# Patient Record
Sex: FEMALE | Race: BLACK OR AFRICAN AMERICAN | NOT HISPANIC OR LATINO | Employment: UNEMPLOYED | URBAN - METROPOLITAN AREA
[De-identification: names, ages, dates, MRNs, and addresses within clinical notes are randomized per-mention and may not be internally consistent; named-entity substitution may affect disease eponyms.]

---

## 2017-01-03 ENCOUNTER — TELEPHONE (OUTPATIENT)
Dept: INTERNAL MEDICINE | Facility: CLINIC | Age: 42
End: 2017-01-03

## 2017-01-03 DIAGNOSIS — M79.7 FIBROMYALGIA: Chronic | ICD-10-CM

## 2017-01-03 RX ORDER — HYDROCODONE BITARTRATE AND ACETAMINOPHEN 7.5; 325 MG/1; MG/1
1 TABLET ORAL EVERY 12 HOURS PRN
Qty: 60 TABLET | Refills: 0 | Status: SHIPPED | OUTPATIENT
Start: 2017-01-03 | End: 2017-02-06 | Stop reason: SDUPTHER

## 2017-01-03 NOTE — TELEPHONE ENCOUNTER
Sent refill on pain medication.  However needs appointment before next refill.  Cannot wait to appointment in April. Reschedule for end of this month or beginning of next month before runs out of med again

## 2017-01-03 NOTE — TELEPHONE ENCOUNTER
----- Message from Dina Hewitt sent at 1/3/2017  1:32 PM CST -----  Contact: pt  Pt is requesting an rx refill for pain medication. Pt uses ..  [facility] - [address] [address]  Phone: [phone] Fax: [phone]    Pt can be reached at [phone]

## 2017-01-19 ENCOUNTER — PATIENT OUTREACH (OUTPATIENT)
Dept: ADMINISTRATIVE | Facility: HOSPITAL | Age: 42
End: 2017-01-19

## 2017-02-06 ENCOUNTER — LAB VISIT (OUTPATIENT)
Dept: LAB | Facility: HOSPITAL | Age: 42
End: 2017-02-06
Attending: FAMILY MEDICINE
Payer: COMMERCIAL

## 2017-02-06 ENCOUNTER — OFFICE VISIT (OUTPATIENT)
Dept: INTERNAL MEDICINE | Facility: CLINIC | Age: 42
End: 2017-02-06
Payer: COMMERCIAL

## 2017-02-06 VITALS
BODY MASS INDEX: 35.37 KG/M2 | SYSTOLIC BLOOD PRESSURE: 136 MMHG | TEMPERATURE: 98 F | WEIGHT: 219.13 LBS | DIASTOLIC BLOOD PRESSURE: 88 MMHG

## 2017-02-06 DIAGNOSIS — E78.5 HYPERLIPIDEMIA ASSOCIATED WITH TYPE 2 DIABETES MELLITUS: Chronic | ICD-10-CM

## 2017-02-06 DIAGNOSIS — F41.9 ANXIETY: Chronic | ICD-10-CM

## 2017-02-06 DIAGNOSIS — E05.90 HYPERTHYROIDISM: ICD-10-CM

## 2017-02-06 DIAGNOSIS — Z23 NEED FOR TDAP VACCINATION: ICD-10-CM

## 2017-02-06 DIAGNOSIS — Z23 NEED FOR PNEUMOCOCCAL VACCINATION: ICD-10-CM

## 2017-02-06 DIAGNOSIS — M79.7 FIBROMYALGIA: Chronic | ICD-10-CM

## 2017-02-06 DIAGNOSIS — E11.69 HYPERLIPIDEMIA ASSOCIATED WITH TYPE 2 DIABETES MELLITUS: Chronic | ICD-10-CM

## 2017-02-06 DIAGNOSIS — E11.9 TYPE 2 DIABETES MELLITUS WITHOUT COMPLICATION, WITHOUT LONG-TERM CURRENT USE OF INSULIN: Primary | Chronic | ICD-10-CM

## 2017-02-06 DIAGNOSIS — E11.59 HYPERTENSION ASSOCIATED WITH DIABETES: Chronic | ICD-10-CM

## 2017-02-06 DIAGNOSIS — I15.2 HYPERTENSION ASSOCIATED WITH DIABETES: Chronic | ICD-10-CM

## 2017-02-06 DIAGNOSIS — J45.20 ASTHMA, INTERMITTENT, UNCOMPLICATED: ICD-10-CM

## 2017-02-06 DIAGNOSIS — E11.9 TYPE 2 DIABETES MELLITUS WITHOUT COMPLICATION, WITHOUT LONG-TERM CURRENT USE OF INSULIN: Chronic | ICD-10-CM

## 2017-02-06 PROCEDURE — 3060F POS MICROALBUMINURIA REV: CPT | Mod: S$GLB,,, | Performed by: FAMILY MEDICINE

## 2017-02-06 PROCEDURE — 2022F DILAT RTA XM EVC RTNOPTHY: CPT | Mod: S$GLB,,, | Performed by: FAMILY MEDICINE

## 2017-02-06 PROCEDURE — 90732 PPSV23 VACC 2 YRS+ SUBQ/IM: CPT | Mod: S$GLB,,, | Performed by: FAMILY MEDICINE

## 2017-02-06 PROCEDURE — 36415 COLL VENOUS BLD VENIPUNCTURE: CPT | Mod: PO

## 2017-02-06 PROCEDURE — 84443 ASSAY THYROID STIM HORMONE: CPT

## 2017-02-06 PROCEDURE — 90471 IMMUNIZATION ADMIN: CPT | Mod: S$GLB,,, | Performed by: FAMILY MEDICINE

## 2017-02-06 PROCEDURE — 90715 TDAP VACCINE 7 YRS/> IM: CPT | Mod: S$GLB,,, | Performed by: FAMILY MEDICINE

## 2017-02-06 PROCEDURE — 90472 IMMUNIZATION ADMIN EACH ADD: CPT | Mod: S$GLB,,, | Performed by: FAMILY MEDICINE

## 2017-02-06 PROCEDURE — 99999 PR PBB SHADOW E&M-EST. PATIENT-LVL III: CPT | Mod: PBBFAC,,, | Performed by: FAMILY MEDICINE

## 2017-02-06 PROCEDURE — 3075F SYST BP GE 130 - 139MM HG: CPT | Mod: S$GLB,,, | Performed by: FAMILY MEDICINE

## 2017-02-06 PROCEDURE — 84439 ASSAY OF FREE THYROXINE: CPT

## 2017-02-06 PROCEDURE — 83036 HEMOGLOBIN GLYCOSYLATED A1C: CPT

## 2017-02-06 PROCEDURE — 99215 OFFICE O/P EST HI 40 MIN: CPT | Mod: S$GLB,,, | Performed by: FAMILY MEDICINE

## 2017-02-06 PROCEDURE — 3079F DIAST BP 80-89 MM HG: CPT | Mod: S$GLB,,, | Performed by: FAMILY MEDICINE

## 2017-02-06 PROCEDURE — 3044F HG A1C LEVEL LT 7.0%: CPT | Mod: S$GLB,,, | Performed by: FAMILY MEDICINE

## 2017-02-06 RX ORDER — HYDROCODONE BITARTRATE AND ACETAMINOPHEN 7.5; 325 MG/1; MG/1
1 TABLET ORAL EVERY 12 HOURS PRN
Qty: 60 TABLET | Refills: 0 | Status: SHIPPED | OUTPATIENT
Start: 2017-02-06 | End: 2017-03-03 | Stop reason: SDUPTHER

## 2017-02-06 RX ORDER — PANTOPRAZOLE SODIUM 20 MG/1
1 TABLET, DELAYED RELEASE ORAL DAILY
Refills: 0 | COMMUNITY
Start: 2017-01-19 | End: 2017-07-24

## 2017-02-06 RX ORDER — PROPRANOLOL HYDROCHLORIDE 40 MG/1
40 TABLET ORAL 2 TIMES DAILY
COMMUNITY
Start: 2016-11-10 | End: 2017-11-22 | Stop reason: SDUPTHER

## 2017-02-06 RX ORDER — CLINDAMYCIN HYDROCHLORIDE 300 MG/1
1 CAPSULE ORAL 3 TIMES DAILY
Refills: 0 | COMMUNITY
Start: 2017-01-11 | End: 2017-07-24

## 2017-02-06 RX ORDER — METHIMAZOLE 10 MG/1
1 TABLET ORAL DAILY
Refills: 3 | COMMUNITY
Start: 2016-11-10 | End: 2017-07-28 | Stop reason: SDUPTHER

## 2017-02-06 NOTE — LETTER
February 6, 2017                 [facility] Internal Medicine  Internal Medicine  [address] [place] [address]  Phone: [phone]  Fax: [phone]   February 6, 2017     Patient: Shelly Kam   YOB: 1975   Date of Visit: 2/6/2017       To Whom it May Concern:    Shelly Kam was seen in my clinic on 2/6/2017. She may return to work on 2/7/2017.    If you have any questions or concerns, please don't hesitate to call.    Sincerely,         Heraclio Farrell MD/Rakel Godoy LPN

## 2017-02-06 NOTE — PROGRESS NOTES
Subjective:   Patient ID: Shelly Kam is a 42 y.o. female.  Chief Complaint:  Medication Refill    HPI Comments: Patient presents for follow-up on diabetes.  A1c 5.8 in October.  Microalbumin negative.  Controlled on metformin.  Needs eye exam.  Hypertension stable.  Negative cardiac review of symptoms.  Hyperlipidemia controlled with LDL 77 in October.  Fibromyalgia and anxiety stable on present regimen of Celexa daily with hydrocodone twice a day.  In October, new diagnosis hyperthyroidism.  Saw Dr. Jonathon CLAYTON at [facility].  Started on propanolol for symptoms and Tapazole.  No follow-up visit to repeat labs since.  No other new issues or concerns today.  Also needs pneumonia and tetanus updated.  Needs GYN referral, but declines at this time.     Review of Systems   Constitutional: Positive for fatigue. Negative for chills, diaphoresis and fever.   HENT: Positive for sore throat, trouble swallowing and voice change. Negative for congestion, dental problem, ear discharge, ear pain, facial swelling, mouth sores, nosebleeds, postnasal drip, rhinorrhea and sinus pressure.    Eyes: Negative for visual disturbance.   Respiratory: Negative for cough, chest tightness, shortness of breath and wheezing.    Cardiovascular: Negative for chest pain, palpitations and leg swelling.   Gastrointestinal: Negative for abdominal distention, abdominal pain, constipation, diarrhea, nausea and vomiting.   Endocrine: Negative for polydipsia, polyphagia and polyuria.   Genitourinary: Negative for difficulty urinating, dysuria, flank pain, frequency, hematuria, pelvic pain and urgency.   Musculoskeletal: Positive for myalgias. Negative for back pain and neck pain.   Skin: Negative for rash.   Neurological: Negative for dizziness, tremors, syncope, weakness, light-headedness, numbness and headaches.   Psychiatric/Behavioral: Negative for agitation, behavioral problems, confusion, decreased concentration, dysphoric mood,  hallucinations, self-injury, sleep disturbance and suicidal ideas. The patient is not nervous/anxious and is not hyperactive.      Objective:     Visit Vitals    /88    Temp 98.2 °F (36.8 °C) (Tympanic)    Wt 99.4 kg (219 lb 2.2 oz)    LMP 02/02/2017    BMI 35.37 kg/m2     Physical Exam   Constitutional: She is oriented to person, place, and time. Vital signs are normal. She appears well-developed and well-nourished. She is cooperative. No distress.   Eyes: Conjunctivae are normal. Right conjunctiva is not injected. Left conjunctiva is not injected. No scleral icterus.   Neck: No JVD present. Carotid bruit is not present. No thyroid mass and no thyromegaly present.   Healed but tender tracheostomy scar midline neck   Cardiovascular: Normal rate, regular rhythm and normal heart sounds.  Exam reveals no gallop and no friction rub.    No murmur heard.  Pulmonary/Chest: Effort normal and breath sounds normal. She has no wheezes. She has no rhonchi. She has no rales.   Abdominal: Soft. She exhibits no distension. There is no hepatosplenomegaly. There is no tenderness. There is no rebound, no guarding and no CVA tenderness.   Musculoskeletal: She exhibits no edema.   Neurological: She is alert and oriented to person, place, and time. She displays a negative Romberg sign. Coordination and gait normal.   Skin: Skin is warm and dry. No rash noted.   Psychiatric: She has a normal mood and affect. Her behavior is normal. Judgment and thought content normal. Her mood appears not anxious. Her affect is not angry, not blunt, not labile and not inappropriate. Her speech is not rapid and/or pressured, not delayed, not tangential and not slurred. She is not agitated, not aggressive, not hyperactive, not slowed, not withdrawn, not actively hallucinating and not combative. Thought content is not paranoid and not delusional. Cognition and memory are not impaired. She does not exhibit a depressed mood. She expresses no  homicidal and no suicidal ideation. She is communicative. She exhibits normal recent memory and normal remote memory. She is attentive.   Nursing note and vitals reviewed.    Assessment:     1. Type 2 diabetes mellitus without complication, without long-term current use of insulin    2. Hypertension associated with diabetes    3. Hyperlipidemia associated with type 2 diabetes mellitus    4. Fibromyalgia    5. Anxiety    6. Asthma, intermittent, uncomplicated    7. Hyperthyroidism    8. Need for pneumococcal vaccination    9. Need for Tdap vaccination      Plan:   Type 2 diabetes mellitus without complication, without long-term current use of insulin  -     Hemoglobin A1c; Future; Expected date: 2/6/17  -     Ambulatory referral to Ophthalmology  Adjust metformin as needed.  Micro albumin up-to-date and negative.    Hypertension associated with diabetes  BP at goal.  Controlled.  Continue present medications.    Hyperlipidemia associated with type 2 diabetes mellitus  LDL less than 100.  At goal.  Continue present dose of pravastatin.    Fibromyalgia  -     hydrocodone-acetaminophen 7.5-325mg (NORCO) 7.5-325 mg per tablet; Take 1 tablet by mouth every 12 (twelve) hours as needed for Pain.  Dispense: 60 tablet; Refill: 0  Pain controlled present regimen.  Continue with 60 pills per month.  Okay to refill ×3.  Reassess in 3 months.    Anxiety  Stable on Celexa.    Hyperthyroidism  -     T4, free; Future; Expected date: 2/6/17  -     TSH; Future; Expected date: 2/6/17  Questionable levels.  On Tapazole and propranolol per Dr. Jonathon CLAYTON.  No follow-up visit to date.  Labs as above.  Additional referral/evaluation as needed.    RHM  -     Pneumococcal Polysaccharide Vaccine (23 Valent) (SQ/IM)  -     Tdap Vaccine (Adult)  Declines GYN referral    RTC 3 months

## 2017-02-06 NOTE — MR AVS SNAPSHOT
[facility] Internal Medicine  [address] [place]  [facility] [address]  Phone: [phone]  Fax: [phone]                  Shelly Kam   2017 9:20 AM   Office Visit    Description:  Female : 1975   Provider:  Heraclio Farrell MD   Department:  [facility] - Internal Medicine           Reason for Visit     Medication Refill           Diagnoses this Visit        Comments    Type 2 diabetes mellitus without complication, without long-term current use of insulin    -  Primary     Hypertension associated with diabetes         Hyperlipidemia associated with type 2 diabetes mellitus         Fibromyalgia         Anxiety         Asthma, intermittent, uncomplicated         Hyperthyroidism         Need for pneumococcal vaccination         Need for Tdap vaccination                To Do List           Future Appointments        Provider Department Dept Phone    2017 11:30 AM LAB, SAME DAY [facility] [phone]    2017 9:00 AM Heraclio Farrell MD [facility] Internal [facility] [phone]      Goals (5 Years of Data)     None      Follow-Up and Disposition     Return in about 3 months (around 2017).       These Medications        Disp Refills Start End    hydrocodone-acetaminophen 7.5-325mg (NORCO) 7.5-325 mg per tablet 60 tablet 0 2017     Take 1 tablet by mouth every 12 (twelve) hours as needed for Pain. - Oral    Pharmacy: [place] - [address] #: [phone]         [facility] On Call     [facility] On Call Nurse Care Line -  Assistance  Registered nurses in the [facility] On Call Center provide clinical advisement, health education, appointment booking, and other advisory services.  Call for this free service at [phone].             Medications           Message regarding Medications     Verify the changes and/or additions to your medication regime listed below are the same as discussed with your  clinician today.  If any of these changes or additions are incorrect, please notify your healthcare provider.        STOP taking these medications     omeprazole (PRILOSEC) 40 MG capsule Take 1 capsule (40 mg total) by mouth before dinner.           Verify that the below list of medications is an accurate representation of the medications you are currently taking.  If none reported, the list may be blank. If incorrect, please contact your healthcare provider. Carry this list with you in case of emergency.           Current Medications     albuterol 90 mcg/actuation inhaler Inhale 2 puffs into the lungs every 6 (six) hours as needed for Wheezing.    amlodipine (NORVASC) 10 MG tablet Take 1 tablet (10 mg total) by mouth once daily.    citalopram (CELEXA) 20 MG tablet Take 1 tablet (20 mg total) by mouth once daily.    clindamycin (CLEOCIN) 300 MG capsule Take 1 capsule by mouth 3 (three) times daily.    hydrocodone-acetaminophen 7.5-325mg (NORCO) 7.5-325 mg per tablet Take 1 tablet by mouth every 12 (twelve) hours as needed for Pain.    metformin (GLUMETZA) 1000 MG (MOD) 24 hr tablet Take 1 tablet (1,000 mg total) by mouth daily with breakfast.    methimazole (TAPAZOLE) 10 MG Tab Take 1 tablet by mouth once daily.    pravastatin (PRAVACHOL) 10 MG tablet Take 1 tablet (10 mg total) by mouth once daily.    propranolol (INDERAL) 40 MG tablet Take 40 mg by mouth 3 (three) times daily.    albuterol (ACCUNEB) 0.63 mg/3 mL Nebu Take 3 mLs (0.63 mg total) by nebulization every 6 (six) hours as needed.    pantoprazole (PROTONIX) 20 MG tablet Take 1 tablet by mouth once daily.           Clinical Reference Information           Your Vitals Were     BP Temp Weight Last Period BMI    136/88 98.2 °F (36.8 °C) (Tympanic) 99.4 kg (219 lb 2.2 oz) 02/02/2017 35.37 kg/m2      Blood Pressure          Most Recent Value    BP  136/88      Allergies as of 2/6/2017     Pcn [Penicillins]    Morphine      Immunizations Administered on Date of  Encounter - 2/6/2017     Name Date Dose VIS Date Route    Pneumococcal Polysaccharide - 23 Valent  Incomplete 0.5 mL 4/24/2015 Intramuscular    TDAP  Incomplete 0.5 mL 2/24/2015 Intramuscular      Orders Placed During Today's Visit      Normal Orders This Visit    Ambulatory referral to Ophthalmology     Pneumococcal Polysaccharide Vaccine (23 Valent) (SQ/IM)     Tdap Vaccine (Adult)     Future Labs/Procedures Expected by Expires    Hemoglobin A1c  2/6/2017 5/7/2017    T4, free  2/6/2017 5/7/2017    TSH  2/6/2017 4/7/2018      Language Assistance Services     ATTENTION: Language assistance services are available, free of charge. Please call [phone].      ATENCIÓN: Si habla shmuel, tiene a lehman disposición servicios gratuitos de asistencia lingüística. Llame al [phone].     CHÚ Ý: N?u b?n nói Ti?ng Vi?t, có các d?ch v? h? tr? ngôn ng? mi?n phí dành cho b?n. G?i s? [phone].         [facility] - Internal Medicine complies with applicable Federal civil rights laws and does not discriminate on the basis of race, color, national origin, age, disability, or sex.

## 2017-02-07 ENCOUNTER — TELEPHONE (OUTPATIENT)
Dept: INTERNAL MEDICINE | Facility: CLINIC | Age: 42
End: 2017-02-07

## 2017-02-07 LAB
ESTIMATED AVG GLUCOSE: 126 MG/DL
HBA1C MFR BLD HPLC: 6 %
T4 FREE SERPL-MCNC: 1.24 NG/DL
TSH SERPL DL<=0.005 MIU/L-ACNC: <0.01 UIU/ML

## 2017-02-07 NOTE — TELEPHONE ENCOUNTER
----- Message from Heraclio Farrell MD sent at 2/7/2017  2:09 PM CST -----  a1C good Prediabetes control. No med changes needed    Circulating thyroid levels normal, but TSH still suppressed.  Follow up with Endo but probably only needs to continue Tapazole, no RA iodine needed

## 2017-02-15 ENCOUNTER — TELEPHONE (OUTPATIENT)
Dept: INTERNAL MEDICINE | Facility: CLINIC | Age: 42
End: 2017-02-15

## 2017-02-15 NOTE — LETTER
February 15, 2017    Shelly Kam  [address]  [place] [address]             [facility] Internal Medicine  [address]  [place] [address]  Phone: [phone]  Fax: [phone] Dear Shelly Kam      Please contact office in regards to your test results.    Sincerely,        Heraclio Farrell MD/Rakel Godoy LPN

## 2017-03-03 ENCOUNTER — TELEPHONE (OUTPATIENT)
Dept: INTERNAL MEDICINE | Facility: CLINIC | Age: 42
End: 2017-03-03

## 2017-03-03 DIAGNOSIS — M79.7 FIBROMYALGIA: Chronic | ICD-10-CM

## 2017-03-03 RX ORDER — HYDROCODONE BITARTRATE AND ACETAMINOPHEN 7.5; 325 MG/1; MG/1
1 TABLET ORAL EVERY 12 HOURS PRN
Qty: 60 TABLET | Refills: 0 | Status: SHIPPED | OUTPATIENT
Start: 2017-03-03 | End: 2017-03-31 | Stop reason: SDUPTHER

## 2017-03-03 NOTE — TELEPHONE ENCOUNTER
Sent refill of pain medication.  Please let her know we except some but not all of at the insurance plans.  If its at medicaid product, she will need to follow-up/see primary care physician on her card.  I can refill her medications for the next 3 months while she gets this sorted out.

## 2017-03-03 NOTE — TELEPHONE ENCOUNTER
S/w pt. Pt stated that she needs refill for her pain medication sent into [facility] on [address]. Pt also stated that she no longer has insurance and hasn't spoke with the Endocrinologist. Pt will be getting [employer] insurance soon and will call back to get a new referral for Endocrinology. Told pt that I would discuss with Dr. Farrell and return her call. Pt verbalized understanding

## 2017-03-31 ENCOUNTER — TELEPHONE (OUTPATIENT)
Dept: INTERNAL MEDICINE | Facility: CLINIC | Age: 42
End: 2017-03-31

## 2017-03-31 DIAGNOSIS — M79.7 FIBROMYALGIA: Chronic | ICD-10-CM

## 2017-03-31 RX ORDER — HYDROCODONE BITARTRATE AND ACETAMINOPHEN 7.5; 325 MG/1; MG/1
1 TABLET ORAL EVERY 12 HOURS PRN
Qty: 60 TABLET | Refills: 0 | Status: SHIPPED | OUTPATIENT
Start: 2017-03-31 | End: 2017-05-01 | Stop reason: SDUPTHER

## 2017-03-31 NOTE — TELEPHONE ENCOUNTER
----- Message from Manju Jensen sent at 3/31/2017 12:30 PM CDT -----  Contact: Patient  Patient is requesting a refill on Hydrocodone-[facility]. Please call patient back if needed at [phone]. Thank you

## 2017-04-24 ENCOUNTER — PATIENT OUTREACH (OUTPATIENT)
Dept: ADMINISTRATIVE | Facility: HOSPITAL | Age: 42
End: 2017-04-24

## 2017-05-01 DIAGNOSIS — M79.7 FIBROMYALGIA: Chronic | ICD-10-CM

## 2017-05-01 RX ORDER — HYDROCODONE BITARTRATE AND ACETAMINOPHEN 7.5; 325 MG/1; MG/1
1 TABLET ORAL EVERY 12 HOURS PRN
Qty: 60 TABLET | Refills: 0 | Status: SHIPPED | OUTPATIENT
Start: 2017-05-01 | End: 2017-06-02 | Stop reason: SDUPTHER

## 2017-05-01 NOTE — TELEPHONE ENCOUNTER
----- Message from Izabella Valentin sent at 5/1/2017 12:24 PM CDT -----  Pt at [phone]//states she is needing a refill of her pain med//Hydrocodone//uses//[facility]/[address]//please call//yanci/adwoa

## 2017-06-02 ENCOUNTER — TELEPHONE (OUTPATIENT)
Dept: INTERNAL MEDICINE | Facility: CLINIC | Age: 42
End: 2017-06-02

## 2017-06-02 DIAGNOSIS — M79.7 FIBROMYALGIA: Chronic | ICD-10-CM

## 2017-06-02 RX ORDER — HYDROCODONE BITARTRATE AND ACETAMINOPHEN 7.5; 325 MG/1; MG/1
1 TABLET ORAL EVERY 12 HOURS PRN
Qty: 30 TABLET | Refills: 0 | Status: SHIPPED | OUTPATIENT
Start: 2017-06-02 | End: 2017-06-14 | Stop reason: SDUPTHER

## 2017-06-02 NOTE — TELEPHONE ENCOUNTER
----- Message from Lupis Saldaña sent at 6/2/2017 12:39 PM CDT -----  Contact: Patient  Patient called to request a refill on:    1. Hydrocodone    [id number] - [place] - [address] AT [address] [facility]  [address] [address]  Phone: [phone] Fax: [phone]    She can be contacted at [phone].    Thanks,  Lupis

## 2017-06-03 ENCOUNTER — NURSE TRIAGE (OUTPATIENT)
Dept: ADMINISTRATIVE | Facility: CLINIC | Age: 42
End: 2017-06-03

## 2017-06-05 ENCOUNTER — TELEPHONE (OUTPATIENT)
Dept: INTERNAL MEDICINE | Facility: CLINIC | Age: 42
End: 2017-06-05

## 2017-06-05 NOTE — TELEPHONE ENCOUNTER
S/w pt. Pt stated that she picked up rx for her Norco but it was only for 30 pills, but should have been for 60 pills. Pt already filled rx but would like to know what she needs to do once she runs out since she takes it twice daily. Told pt that I would discuss with Dr. Farrell and return her call. Pt verbalized understanding.

## 2017-06-05 NOTE — TELEPHONE ENCOUNTER
----- Message from Gwendolyn Valentin sent at 6/5/2017 10:13 AM CDT -----  Contact: pt  Please call pt @ [phone] regarding hydrocodone script she picked up today from Dr. Mendoza pt states the quantity is incorrect, pt states she takes meds twice a day.

## 2017-06-06 NOTE — TELEPHONE ENCOUNTER
MD Rakel Regalado LPN   Caller: Unspecified (Yesterday, 11:07 AM)             Her last visit was in February. The next refill will be the last I can give her without a follow up visit.

## 2017-06-06 NOTE — TELEPHONE ENCOUNTER
MD Rakel Regalado LPN   Caller: Unspecified (Yesterday, 11:07 AM)             Prescribed on June 2nd. Should last 15 days until June 17th   Call on or after June 14 th for refill.   Will prescribe 60 pills then, and get back on her 1 month regimin

## 2017-06-14 DIAGNOSIS — M79.7 FIBROMYALGIA: Chronic | ICD-10-CM

## 2017-06-14 NOTE — TELEPHONE ENCOUNTER
S/w pt. Pt stated that she is completely out of [place] and needs a refill. Pt was given 30 pills on 6/2/17 but she normally gets 60 tablets. Per telephone note on 6/5/17 pt was instructed to call clinic back and refill would be sent in for 60 tablets to get her back on a 1 month regimen. Told pt that I would discuss with on call doctor and return her call. Pt verbalized understanding and would like rx sent directly to pharmacy.

## 2017-06-14 NOTE — TELEPHONE ENCOUNTER
----- Message from Desmond Arroyo sent at 6/14/2017 12:35 PM CDT -----  Contact: Pt  Pt request refill on Hydrocodone,...    [id number] - [place] - [address] AT [facility]  [address] [address]  Phone: [phone] Fax: [phone]     please contact pt at [phone]

## 2017-06-15 ENCOUNTER — TELEPHONE (OUTPATIENT)
Dept: INTERNAL MEDICINE | Facility: CLINIC | Age: 42
End: 2017-06-15

## 2017-06-15 RX ORDER — HYDROCODONE BITARTRATE AND ACETAMINOPHEN 7.5; 325 MG/1; MG/1
1 TABLET ORAL EVERY 12 HOURS PRN
Qty: 60 TABLET | Refills: 0 | Status: SHIPPED | OUTPATIENT
Start: 2017-06-15 | End: 2017-07-18 | Stop reason: SDUPTHER

## 2017-06-15 NOTE — TELEPHONE ENCOUNTER
----- Message from Adeline Wilson sent at 6/15/2017 11:46 AM CDT -----  Contact: pt   Pt did call yesterday about medication and is calling back again,,, please call pt back at [phone]

## 2017-06-21 ENCOUNTER — PATIENT OUTREACH (OUTPATIENT)
Dept: ADMINISTRATIVE | Facility: HOSPITAL | Age: 42
End: 2017-06-21

## 2017-06-29 ENCOUNTER — PATIENT OUTREACH (OUTPATIENT)
Dept: ADMINISTRATIVE | Facility: HOSPITAL | Age: 42
End: 2017-06-29

## 2017-07-17 ENCOUNTER — TELEPHONE (OUTPATIENT)
Dept: INTERNAL MEDICINE | Facility: CLINIC | Age: 42
End: 2017-07-17

## 2017-07-17 NOTE — TELEPHONE ENCOUNTER
Per last refill/telephone call on 6/5/17 pt was to follow up for any additional refills. Pt had appt scheduled for 7/5/17 but appt was canceled. Called pt, left message informing that she needs to be seen.

## 2017-07-17 NOTE — TELEPHONE ENCOUNTER
----- Message from Amaury Roth sent at 7/17/2017  4:27 PM CDT -----  Contact: Pt   Pt needs two medication refilled see below:    ..1. What is the name of the medication you are requesting? Hydrocodone  2. What is the dose? 7.5 mgs   3. How do you take the medication? Orally, topically, etc? Orally   4. How often do you take this medication? Twice a day   5. Do you need a 30 day or 90 day supply? 30 days   6. How many refills are you requesting? 1  7. What is your preferred pharmacy and location of the pharmacy?   ..  [facility] [place], [place] [address] [facility] & [address] [address]  Phone: [phone] Fax: [phone]      8. Who can we contact with further questions? ..[phone] (home)       ..1. What is the name of the medication you are requesting? amlodipine  2. What is the dose? 10 mgs   3. How do you take the medication? Orally, topically, etc? Orally   4. How often do you take this medication? Once a day   5. Do you need a 30 day or 90 day supply? 30  6. How many refills are you requesting? 1  7. What is your preferred pharmacy and location of the pharmacy?   ..  [facility] [address] [place], [place] [address] [address] & [address] [address]  Phone: [phone] Fax: [phone]      8. Who can we contact with further questions?.[phone]

## 2017-07-18 ENCOUNTER — TELEPHONE (OUTPATIENT)
Dept: INTERNAL MEDICINE | Facility: CLINIC | Age: 42
End: 2017-07-18

## 2017-07-18 DIAGNOSIS — M79.7 FIBROMYALGIA: Chronic | ICD-10-CM

## 2017-07-18 RX ORDER — HYDROCODONE BITARTRATE AND ACETAMINOPHEN 7.5; 325 MG/1; MG/1
1 TABLET ORAL EVERY 12 HOURS PRN
Qty: 14 TABLET | Refills: 0 | Status: SHIPPED | OUTPATIENT
Start: 2017-07-18 | End: 2017-07-24 | Stop reason: SDUPTHER

## 2017-07-18 RX ORDER — AMLODIPINE BESYLATE 10 MG/1
10 TABLET ORAL DAILY
Qty: 90 TABLET | Refills: 0 | Status: SHIPPED | OUTPATIENT
Start: 2017-07-18 | End: 2017-10-25 | Stop reason: SDUPTHER

## 2017-07-18 NOTE — TELEPHONE ENCOUNTER
----- Message from Eliazar Clovis sent at 7/18/2017 10:01 AM CDT -----  Contact: pt  1. What is the name of the medication you are requesting? Hydrocodone & Amalodopine  2. What is the dose? 7.5 mg & 10 mg  3. How do you take the medication? Orally, topically, etc? Orally  4. How often do you take this medication? Twice daily 7 once daily  5. Do you need a 30 day or 90 day supply? 30  6. How many refills are you requesting? 1  7. What is your preferred pharmacy and location of the pharmacy? [facility] on [facility] [address] [address] [facility]  8. Who can we contact with further questions? [phone] (aqlu)

## 2017-07-18 NOTE — TELEPHONE ENCOUNTER
S/w pt. Pt stated that she was originally scheduled for a follow up on 7/5/17  But it had to be canceled because Dr. Farrell was out of clinic. Pt stated that she is out of her medication and needs refills. Pt does have follow up appt scheduled for 7/24/17 @ 11:20am. Told pt that I would discuss with on call provider and return her call. Pt verbalized understanding.

## 2017-07-24 ENCOUNTER — OFFICE VISIT (OUTPATIENT)
Dept: INTERNAL MEDICINE | Facility: CLINIC | Age: 42
End: 2017-07-24
Payer: COMMERCIAL

## 2017-07-24 ENCOUNTER — LAB VISIT (OUTPATIENT)
Dept: LAB | Facility: HOSPITAL | Age: 42
End: 2017-07-24
Attending: INTERNAL MEDICINE
Payer: COMMERCIAL

## 2017-07-24 ENCOUNTER — OFFICE VISIT (OUTPATIENT)
Dept: OPHTHALMOLOGY | Facility: CLINIC | Age: 42
End: 2017-07-24
Payer: COMMERCIAL

## 2017-07-24 ENCOUNTER — OFFICE VISIT (OUTPATIENT)
Dept: ENDOCRINOLOGY | Facility: CLINIC | Age: 42
End: 2017-07-24
Payer: COMMERCIAL

## 2017-07-24 VITALS
TEMPERATURE: 98 F | WEIGHT: 224 LBS | HEIGHT: 65 IN | SYSTOLIC BLOOD PRESSURE: 132 MMHG | DIASTOLIC BLOOD PRESSURE: 74 MMHG | BODY MASS INDEX: 37.32 KG/M2 | HEART RATE: 80 BPM

## 2017-07-24 VITALS
DIASTOLIC BLOOD PRESSURE: 70 MMHG | HEART RATE: 76 BPM | SYSTOLIC BLOOD PRESSURE: 110 MMHG | OXYGEN SATURATION: 99 % | WEIGHT: 224.44 LBS | BODY MASS INDEX: 37.39 KG/M2 | HEIGHT: 65 IN | TEMPERATURE: 98 F

## 2017-07-24 DIAGNOSIS — E11.9 TYPE 2 DIABETES MELLITUS WITHOUT COMPLICATION, WITHOUT LONG-TERM CURRENT USE OF INSULIN: ICD-10-CM

## 2017-07-24 DIAGNOSIS — M79.7 FIBROMYALGIA: Primary | ICD-10-CM

## 2017-07-24 DIAGNOSIS — E05.90 HYPERTHYROIDISM: Primary | ICD-10-CM

## 2017-07-24 DIAGNOSIS — E05.90 HYPERTHYROIDISM: ICD-10-CM

## 2017-07-24 DIAGNOSIS — H52.13 MYOPIA WITH ASTIGMATISM AND PRESBYOPIA, BILATERAL: ICD-10-CM

## 2017-07-24 DIAGNOSIS — K52.9 GASTROENTERITIS, ACUTE: ICD-10-CM

## 2017-07-24 DIAGNOSIS — E11.69 HYPERLIPIDEMIA ASSOCIATED WITH TYPE 2 DIABETES MELLITUS: ICD-10-CM

## 2017-07-24 DIAGNOSIS — E78.5 HYPERLIPIDEMIA ASSOCIATED WITH TYPE 2 DIABETES MELLITUS: ICD-10-CM

## 2017-07-24 DIAGNOSIS — H52.203 MYOPIA WITH ASTIGMATISM AND PRESBYOPIA, BILATERAL: ICD-10-CM

## 2017-07-24 DIAGNOSIS — H52.31 ANISOMETROPIA: ICD-10-CM

## 2017-07-24 DIAGNOSIS — H52.4 MYOPIA WITH ASTIGMATISM AND PRESBYOPIA, BILATERAL: ICD-10-CM

## 2017-07-24 DIAGNOSIS — I15.2 HYPERTENSION ASSOCIATED WITH DIABETES: ICD-10-CM

## 2017-07-24 DIAGNOSIS — E11.59 HYPERTENSION ASSOCIATED WITH DIABETES: ICD-10-CM

## 2017-07-24 DIAGNOSIS — E11.9 TYPE 2 DIABETES MELLITUS WITHOUT RETINOPATHY: Primary | ICD-10-CM

## 2017-07-24 LAB
ALBUMIN SERPL BCP-MCNC: 3.4 G/DL
ALP SERPL-CCNC: 91 U/L
ALT SERPL W/O P-5'-P-CCNC: 11 U/L
ANION GAP SERPL CALC-SCNC: 8 MMOL/L
AST SERPL-CCNC: 13 U/L
BASOPHILS # BLD AUTO: 0.04 K/UL
BASOPHILS NFR BLD: 0.6 %
BILIRUB SERPL-MCNC: 0.1 MG/DL
BUN SERPL-MCNC: 10 MG/DL
CALCIUM SERPL-MCNC: 9 MG/DL
CHLORIDE SERPL-SCNC: 105 MMOL/L
CO2 SERPL-SCNC: 26 MMOL/L
CREAT SERPL-MCNC: 0.7 MG/DL
DIFFERENTIAL METHOD: ABNORMAL
EOSINOPHIL # BLD AUTO: 0.5 K/UL
EOSINOPHIL NFR BLD: 7 %
ERYTHROCYTE [DISTWIDTH] IN BLOOD BY AUTOMATED COUNT: 14.9 %
EST. GFR  (AFRICAN AMERICAN): >60 ML/MIN/1.73 M^2
EST. GFR  (NON AFRICAN AMERICAN): >60 ML/MIN/1.73 M^2
GLUCOSE SERPL-MCNC: 79 MG/DL
HCT VFR BLD AUTO: 38.1 %
HGB BLD-MCNC: 12.1 G/DL
LYMPHOCYTES # BLD AUTO: 3.2 K/UL
LYMPHOCYTES NFR BLD: 45.2 %
MCH RBC QN AUTO: 24.4 PG
MCHC RBC AUTO-ENTMCNC: 31.8 G/DL
MCV RBC AUTO: 77 FL
MONOCYTES # BLD AUTO: 0.4 K/UL
MONOCYTES NFR BLD: 6.3 %
NEUTROPHILS # BLD AUTO: 2.9 K/UL
NEUTROPHILS NFR BLD: 40.9 %
PLATELET # BLD AUTO: 244 K/UL
PMV BLD AUTO: 12.1 FL
POTASSIUM SERPL-SCNC: 3.9 MMOL/L
PROT SERPL-MCNC: 7.2 G/DL
RBC # BLD AUTO: 4.95 M/UL
SODIUM SERPL-SCNC: 139 MMOL/L
T3FREE SERPL-MCNC: 5.1 PG/ML
T4 FREE SERPL-MCNC: 1.42 NG/DL
TSH SERPL DL<=0.005 MIU/L-ACNC: <0.01 UIU/ML
WBC # BLD AUTO: 7.03 K/UL

## 2017-07-24 PROCEDURE — 92004 COMPRE OPH EXAM NEW PT 1/>: CPT | Mod: S$GLB,,, | Performed by: OPTOMETRIST

## 2017-07-24 PROCEDURE — 99999 PR PBB SHADOW E&M-EST. PATIENT-LVL IV: CPT | Mod: PBBFAC,,, | Performed by: FAMILY MEDICINE

## 2017-07-24 PROCEDURE — 99999 PR PBB SHADOW E&M-EST. PATIENT-LVL III: CPT | Mod: PBBFAC,,, | Performed by: INTERNAL MEDICINE

## 2017-07-24 PROCEDURE — 99999 PR PBB SHADOW E&M-EST. PATIENT-LVL I: CPT | Mod: PBBFAC,,, | Performed by: OPTOMETRIST

## 2017-07-24 PROCEDURE — 36415 COLL VENOUS BLD VENIPUNCTURE: CPT | Mod: PO

## 2017-07-24 PROCEDURE — 84439 ASSAY OF FREE THYROXINE: CPT

## 2017-07-24 PROCEDURE — 85025 COMPLETE CBC W/AUTO DIFF WBC: CPT

## 2017-07-24 PROCEDURE — 84443 ASSAY THYROID STIM HORMONE: CPT

## 2017-07-24 PROCEDURE — 84481 FREE ASSAY (FT-3): CPT

## 2017-07-24 PROCEDURE — 99204 OFFICE O/P NEW MOD 45 MIN: CPT | Mod: S$GLB,,, | Performed by: INTERNAL MEDICINE

## 2017-07-24 PROCEDURE — 99214 OFFICE O/P EST MOD 30 MIN: CPT | Mod: S$GLB,,, | Performed by: FAMILY MEDICINE

## 2017-07-24 PROCEDURE — 92015 DETERMINE REFRACTIVE STATE: CPT | Mod: S$GLB,,, | Performed by: OPTOMETRIST

## 2017-07-24 PROCEDURE — 80053 COMPREHEN METABOLIC PANEL: CPT

## 2017-07-24 PROCEDURE — 3044F HG A1C LEVEL LT 7.0%: CPT | Mod: S$GLB,,, | Performed by: FAMILY MEDICINE

## 2017-07-24 RX ORDER — PRAVASTATIN SODIUM 10 MG/1
10 TABLET ORAL DAILY
Qty: 90 TABLET | Refills: 3 | Status: SHIPPED | OUTPATIENT
Start: 2017-07-24 | End: 2017-11-27 | Stop reason: SDUPTHER

## 2017-07-24 RX ORDER — HYDROCODONE BITARTRATE AND ACETAMINOPHEN 7.5; 325 MG/1; MG/1
1 TABLET ORAL EVERY 12 HOURS PRN
Qty: 60 TABLET | Refills: 0 | Status: SHIPPED | OUTPATIENT
Start: 2017-07-24 | End: 2017-08-14 | Stop reason: SDUPTHER

## 2017-07-24 RX ORDER — METFORMIN HYDROCHLORIDE 1000 MG/1
1000 TABLET, FILM COATED, EXTENDED RELEASE ORAL
Qty: 90 TABLET | Refills: 3 | Status: SHIPPED | OUTPATIENT
Start: 2017-07-24 | End: 2017-08-31

## 2017-07-24 NOTE — LETTER
July 24, 2017      [facility] Internal Medicine  [address] [place] [address]  Phone: [phone]  Fax: [phone]       Patient: Shelly Kam   YOB: 1975  Date of Visit: 07/24/2017    To Whom It May Concern:    Shelly Shukla was at [facility] on 07/24/2017. She may return to work on 07/26/2017 with no restrictions. If you have any questions or concerns, or if I can be of further assistance, please do not hesitate to contact me.    Sincerely,    Heraclio Farrell MD

## 2017-07-24 NOTE — PROGRESS NOTES
HPI     Diabetic Eye Exam    Additional comments: Yearly           Comments   NP to [facility]. Last eye exam was 1 year ago.  HPI    Any vision changes since last exam: No  Eye pain: No  Other ocular symptoms: Itching and irritation    Do you wear currently wear glasses or contacts? Glasses    Interested in contacts today? No    Do you plan on getting new glasses today? Yes  Diabetic eye exam  Diagnosed with diabetes in 2015  Recent vision fluctuations No  Blood sugar: Unsure           Last edited by Chelsea Hewitt, PCT on 7/24/2017  2:34 PM. (History)              Assessment /Plan     For exam results, see Encounter Report.    Type 2 diabetes mellitus without retinopathy  No diabetic retinopathy in either eye today. Reviewed importance of yearly dilated eye exams. Continue close care with PCP regarding diabetes.    Myopia with astigmatism and presbyopia, bilateral  Anisometropia  Eyeglass Final Rx     Eyeglass Final Rx       Sphere Cylinder Axis Dist VA Add    Right -6.00 +4.00 115 20/20-2 +1.25    Left -5.00 +1.25 072 20/20 +1.25    Expiration Date:  7/25/2018                  RTC 1 yr for dilated eye exam or PRN if any problems.   Discussed above and answered questions.  PRN for cls fitting

## 2017-07-24 NOTE — LETTER
July 27, 2017      Heraclio Farrell MD  [address]  [place] [address]           [facility] - Endocrinology  [address]  [address]  [place] [address]  Phone: [phone]  Fax: [phone]          Patient: Shelly Kam   MR Number: [id number]   YOB: 1975   Date of Visit: 7/24/2017       Dear Dr. Heraclio Farrell:    Thank you for referring Shelly Kam to me for evaluation. Attached you will find relevant portions of my assessment and plan of care.    If you have questions, please do not hesitate to call me. I look forward to following Shelly Kam along with you.    Sincerely,    Gurdeep Estes  CC:  No Recipients    If you would like to receive this communication electronically, please contact [email or web address] or [phone] to request more information on Your [employer] Link access.    For providers and/or their staff who would like to refer a patient to [facility], please contact us through our one-stop-shop provider referral line, [facility], at [phone].    If you feel you have received this communication in error or would no longer like to receive these types of communications, please e-mail [email or web address]

## 2017-07-24 NOTE — LETTER
July 24, 2017      Heraclio Farrell MD  [address] [facility] [address]  [place] [address]           [facility]  [address] [place] [address]  Phone: [phone]  Fax: [phone]          Patient: Shelly Kam   MR Number: [id number]   YOB: 1975   Date of Visit: 7/24/2017       Dear Dr. Heraclio Farrell:    Thank you for referring Shelly Kam to me for evaluation. Attached you will find relevant portions of my assessment and plan of care.    If you have questions, please do not hesitate to call me. I look forward to following Shelly Kam along with you.    Sincerely,    Vern Koo, OD    Enclosure  CC:  No Recipients    If you would like to receive this communication electronically, please contact [email or web address] or [phone] to request more information on [employer] access.    For providers and/or their staff who would like to refer a patient to [facility], please contact us through our one-stop-shop provider referral line, [facility] , at [phone].    If you feel you have received this communication in error or would no longer like to receive these types of communications, please e-mail [email or web address]

## 2017-07-24 NOTE — PROGRESS NOTES
Subjective:   Patient ID: Shelly Kam is a 42 y.o. female.  Chief Complaint:  Diarrhea; Nausea; Abdominal Pain; and Medication Refill      Patient presents for follow-up/reestablish care after obtaining insurance.  Diabetes/prediabetes.  A1c from Feb 2017 6.0%.  Controlled.  On metformin 1000 milligrams daily.  Hypertension well controlled on amlodipine 10 mg daily.  Hyperlipidemia.  Previously controlled pravastatin 10 mg daily.  Patient not presently taking.  Fibromyalgia/mood related disorder.  Celexa 20 mg, hydrocodone 7.5 mg twice a day still effective.  Symptoms controlled.  Happy with medication.  Wants to continue.  Abnormal thyroid testing with [facility]. Told hyperthyroidism.  Placed on Tapazole and propanolol.  Reports compliance with medication.  No follow up to date. TSH <0.10 and Free T4 1.24 Feb 2017  Only new complaint today is GI upset from presumed file gastritis.  Exposure to multiple sick patients at work.  Gradually improving.  No additional complaints concerns.      GI Problem   The primary symptoms include fatigue, abdominal pain, nausea, diarrhea and myalgias (Stable present medical regimen.). Primary symptoms do not include fever, weight loss, vomiting, melena, hematemesis, jaundice, hematochezia, dysuria, arthralgias or rash. The illness began 3 to 5 days ago. The onset was gradual. The problem has been gradually improving.   The nausea is associated with eating. The nausea is exacerbated by food.   The diarrhea is watery and mucous. The diarrhea occurs 2 to 4 times per day.   The myalgias are not associated with weakness.     The illness is also significant for anorexia. The illness does not include chills, dysphagia, odynophagia, bloating, constipation, tenesmus, back pain or itching.     Review of Systems   Constitutional: Positive for fatigue. Negative for chills, diaphoresis, fever and weight loss.   Eyes: Negative for visual disturbance.   Respiratory: Negative for cough, chest  tightness, shortness of breath and wheezing.    Cardiovascular: Negative for chest pain, palpitations and leg swelling.   Gastrointestinal: Positive for abdominal pain, anorexia, diarrhea and nausea. Negative for abdominal distention, bloating, constipation, dysphagia, hematemesis, hematochezia, jaundice, melena and vomiting.   Endocrine: Negative for polydipsia, polyphagia and polyuria.   Genitourinary: Negative for difficulty urinating, dysuria, flank pain, frequency, hematuria and urgency.   Musculoskeletal: Positive for myalgias (Stable present medical regimen.). Negative for arthralgias, back pain and gait problem.   Skin: Negative for itching and rash.   Neurological: Negative for dizziness, syncope, weakness, light-headedness, numbness and headaches.   Psychiatric/Behavioral: Negative.         Stable on present medical regimen.       Current Outpatient Prescriptions:     albuterol (ACCUNEB) 0.63 mg/3 mL Nebu, Take 3 mLs (0.63 mg total) by nebulization every 6 (six) hours as needed., Disp: 30 vial, Rfl: 11    albuterol 90 mcg/actuation inhaler, Inhale 2 puffs into the lungs every 6 (six) hours as needed for Wheezing., Disp: 1 Inhaler, Rfl: 0    amlodipine (NORVASC) 10 MG tablet, Take 1 tablet (10 mg total) by mouth once daily., Disp: 90 tablet, Rfl: 0    citalopram (CELEXA) 20 MG tablet, Take 1 tablet (20 mg total) by mouth once daily., Disp: 90 tablet, Rfl: 3    hydrocodone-acetaminophen 7.5-325mg (NORCO) 7.5-325 mg per tablet, Take 1 tablet by mouth every 12 (twelve) hours as needed for Pain., Disp: 60 tablet, Rfl: 0    metformin (GLUMETZA) 1000 MG (MOD) 24 hr tablet, Take 1 tablet (1,000 mg total) by mouth daily with breakfast., Disp: 90 tablet, Rfl: 3    methimazole (TAPAZOLE) 10 MG Tab, Take 1 tablet by mouth once daily., Disp: , Rfl: 3    pravastatin (PRAVACHOL) 10 MG tablet, Take 1 tablet (10 mg total) by mouth once daily., Disp: 90 tablet, Rfl: 3    propranolol (INDERAL) 40 MG tablet, Take 40  "mg by mouth once daily., Disp: , Rfl:     Objective:   /70 (BP Location: Right arm, Patient Position: Sitting, BP Method: Manual)   Pulse 76   Temp 98.2 °F (36.8 °C)   Ht 5' 5" (1.651 m)   Wt 101.8 kg (224 lb 6.9 oz)   LMP 07/10/2017   SpO2 99%   BMI 37.35 kg/m²     Physical Exam   Constitutional: She is oriented to person, place, and time. Vital signs are normal. She appears well-developed and well-nourished. She is cooperative.  Non-toxic appearance. She does not have a sickly appearance. She does not appear ill. No distress.   HENT:   Mouth/Throat: Uvula is midline, oropharynx is clear and moist and mucous membranes are normal.   Eyes: Conjunctivae are normal. Right conjunctiva is not injected. Left conjunctiva is not injected. No scleral icterus.   Neck: Normal range of motion. Neck supple. No JVD present. Carotid bruit is not present. No thyroid mass and no thyromegaly present.   Healed but tender tracheostomy scar midline neck   Cardiovascular: Normal rate, regular rhythm and normal heart sounds.  Exam reveals no gallop and no friction rub.    No murmur heard.  Pulmonary/Chest: Effort normal and breath sounds normal. She has no wheezes. She has no rhonchi. She has no rales.   Abdominal: Soft. Normal appearance and bowel sounds are normal. She exhibits no distension, no fluid wave, no ascites and no mass. There is no hepatosplenomegaly. There is tenderness (Mid epigastric). There is no rigidity, no rebound, no guarding and no CVA tenderness. No hernia.   Musculoskeletal: She exhibits no edema.   Lymphadenopathy:     She has no cervical adenopathy.        Right: No inguinal adenopathy present.        Left: No inguinal adenopathy present.   Neurological: She is alert and oriented to person, place, and time. She displays a negative Romberg sign. Coordination and gait normal.   Skin: Skin is warm, dry and intact. No rash noted.   Psychiatric: She has a normal mood and affect. Her behavior is normal. " Judgment and thought content normal. Her mood appears not anxious. Her affect is not angry, not blunt, not labile and not inappropriate. Her speech is not rapid and/or pressured, not delayed, not tangential and not slurred. She is not agitated, not aggressive, not hyperactive, not slowed, not withdrawn, not actively hallucinating and not combative. Thought content is not paranoid and not delusional. Cognition and memory are normal. Cognition and memory are not impaired. She does not exhibit a depressed mood. She expresses no homicidal and no suicidal ideation. She is communicative. She exhibits normal recent memory and normal remote memory. She is attentive.   Nursing note and vitals reviewed.    Assessment:     1. Fibromyalgia    2. Type 2 diabetes mellitus without complication, without long-term current use of insulin    3. Hypertension associated with diabetes    4. Hyperlipidemia associated with type 2 diabetes mellitus    5. Hyperthyroidism    6. Gastroenteritis, acute      Plan:   Fibromyalgia  -     hydrocodone-acetaminophen 7.5-325mg (NORCO) 7.5-325 mg per tablet; Take 1 tablet by mouth every 12 (twelve) hours as needed for Pain.  Dispense: 60 tablet; Refill: 0  Symptoms controlled present regimen.  Continue Celexa 20 mg daily and Norco 7.5 twice a day.    Type 2 diabetes mellitus without complication, without long-term current use of insulin  -     metformin (GLUMETZA) 1000 MG (MOD) 24 hr tablet; Take 1 tablet (1,000 mg total) by mouth daily with breakfast.  Dispense: 90 tablet; Refill: 3  -     Ambulatory referral to Ophthalmology  Stable.  Continue metformin.  Referral to eye doctor.    Hypertension associated with diabetes  Controlled.  Continue amlodipine 10 mg daily.    Hyperlipidemia associated with type 2 diabetes mellitus  -     pravastatin (PRAVACHOL) 10 MG tablet; Take 1 tablet (10 mg total) by mouth once daily.  Dispense: 90 tablet; Refill: 3  Previously controlled with LDL at goal less than 100.   Restart pravastatin 10 mg daily.  Take aspirin 81 mg daily.    Hyperthyroidism  -     Ambulatory referral to Endocrinology  Questionable control/etiology.  Reports first to see endocrinology here.  Referral made.  Continue Tapazole and propranolol for now.    Gastroenteritis, acute  Patient education on Gastroenteritis  BRAT diet. Increase fluid intake  Immodium 2 initially, 1 each loose stool after that. Do not take more than 8 Imodium in 24 hour period.   Should improve significantly in 48 to 72 hours, may take 7-10 days to return to normal  If worse despite medication, not better in 2 weeks, or if any blood noted in stool RTC    RTC 3-4 months

## 2017-07-24 NOTE — PATIENT INSTRUCTIONS
Hyperthyroidism    You have hyperthyroidism. This means you have a thyroid gland that makes too much thyroid hormone. This hormone is vital to body growth and metabolism. If you make too much thyroid hormone, many body processes speed up and may not work right. This can cause symptoms throughout the body.  There are a number of causes of hyperthyroidism. The most common cause is Graves disease. This occurs when the bodys immune system causes the thyroid to grow and make more thyroid hormone than needed.  Symptoms of hyperthyroidism include:  · Nervousness, anxiety, irritability  · Shaking (tremors) that affects the hands and fingers  · Weight loss despite having a normal or increased appetite  · Low tolerance to heat  · Sweating more than normal  · Fast or irregular heartbeat  · Lighter or irregular periods (women only)  · More frequent bowel movements  · Enlarged thyroid gland (goiter)  · Bulging eyes  · Problems sleeping  · Muscle weakness  · Fatigue  · Swelling of the hands, ankles, or feet (older adults only)  Treatment for hyperthyroidism may include taking medicines. For instance, antithyroid medicines may be prescribed. These help lower the amount of thyroid hormone made by the thyroid gland. Beta-blockers may be prescribed as well. Tips for taking medicines are given below.  Radioiodine ablation or surgery may also be advised. Your healthcare provider will tell you more about these options if needed.  Home care  Tips for taking medicines  · Take any medicines youre prescribed as directed.  · Take your medicine at the same times each day.  · Use a pillbox labeled with the days of the week. This will help you remember to take your medicine each day.  · Tell your provider if you have any side effects from your medicines that bother you.  · Never stop taking medicines on your own. If you do, your symptoms will return.  General care  · Always talk with your provider before trying other medicines or  treatments for your thyroid problem.  · If you see other healthcare providers, be sure to let them know about your thyroid problem.  Follow-up care  See your healthcare provider for checkups as advised. You may need regular tests to check the level of thyroid hormone in your blood.  When to seek medical advice  Call your healthcare provider right away if any of these occur:  · New symptoms occur  · Symptoms return, continue, or worsen even after treatment  · Extreme fatigue  · Puffy hands, face, or feet  · Confusion  Call 911  Call 911 right away if any of these occur:  · Fainting  · Chest pain  · Shortness of breath or trouble breathing  Date Last Reviewed: 8/24/2015 © 2000-2016 [employer]. [address] [place] [address], [place] [address]. All rights reserved. This information is not intended as a substitute for professional medical care. Always follow your healthcare professional's instructions.        Discharge Instructions for Graves' Disease  You have been diagnosed with Graves' disease, which is the result of an overactive thyroid gland (hyperthyroidism). Thyroid hormone is important to your body's growth and metabolism. But if you have too much thyroid hormone, your body's processes may speed up or overreact, causing a variety of symptoms. Three options are available to treat Graves' disease: medications, radiation, or surgery. Here's what you need to do at home following treatment.  Medication and medical care  Take your medication exactly as directed.   · Never stop treatment on your own. If you do, your symptoms will return.  · Take your medication at the same time every day.  · Keep your pills in a container that is labeled with the days of the week. This will help you remember whether youve taken your medication each day.  · Try to take your medication with the same food or drink each day. This will help you control the amount of thyroid hormone in your system.  · Keep a card in your wallet  that lists:  ¨ Your name and contact information  ¨ Your doctors name and contact information  ¨ The name of your disease  ¨ The brand names and doses of your medications  Doctor visits  · Make and keep appointments to see your doctor and get laboratory work. You will need to be monitored for the rest of your life.  · During your routine visits, tell your doctor about any signs of hyperthyroidism (too much thyroid hormone), such as:  ¨ Restlessness  ¨ Rapid weight loss  ¨ Sweating  ¨ Feeling unusually  hot when others feel comfortable  · During your routine visits, tell your doctor about any signs of hypothyroidism (too little thyroid hormone, which can be a side effect of treatment), such as:  ¨ Fatigue or sluggishness  ¨ Puffy hands, face, or feet  ¨ Hoarseness  ¨ Muscle pain  ¨ Slow pulse (less than 60 beats per minute)  ¨ Feeling unusually cold when others feel comfortable  Other home care  · Sleep with your head elevated to reduce eyelid swelling, if you have this problem.  · If your eyes are irritated, ask your doctor about using ointments or artificial tears.  · If you have eye symptoms, wear glasses with side guards to protect your eyes from dust and drying wind.  To learn more  The resources below can help you learn more:  · American Thyroid Association [phone] www.thyroid.org  · [facility] [phone] www. hormone.org  Follow-up care  Make a follow-up appointment as directed by our staff.  When to call your doctor  Call your doctor right away if you have any of the following:  · Anxiety, tremors, or sleeplessness that gets worse  · Sore throat while taking medications to control hyperthyroidism  · Fever above 100.5°F (38°C)  · Feeling sweaty and hot when others around you are comfortable  · Shortness of breath  · Trouble focusing your eyes or double vision  · Bulging eyes  · Weight loss for no obvious reason  · Rapid pulse (higher than 100 beats per minute)  · Enlarged thyroid gland  (bulmaro) gets larger  · Diarrhea   Date Last Reviewed: 8/15/2014  © 1460-0142 [employer]. [address] [place] [address], [place] [address]. All rights reserved. This information is not intended as a substitute for professional medical care. Always follow your healthcare professional's instructions.

## 2017-07-28 ENCOUNTER — PATIENT MESSAGE (OUTPATIENT)
Dept: ENDOCRINOLOGY | Facility: CLINIC | Age: 42
End: 2017-07-28

## 2017-07-28 RX ORDER — METHIMAZOLE 10 MG/1
10 TABLET ORAL 2 TIMES DAILY
Qty: 60 TABLET | Refills: 3 | Status: SHIPPED | OUTPATIENT
Start: 2017-07-28 | End: 2018-01-08 | Stop reason: SDUPTHER

## 2017-08-01 NOTE — PROGRESS NOTES
""This note will be shared with the patient"Subjective:       Patient ID: Shelly Kam is a 42 y.o. female.  Patient is new to me  Chief Complaint: Hyperparathyroidism    HPI    Consultation was requested by Dr. Heraclio Farrell       Diagnosed: around October 2016, positive TSI ANTIBODY; HAD SEEN Dr. Lugo once and was started on methimazole    Previous radiology tests:  Thyroid ultrasound : No   NM uptake and scan: Yes - showed increased uptake at 6 in 24 hours    Previous thyroid surgery: No      Thyroid symptoms:denies fatigue, weight changes, heat/cold intolerance, bowel/skin changes or CVS symptoms      Thyroid medications: methimazole 10 mg once a day      Takes medication appropriately: Yes    Review of Systems   Constitutional: Negative for appetite change, fatigue, fever and unexpected weight change.   HENT: Negative for sore throat and trouble swallowing.    Eyes: Negative for visual disturbance.   Respiratory: Negative for shortness of breath and wheezing.    Cardiovascular: Negative for chest pain, palpitations and leg swelling.   Gastrointestinal: Negative for diarrhea, nausea and vomiting.   Endocrine: Negative for cold intolerance, heat intolerance, polydipsia, polyphagia and polyuria.   Genitourinary: Negative for difficulty urinating, dysuria and menstrual problem.   Musculoskeletal: Negative for arthralgias and joint swelling.   Skin: Negative for rash.   Neurological: Negative for dizziness, weakness, numbness and headaches.   Psychiatric/Behavioral: Negative for confusion, dysphoric mood and sleep disturbance.       Objective:      Physical Exam   Constitutional: She is oriented to person, place, and time. She appears well-developed and well-nourished. No distress.   HENT:   Head: Normocephalic and atraumatic.   Right Ear: External ear normal.   Left Ear: External ear normal.   Nose: Nose normal.   Mouth/Throat: Oropharynx is clear and moist. No oropharyngeal exudate.   Eyes: Conjunctivae " and EOM are normal. Pupils are equal, round, and reactive to light. No scleral icterus.   Neck: No JVD present. No tracheal deviation present. No thyromegaly present.   Cardiovascular: Normal rate, regular rhythm, normal heart sounds and intact distal pulses.  Exam reveals no gallop and no friction rub.    No murmur heard.  Pulmonary/Chest: Effort normal and breath sounds normal. No respiratory distress. She has no wheezes. She has no rales.   Abdominal: Soft. Bowel sounds are normal. She exhibits no distension and no mass. There is no tenderness. There is no rebound and no guarding. No hernia.   Musculoskeletal: She exhibits no edema or deformity.   Lymphadenopathy:     She has no cervical adenopathy.   Neurological: She is alert and oriented to person, place, and time. She has normal reflexes. No cranial nerve deficit.   Skin: Skin is warm. No rash noted. No erythema.   Psychiatric: She has a normal mood and affect. Her behavior is normal.   Vitals reviewed.        Lab Review:   Lab Results   Component Value Date    TSH <0.010 (L) 07/24/2017    TSH <0.010 (L) 02/06/2017    TSH <0.010 (L) 10/11/2016    TSH <0.010 (L) 10/06/2016     Lab Results   Component Value Date    FREET4 1.42 07/24/2017    FREET4 1.24 02/06/2017    FREET4 1.79 (H) 10/11/2016    FREET4 1.84 (H) 10/06/2016     No components found for: FREET3      Assessment:     1. Hyperthyroidism  TSH    T4, free    T3, free    Comprehensive metabolic panel    CBC auto differential      Check labs andjust adjust dose of methimazole as n  Plan:   Hyperthyroidism  -     TSH; Future; Expected date: 07/24/2017  -     T4, free; Future; Expected date: 07/24/2017  -     T3, free; Future; Expected date: 07/24/2017  -     Comprehensive metabolic panel; Future; Expected date: 07/24/2017  -     CBC auto differential; Future; Expected date: 07/24/2017        Return in about 6 weeks (around 9/4/2017).     Thank you to Dr. Heraclio Farrell for this consultation

## 2017-08-08 ENCOUNTER — TELEPHONE (OUTPATIENT)
Dept: ENDOCRINOLOGY | Facility: CLINIC | Age: 42
End: 2017-08-08

## 2017-08-14 DIAGNOSIS — M79.7 FIBROMYALGIA: ICD-10-CM

## 2017-08-14 RX ORDER — HYDROCODONE BITARTRATE AND ACETAMINOPHEN 7.5; 325 MG/1; MG/1
1 TABLET ORAL EVERY 12 HOURS PRN
Qty: 60 TABLET | Refills: 0 | Status: SHIPPED | OUTPATIENT
Start: 2017-08-14 | End: 2017-09-18 | Stop reason: SDUPTHER

## 2017-08-14 NOTE — TELEPHONE ENCOUNTER
----- Message from Izabella Hanson sent at 8/14/2017 10:13 AM CDT -----  1. What is the name of the medication you are requesting? Hydrocodone  2. What is the dose? 7.5mg  3. How do you take the medication? Orally, topically, etc? orally  4. How often do you take this medication? Takes twice daily  5. Do you need a 30 day or 90 day supply? 30 day  6. How many refills are you requesting? 1  7. What is your preferred pharmacy and location of the pharmacy? [facility]  8. Who can we contact with further questions?  [phone](please call when sent in)

## 2017-08-18 DIAGNOSIS — E11.9 TYPE 2 DIABETES MELLITUS WITHOUT COMPLICATION: ICD-10-CM

## 2017-08-24 ENCOUNTER — TELEPHONE (OUTPATIENT)
Dept: INTERNAL MEDICINE | Facility: CLINIC | Age: 42
End: 2017-08-24

## 2017-08-24 NOTE — TELEPHONE ENCOUNTER
----- Message from Savannah Laboy sent at 8/24/2017  2:53 PM CDT -----  Contact: patient  Calling concerning needing to get an antibiotic and diabetic medication. States she have a very severe sinus infection. Please call patient ASAP Today @ [phone]. Thanks, [facility] [id number] - [place] - [address] [facility]  [address] [place] [address]  Phone: [phone] Fax: [phone]

## 2017-08-24 NOTE — TELEPHONE ENCOUNTER
S/w pt. Pt stated that she has sinus pressure, nasal drainage, sore throat, cough and headache, no fever or wheezing. Pt would like antibiotic to be sent to pharmacy. Told pt that I would discuss with Dr. Farrell and return her call. Pt verbalized understanding.    Pt also stated that pharmacy still hasn't filled her Glumetza and she would like for us to contact pharmacy to see what's going on.

## 2017-08-25 NOTE — TELEPHONE ENCOUNTER
MD Rakel Regalado LPN   Caller: Unspecified (Yesterday,  2:59 PM)             Cannot send an antibiotic prescription without visit to confirm actual bacterial cause of sinus issues.     The insurance may no longer cover her Glumetza.  She may need to change to plain metformin 500 mg 2 pills twice a day or 1000 mg 1 pill twice a day.      S/w pharmacy yesterday and rx for Glumetza will be filled.

## 2017-08-31 ENCOUNTER — TELEPHONE (OUTPATIENT)
Dept: INTERNAL MEDICINE | Facility: CLINIC | Age: 42
End: 2017-08-31

## 2017-08-31 RX ORDER — METFORMIN HYDROCHLORIDE 1000 MG/1
1000 TABLET ORAL 2 TIMES DAILY WITH MEALS
COMMUNITY
End: 2017-11-22 | Stop reason: SDUPTHER

## 2017-08-31 NOTE — TELEPHONE ENCOUNTER
Per telephone call on 8/24/17, Dr. Farrell stated that:  The insurance may no longer cover her Glumetza.  She may need to change to plain metformin 500 mg 2 pills twice a day or 1000 mg 1 pill twice a day.     Rx for Metformin 1000mg 1 pill twice daily dispense #180 with no refills [facility] ([place], [place] spoke with Elsy. Notified pt. Pt verbalized understanding.

## 2017-08-31 NOTE — TELEPHONE ENCOUNTER
----- Message from Macarena Chu sent at 8/31/2017  7:20 AM CDT -----  Contact: Pt  Pt states that the rx for Glumetza is too expensive. Pls call pt back at [phone].

## 2017-09-18 DIAGNOSIS — M79.7 FIBROMYALGIA: ICD-10-CM

## 2017-09-18 RX ORDER — HYDROCODONE BITARTRATE AND ACETAMINOPHEN 7.5; 325 MG/1; MG/1
1 TABLET ORAL EVERY 12 HOURS PRN
Qty: 60 TABLET | Refills: 0 | Status: SHIPPED | OUTPATIENT
Start: 2017-09-18 | End: 2017-10-17 | Stop reason: SDUPTHER

## 2017-09-18 NOTE — TELEPHONE ENCOUNTER
----- Message from Ana Paula Smith sent at 9/18/2017  9:22 AM CDT -----  Contact: self [phone]  1. What is the name of the medication you are requesting? hydrocodone  2. What is the dose? 7.5mg  3. How do you take the medication? Orally, topically, etc? orally  4. How often do you take this medication? 2x daily  5. Do you need a 30 day or 90 day supply? 30 day  6. How many refills are you requesting? 1  7. What is your preferred pharmacy and location of the pharmacy?     [facility] [id number] - [facility] [address] [facility] AT [facility]  [address] [address]  Phone: [phone] Fax: [phone]    8. Who can we contact with further questions? Pt [phone]

## 2017-10-02 ENCOUNTER — HOSPITAL ENCOUNTER (EMERGENCY)
Facility: HOSPITAL | Age: 42
Discharge: HOME | End: 2017-10-03
Attending: EMERGENCY MEDICINE
Payer: COMMERCIAL

## 2017-10-02 DIAGNOSIS — R05.9 COUGH: ICD-10-CM

## 2017-10-02 DIAGNOSIS — J45.20 ASTHMA, INTERMITTENT, UNCOMPLICATED: ICD-10-CM

## 2017-10-02 DIAGNOSIS — F17.200 SMOKER: ICD-10-CM

## 2017-10-02 DIAGNOSIS — J20.9 BRONCHITIS, ACUTE, WITH BRONCHOSPASM: Primary | ICD-10-CM

## 2017-10-02 LAB
ALBUMIN SERPL BCP-MCNC: 3.1 G/DL
ALP SERPL-CCNC: 88 U/L
ALT SERPL W/O P-5'-P-CCNC: 11 U/L
ANION GAP SERPL CALC-SCNC: 9 MMOL/L
AST SERPL-CCNC: 13 U/L
BASOPHILS # BLD AUTO: 0.05 K/UL
BASOPHILS NFR BLD: 0.6 %
BILIRUB SERPL-MCNC: 0.1 MG/DL
BNP SERPL-MCNC: 24 PG/ML
BUN SERPL-MCNC: 9 MG/DL
CALCIUM SERPL-MCNC: 8.8 MG/DL
CHLORIDE SERPL-SCNC: 106 MMOL/L
CO2 SERPL-SCNC: 25 MMOL/L
CREAT SERPL-MCNC: 0.8 MG/DL
DIFFERENTIAL METHOD: ABNORMAL
EOSINOPHIL # BLD AUTO: 0.5 K/UL
EOSINOPHIL NFR BLD: 5.4 %
ERYTHROCYTE [DISTWIDTH] IN BLOOD BY AUTOMATED COUNT: 15.5 %
EST. GFR  (AFRICAN AMERICAN): >60 ML/MIN/1.73 M^2
EST. GFR  (NON AFRICAN AMERICAN): >60 ML/MIN/1.73 M^2
GLUCOSE SERPL-MCNC: 107 MG/DL
HCT VFR BLD AUTO: 34.8 %
HGB BLD-MCNC: 11.1 G/DL
LYMPHOCYTES # BLD AUTO: 4.3 K/UL
LYMPHOCYTES NFR BLD: 50.3 %
MCH RBC QN AUTO: 25.3 PG
MCHC RBC AUTO-ENTMCNC: 31.9 G/DL
MCV RBC AUTO: 79 FL
MONOCYTES # BLD AUTO: 0.6 K/UL
MONOCYTES NFR BLD: 7.6 %
NEUTROPHILS # BLD AUTO: 3.1 K/UL
NEUTROPHILS NFR BLD: 36.1 %
PLATELET # BLD AUTO: 241 K/UL
PMV BLD AUTO: 10.4 FL
POTASSIUM SERPL-SCNC: 3.8 MMOL/L
PROT SERPL-MCNC: 6.4 G/DL
RBC # BLD AUTO: 4.39 M/UL
SODIUM SERPL-SCNC: 140 MMOL/L
WBC # BLD AUTO: 8.47 K/UL

## 2017-10-02 PROCEDURE — 36415 COLL VENOUS BLD VENIPUNCTURE: CPT

## 2017-10-02 PROCEDURE — 83880 ASSAY OF NATRIURETIC PEPTIDE: CPT

## 2017-10-02 PROCEDURE — 25000242 PHARM REV CODE 250 ALT 637 W/ HCPCS: Performed by: EMERGENCY MEDICINE

## 2017-10-02 PROCEDURE — 85025 COMPLETE CBC W/AUTO DIFF WBC: CPT

## 2017-10-02 PROCEDURE — 80053 COMPREHEN METABOLIC PANEL: CPT

## 2017-10-02 PROCEDURE — 94640 AIRWAY INHALATION TREATMENT: CPT

## 2017-10-02 PROCEDURE — 99284 EMERGENCY DEPT VISIT MOD MDM: CPT

## 2017-10-02 RX ORDER — IPRATROPIUM BROMIDE AND ALBUTEROL SULFATE 2.5; .5 MG/3ML; MG/3ML
3 SOLUTION RESPIRATORY (INHALATION)
Status: COMPLETED | OUTPATIENT
Start: 2017-10-02 | End: 2017-10-02

## 2017-10-02 RX ADMIN — IPRATROPIUM BROMIDE AND ALBUTEROL SULFATE 3 ML: .5; 3 SOLUTION RESPIRATORY (INHALATION) at 11:10

## 2017-10-03 VITALS
WEIGHT: 220 LBS | BODY MASS INDEX: 35.36 KG/M2 | DIASTOLIC BLOOD PRESSURE: 94 MMHG | OXYGEN SATURATION: 98 % | HEIGHT: 66 IN | HEART RATE: 76 BPM | SYSTOLIC BLOOD PRESSURE: 137 MMHG | TEMPERATURE: 98 F | RESPIRATION RATE: 18 BRPM

## 2017-10-03 RX ORDER — PREDNISONE 20 MG/1
40 TABLET ORAL DAILY
Qty: 10 TABLET | Refills: 0 | Status: SHIPPED | OUTPATIENT
Start: 2017-10-03 | End: 2017-10-08

## 2017-10-03 RX ORDER — ALBUTEROL SULFATE 90 UG/1
2 AEROSOL, METERED RESPIRATORY (INHALATION) EVERY 4 HOURS PRN
Qty: 1 INHALER | Refills: 3 | Status: SHIPPED | OUTPATIENT
Start: 2017-10-03 | End: 2019-06-05 | Stop reason: SDUPTHER

## 2017-10-03 NOTE — ED PROVIDER NOTES
SCRIBE #1 NOTE: I, Terrell Roger, am scribing for, and in the presence of, Ryan Harris MD. I have scribed the entire note.      History      Chief Complaint   Patient presents with    Cough     seen at after hours last week and given antibiotics for URI; pt still having wheezing, chest congestion, productive cough and chest soreness       Review of patient's allergies indicates:   Allergen Reactions    Pcn [penicillins]     Morphine Other (See Comments)     Unknown        HPI   HPI    10/2/2017, 10:16 PM   History obtained from the patient      History of Present Illness: Shelly Kam is a 42 y.o. female patient who presents to the Emergency Department for productive cough which onset gradually 1 week ago. Symptoms are intermittent and moderate in severity. Pt was seen at urgent care last week and was given doxycycline. Pt reports no relief from the abx. Pt states she had a fever last week before she was seen at urgent care but it has since resolved. No mitigating or exacerbating factors reported. Associated sxs include chest congestion and generalized body aches. Patient denies any chills, n/v/d, sore throat, rhinorrhea, CP, SOB, HA, lightheadedness, dizziness, and all other sxs at this time. No further complaints or concerns at this time.         Arrival mode: Personal vehicle     PCP: Heraclio Farrell MD       Past Medical History:  Past Medical History:   Diagnosis Date    Asthma     Diabetes mellitus     Fibromyalgia     Hypertension     Panic attack     Prozac in past       Past Surgical History:  Past Surgical History:   Procedure Laterality Date    BRONCHOSCOPY      TONSILLECTOMY      TRACHEOSTOMY TUBE PLACEMENT           Family History:  Family History   Problem Relation Age of Onset    Cancer Paternal Aunt        Social History:  Social History     Social History Main Topics    Smoking status: Former Smoker     Packs/day: 0.50     Quit date: 3/1/2011    Smokeless tobacco: Never Used     Alcohol use Yes      Comment: occasionally    Drug use: No    Sexual activity: Yes     Partners: Male       ROS   Review of Systems   Constitutional: Negative for chills and fever.   HENT: Negative for rhinorrhea, sore throat and trouble swallowing.    Respiratory: Positive for cough. Negative for shortness of breath.         + chest congestion   Cardiovascular: Negative for chest pain.   Gastrointestinal: Negative for diarrhea, nausea and vomiting.   Genitourinary: Negative for dysuria.   Musculoskeletal: Negative for back pain.   Skin: Negative for rash.   Neurological: Negative for dizziness, weakness, light-headedness and headaches.   Hematological: Does not bruise/bleed easily.       Physical Exam      Initial Vitals [10/02/17 2201]   BP Pulse Resp Temp SpO2   (!) 150/95 76 (!) 22 98.1 °F (36.7 °C) 98 %      MAP       113.33          Physical Exam  Nursing Notes and Vital Signs Reviewed.  Constitutional: Patient is in no acute distress. Well-developed and well-nourished.  Head: Atraumatic. Normocephalic.  Eyes: PERRL. EOM intact. Conjunctivae are not pale. No scleral icterus.  ENT: Mucous membranes are moist. Oropharynx is clear and symmetric.    Neck: Supple. Full ROM. No lymphadenopathy.  Cardiovascular: Regular rate. Regular rhythm. No murmurs, rubs, or gallops. Distal pulses are 2+ and symmetric.  Pulmonary/Chest: No respiratory distress. Bibasilar wheezing in the lungs. No rales or rhonchi.  Abdominal: Soft and non-distended.  There is no tenderness.  No rebound, guarding, or rigidity.   Musculoskeletal: Moves all extremities. No obvious deformities. No edema. No calf tenderness.  Skin: Warm and dry.  Neurological:  Alert, awake, and appropriate.  Normal speech.  No acute focal neurological deficits are appreciated.  Psychiatric: Normal affect. Good eye contact. Appropriate in content.    ED Course    Procedures  ED Vital Signs:  Vitals:    10/02/17 2201 10/02/17 2336 10/02/17 2344 10/03/17 0019   BP:  "(!) 150/95   (!) 137/94   Pulse: 76 75 77 76   Resp: (!) 22 16 18 18   Temp: 98.1 °F (36.7 °C)      TempSrc: Oral      SpO2: 98% 98% 98% 98%   Weight: 99.8 kg (220 lb)      Height: 5' 6" (1.676 m)          Abnormal Lab Results:  Labs Reviewed   CBC W/ AUTO DIFFERENTIAL - Abnormal; Notable for the following:        Result Value    Hemoglobin 11.1 (*)     Hematocrit 34.8 (*)     MCV 79 (*)     MCH 25.3 (*)     MCHC 31.9 (*)     RDW 15.5 (*)     Gran% 36.1 (*)     Lymph% 50.3 (*)     All other components within normal limits   COMPREHENSIVE METABOLIC PANEL - Abnormal; Notable for the following:     Albumin 3.1 (*)     All other components within normal limits   B-TYPE NATRIURETIC PEPTIDE        All Lab Results:  Results for orders placed or performed during the hospital encounter of 10/02/17   CBC auto differential   Result Value Ref Range    WBC 8.47 3.90 - 12.70 K/uL    RBC 4.39 4.00 - 5.40 M/uL    Hemoglobin 11.1 (L) 12.0 - 16.0 g/dL    Hematocrit 34.8 (L) 37.0 - 48.5 %    MCV 79 (L) 82 - 98 fL    MCH 25.3 (L) 27.0 - 31.0 pg    MCHC 31.9 (L) 32.0 - 36.0 g/dL    RDW 15.5 (H) 11.5 - 14.5 %    Platelets 241 150 - 350 K/uL    MPV 10.4 9.2 - 12.9 fL    Gran # 3.1 1.8 - 7.7 K/uL    Lymph # 4.3 1.0 - 4.8 K/uL    Mono # 0.6 0.3 - 1.0 K/uL    Eos # 0.5 0.0 - 0.5 K/uL    Baso # 0.05 0.00 - 0.20 K/uL    Gran% 36.1 (L) 38.0 - 73.0 %    Lymph% 50.3 (H) 18.0 - 48.0 %    Mono% 7.6 4.0 - 15.0 %    Eosinophil% 5.4 0.0 - 8.0 %    Basophil% 0.6 0.0 - 1.9 %    Differential Method Automated    Comprehensive metabolic panel   Result Value Ref Range    Sodium 140 136 - 145 mmol/L    Potassium 3.8 3.5 - 5.1 mmol/L    Chloride 106 95 - 110 mmol/L    CO2 25 23 - 29 mmol/L    Glucose 107 70 - 110 mg/dL    BUN, Bld 9 6 - 20 mg/dL    Creatinine 0.8 0.5 - 1.4 mg/dL    Calcium 8.8 8.7 - 10.5 mg/dL    Total Protein 6.4 6.0 - 8.4 g/dL    Albumin 3.1 (L) 3.5 - 5.2 g/dL    Total Bilirubin 0.1 0.1 - 1.0 mg/dL    Alkaline Phosphatase 88 55 - 135 U/L    " AST 13 10 - 40 U/L    ALT 11 10 - 44 U/L    Anion Gap 9 8 - 16 mmol/L    eGFR if African American >60 >60 mL/min/1.73 m^2    eGFR if non African American >60 >60 mL/min/1.73 m^2   Brain natriuretic peptide   Result Value Ref Range    BNP 24 0 - 99 pg/mL         Imaging Results:  Imaging Results          X-Ray Chest PA And Lateral (Final result)  Result time 10/02/17 23:10:25    Final result by Clarence Good MD (10/02/17 23:10:25)                 Impression:         No acute findings.  No significant change since the previous exam.      Electronically signed by: CLARENCE GOOD MD  Date:     10/02/17  Time:    23:10              Narrative:    Exam: XR CHEST PA AND LATERAL,    Date:  10/02/17 23:07:13    History: Cough    Comparison:  05/07/2016    Findings:     The lungs clear.  Heart size within normal limits.  Tracheostomy not visualized. No suspicious bony findings.                                      The Emergency Provider reviewed the vital signs and test results, which are outlined above.    ED Discussion     10:20 PM: Patient was counseled regarding smoking for 3-10 minutes. Assistance with smoking cessation was offered, including:  [x]  Printed Information on Smoking Cessation    12:03 AM: Reassessed pt at this time.  Pt is awake, alert, and in no distress. Discussed with pt all pertinent ED information and results. Discussed pt dx and plan of tx. Gave pt all f/u and return to the ED instructions. All questions and concerns were addressed at this time. Pt expresses understanding of information and instructions, and is comfortable with plan to discharge. Pt is stable for discharge.    I discussed with patient and/or family/caretaker that evaluation in the ED does not suggest any emergent or life threatening medical conditions requiring immediate intervention beyond what was provided in the ED, and I believe patient is safe for discharge.  Regardless, an unremarkable evaluation in the ED does not  preclude the development or presence of a serious of life threatening condition. As such, patient was instructed to return immediately for any worsening or change in current symptoms.                ED Medication(s):  Medications   albuterol-ipratropium 2.5mg-0.5mg/3mL nebulizer solution 3 mL (3 mLs Nebulization Given 10/2/17 2344)       Discharge Medication List as of 10/3/2017 12:06 AM      START taking these medications    Details   predniSONE (DELTASONE) 20 MG tablet Take 2 tablets (40 mg total) by mouth once daily., Starting Tue 10/3/2017, Until Sun 10/8/2017, Print             Follow-up Information     Heraclio Farrell MD In 2 days.    Specialty:  Family Medicine  Contact information:  [address]  [place] [address] [phone]             [facility] - .    Specialty:  Emergency Medicine  Why:  As needed, If symptoms worsen  Contact information:  [address]  [facility] [address] [phone]                   Medical Decision Making    Medical Decision Making:   Clinical Tests:   Lab Tests: Ordered and Reviewed  Radiological Study: Ordered and Reviewed     Additional MDM:   Smoking Cessation: The patient is a smoker. The patient was counseled on smoking cessation for: 3 minutes. The patient was counseled on tobacco related  health complications. Appropriate patient literature was given to the patient concerning tobacco cessation.        Scribe Attestation:   Scribe #1: I performed the above scribed service and the documentation accurately describes the services I performed. I attest to the accuracy of the note.    Attending:   Physician Attestation Statement for Scribe #1: I, Ryan Harris MD, personally performed the services described in this documentation, as scribed by Terrell Roger, in my presence, and it is both accurate and complete.          Clinical Impression       ICD-10-CM ICD-9-CM   1. Bronchitis, acute, with bronchospasm J20.9 466.0   2. Cough R05  786.2   3. Smoker F17.200 305.1   4. Asthma, intermittent, uncomplicated J45.20 493.90       Disposition:   Disposition: Discharged  Condition: Stable         Si GERRY Harris MD  10/03/17 0540

## 2017-10-17 DIAGNOSIS — M79.7 FIBROMYALGIA: ICD-10-CM

## 2017-10-17 RX ORDER — HYDROCODONE BITARTRATE AND ACETAMINOPHEN 7.5; 325 MG/1; MG/1
1 TABLET ORAL EVERY 12 HOURS PRN
Qty: 60 TABLET | Refills: 0 | Status: SHIPPED | OUTPATIENT
Start: 2017-10-17 | End: 2017-11-22 | Stop reason: SDUPTHER

## 2017-10-17 NOTE — TELEPHONE ENCOUNTER
----- Message from Fatou Jules sent at 10/17/2017 11:12 AM CDT -----  Contact: self   1. What is the name of the medication you are requesting? Pain medication  2. What is the dose? 7.25  3. How do you take the medication? Orally, topically, etc? orally  4. How often do you take this medication? Twice a day  5. Do you need a 30 day or 90 day supply? 30 day  6. How many refills are you requesting? n/a  7. What is your preferred pharmacy and location of the pharmacy?   [id number] - [place] - [address] AT [address] [facility] [address] [facility]  [address] [address]  Phone: [phone] Fax: [phone]  8. Who can we contact with further questions? Self

## 2017-10-25 RX ORDER — AMLODIPINE BESYLATE 10 MG/1
10 TABLET ORAL DAILY
Qty: 90 TABLET | Refills: 0 | Status: SHIPPED | OUTPATIENT
Start: 2017-10-25 | End: 2017-11-22 | Stop reason: SDUPTHER

## 2017-10-27 ENCOUNTER — TELEPHONE (OUTPATIENT)
Dept: INTERNAL MEDICINE | Facility: CLINIC | Age: 42
End: 2017-10-27

## 2017-10-27 DIAGNOSIS — E11.9 TYPE 2 DIABETES MELLITUS WITHOUT COMPLICATION: ICD-10-CM

## 2017-10-27 NOTE — TELEPHONE ENCOUNTER
Spoke to pt. Informed pt that I dont see any recent labs from [place] . But I went over pt february labs and most recent scan. Pt verbalized understanding

## 2017-10-27 NOTE — TELEPHONE ENCOUNTER
----- Message from Fatou Vicente sent at 10/27/2017 10:06 AM CDT -----  Contact: self   Patient returning call. Please call back at [phone].        Thanks,  Fatou Vicente

## 2017-11-17 DIAGNOSIS — M79.7 FIBROMYALGIA: ICD-10-CM

## 2017-11-17 NOTE — TELEPHONE ENCOUNTER
----- Message from Manju Puris sent at 11/17/2017  1:34 PM CST -----  Contact: Patient  1. What is the name of the medication you are requesting? Hydrocodone  2. What is the dose? 7.5mg  3. How do you take the medication? Orally, topically, etc? orally  4. How often do you take this medication? Twice daily  5. Do you need a 30 day or 90 day supply? 30  6. How many refills are you requesting?   7. What is your preferred pharmacy and location of the pharmacy? [facility]  8. Who can we contact with further questions? Patient at [phone] please call when sent in

## 2017-11-20 ENCOUNTER — TELEPHONE (OUTPATIENT)
Dept: INTERNAL MEDICINE | Facility: CLINIC | Age: 42
End: 2017-11-20

## 2017-11-20 RX ORDER — HYDROCODONE BITARTRATE AND ACETAMINOPHEN 7.5; 325 MG/1; MG/1
1 TABLET ORAL EVERY 12 HOURS PRN
Qty: 60 TABLET | Refills: 0 | OUTPATIENT
Start: 2017-11-20

## 2017-11-20 NOTE — TELEPHONE ENCOUNTER
----- Message from Christopher Dickens sent at 11/20/2017  3:47 PM CST -----  Contact: Pt  Please give pt a call at ..[phone] (home) regarding a refill on her HYDROCODONE 7.5 MG            ..  [facility] [id number] - [place] - [address] AT [facility] [address] [place] [id number] [address]  Phone: [phone] Fax: [phone]

## 2017-11-20 NOTE — TELEPHONE ENCOUNTER
S/w pt and informed that refill was denied and she has to be seen before any refills can be given. Pt verbalized understanding and has appt scheduled for 11/22/17.

## 2017-11-22 ENCOUNTER — LAB VISIT (OUTPATIENT)
Dept: LAB | Facility: HOSPITAL | Age: 42
End: 2017-11-22
Attending: FAMILY MEDICINE
Payer: COMMERCIAL

## 2017-11-22 ENCOUNTER — OFFICE VISIT (OUTPATIENT)
Dept: INTERNAL MEDICINE | Facility: CLINIC | Age: 42
End: 2017-11-22
Payer: COMMERCIAL

## 2017-11-22 VITALS
DIASTOLIC BLOOD PRESSURE: 76 MMHG | HEIGHT: 66 IN | BODY MASS INDEX: 36.39 KG/M2 | TEMPERATURE: 99 F | WEIGHT: 226.44 LBS | SYSTOLIC BLOOD PRESSURE: 124 MMHG

## 2017-11-22 DIAGNOSIS — E11.59 HYPERTENSION ASSOCIATED WITH DIABETES: Chronic | ICD-10-CM

## 2017-11-22 DIAGNOSIS — E11.9 TYPE 2 DIABETES MELLITUS WITHOUT COMPLICATION, WITHOUT LONG-TERM CURRENT USE OF INSULIN: Chronic | ICD-10-CM

## 2017-11-22 DIAGNOSIS — J45.20 MILD INTERMITTENT ASTHMA WITHOUT COMPLICATION: Chronic | ICD-10-CM

## 2017-11-22 DIAGNOSIS — E05.90 HYPERTHYROIDISM: Chronic | ICD-10-CM

## 2017-11-22 DIAGNOSIS — M79.7 FIBROMYALGIA: Primary | Chronic | ICD-10-CM

## 2017-11-22 DIAGNOSIS — Z23 NEED FOR INFLUENZA VACCINATION: ICD-10-CM

## 2017-11-22 DIAGNOSIS — E11.69 HYPERLIPIDEMIA ASSOCIATED WITH TYPE 2 DIABETES MELLITUS: Chronic | ICD-10-CM

## 2017-11-22 DIAGNOSIS — D64.9 ANEMIA, UNSPECIFIED TYPE: ICD-10-CM

## 2017-11-22 DIAGNOSIS — E78.5 HYPERLIPIDEMIA ASSOCIATED WITH TYPE 2 DIABETES MELLITUS: Chronic | ICD-10-CM

## 2017-11-22 DIAGNOSIS — F41.9 ANXIETY: Chronic | ICD-10-CM

## 2017-11-22 DIAGNOSIS — I15.2 HYPERTENSION ASSOCIATED WITH DIABETES: Chronic | ICD-10-CM

## 2017-11-22 LAB
CHOLEST SERPL-MCNC: 201 MG/DL
CHOLEST/HDLC SERPL: 4.6 {RATIO}
ESTIMATED AVG GLUCOSE: 108 MG/DL
HBA1C MFR BLD HPLC: 5.4 %
HDLC SERPL-MCNC: 44 MG/DL
HDLC SERPL: 21.9 %
LDLC SERPL CALC-MCNC: 119 MG/DL
NONHDLC SERPL-MCNC: 157 MG/DL
T3FREE SERPL-MCNC: 2.6 PG/ML
T4 FREE SERPL-MCNC: 0.82 NG/DL
TRIGL SERPL-MCNC: 190 MG/DL
TSH SERPL DL<=0.005 MIU/L-ACNC: 0.01 UIU/ML

## 2017-11-22 PROCEDURE — 84443 ASSAY THYROID STIM HORMONE: CPT

## 2017-11-22 PROCEDURE — 90471 IMMUNIZATION ADMIN: CPT | Mod: S$GLB,,, | Performed by: FAMILY MEDICINE

## 2017-11-22 PROCEDURE — 90686 IIV4 VACC NO PRSV 0.5 ML IM: CPT | Mod: S$GLB,,, | Performed by: FAMILY MEDICINE

## 2017-11-22 PROCEDURE — 99215 OFFICE O/P EST HI 40 MIN: CPT | Mod: 25,S$GLB,, | Performed by: FAMILY MEDICINE

## 2017-11-22 PROCEDURE — 36415 COLL VENOUS BLD VENIPUNCTURE: CPT | Mod: PO

## 2017-11-22 PROCEDURE — 80061 LIPID PANEL: CPT

## 2017-11-22 PROCEDURE — 99999 PR PBB SHADOW E&M-EST. PATIENT-LVL III: CPT | Mod: PBBFAC,,, | Performed by: FAMILY MEDICINE

## 2017-11-22 PROCEDURE — 83036 HEMOGLOBIN GLYCOSYLATED A1C: CPT

## 2017-11-22 PROCEDURE — 84481 FREE ASSAY (FT-3): CPT

## 2017-11-22 PROCEDURE — 84439 ASSAY OF FREE THYROXINE: CPT

## 2017-11-22 RX ORDER — CLINDAMYCIN HYDROCHLORIDE 300 MG/1
300 CAPSULE ORAL 2 TIMES DAILY
COMMUNITY
End: 2017-11-22

## 2017-11-22 RX ORDER — PROPRANOLOL HYDROCHLORIDE 40 MG/1
40 TABLET ORAL 2 TIMES DAILY
Qty: 180 TABLET | Refills: 3 | Status: SHIPPED | OUTPATIENT
Start: 2017-11-22 | End: 2018-01-08 | Stop reason: SDUPTHER

## 2017-11-22 RX ORDER — CITALOPRAM 20 MG/1
20 TABLET, FILM COATED ORAL DAILY
Qty: 90 TABLET | Refills: 3 | Status: SHIPPED | OUTPATIENT
Start: 2017-11-22 | End: 2019-06-05 | Stop reason: SDUPTHER

## 2017-11-22 RX ORDER — HYDROCODONE BITARTRATE AND ACETAMINOPHEN 7.5; 325 MG/1; MG/1
1 TABLET ORAL EVERY 12 HOURS PRN
Qty: 60 TABLET | Refills: 0 | Status: SHIPPED | OUTPATIENT
Start: 2017-11-22 | End: 2017-12-20 | Stop reason: SDUPTHER

## 2017-11-22 RX ORDER — METFORMIN HYDROCHLORIDE 1000 MG/1
1000 TABLET ORAL 2 TIMES DAILY WITH MEALS
Qty: 180 TABLET | Refills: 3 | Status: SHIPPED | OUTPATIENT
Start: 2017-11-22 | End: 2019-06-05 | Stop reason: SDUPTHER

## 2017-11-22 RX ORDER — AMLODIPINE BESYLATE 10 MG/1
10 TABLET ORAL DAILY
Qty: 90 TABLET | Refills: 3 | Status: SHIPPED | OUTPATIENT
Start: 2017-11-22 | End: 2018-03-02

## 2017-11-22 NOTE — PROGRESS NOTES
Subjective:   Patient ID: Shelly Kam is a 42 y.o. female.  Chief Complaint:  Medication Refill and Diabetes      Follow-up multiple chronic medical conditions.  Diabetes.  Last A1c 6% well-controlled.  Metformin 1000 mg twice a day.  Needs repeat A1c and urine microalbumin.  Hypertension.  Controlled on amlodipine 10 mg daily.  Hyperlipidemia.  On pravastatin 10 mg daily.  Due repeat lipid panel.  CMP stable October 2017.  Hyperthyroidism.  On Tapazole and propanolol.  Has not followed up with endocrinology.  Due repeat labs.  Fibromyalgia, anxiety, chronic pain.  Stable on Celexa 20 mg daily and Lortab 500 mg twice a day as needed.  Average use 60 per month.  Reviewed .  No additional refills from outside providers.  CBC October 2017 with mild anemia.  Not taking any multivitamin.  Foot exam done today.  Needs flu vaccine.  No additional complaints concerns today.        Current Outpatient Prescriptions:     albuterol (ACCUNEB) 0.63 mg/3 mL Nebu, Take 3 mLs (0.63 mg total) by nebulization every 6 (six) hours as needed., Disp: 30 vial, Rfl: 11    amLODIPine (NORVASC) 10 MG tablet, Take 1 tablet (10 mg total) by mouth once daily., Disp: 90 tablet, Rfl: 3    citalopram (CELEXA) 20 MG tablet, Take 1 tablet (20 mg total) by mouth once daily., Disp: 90 tablet, Rfl: 3    hydrocodone-acetaminophen 7.5-325mg (NORCO) 7.5-325 mg per tablet, Take 1 tablet by mouth every 12 (twelve) hours as needed for Pain., Disp: 60 tablet, Rfl: 0    metFORMIN (GLUCOPHAGE) 1000 MG tablet, Take 1 tablet (1,000 mg total) by mouth 2 (two) times daily with meals., Disp: 180 tablet, Rfl: 3    methimazole (TAPAZOLE) 10 MG Tab, Take 1 tablet (10 mg total) by mouth 2 (two) times daily., Disp: 60 tablet, Rfl: 3    pravastatin (PRAVACHOL) 10 MG tablet, Take 1 tablet (10 mg total) by mouth once daily., Disp: 90 tablet, Rfl: 3    propranolol (INDERAL) 40 MG tablet, Take 1 tablet (40 mg total) by mouth 2 (two) times daily., Disp: 180  "tablet, Rfl: 3    albuterol 90 mcg/actuation inhaler, Inhale 2 puffs into the lungs every 4 (four) hours as needed for Wheezing or Shortness of Breath., Disp: 1 Inhaler, Rfl: 3     Review of Systems   Constitutional: Negative for activity change, appetite change, chills, diaphoresis, fatigue and fever.   Eyes: Negative for visual disturbance.   Respiratory: Negative for cough, chest tightness, shortness of breath and wheezing.    Cardiovascular: Negative for chest pain, palpitations and leg swelling.   Gastrointestinal: Negative for abdominal distention, abdominal pain, constipation, diarrhea, nausea and vomiting.   Endocrine: Negative for cold intolerance, heat intolerance, polydipsia, polyphagia and polyuria.   Genitourinary: Negative for difficulty urinating, dysuria, flank pain, frequency, hematuria, menstrual problem, pelvic pain and urgency.   Musculoskeletal: Positive for myalgias. Negative for back pain and neck pain.   Skin: Negative for rash.   Neurological: Negative for dizziness, tremors, syncope, weakness, light-headedness, numbness and headaches.   Psychiatric/Behavioral: Negative for agitation, behavioral problems, confusion, decreased concentration, dysphoric mood, hallucinations, self-injury, sleep disturbance and suicidal ideas. The patient is not nervous/anxious and is not hyperactive.      Objective:   /76 (BP Location: Right arm, Patient Position: Sitting, BP Method: Large (Manual))   Temp 98.6 °F (37 °C) (Tympanic)   Ht 5' 6" (1.676 m)   Wt 102.7 kg (226 lb 6.6 oz)   LMP 11/11/2017 (Approximate)   BMI 36.54 kg/m²     Physical Exam   Constitutional: She is oriented to person, place, and time. Vital signs are normal. She appears well-developed and well-nourished. She is cooperative.  Non-toxic appearance. She does not have a sickly appearance. She does not appear ill. No distress.   HENT:   Nose: Nose normal. Right sinus exhibits no maxillary sinus tenderness and no frontal sinus " tenderness. Left sinus exhibits no maxillary sinus tenderness and no frontal sinus tenderness.   Mouth/Throat: Uvula is midline, oropharynx is clear and moist and mucous membranes are normal.   Eyes: Conjunctivae are normal. Right eye exhibits no discharge. Left eye exhibits no discharge. Right conjunctiva is not injected. Left conjunctiva is not injected. No scleral icterus.   Neck: Trachea normal, normal range of motion and phonation normal. Neck supple. No JVD present. Carotid bruit is not present. No thyroid mass and no thyromegaly present.   Healed but tender tracheostomy scar midline neck   Cardiovascular: Normal rate, regular rhythm and normal heart sounds.  Exam reveals no gallop and no friction rub.    No murmur heard.  Pulses:       Dorsalis pedis pulses are 2+ on the right side, and 2+ on the left side.        Posterior tibial pulses are 2+ on the right side, and 2+ on the left side.   Pulmonary/Chest: Effort normal and breath sounds normal. No accessory muscle usage or stridor. No respiratory distress. She has no decreased breath sounds. She has no wheezes. She has no rhonchi. She has no rales.   Abdominal: Soft. Normal appearance and bowel sounds are normal. She exhibits no distension, no fluid wave, no ascites and no mass. There is no hepatosplenomegaly. There is no tenderness. There is no rigidity, no rebound, no guarding and no CVA tenderness. No hernia.   Musculoskeletal: She exhibits no edema.        Right foot: There is normal range of motion and no deformity.        Left foot: There is normal range of motion and no deformity.   Feet:   Right Foot:   Protective Sensation: 10 sites tested. 10 sites sensed.   Skin Integrity: Negative for ulcer, blister, skin breakdown, erythema, warmth, callus or dry skin.   Left Foot:   Protective Sensation: 10 sites tested. 10 sites sensed.   Skin Integrity: Negative for ulcer, blister, skin breakdown, erythema, warmth, callus or dry skin.   Lymphadenopathy:      She has no cervical adenopathy.        Right: No inguinal adenopathy present.        Left: No inguinal adenopathy present.   Neurological: She is alert and oriented to person, place, and time. She displays a negative Romberg sign. Coordination and gait normal.   Skin: Skin is warm, dry and intact. No rash noted.   Psychiatric: She has a normal mood and affect. Her behavior is normal. Judgment and thought content normal. Her mood appears not anxious. Her affect is not angry, not blunt, not labile and not inappropriate. Her speech is not rapid and/or pressured, not delayed, not tangential and not slurred. She is not agitated, not aggressive, not hyperactive, not slowed, not withdrawn, not actively hallucinating and not combative. Thought content is not paranoid and not delusional. Cognition and memory are normal. Cognition and memory are not impaired. She does not exhibit a depressed mood. She expresses no homicidal and no suicidal ideation. She is communicative. She exhibits normal recent memory and normal remote memory. She is attentive.   Nursing note and vitals reviewed.    Assessment:     1. Fibromyalgia    2. Type 2 diabetes mellitus without complication, without long-term current use of insulin    3. Hyperlipidemia associated with type 2 diabetes mellitus    4. Hypertension associated with diabetes    5. Hyperthyroidism    6. Mild intermittent asthma without complication    7. Anxiety    8. Need for influenza vaccination    9. Anemia, unspecified type      Plan:   Fibromyalgia  -     hydrocodo pain control.  Stable narcotic use.  Continue to refill 60 pills per month.  ne-acetaminophen 7.5-325mg (NORCO) 7.5-325 mg per tablet; Take 1 tablet by mouth every 12 (twelve) hours as needed for Pain.  Dispense: 60 tablet; Refill: 0    Type 2 diabetes mellitus without complication, without long-term current use of insulin  -     metFORMIN (GLUCOPHAGE) 1000 MG tablet; Take 1 tablet (1,000 mg total) by mouth 2 (two) times  daily with meals.  Dispense: 180 tablet; Refill: 3  -     Microalbumin/creatinine urine ratio; Future; Expected date: 11/22/2017  -     Hemoglobin A1c; Future; Expected date: 11/22/2017  Check A1c.  Additional medication if greater than 7%.  Start ACE inhibitor if Mycobutin positive.    Hyperlipidemia associated with type 2 diabetes mellitus  -     Lipid panel; Future; Expected date: 11/22/2017  Adjust pravastatin 10 mg Achilles indicated.  Goal LDL less than 100 for diabetic.    Hypertension associated with diabetes  -     amLODIPine (NORVASC) 10 MG tablet; Take 1 tablet (10 mg total) by mouth once daily.  Dispense: 90 tablet; Refill: 3  -     propranolol (INDERAL) 40 MG tablet; Take 1 tablet (40 mg total) by mouth 2 (two) times daily.  Dispense: 180 tablet; Refill: 3  Controlled.  BP at goal.  Continue present medications.      Hyperthyroidism  -     TSH; Future; Expected date: 11/22/2017  -     T4, free; Future; Expected date: 11/22/2017  -     T3, FREE; Future; Expected date: 12/06/2017  Check labs.  Forward to endocrinology.  Additional treatment/management per them.  Presently on Inderal and Tapazole.    Mild intermittent asthma without complication  Stable.  No frequent inhaler use.  Does need refill of albuterol inhaler.  Pharmacy to send request for cheapest alternative.    Anxiety  -     citalopram (CELEXA) 20 MG tablet; Take 1 tablet (20 mg total) by mouth once daily.  Dispense: 90 tablet; Refill: 3  Stable.  No exacerbations.  Continue Celexa.    Anemia, unspecified type  Daily multivitamin with iron.  Recheck next lab draw.    RHM  -     Influenza - Quadrivalent (3 years & older) (PF)    Return to clinic 4 months sooner as needed.

## 2017-11-27 DIAGNOSIS — E11.69 HYPERLIPIDEMIA ASSOCIATED WITH TYPE 2 DIABETES MELLITUS: ICD-10-CM

## 2017-11-27 DIAGNOSIS — E78.5 HYPERLIPIDEMIA ASSOCIATED WITH TYPE 2 DIABETES MELLITUS: ICD-10-CM

## 2017-11-27 RX ORDER — PRAVASTATIN SODIUM 10 MG/1
20 TABLET ORAL DAILY
Qty: 90 TABLET | Refills: 3
Start: 2017-11-27 | End: 2018-07-13 | Stop reason: SDUPTHER

## 2017-12-07 ENCOUNTER — TELEPHONE (OUTPATIENT)
Dept: INTERNAL MEDICINE | Facility: CLINIC | Age: 42
End: 2017-12-07

## 2017-12-07 NOTE — TELEPHONE ENCOUNTER
----- Message from Heraclio Farrell MD sent at 11/27/2017  8:14 AM CST -----  A1c is well controlled diabetes.  Continue metformin thousand milligrams twice a day.  TSH low, free T4 and T3 levels low normal.  Follow-up with endocrinology as scheduled.  .  Not at goal for diabetic.  Increase pravastatin 20 mg daily.  Recheck A1c and lipids 3 months.

## 2017-12-12 ENCOUNTER — TELEPHONE (OUTPATIENT)
Dept: INTERNAL MEDICINE | Facility: CLINIC | Age: 42
End: 2017-12-12

## 2017-12-12 NOTE — TELEPHONE ENCOUNTER
----- Message from Enrique Rollins sent at 12/12/2017  3:32 PM CST -----  Contact: self [phone] or [phone]  Would like to consult with nurse regarding test results.   Please call back at [phone] or [phone]

## 2017-12-20 DIAGNOSIS — M79.7 FIBROMYALGIA: Chronic | ICD-10-CM

## 2017-12-20 RX ORDER — HYDROCODONE BITARTRATE AND ACETAMINOPHEN 7.5; 325 MG/1; MG/1
1 TABLET ORAL EVERY 12 HOURS PRN
Qty: 60 TABLET | Refills: 0 | Status: SHIPPED | OUTPATIENT
Start: 2017-12-20 | End: 2018-01-04 | Stop reason: SDUPTHER

## 2017-12-20 NOTE — TELEPHONE ENCOUNTER
----- Message from Mery Napier sent at 12/20/2017 11:14 AM CST -----  1. What is the name of the medication you are requesting? hydrocodone  2. What is the dose? 7.5  3. How do you take the medication? Orally, topically, etc? orally  4. How often do you take this medication? Twice a day  5. Do you need a 30 day or 90 day supply? Left up to provider  6. How many refills are you requesting? Left up to provider  7. What is your preferred pharmacy and location of the pharmacy? [facility] [phone]  8. Who can we contact with further questions? Patient [phone]                                       johnson

## 2018-01-03 ENCOUNTER — TELEPHONE (OUTPATIENT)
Dept: INTERNAL MEDICINE | Facility: CLINIC | Age: 43
End: 2018-01-03

## 2018-01-03 NOTE — TELEPHONE ENCOUNTER
----- Message from Lupis Saldaña sent at 1/3/2018  4:12 PM CST -----  Contact: Patient  Patient called to speak with the nurse; she wants to be worked in tomorrow morning because she Pulled muscle in her back on the right that is constantly giving her muscle spasms. She doesn't want to see anyone else but is in a lot of pain. She can be contacted at [phone] [place].    Thanks,  Lupis

## 2018-01-04 ENCOUNTER — OFFICE VISIT (OUTPATIENT)
Dept: INTERNAL MEDICINE | Facility: CLINIC | Age: 43
End: 2018-01-04
Payer: COMMERCIAL

## 2018-01-04 VITALS
BODY MASS INDEX: 37.56 KG/M2 | DIASTOLIC BLOOD PRESSURE: 90 MMHG | SYSTOLIC BLOOD PRESSURE: 148 MMHG | TEMPERATURE: 99 F | HEART RATE: 81 BPM | OXYGEN SATURATION: 99 % | HEIGHT: 66 IN | WEIGHT: 233.69 LBS

## 2018-01-04 DIAGNOSIS — E05.90 HYPERTHYROIDISM: Chronic | ICD-10-CM

## 2018-01-04 DIAGNOSIS — E11.59 HYPERTENSION ASSOCIATED WITH DIABETES: Chronic | ICD-10-CM

## 2018-01-04 DIAGNOSIS — S23.41XA SPRAIN OF COSTOCHONDRAL JOINT, INITIAL ENCOUNTER: Primary | ICD-10-CM

## 2018-01-04 DIAGNOSIS — I15.2 HYPERTENSION ASSOCIATED WITH DIABETES: Chronic | ICD-10-CM

## 2018-01-04 DIAGNOSIS — M54.6 ACUTE RIGHT-SIDED THORACIC BACK PAIN: ICD-10-CM

## 2018-01-04 PROBLEM — Z23 NEED FOR INFLUENZA VACCINATION: Status: RESOLVED | Noted: 2017-11-22 | Resolved: 2018-01-04

## 2018-01-04 PROCEDURE — 99999 PR PBB SHADOW E&M-EST. PATIENT-LVL III: CPT | Mod: PBBFAC,,, | Performed by: FAMILY MEDICINE

## 2018-01-04 PROCEDURE — 99214 OFFICE O/P EST MOD 30 MIN: CPT | Mod: S$GLB,,, | Performed by: FAMILY MEDICINE

## 2018-01-04 RX ORDER — HYDROCODONE BITARTRATE AND ACETAMINOPHEN 7.5; 325 MG/1; MG/1
1 TABLET ORAL EVERY 6 HOURS PRN
Qty: 30 TABLET | Refills: 0 | Status: SHIPPED | OUTPATIENT
Start: 2018-01-04 | End: 2018-06-14 | Stop reason: CLARIF

## 2018-01-04 RX ORDER — BACLOFEN 10 MG/1
10 TABLET ORAL 3 TIMES DAILY
Qty: 30 TABLET | Refills: 0 | Status: SHIPPED | OUTPATIENT
Start: 2018-01-04 | End: 2018-03-01 | Stop reason: SDUPTHER

## 2018-01-04 RX ORDER — METHYLPREDNISOLONE 4 MG/1
TABLET ORAL
Qty: 1 PACKAGE | Refills: 0 | Status: SHIPPED | OUTPATIENT
Start: 2018-01-04 | End: 2018-06-14

## 2018-01-04 NOTE — PROGRESS NOTES
Subjective:   Patient ID: Shelly Kam is a 42 y.o. female.  Chief Complaint:  muscle strain (back)      Patient presents for evaluation of acute right-sided upper thoracic back pain.  Reports lifting something at home, felt pull/pop/snap.  Pain since.  Norco for fibromyalgia helps pain a little.  Flexeril to sedating.  Tolerated baclofen past.  Reports unable to work presently.  Denies previous pain problem in this area.  Shortness of breath due to hurts to take a deep breath  Needs follow-up appointment with endocrinology Hyperthyroidism.      Back Pain   This is a new problem. The current episode started in the past 7 days. The problem occurs constantly. The problem is unchanged. The pain is present in the thoracic spine. The quality of the pain is described as shooting, stabbing and burning. The pain does not radiate. The pain is at a severity of 10/10. The pain is severe. The pain is the same all the time. The symptoms are aggravated by coughing, twisting, lying down and position. Pertinent negatives include no abdominal pain, bladder incontinence, bowel incontinence, chest pain, dysuria, fever, headaches, leg pain, numbness, paresis, paresthesias, pelvic pain, perianal numbness, tingling, weakness or weight loss. Risk factors include lack of exercise, obesity, poor posture and sedentary lifestyle. She has tried bed rest (Narcotic Pain Meds) for the symptoms. The treatment provided mild relief.   Review of Systems   Constitutional: Negative for chills, fatigue, fever and weight loss.   HENT: Negative.    Respiratory: Positive for shortness of breath. Negative for cough, choking, chest tightness and wheezing.    Cardiovascular: Negative for chest pain, palpitations and leg swelling.   Gastrointestinal: Negative for abdominal pain, bowel incontinence, constipation, diarrhea, nausea and vomiting.   Genitourinary: Negative for bladder incontinence, decreased urine volume, difficulty urinating, dysuria, flank  "pain, frequency, hematuria, pelvic pain and urgency.   Musculoskeletal: Positive for back pain (Right upper thoracic) and myalgias. Negative for arthralgias, gait problem, joint swelling, neck pain and neck stiffness.   Skin: Negative for color change and rash.   Neurological: Negative for dizziness, tingling, tremors, syncope, weakness, light-headedness, numbness, headaches and paresthesias.   Psychiatric/Behavioral: Positive for sleep disturbance (secondary to pain). The patient is not nervous/anxious.      Objective:   BP (!) 148/90 (BP Location: Right arm, Patient Position: Sitting, BP Method: Large (Manual))   Pulse 81   Temp 98.9 °F (37.2 °C) (Tympanic)   Ht 5' 6" (1.676 m)   Wt 106 kg (233 lb 11 oz)   LMP 12/31/2017 (Approximate)   SpO2 99%   BMI 37.72 kg/m²     Physical Exam   Constitutional: She is oriented to person, place, and time. Vital signs are normal. She appears well-developed and well-nourished. No distress.   HENT:   Head: Normocephalic and atraumatic.   Neck: Normal range of motion and full passive range of motion without pain. Neck supple. No thyromegaly present.   Cardiovascular: Normal rate, regular rhythm, S1 normal, S2 normal and normal heart sounds.  Exam reveals no gallop and no friction rub.    No murmur heard.  Pulmonary/Chest: Effort normal and breath sounds normal. No respiratory distress. She has no wheezes. She has no rhonchi. She has no rales.   Abdominal: Soft. She exhibits no distension. There is no tenderness. There is no CVA tenderness.   Musculoskeletal: She exhibits no edema.        Thoracic back: She exhibits decreased range of motion, tenderness, pain and spasm. She exhibits no bony tenderness, no swelling, no edema, no deformity and no laceration.        Lumbar back: Normal. She exhibits normal range of motion, no tenderness, no bony tenderness, no swelling, no edema, no deformity, no laceration, no pain and no spasm.   Neurological: She is oriented to person, place, " and time. She has normal strength and normal reflexes. She displays a negative Romberg sign. Coordination and gait normal.   Skin: Skin is warm and dry. No rash noted.   Psychiatric: She has a normal mood and affect. Her speech is normal and behavior is normal. Judgment and thought content normal. Cognition and memory are normal.   Nursing note and vitals reviewed.    Assessment:     1. Sprain of costochondral joint, initial encounter    2. Acute right-sided thoracic back pain    3. Hyperthyroidism    4. Hypertension associated with diabetes      Plan:   Sprain of costochondral joint, initial encounter  Acute right-sided thoracic back pain  -     methylPREDNISolone (MEDROL DOSEPACK) 4 mg tablet; Take as directed on dosepack  Dispense: 1 Package; Refill: 0  -     baclofen (LIORESAL) 10 MG tablet; Take 1 tablet (10 mg total) by mouth 3 (three) times daily.  Dispense: 30 tablet; Refill: 0  -     hydrocodone-acetaminophen 7.5-325mg (NORCO) 7.5-325 mg per tablet; Take 1 tablet by mouth every 6 (six) hours as needed for Pain.  Dispense: 30 tablet; Refill: 0  Anti-inflammatories, muscle relaxant, increased frequency of Norco.  Alternate ice or heat.  Out of work letter provided until rechecked.    Hyperthyroidism  Schedule follow-up appointment with endocrinology.    Hypertension associated with diabetes  Previously controlled.  Elevated today.  Question related pain. Recheck at follow-up visit.    Return to clinic on Monday.

## 2018-01-04 NOTE — LETTER
January 4, 2018      [facility]  [address] [facility] [address] [place] [address]  Phone: [phone]  Fax: [phone]       Patient: Shelly Kam   YOB: 1975  Date of Visit: 01/04/2018    To Whom It May Concern:    Yashira Kam  was at [facility] on 01/04/2018. She is not able to work at this time. She will be revaluated on 01/08/2018. If you have any questions or concerns, or if I can be of further assistance, please do not hesitate to contact me.    Sincerely,    Heraclio Farrell MD

## 2018-01-08 ENCOUNTER — OFFICE VISIT (OUTPATIENT)
Dept: ENDOCRINOLOGY | Facility: CLINIC | Age: 43
End: 2018-01-08
Payer: COMMERCIAL

## 2018-01-08 ENCOUNTER — OFFICE VISIT (OUTPATIENT)
Dept: INTERNAL MEDICINE | Facility: CLINIC | Age: 43
End: 2018-01-08
Payer: COMMERCIAL

## 2018-01-08 VITALS
WEIGHT: 231.94 LBS | TEMPERATURE: 99 F | BODY MASS INDEX: 37.43 KG/M2 | SYSTOLIC BLOOD PRESSURE: 130 MMHG | HEART RATE: 93 BPM | OXYGEN SATURATION: 98 % | DIASTOLIC BLOOD PRESSURE: 72 MMHG

## 2018-01-08 VITALS
BODY MASS INDEX: 37.28 KG/M2 | SYSTOLIC BLOOD PRESSURE: 116 MMHG | WEIGHT: 231.94 LBS | HEART RATE: 95 BPM | HEIGHT: 66 IN | TEMPERATURE: 99 F | DIASTOLIC BLOOD PRESSURE: 76 MMHG

## 2018-01-08 DIAGNOSIS — S23.41XD: Primary | ICD-10-CM

## 2018-01-08 DIAGNOSIS — M54.6 ACUTE RIGHT-SIDED THORACIC BACK PAIN: ICD-10-CM

## 2018-01-08 DIAGNOSIS — E11.59 HYPERTENSION ASSOCIATED WITH DIABETES: Chronic | ICD-10-CM

## 2018-01-08 DIAGNOSIS — I15.2 HYPERTENSION ASSOCIATED WITH DIABETES: Chronic | ICD-10-CM

## 2018-01-08 DIAGNOSIS — Z87.891 SMOKING HX: ICD-10-CM

## 2018-01-08 DIAGNOSIS — E05.90 HYPERTHYROIDISM: Primary | ICD-10-CM

## 2018-01-08 DIAGNOSIS — R49.0 HOARSENESS: ICD-10-CM

## 2018-01-08 DIAGNOSIS — R13.10 DYSPHAGIA, UNSPECIFIED TYPE: ICD-10-CM

## 2018-01-08 PROCEDURE — 99999 PR PBB SHADOW E&M-EST. PATIENT-LVL III: CPT | Mod: PBBFAC,,, | Performed by: INTERNAL MEDICINE

## 2018-01-08 PROCEDURE — 3008F BODY MASS INDEX DOCD: CPT | Mod: S$GLB,,, | Performed by: FAMILY MEDICINE

## 2018-01-08 PROCEDURE — 99999 PR PBB SHADOW E&M-EST. PATIENT-LVL III: CPT | Mod: PBBFAC,,, | Performed by: FAMILY MEDICINE

## 2018-01-08 PROCEDURE — 99214 OFFICE O/P EST MOD 30 MIN: CPT | Mod: S$GLB,,, | Performed by: FAMILY MEDICINE

## 2018-01-08 PROCEDURE — 99214 OFFICE O/P EST MOD 30 MIN: CPT | Mod: S$GLB,,, | Performed by: INTERNAL MEDICINE

## 2018-01-08 RX ORDER — METHIMAZOLE 10 MG/1
20 TABLET ORAL DAILY
Qty: 60 TABLET | Refills: 3 | Status: SHIPPED | OUTPATIENT
Start: 2018-01-08 | End: 2019-06-05

## 2018-01-08 RX ORDER — PROPRANOLOL HYDROCHLORIDE 40 MG/1
40 TABLET ORAL 2 TIMES DAILY PRN
Qty: 60 TABLET | Refills: 3 | Status: SHIPPED | OUTPATIENT
Start: 2018-01-08 | End: 2019-04-09 | Stop reason: SDUPTHER

## 2018-01-08 NOTE — PROGRESS NOTES
Subjective:   Patient ID: Shelly Kam is a 43 y.o. female.  Chief Complaint:  Follow-up      Follow up on Sprain of costochondral joint and Acute right-sided thoracic back pain. Treated with:  -     methylPREDNISolone (MEDROL DOSEPACK) 4 mg tablet; Take as directed on dosepack  Dispense: 1 Package; Refill: 0  -     baclofen (LIORESAL) 10 MG tablet; Take 1 tablet (10 mg total) by mouth 3 (three) times daily.  Dispense: 30 tablet; Refill: 0  -     hydrocodone-acetaminophen 7.5-325mg (NORCO) 7.5-325 mg per tablet; Take 1 tablet by mouth every 6 (six) hours as needed for Pain.  Dispense: 30 tablet; Refill: 0  Rest and out of work until today  She reports pain improved but still present.     Hypertension associated with diabetes Previously controlled.  Elevated last visit. Question related pain. Her for recheck.  BP presently controlled.      Review of Systems   Constitutional: Negative for chills, fatigue and fever.   HENT: Negative.    Respiratory: Negative for cough, choking, chest tightness, shortness of breath and wheezing.    Cardiovascular: Negative for chest pain, palpitations and leg swelling.   Gastrointestinal: Negative for abdominal pain, constipation, diarrhea, nausea and vomiting.   Genitourinary: Negative for decreased urine volume, difficulty urinating, dysuria, flank pain, frequency, hematuria, pelvic pain and urgency.   Musculoskeletal: Positive for back pain (Right upper thoracic. Improved). Negative for arthralgias, gait problem, joint swelling, myalgias, neck pain and neck stiffness.   Skin: Negative for color change and rash.   Neurological: Negative for dizziness, tremors, syncope, weakness, light-headedness, numbness and headaches.   Psychiatric/Behavioral: Negative for sleep disturbance. The patient is not nervous/anxious.      Objective:   /72 (BP Location: Right arm, Patient Position: Sitting, BP Method: Large (Manual))   Pulse 93   Temp 98.6 °F (37 °C) (Tympanic)   Wt 105.2 kg  (231 lb 14.8 oz)   LMP 12/31/2017 (Approximate)   SpO2 98%   BMI 37.43 kg/m²     Physical Exam   Constitutional: She is oriented to person, place, and time. Vital signs are normal. She appears well-developed and well-nourished. No distress.   HENT:   Head: Normocephalic and atraumatic.   Neck: Normal range of motion and full passive range of motion without pain. Neck supple. No thyromegaly present.   Cardiovascular: Normal rate, regular rhythm, S1 normal, S2 normal and normal heart sounds.  Exam reveals no gallop and no friction rub.    No murmur heard.  Pulmonary/Chest: Effort normal and breath sounds normal. No respiratory distress. She has no wheezes. She has no rhonchi. She has no rales.   Abdominal: Soft. She exhibits no distension. There is no tenderness. There is no CVA tenderness.   Musculoskeletal: She exhibits no edema.        Thoracic back: She exhibits decreased range of motion, tenderness, pain and spasm. She exhibits no bony tenderness, no swelling, no edema, no deformity and no laceration.        Lumbar back: Normal. She exhibits normal range of motion, no tenderness, no bony tenderness, no swelling, no edema, no deformity, no laceration, no pain and no spasm.   Neurological: She is oriented to person, place, and time. She has normal strength and normal reflexes. She displays a negative Romberg sign. Coordination and gait normal.   Skin: Skin is warm and dry. No rash noted.   Psychiatric: She has a normal mood and affect. Her speech is normal and behavior is normal. Judgment and thought content normal. Cognition and memory are normal.   Nursing note and vitals reviewed.    Assessment:     1. Sprain of costochondral joint, subsequent encounter    2. Acute right-sided thoracic back pain    3. Hypertension associated with diabetes      Plan:   Sprain of costochondral joint, subsequent encounter  Acute right-sided thoracic back pain  -     Ambulatory referral to Physiatry  Improved.  Still some  discomfort.  Referral to physiatry.  Possible trigger point or other injection.    Hypertension associated with diabetes  Controlled.  BP at goal.  Continue present medications.    Return to clinic 4 months or sooner as needed.

## 2018-01-08 NOTE — PROGRESS NOTES
Patient ID: Shelly Kam is a 43 y.o. female.  Patient is here for follow up      Last seen July 2017 and Mr. follow-up appointment in September      Chief Complaint: Hyperthyroidism      HPI  Consultation was requested by Dr. Heraclio Farrell       Diagnosed: around October 2016, positive TSI ANTIBODY; HAD SEEN Dr. Lugo once and was started on methimazole    Past hx of tracheotomy, had severe pharnygotonsillitis complicated by respiratory insufficiency    She reports worsening hoarseness and trouble swallowing pills especially of the methimazole and always having a sensation of feeling something at the back of her throat, sometimes can't completely swallow her food and has to spit it out, has not seen ENT here since June 2016 but was also seen Dr. 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 infections and has a cough often    Today has a cough   She is a smoker      Previous radiology tests:  Thyroid ultrasound : No   NM uptake and scan: Yes - showed increased uptake at 6 in 24 hours    Previous thyroid surgery: No      Thyroid symptoms:denies fatigue, weight changes, heat/cold intolerance, bowel/skin changes or CVS symptoms      Thyroid medications: methimazole 10 mg once a day-in July I had wanted to increase her to 10 mg twice a day which she admits she is not good at taking her pills more than once a day so most the time she is just taking it once a day and same thing with the Inderal 40 mg she takes it most of the time once a day    Has run out of both of these medications 2 weeks ago      Takes medication appropriately: See above      I have reviewed the past medical, family and social history    Review of Systems   Constitutional: Negative for appetite change, fatigue, fever and unexpected weight change.   HENT: Positive for trouble swallowing and voice change. Negative for sore throat.    Eyes: Negative for visual disturbance.   Respiratory: Positive  for cough. Negative for shortness of breath and wheezing.    Cardiovascular: Negative for chest pain, palpitations and leg swelling.   Gastrointestinal: Negative for diarrhea, nausea and vomiting.   Endocrine: Negative for cold intolerance, heat intolerance, polydipsia, polyphagia and polyuria.   Genitourinary: Negative for difficulty urinating, dysuria and menstrual problem.   Musculoskeletal: Negative for arthralgias and joint swelling.   Skin: Negative for rash.   Neurological: Negative for dizziness, weakness, numbness and headaches.   Psychiatric/Behavioral: Negative for confusion, dysphoric mood and sleep disturbance.       Objective:      Physical Exam   Constitutional: She appears well-developed and well-nourished. No distress.   HENT:   Head: Normocephalic and atraumatic.   Eyes: Conjunctivae are normal.   Neck: No JVD present. No tracheal deviation present. No thyromegaly present.   Scar from previous tracheotomy   Cardiovascular: Normal rate, regular rhythm, normal heart sounds and intact distal pulses.  Exam reveals no gallop and no friction rub.    No murmur heard.  Pulmonary/Chest: Effort normal. No stridor. No respiratory distress. She has wheezes. She has no rales. She exhibits no tenderness.   Slight wheeze in the left lower lung   Musculoskeletal: She exhibits no edema or deformity.   Lymphadenopathy:     She has no cervical adenopathy.   Neurological: She is alert.   Skin: She is not diaphoretic.   Vitals reviewed.        Lab Review:   Lab Visit on 11/22/2017   Component Date Value    Microalbum.,U,Random 11/22/2017 11.0     Creatinine, Random Ur 11/22/2017 282.0     Microalb Creat Ratio 11/22/2017 3.9    Lab Visit on 11/22/2017   Component Date Value    Cholesterol 11/22/2017 201*    Triglycerides 11/22/2017 190*    HDL 11/22/2017 44     LDL Cholesterol 11/22/2017 119.0     HDL/Chol Ratio 11/22/2017 21.9     Total Cholesterol/HDL Ra* 11/22/2017 4.6     Non-HDL Cholesterol 11/22/2017 157      TSH 11/22/2017 0.010*    Free T4 11/22/2017 0.82     T3, Free 11/22/2017 2.6     Hemoglobin A1C 11/22/2017 5.4     Estimated Avg Glucose 11/22/2017 108    Admission on 10/02/2017, Discharged on 10/03/2017   Component Date Value    WBC 10/02/2017 8.47     RBC 10/02/2017 4.39     Hemoglobin 10/02/2017 11.1*    Hematocrit 10/02/2017 34.8*    MCV 10/02/2017 79*    MCH 10/02/2017 25.3*    MCHC 10/02/2017 31.9*    RDW 10/02/2017 15.5*    Platelets 10/02/2017 241     MPV 10/02/2017 10.4     Gran # 10/02/2017 3.1     Lymph # 10/02/2017 4.3     Mono # 10/02/2017 0.6     Eos # 10/02/2017 0.5     Baso # 10/02/2017 0.05     Gran% 10/02/2017 36.1*    Lymph% 10/02/2017 50.3*    Mono% 10/02/2017 7.6     Eosinophil% 10/02/2017 5.4     Basophil% 10/02/2017 0.6     Differential Method 10/02/2017 Automated     Sodium 10/02/2017 140     Potassium 10/02/2017 3.8     Chloride 10/02/2017 106     CO2 10/02/2017 25     Glucose 10/02/2017 107     BUN, Bld 10/02/2017 9     Creatinine 10/02/2017 0.8     Calcium 10/02/2017 8.8     Total Protein 10/02/2017 6.4     Albumin 10/02/2017 3.1*    Total Bilirubin 10/02/2017 0.1     Alkaline Phosphatase 10/02/2017 88     AST 10/02/2017 13     ALT 10/02/2017 11     Anion Gap 10/02/2017 9     eGFR if African American 10/02/2017 >60     eGFR if non African Amer* 10/02/2017 >60     BNP 10/02/2017 24    Lab Visit on 07/24/2017   Component Date Value    TSH 07/24/2017 <0.010*    Free T4 07/24/2017 1.42     T3, Free 07/24/2017 5.1*    Sodium 07/24/2017 139     Potassium 07/24/2017 3.9     Chloride 07/24/2017 105     CO2 07/24/2017 26     Glucose 07/24/2017 79     BUN, Bld 07/24/2017 10     Creatinine 07/24/2017 0.7     Calcium 07/24/2017 9.0     Total Protein 07/24/2017 7.2     Albumin 07/24/2017 3.4*    Total Bilirubin 07/24/2017 0.1     Alkaline Phosphatase 07/24/2017 91     AST 07/24/2017 13     ALT 07/24/2017 11     Anion Gap 07/24/2017 8      eGFR if African American 07/24/2017 >60.0     eGFR if non  Amer* 07/24/2017 >60.0     WBC 07/24/2017 7.03     RBC 07/24/2017 4.95     Hemoglobin 07/24/2017 12.1     Hematocrit 07/24/2017 38.1     MCV 07/24/2017 77*    MCH 07/24/2017 24.4*    MCHC 07/24/2017 31.8*    RDW 07/24/2017 14.9*    Platelets 07/24/2017 244     MPV 07/24/2017 12.1     Gran # 07/24/2017 2.9     Lymph # 07/24/2017 3.2     Mono # 07/24/2017 0.4     Eos # 07/24/2017 0.5     Baso # 07/24/2017 0.04     Gran% 07/24/2017 40.9     Lymph% 07/24/2017 45.2     Mono% 07/24/2017 6.3     Eosinophil% 07/24/2017 7.0     Basophil% 07/24/2017 0.6     Differential Method 07/24/2017 Automated        Assessment:     1. Hyperthyroidism     2. Hypertension associated with diabetes  propranolol (INDERAL) 40 MG tablet   3. Dysphagia, unspecified type      Unsure if may be related to pharyngeal problems from her previous tracheotomy, plus she is having chronic hoarseness and is a smoker, will schedule with ENT    4. Hoarseness     5. Smoking hx      Discussed smoking cessation      Plan:   Hyperthyroidism    Hypertension associated with diabetes  -     propranolol (INDERAL) 40 MG tablet; Take 1 tablet (40 mg total) by mouth 2 (two) times daily as needed.  Dispense: 60 tablet; Refill: 3    Dysphagia, unspecified type  Comments:  Unsure if may be related to pharyngeal problems from her previous tracheotomy, plus she is having chronic hoarseness and is a smoker, will schedule with ENT     Hoarseness    Smoking hx  Comments:  Discussed smoking cessation    Other orders  -     Cancel: TSH; Future; Expected date: 01/08/2018  -     Cancel: T4, free; Future; Expected date: 01/08/2018  -     Cancel: T3, free; Future; Expected date: 01/08/2018  -     Cancel: Thyroid stimulating immunoglobulin; Future; Expected date: 01/08/2018  -     methIMAzole (TAPAZOLE) 10 MG Tab; Take 2 tablets (20 mg total) by mouth once daily.  Dispense: 60 tablet; Refill:  3          Return in about 6 weeks (around 2/19/2018).    Labs prior to appointment? no     Disclaimer:  This note may have been partially prepared using voice recognition software and  it may have not been extensively proofed, as such there could be errors within the text such as sound alike errors.

## 2018-01-08 NOTE — LETTER
January 8, 2018      [facility]  [address]  [place] [address]  Phone: [phone]  Fax: [phone]       Patient: Shelly Kam   YOB: 1975  Date of Visit: 01/08/2018    To Whom It May Concern:    Yashira Kam  was at [facility] on 01/08/2018. She may return to workl on 1/11/2018 with no restrictions. If you have any questions or concerns, or if I can be of further assistance, please do not hesitate to contact me.    Sincerely,    Heraclio Farrell MD

## 2018-01-11 ENCOUNTER — TELEPHONE (OUTPATIENT)
Dept: INTERNAL MEDICINE | Facility: CLINIC | Age: 43
End: 2018-01-11

## 2018-01-11 NOTE — TELEPHONE ENCOUNTER
----- Message from Maribel Vann sent at 1/11/2018  9:06 AM CST -----  Contact: pt  The pt request a call concerning her work release form, pt can be reached at [phone]///[email or web address]

## 2018-03-01 DIAGNOSIS — S23.41XA SPRAIN OF COSTOCHONDRAL JOINT, INITIAL ENCOUNTER: ICD-10-CM

## 2018-03-01 DIAGNOSIS — M54.6 ACUTE RIGHT-SIDED THORACIC BACK PAIN: ICD-10-CM

## 2018-03-02 RX ORDER — BACLOFEN 10 MG/1
10 TABLET ORAL 3 TIMES DAILY PRN
Qty: 90 TABLET | Refills: 0 | Status: SHIPPED | OUTPATIENT
Start: 2018-03-02 | End: 2019-06-05

## 2018-03-02 RX ORDER — AMLODIPINE BESYLATE 10 MG/1
10 TABLET ORAL DAILY
Qty: 90 TABLET | Refills: 3 | Status: SHIPPED | OUTPATIENT
Start: 2018-03-02 | End: 2018-06-14 | Stop reason: CLARIF

## 2018-06-14 ENCOUNTER — HOSPITAL ENCOUNTER (EMERGENCY)
Facility: HOSPITAL | Age: 43
Discharge: HOME | End: 2018-06-14
Payer: COMMERCIAL

## 2018-06-14 VITALS
BODY MASS INDEX: 38.73 KG/M2 | WEIGHT: 241 LBS | DIASTOLIC BLOOD PRESSURE: 87 MMHG | RESPIRATION RATE: 18 BRPM | TEMPERATURE: 99 F | HEART RATE: 76 BPM | SYSTOLIC BLOOD PRESSURE: 159 MMHG | HEIGHT: 66 IN | OXYGEN SATURATION: 97 %

## 2018-06-14 DIAGNOSIS — R09.82 POST-NASAL DRIP: ICD-10-CM

## 2018-06-14 DIAGNOSIS — I10 ELEVATED BLOOD PRESSURE READING WITH DIAGNOSIS OF HYPERTENSION: ICD-10-CM

## 2018-06-14 DIAGNOSIS — J04.0 LARYNGITIS: ICD-10-CM

## 2018-06-14 DIAGNOSIS — R59.0 ANTERIOR CERVICAL ADENOPATHY: Primary | ICD-10-CM

## 2018-06-14 DIAGNOSIS — F17.200 CURRENT SMOKER: ICD-10-CM

## 2018-06-14 PROCEDURE — 99283 EMERGENCY DEPT VISIT LOW MDM: CPT

## 2018-06-14 RX ORDER — AZITHROMYCIN 250 MG/1
TABLET, FILM COATED ORAL
Qty: 6 TABLET | Refills: 0 | Status: SHIPPED | OUTPATIENT
Start: 2018-06-14 | End: 2019-06-05

## 2018-06-14 RX ORDER — AMLODIPINE BESYLATE 10 MG/1
10 TABLET ORAL DAILY
COMMUNITY
End: 2019-04-09 | Stop reason: SDUPTHER

## 2018-06-14 NOTE — ED PROVIDER NOTES
History      Chief Complaint   Patient presents with    Sore Throat      pt states it hurts when she swallows    Otalgia     bilateral pain reported    Headache       Review of patient's allergies indicates:   Allergen Reactions    Pcn [penicillins]     Morphine Other (See Comments)     Unknown        HPI   HPI    6/14/2018, 12:31 PM   History obtained from the patient      History of Present Illness: Shelly Kam is a 43 y.o. female patient who presents to the Emergency Department for otalgia x one day.  Associated symptoms include sore throat, headache, cough, post nasal drip.  Denies fever, nasal congestion, rhinorrhea, chest pain, SOB, dizziness. No treatments tried.  Patient complains of swollen neck glands; she states that she has had swollen glands since trach was removed but that glands have been hurting since yesterday.        Arrival mode: Personal vehicle      PCP: Heraclio Farrell MD       Past Medical History:  Past Medical History:   Diagnosis Date    Asthma     Diabetes mellitus     Fibromyalgia     Hypertension     Panic attack     Prozac in past       Past Surgical History:  Past Surgical History:   Procedure Laterality Date    BRONCHOSCOPY      TONSILLECTOMY      TRACHEOSTOMY TUBE PLACEMENT           Family History:  Family History   Problem Relation Age of Onset    Cancer Paternal Aunt        Social History:  Social History     Social History Main Topics    Smoking status: Current Every Day Smoker     Packs/day: 0.25     Last attempt to quit: 3/1/2011    Smokeless tobacco: Never Used    Alcohol use Yes      Comment: occasionally    Drug use: No    Sexual activity: Yes     Partners: Male       ROS   Review of Systems   Constitutional: Negative for chills and fever.   HENT: Positive for ear pain, postnasal drip and sore throat. Negative for congestion and rhinorrhea.    Eyes: Negative for discharge and redness.   Respiratory: Positive for cough. Negative for wheezing.   "  Cardiovascular: Negative for chest pain and palpitations.   Gastrointestinal: Negative for diarrhea and vomiting.   Genitourinary: Negative for dysuria and frequency.   Musculoskeletal: Negative for back pain and neck pain.   Skin: Negative for rash and wound.   Neurological: Positive for headaches. Negative for dizziness.       Physical Exam      Initial Vitals [06/14/18 1149]   BP Pulse Resp Temp SpO2   (!) 159/87 76 18 98.7 °F (37.1 °C) 97 %      MAP       --          Physical Exam  Nursing Notes and Vital Signs Reviewed.  Constitutional: Patient is in no acute distress. Awake and alert. Well-developed and well-nourished.  Head: Atraumatic. Normocephalic.  Eyes: PERRL. EOM intact. Conjunctivae are not pale. No scleral icterus.  ENT: Mucous membranes are moist. Oropharynx is clear and symmetric.  Cervical adenopathy, anterior.     Neck: Supple. Full ROM. No lymphadenopathy.  Cardiovascular: Regular rate. Regular rhythm. No murmurs, rubs, or gallops.   Pulmonary/Chest: No respiratory distress. Clear to auscultation bilaterally. No wheezing, rales, or rhonchi.  Abdominal: Soft and non-distended.  There is no tenderness.  No rebound, guarding, or rigidity.   Musculoskeletal: Moves all extremities. No obvious deformities. No edema. No calf tenderness.  Skin: Warm and dry.  Neurological:  Alert, awake, and appropriate.  Normal speech.  No acute focal neurological deficits are appreciated.  Psychiatric: Normal affect. Good eye contact. Appropriate in content.    ED Course    Procedures  ED Vital Signs:  Vitals:    06/14/18 1149   BP: (!) 159/87   Pulse: 76   Resp: 18   Temp: 98.7 °F (37.1 °C)   TempSrc: Oral   SpO2: 97%   Weight: 109.3 kg (241 lb)   Height: 5' 6" (1.676 m)       Abnormal Lab Results:  Labs Reviewed - No data to display         Imaging Results:  Imaging Results    None                 The Emergency Provider reviewed the vital signs and test results, which are outlined above.    ED Discussion     12:47  " PM: . Discussed with pt all pertinent ED information and results. Discussed pt dx and plan of tx. Gave pt all f/u and return to the ED instructions. All questions and concerns were addressed at this time. Pt expresses understanding of information and instructions, and is comfortable with plan to discharge. Pt is stable for discharge.    I discussed with patient and/or family/caretaker that evaluation in the ED does not suggest any emergent or life threatening medical conditions requiring immediate intervention beyond what was provided in the ED, and I believe patient is safe for discharge.  Regardless, an unremarkable evaluation in the ED does not preclude the development or presence of a serious of life threatening condition. As such, patient was instructed to return immediately for any worsening or change in current symptoms.    Pre-hypertension/Hypertension: The pt has been informed that they may have pre-hypertension or hypertension based on a blood pressure reading in the ED. I recommend that the pt call the PCP listed on their discharge instructions or a physician of their choice this week to arrange f/u for further evaluation of possible pre-hypertension or hypertension.       ED Medication(s):  Medications - No data to display    Discharge Medication List as of 6/14/2018 12:47 PM      START taking these medications    Details   azithromycin (Z-KALI) 250 MG tablet As per packet instructions, Print             Follow-up Information     [facility] In 3 days.    Contact information:  [address] [facility] [address] [phone]                     Medical Decision Making        Additional MDM:   Hypertension: The patient has hypertension (no treatment required at this time). The patient's condition was felt to be stable.   Smoking Cessation: The patient was counseled on tobacco related  health complications.            Clinical Impression       ICD-10-CM ICD-9-CM   1. Anterior cervical  adenopathy R59.0 785.6   2. Laryngitis J04.0 464.00   3. Post-nasal drip R09.82 784.91   4. Current smoker F17.200 305.1   5. Elevated blood pressure reading with diagnosis of hypertension I10 401.9       Disposition:   Disposition: Discharged  Condition: Stable           Vanita Schreiber PA-C  06/15/18 [id number]

## 2018-06-15 DIAGNOSIS — E11.9 TYPE 2 DIABETES MELLITUS WITHOUT COMPLICATION: ICD-10-CM

## 2018-07-12 ENCOUNTER — NURSE TRIAGE (OUTPATIENT)
Dept: ADMINISTRATIVE | Facility: CLINIC | Age: 43
End: 2018-07-12

## 2018-07-12 DIAGNOSIS — E11.69 HYPERLIPIDEMIA ASSOCIATED WITH TYPE 2 DIABETES MELLITUS: Chronic | ICD-10-CM

## 2018-07-12 DIAGNOSIS — E78.5 HYPERLIPIDEMIA ASSOCIATED WITH TYPE 2 DIABETES MELLITUS: Chronic | ICD-10-CM

## 2018-07-12 DIAGNOSIS — E11.9 TYPE 2 DIABETES MELLITUS WITHOUT COMPLICATION, WITHOUT LONG-TERM CURRENT USE OF INSULIN: Primary | Chronic | ICD-10-CM
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
## 2018-07-13 RX ORDER — PRAVASTATIN SODIUM 10 MG/1
20 TABLET ORAL DAILY
Qty: 90 TABLET | Refills: 3
Start: 2018-07-13 | End: 2019-01-21

## 2018-07-13 RX ORDER — INSULIN PUMP SYRINGE, 3 ML
EACH MISCELLANEOUS
Qty: 1 EACH | Refills: 0 | Status: SHIPPED | OUTPATIENT
Start: 2018-07-13 | End: 2019-06-05

## 2018-07-13 RX ORDER — LANCETS
EACH MISCELLANEOUS
Qty: 100 EACH | Refills: 3 | Status: SHIPPED | OUTPATIENT
Start: 2018-07-13 | End: 2019-06-05

## 2018-07-13 NOTE — TELEPHONE ENCOUNTER
Refilled pravastatin.    No additional medication refills without visit.    Sent prescription for insurance preferred diabetes meter.

## 2018-08-13 ENCOUNTER — TELEPHONE (OUTPATIENT)
Dept: INTERNAL MEDICINE | Facility: CLINIC | Age: 43
End: 2018-08-13

## 2018-08-13 NOTE — TELEPHONE ENCOUNTER
----- Message from Heraclio Farrell MD sent at 8/13/2018  4:14 PM CDT -----  Contact: Patient  She needs to identify what pain management is covered by her insurance.    Let us know the insurance preferred provider, I will fill out appropriate referral to that physician.  ----- Message -----  From: Lisa Underwood LPN  Sent: 8/13/2018   3:43 PM  To: Heraclio Farrell MD        ----- Message -----  From: Lupis Saldaña  Sent: 8/13/2018  12:48 PM  To: Bud MCPHERSON Staff    Patient called and stated she needs a referral to a Pain Management doctor.    She can be contacted at [phone].    Thanks,  Lupis

## 2018-08-13 NOTE — TELEPHONE ENCOUNTER
----- Message from Heraclio Farrell MD sent at 8/13/2018  4:11 PM CDT -----  Contact: Patient  She needs to contact her insurance provider.    See what pain management clinics if any are on her list.    I will refer to any provider she identifies is covered by her insurance.  ----- Message -----  From: Lisa Underwood LPN  Sent: 8/13/2018   3:43 PM  To: Heraclio Farrell MD        ----- Message -----  From: Lupis Saldaña  Sent: 8/13/2018  12:48 PM  To: Bud MCPHERSON Staff    Patient called and stated she needs a referral to a Pain Management doctor.    She can be contacted at [phone].    Thanks,  Lupis

## 2018-11-23 DIAGNOSIS — E11.9 TYPE 2 DIABETES MELLITUS WITHOUT COMPLICATION: ICD-10-CM

## 2018-12-05 ENCOUNTER — PATIENT OUTREACH (OUTPATIENT)
Dept: ADMINISTRATIVE | Facility: HOSPITAL | Age: 43
End: 2018-12-05

## 2018-12-05 ENCOUNTER — PATIENT MESSAGE (OUTPATIENT)
Dept: ADMINISTRATIVE | Facility: HOSPITAL | Age: 43
End: 2018-12-05

## 2019-01-20 DIAGNOSIS — E11.59 HYPERTENSION ASSOCIATED WITH DIABETES: Chronic | ICD-10-CM

## 2019-01-20 DIAGNOSIS — I15.2 HYPERTENSION ASSOCIATED WITH DIABETES: Chronic | ICD-10-CM

## 2019-01-20 DIAGNOSIS — E78.5 HYPERLIPIDEMIA ASSOCIATED WITH TYPE 2 DIABETES MELLITUS: ICD-10-CM

## 2019-01-20 DIAGNOSIS — E11.69 HYPERLIPIDEMIA ASSOCIATED WITH TYPE 2 DIABETES MELLITUS: ICD-10-CM

## 2019-01-21 RX ORDER — PRAVASTATIN SODIUM 10 MG/1
10 TABLET ORAL DAILY
Qty: 30 TABLET | Refills: 0 | Status: SHIPPED | OUTPATIENT
Start: 2019-01-21 | End: 2019-06-05

## 2019-01-21 RX ORDER — PROPRANOLOL HYDROCHLORIDE 40 MG/1
TABLET ORAL
Qty: 60 TABLET | Refills: 0 | OUTPATIENT
Start: 2019-01-21

## 2019-01-21 RX ORDER — METHIMAZOLE 10 MG/1
TABLET ORAL
Qty: 60 TABLET | Refills: 0 | OUTPATIENT
Start: 2019-01-21

## 2019-01-21 NOTE — TELEPHONE ENCOUNTER
Patient advised Refill script sent to pharmacy and to make follow up appointment.  Patient voiced understanding.

## 2019-02-20 ENCOUNTER — PATIENT OUTREACH (OUTPATIENT)
Dept: ADMINISTRATIVE | Facility: HOSPITAL | Age: 44
End: 2019-02-20

## 2019-02-20 NOTE — PROGRESS NOTES
Contacted patient to schedule annual pcp appointment. Patient has an appointment scheduled on 03/11/19

## 2019-02-25 ENCOUNTER — PATIENT OUTREACH (OUTPATIENT)
Dept: ADMINISTRATIVE | Facility: HOSPITAL | Age: 44
End: 2019-02-25

## 2019-03-11 DIAGNOSIS — E78.5 HYPERLIPIDEMIA ASSOCIATED WITH TYPE 2 DIABETES MELLITUS: ICD-10-CM

## 2019-03-11 DIAGNOSIS — E11.69 HYPERLIPIDEMIA ASSOCIATED WITH TYPE 2 DIABETES MELLITUS: ICD-10-CM

## 2019-03-11 RX ORDER — PRAVASTATIN SODIUM 10 MG/1
TABLET ORAL
Qty: 30 TABLET | Refills: 0 | OUTPATIENT
Start: 2019-03-11

## 2019-04-08 ENCOUNTER — TELEPHONE (OUTPATIENT)
Dept: INTERNAL MEDICINE | Facility: CLINIC | Age: 44
End: 2019-04-08

## 2019-04-08 NOTE — TELEPHONE ENCOUNTER
----- Message from Cathie Wilson sent at 4/8/2019  1:55 PM CDT -----  Contact: [phone]  Would like to consult with the nurse, patient states that she has not seen the dr in a while because of her insurance, patient would like to know if she can get something call in for her blood pressure, patient would like to speak with the nurse concerning this medication, please call back at [phone], thank sj  .Type:  RX Refill Request    Who Called: ms leary  Refill or New Rx:refill  RX Name and Strength:blood pressure  How is the patient currently taking it? (ex. 1XDay):once a day  Is this a 30 day or 90 day RX:90  Preferred Pharmacy with phone number:.  [id number] - [place], [place] - [address] AT [facility] [address] [facility]  [address] [address]  Phone: [phone] Fax: [phone]      Local or Mail Order: local  Ordering Provider:dr granados  Would the patient rather a call back or a response via [facility]? Call back  Best Call Back Number:[phone]  Additional Information:

## 2019-04-08 NOTE — TELEPHONE ENCOUNTER
Spoke with patient who advised she is currently uninsured. She has been out of her BPmedications for the last 2-3 weeks (amlodipine 10 mg QD, Inderal 40 mg BID). Patient went to [facility] ER for difficulty swallowing, had up GI scope. [facility] set patient up to receive medicaid coverage, was told she would receive decision via mailed letter. She has not received anything in a week  And a half. Patient was told [facility] clinic doctors cannot prescribe her BP medications has to come from pcp. I advised patient Dr. Farrell is out of the office today and would most likely reply tomorrow when hes back. As soon as that happens our office will contact her. Please advise.  Pharmacy verified

## 2019-04-09 DIAGNOSIS — I15.2 HYPERTENSION ASSOCIATED WITH DIABETES: Chronic | ICD-10-CM

## 2019-04-09 DIAGNOSIS — E11.59 HYPERTENSION ASSOCIATED WITH DIABETES: Chronic | ICD-10-CM

## 2019-04-09 RX ORDER — PROPRANOLOL HYDROCHLORIDE 40 MG/1
40 TABLET ORAL 2 TIMES DAILY
Qty: 60 TABLET | Refills: 0 | Status: SHIPPED | OUTPATIENT
Start: 2019-04-09 | End: 2019-06-05

## 2019-04-09 RX ORDER — AMLODIPINE BESYLATE 10 MG/1
10 TABLET ORAL DAILY
Qty: 30 TABLET | Refills: 0 | Status: SHIPPED | OUTPATIENT
Start: 2019-04-09 | End: 2019-06-05 | Stop reason: SDUPTHER

## 2019-04-09 NOTE — TELEPHONE ENCOUNTER
I refilled her blood pressure medications for 30 days.    She will need to establish with [facility]CP or a Medicaid provider as her PCP.

## 2019-05-07 ENCOUNTER — PATIENT OUTREACH (OUTPATIENT)
Dept: ADMINISTRATIVE | Facility: HOSPITAL | Age: 44
End: 2019-05-07

## 2019-05-29 ENCOUNTER — PATIENT OUTREACH (OUTPATIENT)
Dept: ADMINISTRATIVE | Facility: HOSPITAL | Age: 44
End: 2019-05-29

## 2019-05-29 NOTE — PROGRESS NOTES
Spoke with pt re scheduling appt with Dr. Farrell for HTN/DM. Appt scheduled 06/05/19. Instructed pt on appt. Pt verbalized understanding.

## 2019-06-05 ENCOUNTER — OFFICE VISIT (OUTPATIENT)
Dept: INTERNAL MEDICINE | Facility: CLINIC | Age: 44
End: 2019-06-05
Payer: MEDICAID

## 2019-06-05 ENCOUNTER — LAB VISIT (OUTPATIENT)
Dept: LAB | Facility: HOSPITAL | Age: 44
End: 2019-06-05
Attending: FAMILY MEDICINE
Payer: MEDICAID

## 2019-06-05 VITALS
HEART RATE: 94 BPM | TEMPERATURE: 99 F | HEIGHT: 66 IN | SYSTOLIC BLOOD PRESSURE: 158 MMHG | DIASTOLIC BLOOD PRESSURE: 74 MMHG | BODY MASS INDEX: 36.24 KG/M2 | WEIGHT: 225.5 LBS | OXYGEN SATURATION: 98 %

## 2019-06-05 DIAGNOSIS — E05.90 HYPERTHYROIDISM: Chronic | ICD-10-CM

## 2019-06-05 DIAGNOSIS — I15.2 HYPERTENSION ASSOCIATED WITH DIABETES: Chronic | ICD-10-CM

## 2019-06-05 DIAGNOSIS — E11.69 HYPERLIPIDEMIA ASSOCIATED WITH TYPE 2 DIABETES MELLITUS: Chronic | ICD-10-CM

## 2019-06-05 DIAGNOSIS — D64.9 ANEMIA, UNSPECIFIED TYPE: ICD-10-CM

## 2019-06-05 DIAGNOSIS — F41.9 ANXIETY: Chronic | ICD-10-CM

## 2019-06-05 DIAGNOSIS — E11.59 HYPERTENSION ASSOCIATED WITH DIABETES: Chronic | ICD-10-CM

## 2019-06-05 DIAGNOSIS — E11.9 TYPE 2 DIABETES MELLITUS WITHOUT COMPLICATION, WITHOUT LONG-TERM CURRENT USE OF INSULIN: Primary | Chronic | ICD-10-CM

## 2019-06-05 DIAGNOSIS — M79.7 FIBROMYALGIA: Chronic | ICD-10-CM

## 2019-06-05 DIAGNOSIS — E78.5 HYPERLIPIDEMIA ASSOCIATED WITH TYPE 2 DIABETES MELLITUS: Chronic | ICD-10-CM

## 2019-06-05 DIAGNOSIS — J45.20 MILD INTERMITTENT ASTHMA WITHOUT COMPLICATION: Chronic | ICD-10-CM

## 2019-06-05 DIAGNOSIS — E11.9 TYPE 2 DIABETES MELLITUS WITHOUT COMPLICATION: ICD-10-CM

## 2019-06-05 DIAGNOSIS — E11.9 TYPE 2 DIABETES MELLITUS WITHOUT COMPLICATION, WITHOUT LONG-TERM CURRENT USE OF INSULIN: Chronic | ICD-10-CM

## 2019-06-05 LAB
ALBUMIN SERPL BCP-MCNC: 3.2 G/DL (ref 3.5–5.2)
ALP SERPL-CCNC: 63 U/L (ref 55–135)
ALT SERPL W/O P-5'-P-CCNC: 7 U/L (ref 10–44)
ANION GAP SERPL CALC-SCNC: 8 MMOL/L (ref 8–16)
AST SERPL-CCNC: 12 U/L (ref 10–40)
BILIRUB SERPL-MCNC: 0.1 MG/DL (ref 0.1–1)
BUN SERPL-MCNC: 10 MG/DL (ref 6–20)
CALCIUM SERPL-MCNC: 8.8 MG/DL (ref 8.7–10.5)
CHLORIDE SERPL-SCNC: 109 MMOL/L (ref 95–110)
CHOLEST SERPL-MCNC: 130 MG/DL (ref 120–199)
CHOLEST SERPL-MCNC: 130 MG/DL (ref 120–199)
CHOLEST/HDLC SERPL: 3.7 {RATIO} (ref 2–5)
CHOLEST/HDLC SERPL: 3.7 {RATIO} (ref 2–5)
CO2 SERPL-SCNC: 23 MMOL/L (ref 23–29)
CREAT SERPL-MCNC: 0.7 MG/DL (ref 0.5–1.4)
ERYTHROCYTE [DISTWIDTH] IN BLOOD BY AUTOMATED COUNT: 17.7 % (ref 11.5–14.5)
EST. GFR  (AFRICAN AMERICAN): >60 ML/MIN/1.73 M^2
EST. GFR  (NON AFRICAN AMERICAN): >60 ML/MIN/1.73 M^2
GLUCOSE SERPL-MCNC: 115 MG/DL (ref 70–110)
HCT VFR BLD AUTO: 32.3 % (ref 37–48.5)
HDLC SERPL-MCNC: 35 MG/DL (ref 40–75)
HDLC SERPL-MCNC: 35 MG/DL (ref 40–75)
HDLC SERPL: 26.9 % (ref 20–50)
HDLC SERPL: 26.9 % (ref 20–50)
HGB BLD-MCNC: 9.7 G/DL (ref 12–16)
LDLC SERPL CALC-MCNC: 71 MG/DL (ref 63–159)
LDLC SERPL CALC-MCNC: 71 MG/DL (ref 63–159)
MCH RBC QN AUTO: 22 PG (ref 27–31)
MCHC RBC AUTO-ENTMCNC: 30 G/DL (ref 32–36)
MCV RBC AUTO: 73 FL (ref 82–98)
NONHDLC SERPL-MCNC: 95 MG/DL
NONHDLC SERPL-MCNC: 95 MG/DL
PLATELET # BLD AUTO: 183 K/UL (ref 150–350)
PMV BLD AUTO: ABNORMAL FL (ref 9.2–12.9)
POTASSIUM SERPL-SCNC: 3.6 MMOL/L (ref 3.5–5.1)
PROT SERPL-MCNC: 6.3 G/DL (ref 6–8.4)
RBC # BLD AUTO: 4.41 M/UL (ref 4–5.4)
SODIUM SERPL-SCNC: 140 MMOL/L (ref 136–145)
T4 FREE SERPL-MCNC: 1.82 NG/DL (ref 0.71–1.51)
TRIGL SERPL-MCNC: 120 MG/DL (ref 30–150)
TRIGL SERPL-MCNC: 120 MG/DL (ref 30–150)
TSH SERPL DL<=0.005 MIU/L-ACNC: <0.01 UIU/ML (ref 0.4–4)
WBC # BLD AUTO: 5.93 K/UL (ref 3.9–12.7)

## 2019-06-05 PROCEDURE — 85027 COMPLETE CBC AUTOMATED: CPT

## 2019-06-05 PROCEDURE — 84443 ASSAY THYROID STIM HORMONE: CPT

## 2019-06-05 PROCEDURE — 99999 PR PBB SHADOW E&M-EST. PATIENT-LVL III: ICD-10-PCS | Mod: PBBFAC,,, | Performed by: FAMILY MEDICINE

## 2019-06-05 PROCEDURE — 99215 PR OFFICE/OUTPT VISIT, EST, LEVL V, 40-54 MIN: ICD-10-PCS | Mod: S$PBB,,, | Performed by: FAMILY MEDICINE

## 2019-06-05 PROCEDURE — 83036 HEMOGLOBIN GLYCOSYLATED A1C: CPT

## 2019-06-05 PROCEDURE — 36415 COLL VENOUS BLD VENIPUNCTURE: CPT

## 2019-06-05 PROCEDURE — 84439 ASSAY OF FREE THYROXINE: CPT

## 2019-06-05 PROCEDURE — 80053 COMPREHEN METABOLIC PANEL: CPT

## 2019-06-05 PROCEDURE — 99215 OFFICE O/P EST HI 40 MIN: CPT | Mod: S$PBB,,, | Performed by: FAMILY MEDICINE

## 2019-06-05 PROCEDURE — 99213 OFFICE O/P EST LOW 20 MIN: CPT | Mod: PBBFAC | Performed by: FAMILY MEDICINE

## 2019-06-05 PROCEDURE — 99999 PR PBB SHADOW E&M-EST. PATIENT-LVL III: CPT | Mod: PBBFAC,,, | Performed by: FAMILY MEDICINE

## 2019-06-05 PROCEDURE — 80061 LIPID PANEL: CPT

## 2019-06-05 RX ORDER — ALBUTEROL SULFATE 90 UG/1
2 AEROSOL, METERED RESPIRATORY (INHALATION) EVERY 4 HOURS PRN
Qty: 1 INHALER | Refills: 3 | Status: SHIPPED | OUTPATIENT
Start: 2019-06-05 | End: 2020-04-07 | Stop reason: SDUPTHER

## 2019-06-05 RX ORDER — CITALOPRAM 20 MG/1
20 TABLET, FILM COATED ORAL DAILY
Qty: 90 TABLET | Refills: 1 | Status: SHIPPED | OUTPATIENT
Start: 2019-06-05 | End: 2020-04-06

## 2019-06-05 RX ORDER — SIMVASTATIN 10 MG/1
10 TABLET, FILM COATED ORAL NIGHTLY
Qty: 90 TABLET | Refills: 1 | Status: SHIPPED | OUTPATIENT
Start: 2019-06-05 | End: 2020-04-07 | Stop reason: SDUPTHER

## 2019-06-05 RX ORDER — AMLODIPINE BESYLATE 10 MG/1
10 TABLET ORAL DAILY
Qty: 90 TABLET | Refills: 1 | Status: SHIPPED | OUTPATIENT
Start: 2019-06-05 | End: 2020-04-07 | Stop reason: SDUPTHER

## 2019-06-05 RX ORDER — METFORMIN HYDROCHLORIDE 1000 MG/1
1000 TABLET ORAL 2 TIMES DAILY WITH MEALS
Qty: 180 TABLET | Refills: 1 | Status: SHIPPED | OUTPATIENT
Start: 2019-06-05 | End: 2020-04-07 | Stop reason: SDUPTHER

## 2019-06-05 NOTE — PROGRESS NOTES
"Subjective:   Patient ID: Shelly Kam is a 44 y.o. female.  Chief Complaint:  Hypertension; Diabetes; and Wheezing      Patient presents for overdue follow-up on chronic medical conditions.  Lost insurance.  Out of all medications.    Diabetes mellitus.  Previously well-controlled metformin 1000 mg twice a day.    No side effects. Reports symptoms hyperglycemia off medication.  Due for A1c, micro albumin, foot exam, and eye exam.    Hypertension.  Previously amlodipine 10 mg daily.  No side effects.  Worked well.  Blood pressure elevated today off medication.    Asthma.  Intermittent.  Albuterol p.r.n..  Fibromyalgia and anxiety.  Celexa 20 mg daily previously effective.    Hyperthyroidism.  Tapazole 20 mg daily and Inderal 40 mg twice a day.  No recent TSH free T4 on file.  Hyperlipidemia.  Pravastatin 10 mg daily.  No side effects.  Routine health maintenance shows need for gyn exam and Pap smear.    She is a smoker and not interested in cessation.  Overall just feels "terrible" off all medicines.      Current Outpatient Medications:     albuterol (PROVENTIL/VENTOLIN HFA) 90 mcg/actuation inhaler, Inhale 2 puffs into the lungs every 4 (four) hours as needed for Wheezing or Shortness of Breath., Disp: 1 Inhaler, Rfl: 3    amLODIPine (NORVASC) 10 MG tablet, Take 1 tablet (10 mg total) by mouth once daily., Disp: 90 tablet, Rfl: 1    citalopram (CELEXA) 20 MG tablet, Take 1 tablet (20 mg total) by mouth once daily., Disp: 90 tablet, Rfl: 1    metFORMIN (GLUCOPHAGE) 1000 MG tablet, Take 1 tablet (1,000 mg total) by mouth 2 (two) times daily with meals., Disp: 180 tablet, Rfl: 1    simvastatin (ZOCOR) 10 MG tablet, Take 1 tablet (10 mg total) by mouth every evening., Disp: 90 tablet, Rfl: 1    Review of Systems   Constitutional: Negative for chills, diaphoresis, fatigue and fever.   HENT: Positive for congestion, postnasal drip, rhinorrhea and sinus pressure. Negative for dental problem, ear discharge, ear " "pain, sore throat and trouble swallowing.    Eyes: Positive for visual disturbance.   Respiratory: Positive for wheezing. Negative for cough, chest tightness and shortness of breath.    Cardiovascular: Positive for leg swelling. Negative for chest pain and palpitations.   Gastrointestinal: Negative for abdominal distention, abdominal pain, blood in stool, constipation, diarrhea, nausea and vomiting.   Endocrine: Positive for polydipsia, polyphagia and polyuria.   Genitourinary: Negative for difficulty urinating, dysuria, flank pain, frequency, hematuria, pelvic pain and urgency.   Musculoskeletal: Positive for myalgias.   Skin: Negative for rash.   Neurological: Positive for headaches. Negative for dizziness, syncope, weakness, light-headedness and numbness.   Hematological: Negative for adenopathy.   Psychiatric/Behavioral: Positive for sleep disturbance. Negative for decreased concentration and dysphoric mood. The patient is nervous/anxious.      Objective:   BP (!) 158/74 (BP Location: Left arm, Patient Position: Sitting, BP Method: Small (Manual))   Pulse 94   Temp 98.8 °F (37.1 °C) (Tympanic)   Ht 5' 6" (1.676 m)   Wt 102.3 kg (225 lb 8.5 oz)   LMP 05/22/2019   SpO2 98%   BMI 36.40 kg/m²     Physical Exam   Constitutional: She is oriented to person, place, and time. She appears well-developed and well-nourished. She is cooperative.  Non-toxic appearance. She does not have a sickly appearance. She does not appear ill. No distress.   Blood pressure elevated.  Severe obesity.   HENT:   Nose: Nose normal. Right sinus exhibits no maxillary sinus tenderness and no frontal sinus tenderness. Left sinus exhibits no maxillary sinus tenderness and no frontal sinus tenderness.   Mouth/Throat: Uvula is midline, oropharynx is clear and moist and mucous membranes are normal.   Eyes: Conjunctivae are normal. Right eye exhibits no discharge. Left eye exhibits no discharge. Right conjunctiva is not injected. Left " conjunctiva is not injected. No scleral icterus.   Neck: Trachea normal, normal range of motion and phonation normal. Neck supple. No JVD present. Carotid bruit is not present. No thyroid mass and no thyromegaly present.   Healed but tender tracheostomy scar midline neck   Cardiovascular: Normal rate, regular rhythm and normal heart sounds. Exam reveals no gallop and no friction rub.   No murmur heard.  Pulses:       Dorsalis pedis pulses are 2+ on the right side, and 2+ on the left side.        Posterior tibial pulses are 2+ on the right side, and 2+ on the left side.   Pulmonary/Chest: Effort normal. No accessory muscle usage or stridor. No respiratory distress. She has no decreased breath sounds. She has wheezes. She has no rhonchi. She has no rales.   Abdominal: Soft. Normal appearance and bowel sounds are normal. She exhibits no distension, no fluid wave, no ascites and no mass. There is no hepatosplenomegaly. There is no tenderness. There is no rigidity, no rebound, no guarding and no CVA tenderness. No hernia.   Musculoskeletal: She exhibits edema.        Right foot: There is normal range of motion and no deformity.        Left foot: There is normal range of motion and no deformity.   Feet:   Right Foot:   Protective Sensation: 10 sites tested. 10 sites sensed.   Skin Integrity: Negative for ulcer, blister, skin breakdown, erythema, warmth, callus or dry skin.   Left Foot:   Protective Sensation: 10 sites tested. 10 sites sensed.   Skin Integrity: Negative for ulcer, blister, skin breakdown, erythema, warmth, callus or dry skin.   Lymphadenopathy:     She has no cervical adenopathy.        Right: No inguinal adenopathy present.        Left: No inguinal adenopathy present.   Neurological: She is alert and oriented to person, place, and time. She displays a negative Romberg sign. Coordination and gait normal.   Skin: Skin is warm, dry and intact. No rash noted.   Psychiatric: Judgment and thought content  normal. Her mood appears not anxious. Her affect is not angry, not blunt, not labile and not inappropriate. Her speech is delayed. Her speech is not rapid and/or pressured, not tangential and not slurred. She is slowed and withdrawn. She is not agitated, not aggressive, not hyperactive, not actively hallucinating and not combative. Thought content is not paranoid and not delusional. Cognition and memory are normal. Cognition and memory are not impaired. She exhibits a depressed mood. She expresses no homicidal and no suicidal ideation. She is communicative. She exhibits normal recent memory and normal remote memory. She is attentive.   Nursing note and vitals reviewed.    Assessment:       ICD-10-CM ICD-9-CM   1. Type 2 diabetes mellitus without complication, without long-term current use of insulin E11.9 250.00   2. Hypertension associated with diabetes E11.59 250.80    I10 401.9   3. Hyperlipidemia associated with type 2 diabetes mellitus E11.69 250.80    E78.5 272.4   4. Mild intermittent asthma without complication J45.20 493.90   5. Hyperthyroidism E05.90 242.90   6. Anemia, unspecified type D64.9 285.9   7. Fibromyalgia M79.7 729.1   8. Anxiety F41.9 300.00     Plan:   Type 2 diabetes mellitus without complication, without long-term current use of insulin  -     metFORMIN (GLUCOPHAGE) 1000 MG tablet; Take 1 tablet (1,000 mg total) by mouth 2 (two) times daily with meals.  Dispense: 180 tablet; Refill: 1  -     Hemoglobin A1c; Future; Expected date: 06/05/2019  Check A1c  Restart metformin 1000 mg twice a day.    Declines eye exam and micro albumin due to cost.      Hypertension associated with diabetes  -     amLODIPine (NORVASC) 10 MG tablet; Take 1 tablet (10 mg total) by mouth once daily.  Dispense: 90 tablet; Refill: 1  -     Comprehensive metabolic panel; Future; Expected date: 06/05/2019  Uncontrolled.  BP not at goal.    Restart amlodipine 10 mg daily.      Hyperlipidemia associated with type 2 diabetes  mellitus  -     simvastatin (ZOCOR) 10 MG tablet; Take 1 tablet (10 mg total) by mouth every evening.  Dispense: 90 tablet; Refill: 1  -     Lipid panel; Future; Expected date: 06/05/2019  Check lipid panel.  Discontinue pravastatin.    Changed simvastatin 10 mg daily     Mild intermittent asthma without complication  -     albuterol (PROVENTIL/VENTOLIN HFA) 90 mcg/actuation inhaler; Inhale 2 puffs into the lungs every 4 (four) hours as needed for Wheezing or Shortness of Breath.  Dispense: 1 Inhaler; Refill: 3  Refill albuterol inhaler as needed for wheezing.      Hyperthyroidism  -     TSH; Future; Expected date: 06/05/2019  Discontinue Tapazole and Inderal.    Check TSH.  Additional recommendations based on results.      Anemia, unspecified type  -     CBC Without Differential; Future; Expected date: 06/05/2019  Evaluation and treatment as indicated.      Fibromyalgia  Anxiety  -     citalopram (CELEXA) 20 MG tablet; Take 1 tablet (20 mg total) by mouth once daily.  Dispense: 90 tablet; Refill: 1  Restart Celexa 20 mg daily.    Reviewed all medications except for albuterol on [employer] dollar 90 day supply list. Patient agrees to restart meds above    She does decline gyn referral due to cost.    She is not interested in smoking cessation  She is advised to follow up in 6 months or sooner as needed.

## 2019-06-06 LAB
ESTIMATED AVG GLUCOSE: 117 MG/DL (ref 68–131)
HBA1C MFR BLD HPLC: 5.7 % (ref 4–5.6)

## 2019-06-08 DIAGNOSIS — E05.90 HYPERTHYROIDISM: Primary | Chronic | ICD-10-CM

## 2019-06-29 ENCOUNTER — HOSPITAL ENCOUNTER (EMERGENCY)
Facility: HOSPITAL | Age: 44
Discharge: HOME | End: 2019-06-29
Attending: EMERGENCY MEDICINE
Payer: MEDICAID

## 2019-06-29 VITALS
OXYGEN SATURATION: 96 % | TEMPERATURE: 99 F | SYSTOLIC BLOOD PRESSURE: 164 MMHG | BODY MASS INDEX: 34.33 KG/M2 | HEART RATE: 100 BPM | WEIGHT: 213.63 LBS | HEIGHT: 66 IN | DIASTOLIC BLOOD PRESSURE: 74 MMHG | RESPIRATION RATE: 20 BRPM

## 2019-06-29 DIAGNOSIS — H92.02 OTALGIA OF LEFT EAR: Primary | ICD-10-CM

## 2019-06-29 PROCEDURE — 96372 THER/PROPH/DIAG INJ SC/IM: CPT

## 2019-06-29 PROCEDURE — 63600175 PHARM REV CODE 636 W HCPCS: Performed by: EMERGENCY MEDICINE

## 2019-06-29 PROCEDURE — 99284 EMERGENCY DEPT VISIT MOD MDM: CPT | Mod: 25

## 2019-06-29 RX ORDER — NEOMYCIN SULFATE, POLYMYXIN B SULFATE AND HYDROCORTISONE 10; 3.5; 1 MG/ML; MG/ML; [USP'U]/ML
4 SUSPENSION/ DROPS AURICULAR (OTIC) 3 TIMES DAILY
Qty: 10 ML | Refills: 0 | Status: SHIPPED | OUTPATIENT
Start: 2019-06-29 | End: 2019-07-09

## 2019-06-29 RX ORDER — TRAMADOL HYDROCHLORIDE 50 MG/1
50 TABLET ORAL EVERY 6 HOURS PRN
Qty: 12 TABLET | Refills: 0 | Status: SHIPPED | OUTPATIENT
Start: 2019-06-29 | End: 2019-09-06

## 2019-06-29 RX ORDER — KETOROLAC TROMETHAMINE 30 MG/ML
30 INJECTION, SOLUTION INTRAMUSCULAR; INTRAVENOUS
Status: COMPLETED | OUTPATIENT
Start: 2019-06-29 | End: 2019-06-29

## 2019-06-29 RX ORDER — NAPROXEN 375 MG/1
375 TABLET ORAL 2 TIMES DAILY WITH MEALS
Qty: 14 TABLET | Refills: 0 | Status: SHIPPED | OUTPATIENT
Start: 2019-06-29 | End: 2019-09-06

## 2019-06-29 RX ADMIN — KETOROLAC TROMETHAMINE 30 MG: 30 INJECTION, SOLUTION INTRAMUSCULAR; INTRAVENOUS at 10:06

## 2019-06-30 NOTE — ED TRIAGE NOTES
"Arrives to ER reports left ear pain for last few days. Pt. States "I took some abx I had, the pain went away for a little bit but now it's back." Rates pain 10/10, no fever, no vomiting or diarrhea some nausea.   "

## 2019-06-30 NOTE — ED PROVIDER NOTES
SCRIBE #1 NOTE: I, Katherine Nelson, am scribing for, and in the presence of, Ryan Harris MD. I have scribed the entire note.      History      Chief Complaint   Patient presents with    Otalgia     pt reports severe L sided ear pain       Review of patient's allergies indicates:   Allergen Reactions    Pcn [penicillins]     Morphine Other (See Comments)     Unknown        HPI   HPI    6/29/2019, 10:26 PM   History obtained from the patient      History of Present Illness: Shelly Kam is a 44 y.o. female patient who presents to the Emergency Department for otalgia which onset gradually 9 days ago. Symptoms are constant and moderate in severity. Pt reports pain to her L ear, described as sharp and radiating to her L jaw. Patient was treated with antibiotics a week ago, with no relief. No mitigating factors reported. Pt's ear pain worsens with ROM to her jaw. Associated sxs include rhinorrhea. Patient denies any fever, chills, nausea, emesis, diarrhea, ear discharge, hearing loss, sore throat, and all other sxs at this time. Prior Tx includes tylenol, last dose noted at 11:00 AM. No further complaints or concerns at this time.         Arrival mode: Personal vehicle    PCP: Heraclio Farrell MD       Past Medical History:  Past Medical History:   Diagnosis Date    Asthma     Diabetes mellitus     Fibromyalgia     Hypertension     Panic attack     Prozac in past       Past Surgical History:  Past Surgical History:   Procedure Laterality Date    BRONCHOSCOPY      INTRACAPSULAR TONSILLECTOMY Right 3/15/2016    [facility]    LARYNGOSCOPY BRONCHOSCOPY-DIRECT WITH BIOPSY N/A 3/15/2016    [facility]    MICROLARYNGOSOPY W/ LASER OF STENOSIS N/A 5/3/2016    [facility]    STEROID INJECTION N/A 5/3/2016    [facility]    TONSILLECTOMY       TONSILLECTOMY-ADENOIDECTOMY (T AND A) N/A 5/3/2016    [facility]    TRACHEOSTOMY N/A 3/15/2016    [facility]    TRACHEOSTOMY TUBE PLACEMENT           Family History:  Family History   Problem Relation Age of Onset    Cancer Paternal Aunt        Social History:  Social History     Tobacco Use    Smoking status: Current Every Day Smoker     Packs/day: 0.25     Last attempt to quit: 3/1/2011     Years since quittin.3    Smokeless tobacco: Never Used   Substance and Sexual Activity    Alcohol use: Yes     Comment: occasionally    Drug use: No    Sexual activity: Yes     Partners: Male       ROS   Review of Systems   Constitutional: Negative for chills and fever.   HENT: Positive for ear pain (Left) and rhinorrhea. Negative for ear discharge, hearing loss and sore throat.    Respiratory: Negative for shortness of breath.    Cardiovascular: Negative for chest pain.   Gastrointestinal: Negative for diarrhea, nausea and vomiting.   Genitourinary: Negative for dysuria.   Musculoskeletal: Negative for back pain.   Skin: Negative for rash.   Neurological: Negative for weakness.   Hematological: Does not bruise/bleed easily.   All other systems reviewed and are negative.    Physical Exam      Initial Vitals [19 2137]   BP Pulse Resp Temp SpO2   (!) 153/76 103 20 99.1 °F (37.3 °C) 95 %      MAP       --          Physical Exam  Nursing Notes and Vital Signs Reviewed.  Constitutional: Patient is in no acute distress. Well-developed and well-nourished.  Head: Atraumatic. Normocephalic.  Eyes: PERRL. EOM intact. Conjunctivae are not pale. No scleral icterus.  ENT: Mucous membranes are moist. Oropharynx is clear and symmetric. There is tenderness to the L tragus. Earwax noted.    Neck: Supple. Full ROM. No lymphadenopathy.  Cardiovascular: Regular rate. Regular rhythm. No murmurs, rubs, or gallops. Distal pulses are 2+ and  "symmetric.  Pulmonary/Chest: No respiratory distress. Clear to auscultation bilaterally. No wheezing or rales.  Abdominal: Soft and non-distended.  There is no tenderness.  No rebound, guarding, or rigidity. Good bowel sounds.  Genitourinary: No CVA tenderness  Musculoskeletal: Moves all extremities. No obvious deformities. No edema. No calf tenderness.  Skin: Warm and dry.  Neurological:  Alert, awake, and appropriate.  Normal speech.  No acute focal neurological deficits are appreciated.  Psychiatric: Normal affect. Good eye contact. Appropriate in content.    ED Course    Procedures  ED Vital Signs:  Vitals:    06/29/19 2137 06/29/19 2251   BP: (!) 153/76 (!) 164/74   Pulse: 103 100   Resp: 20 20   Temp: 99.1 °F (37.3 °C)    TempSrc: Oral    SpO2: 95% 96%   Weight: 96.9 kg (213 lb 10 oz)    Height: 5' 6" (1.676 m)        Abnormal Lab Results:  Labs Reviewed - No data to display     All Lab Results:  None    Imaging Results:  Imaging Results    None                 The Emergency Provider reviewed the vital signs and test results, which are outlined above.    ED Discussion     10:48 PM: Reassessed pt at this time.  Pt states her condition has improved at this time. Discussed with pt all pertinent ED information and results. Discussed pt dx and plan of tx. Gave pt all f/u and return to the ED instructions. All questions and concerns were addressed at this time. Pt expresses understanding of information and instructions, and is comfortable with plan to discharge. Pt is stable for discharge.    I discussed with patient and/or family/caretaker that evaluation in the ED does not suggest any emergent or life threatening medical conditions requiring immediate intervention beyond what was provided in the ED, and I believe patient is safe for discharge.  Regardless, an unremarkable evaluation in the ED does not preclude the development or presence of a serious of life threatening condition. As such, patient was instructed " to return immediately for any worsening or change in current symptoms.      ED Medication(s):  Medications   ketorolac injection 30 mg (30 mg Intramuscular Given 6/29/19 0783)     Current Discharge Medication List      START taking these medications    Details   naproxen (NAPROSYN) 375 MG tablet Take 1 tablet (375 mg total) by mouth 2 (two) times daily with meals.  Qty: 14 tablet, Refills: 0      neomycin-polymyxin-hydrocortisone (CORTISPORIN) 3.5-10,000-1 mg/mL-unit/mL-% otic suspension Place 4 drops into the left ear 3 (three) times daily. for 10 days  Qty: 10 mL, Refills: 0      traMADol (ULTRAM) 50 mg tablet Take 1 tablet (50 mg total) by mouth every 6 (six) hours as needed for Pain.  Qty: 12 tablet, Refills: 0               Follow-up Information     Heraclio Farrell MD In 2 days.    Specialty:  Family Medicine  Contact information:  [address]  [place] [address] [phone]             [facility]    Specialty:  Emergency Medicine  Why:  As needed, If symptoms worsen  Contact information:  [address] [facility] [address] [phone]                   Medical Decision Making              Scribe Attestation:   Scribe #1: I performed the above scribed service and the documentation accurately describes the services I performed. I attest to the accuracy of the note.    Attending:   Physician Attestation Statement for Scribe #1: I, Ryan Harris MD, personally performed the services described in this documentation, as scribed by Katherine Damon, in my presence, and it is both accurate and complete.          Clinical Impression       ICD-10-CM ICD-9-CM   1. Otalgia of left ear H92.02 388.70       Disposition:   Disposition: Discharged  Condition: Stable         Ryan Harris MD  06/30/19 0270

## 2019-07-01 ENCOUNTER — TELEPHONE (OUTPATIENT)
Dept: INTERNAL MEDICINE | Facility: CLINIC | Age: 44
End: 2019-07-01

## 2019-07-01 NOTE — TELEPHONE ENCOUNTER
----- Message from Lona Vasquez sent at 7/1/2019  1:38 PM CDT -----  Contact: PT  Type:  Same Day Appointment Request    Caller is requesting a same day appointment.  Caller declined first available appointment listed below.    Name of Caller: Pt  When is the first available appointment? none  Symptoms: two medications causing a allergic reaction  Best Call Back Number: [phone]  Additional Information: n/a

## 2019-07-17 ENCOUNTER — NURSE TRIAGE (OUTPATIENT)
Dept: ADMINISTRATIVE | Facility: CLINIC | Age: 44
End: 2019-07-17

## 2019-07-17 NOTE — TELEPHONE ENCOUNTER
Feeling nauseated, and small amount of spitting up, thinks it might be her medication, began about 2 weeks ago.  Not sure which medication it might be, but there are several new medications that have been added.  Would like to see the physician.    Reason for Disposition   Caller has NON-URGENT medication question about med that PCP prescribed and triager unable to answer question    Protocols used: MEDICATION QUESTION CALL-A-AH

## 2019-07-17 NOTE — TELEPHONE ENCOUNTER
Should not be any of the medicines that I gave her.    Could be related to the tramadol given for her here.    Okay to schedule her an appointment.

## 2019-08-15 ENCOUNTER — TELEPHONE (OUTPATIENT)
Dept: INTERNAL MEDICINE | Facility: CLINIC | Age: 44
End: 2019-08-15

## 2019-08-15 DIAGNOSIS — Z12.39 SCREENING FOR BREAST CANCER: Primary | ICD-10-CM

## 2019-08-15 NOTE — TELEPHONE ENCOUNTER
----- Message from Savannah Laboy sent at 8/15/2019  2:06 PM CDT -----  Contact: PATIENT  Type:  Needs Medical Advice    Who Called: PATIENT  Symptoms (please be specific): JOINT PAIN   How long has patient had these symptoms:  WEEKS  Pharmacy name and phone #:    [id number] - [place] - [address]  [address] [address]  Phone: [phone] Fax: [phone]    Would the patient rather a call back or a response via [facility]? CALL  Best Call Back Number: [phone]  Additional Information: PLEASE CALL PATIENT ASAP TODAY URGENT!!

## 2019-08-26 ENCOUNTER — HOSPITAL ENCOUNTER (OUTPATIENT)
Dept: RADIOLOGY | Facility: HOSPITAL | Age: 44
Discharge: HOME | End: 2019-08-26
Attending: FAMILY MEDICINE
Payer: MEDICAID

## 2019-08-26 VITALS — BODY MASS INDEX: 34.23 KG/M2 | WEIGHT: 213 LBS | HEIGHT: 66 IN

## 2019-08-26 DIAGNOSIS — Z12.39 SCREENING FOR BREAST CANCER: ICD-10-CM

## 2019-08-26 PROCEDURE — 77067 SCR MAMMO BI INCL CAD: CPT | Mod: TC

## 2019-08-26 PROCEDURE — 77063 BREAST TOMOSYNTHESIS BI: CPT | Mod: 26,,, | Performed by: RADIOLOGY

## 2019-08-26 PROCEDURE — 77063 MAMMO DIGITAL SCREENING BILAT WITH TOMOSYNTHESIS_CAD: ICD-10-PCS | Mod: 26,,, | Performed by: RADIOLOGY

## 2019-08-26 PROCEDURE — 77067 MAMMO DIGITAL SCREENING BILAT WITH TOMOSYNTHESIS_CAD: ICD-10-PCS | Mod: 26,,, | Performed by: RADIOLOGY

## 2019-08-26 PROCEDURE — 77067 SCR MAMMO BI INCL CAD: CPT | Mod: 26,,, | Performed by: RADIOLOGY

## 2019-09-05 ENCOUNTER — TELEPHONE (OUTPATIENT)
Dept: RADIOLOGY | Facility: HOSPITAL | Age: 44
End: 2019-09-05

## 2019-09-06 ENCOUNTER — OFFICE VISIT (OUTPATIENT)
Dept: INTERNAL MEDICINE | Facility: CLINIC | Age: 44
End: 2019-09-06
Payer: MEDICAID

## 2019-09-06 ENCOUNTER — HOSPITAL ENCOUNTER (OUTPATIENT)
Dept: RADIOLOGY | Facility: HOSPITAL | Age: 44
Discharge: HOME | End: 2019-09-06
Attending: FAMILY MEDICINE
Payer: MEDICAID

## 2019-09-06 VITALS
BODY MASS INDEX: 34.98 KG/M2 | SYSTOLIC BLOOD PRESSURE: 142 MMHG | HEART RATE: 91 BPM | TEMPERATURE: 99 F | OXYGEN SATURATION: 98 % | WEIGHT: 216.69 LBS | DIASTOLIC BLOOD PRESSURE: 72 MMHG

## 2019-09-06 DIAGNOSIS — M79.7 FIBROMYALGIA: ICD-10-CM

## 2019-09-06 DIAGNOSIS — R92.8 ABNORMAL MAMMOGRAM: ICD-10-CM

## 2019-09-06 DIAGNOSIS — L20.9 ATOPIC DERMATITIS, UNSPECIFIED TYPE: Primary | ICD-10-CM

## 2019-09-06 PROCEDURE — 77061 BREAST TOMOSYNTHESIS UNI: CPT | Mod: 26,LT,, | Performed by: RADIOLOGY

## 2019-09-06 PROCEDURE — 99214 OFFICE O/P EST MOD 30 MIN: CPT | Mod: PBBFAC | Performed by: PHYSICIAN ASSISTANT

## 2019-09-06 PROCEDURE — 99214 OFFICE O/P EST MOD 30 MIN: CPT | Mod: S$PBB,,, | Performed by: PHYSICIAN ASSISTANT

## 2019-09-06 PROCEDURE — 77065 MAMMO DIGITAL DIAGNOSTIC LEFT WITH TOMOSYNTHESIS_CAD: ICD-10-PCS | Mod: 26,LT,, | Performed by: RADIOLOGY

## 2019-09-06 PROCEDURE — 77061 BREAST TOMOSYNTHESIS UNI: CPT | Mod: TC,LT

## 2019-09-06 PROCEDURE — 77065 DX MAMMO INCL CAD UNI: CPT | Mod: TC,LT

## 2019-09-06 PROCEDURE — 99214 PR OFFICE/OUTPT VISIT, EST, LEVL IV, 30-39 MIN: ICD-10-PCS | Mod: S$PBB,,, | Performed by: PHYSICIAN ASSISTANT

## 2019-09-06 PROCEDURE — 99999 PR PBB SHADOW E&M-EST. PATIENT-LVL IV: CPT | Mod: PBBFAC,,, | Performed by: PHYSICIAN ASSISTANT

## 2019-09-06 PROCEDURE — 77061 MAMMO DIGITAL DIAGNOSTIC LEFT WITH TOMOSYNTHESIS_CAD: ICD-10-PCS | Mod: 26,LT,, | Performed by: RADIOLOGY

## 2019-09-06 PROCEDURE — 77065 DX MAMMO INCL CAD UNI: CPT | Mod: 26,LT,, | Performed by: RADIOLOGY

## 2019-09-06 PROCEDURE — 99999 PR PBB SHADOW E&M-EST. PATIENT-LVL IV: ICD-10-PCS | Mod: PBBFAC,,, | Performed by: PHYSICIAN ASSISTANT

## 2019-09-06 RX ORDER — GABAPENTIN 300 MG/1
300 CAPSULE ORAL NIGHTLY
Qty: 30 CAPSULE | Refills: 0 | Status: SHIPPED | OUTPATIENT
Start: 2019-09-06 | End: 2020-04-07

## 2019-09-06 RX ORDER — TRIAMCINOLONE ACETONIDE 1 MG/G
CREAM TOPICAL 2 TIMES DAILY
Qty: 80 G | Refills: 0 | Status: SHIPPED | OUTPATIENT
Start: 2019-09-06 | End: 2020-04-07

## 2019-09-06 NOTE — PATIENT INSTRUCTIONS
Fibromyalgia  Fibromyalgia is a chronic condition. It causes pain and tenderness in connective tissues. This causes muscle pain. Often, there are also many tender areas throughout the body. Symptoms may also include stiffness and feelings of numbness and tingling. Symptoms may be worse upon waking up. They may increase with poor sleep, heavy activity, cold or damp weather, anxiety, or stress.  People with fibromyalgia often feel tired. They may have trouble sleeping. Other symptoms include morning stiffness, headaches, and painful menstrual periods. Some people have problems with thinking clearly and changes in memory.  The cause of fibromyalgia is not known. Symptoms are similar to that of other diseases. These include rheumatoid arthritis, low thyroid, chronic fatigue syndrome, and Lyme disease. In some cases, these diseases may occur together.  Fibromyalgia is often treated with medicines. You and your healthcare provider can discuss the medicine that may work best for you. You may have to try more than one medicine or combination of medicines before you find what works for you.  Home care  · If your healthcare provider has prescribed or recommended medicines, take them as directed.  · Rest as needed. Try to get enough sleep. If you have trouble sleeping, discuss this with your healthcare provider.   · Be active. Regular exercise can help manage symptoms. Some options include walking, swimming, and biking. Strengthening exercises may also be helpful. Talk to your healthcare provider about the best ways to be active.  · Follow a healthy diet. Limit caffeine and alcohol. If you smoke, ask your healthcare provider for help to stop.  · Notice how your body reacts to stress. Learn to listen to your body signals. This will help you take action before the stress becomes severe.  · Learn relaxation techniques. Also consider joining a stress reduction program or class.  · Talk to your healthcare provider about trying  complementary treatments. These include acupuncture, hypnosis, and biofeedback. Yoga and cassie chi may be helpful.  · Ask your healthcare provider about cognitive behavioral therapy (CBT). This type of counseling can help people with fibromyalgia cope better with their illness.  Follow-up care  Follow up with your healthcare provider or as advised by our staff. In many cases, fibromyalgia is best treated with a team approach.  This may involve your primary care provider, a rheumatologist, a physical therapist, and a mental health professional.   For more information: National [employer] of Arthritis and Musculoskeletal and Skin Diseases (NIAMS)  www.niams.nih.gov 076-558-2028  When to seek medical advice  Contact your healthcare provider right away if any of these occur:  · Symptoms getting worse or new symptoms developing  · You feel hopeless, helpless, or lose interest in day-to-day life  Date Last Reviewed: 3/1/2017  © 9686-9733 [employer]. [address] [place] [address]. All rights reserved. This information is not intended as a substitute for professional medical care. Always follow your healthcare professional's instructions.        Atopic Dermatitis (Adult)  Atopic dermatitis is a dry, itchy, red rash. Its also called eczema. The rash is chronic, or ongoing. It can come and go over time. The disease is often passed down in families. It causes a problem with the skin barrier that makes the skin more sensitive to the environment and other factors. The increased skin sensitivity causes an itch, which causes scratching. Scratching can worsen the itching or also break the skin. This can put the skin at risk of infection.  The condition is most common in people with asthma, hay fever, hives, or dry or sensitive skin. The rash may be caused by extreme heat or heavy sweating. Skin irritants can cause the rash to flare up. These can include wool or silk clothing, grease, oils, some medicines,  and harsh soaps and detergents. Emotional stress can also be a trigger.  Treatment is done to relieve the itching and inflammation of the skin.  Home care  Follow these tips to care for your condition:  · Keep the areas of rash clean by bathing at least every other day. Use lukewarm water to bathe. Dont use hot water, which can dry out the skin.  · Dont use soaps with strong detergents. Use mild soaps made for sensitive skin.  · Apply a cream or ointment to damp skin right after bathing.  · Avoid things that irritate your skin. Wear absorbent, soft fabrics next to the skin rather than rough or scratchy materials.  · Use mild laundry soap free of scents and perfumes. Make sure to rinse all the soap out of your clothes.  · Treat any skin infection as directed.  · Use oral diphenhydramine to help reduce itching. This is an antihistamine you can buy at drug and grocery stores. It can make you sleepy, so use lower doses during the daytime. Or you can use loratadine. This is an antihistamine that will not make you sleepy. Do not use diphenhydramine if you have glaucoma or have trouble urinating due to an enlarged prostate.  Follow-up care  See your healthcare provider, or as advised. If your symptoms dont get better or if they get worse in the next 7 days, make an appointment with your healthcare provider.  When to seek medical advice  Call your healthcare provider right away  if any of these occur:  · Increasing area of redness or pain in the skin  · Yellow crusts or wet drainage from the rash  · Fever of 100.4°F (38°C) or higher, or as directed by your healthcare provider  Date Last Reviewed: 9/1/2016  © 3040-2554 [employer]. [address] [place] [address], [place] [address]. All rights reserved. This information is not intended as a substitute for professional medical care. Always follow your healthcare professional's instructions.        Managing Fibromyalgia    Fibromyalgia is a chronic illness that causes  pain in specific points on the body, stiffness, and fatigue. But it doesnt have to keep you from doing what you enjoy. You can feel better. You and your healthcare provider can work together to develop a plan.  Take medications as directed  You may be given medications to help reduce pain and improve sleep.  Several medications are approved to treat fibromyalgia. Two were originally designed to treat depression. They are duloxetine, and milnacipran. A third, called pregaballin, was developed to treat nerve pain. Other medications include pain relievers such as acetaminophen or stronger narcotics. These may be prescribed short term.  NSAIDs (non-steroidal anti-inflammatory drugs) include aspirin, ibuprofen and naproxen are used to relieve pain.  Get exercise  Gentle exercise can help lessen your pain. Try these tips:  · Choose activities that are gentle on your joints, such as walking, biking, and swimming or other water exercises.  · Dont push yourself too hard. Build up your strength and endurance slowly, over time.  · Stick to it. For the most relief, exercise should become part of your daily life.  Get a good nights rest  To help you get more sleep, try the tips below:  · Sleep only in a bed, not on a couch or chair.  · Dont watch TV, read, or work in bed.  · Go to bed and get up at the same time each day.  · Try to avoid naps.  · Avoid alcohol, caffeine, and tobacco for at least 3 hours before going to bed.  · Avoid fluids in the evening to avoid having to get up to urinate.  Other things you can do  No one knows what causes fibromyalgia. But stress, poor eating habits, and extra weight can make it worse. These tips may help you feel better:  · Eat a balanced diet with plenty of fruits and vegetables, whole grains, lean protein, and low-fat or nonfat dairy products.  · Maintain a healthy weight.  · Learn ways to reduce or manage the stress in your life.  · Ask your healthcare provider for resources to help  you make changes. Or check out the resources below.  Resources  · Arthritis Foundation  www.arthritis.org  · National Fibromyalgia Association  www.fmaware.org  · American Fibromyalgia Syndrome Association  www.afsafund.org  Date Last Reviewed: 2/14/2016 © 2000-2017 [employer]. [address] [place] [address]. All rights reserved. This information is not intended as a substitute for professional medical care. Always follow your healthcare professional's instructions.        Understanding Fibromyalgia     People with fibromyalgia tend to have at least 11 of the 18 tender points shown above.     Fibromyalgia is a condition that causes pain at specific points on the body, stiffness, and fatigue.  What are the symptoms of fibromyalgia?  In most cases, you will have tender points on your body. These points are very sore, especially when touched. Finding several of these points helps your healthcare provider diagnose fibromyalgia.  Along with the tender points, you may have some or all of the following symptoms:  · Constant tiredness (fatigue), even after a full nights sleep (non-restorative sleep)  · A burning or throbbing pain in many parts of the body (this pain may vary during the day)  · Stiffness or aching all over your body  · Numbness or tingling in your arms and legs  · Trouble sleeping  · Bowel problems (bloating, diarrhea, constipation)  · Headaches  · Depression  How is fibromyalgia diagnosed?  There is no lab test to diagnose fibromyalgia. Instead, your healthcare provider will take your health history and examine your joints and muscles. Certain criteria are used when diagnosing fibromyalgia. Symptoms need to be present for at least 3 months. Tests may be done to rule out other conditions with similar symptoms. Then, together you can design a plan to help you manage your symptoms.   How is fibromyalgia treated?  Several medications are approved to treat fibromyalgia. Two were  originally made to treat depression. They are duloxetine, and milnacipran. A third, called pregaballin, was developed to treat nerve pain. Certain medicines used to treat depression have been helpful with fibromyalgia. Other medications include pain relievers such as acetaminophen or stronger narcotics. These may be prescribed short term.  NSAIDs (nonsteroidal anti-inflammatory drugs) including aspirin, ibuprofen, and naproxen are used to relieve pain.  Getting enough sleep, regular exercise, and eating a healthy diet can help manage symptoms. Cognitive behavioral therapy (a form of psychotherapy) can be helpful for fibromyalgia. Some people find alternative treatments such as massage, chiropractic treatments, biofeedback, or acupuncture also help with symptoms.  Date Last Reviewed: 2/14/2016  © 0418-5546 [employer]. [address] [place] [address], [place] [address]. All rights reserved. This information is not intended as a substitute for professional medical care. Always follow your healthcare professional's instructions.

## 2019-09-06 NOTE — PROGRESS NOTES
Subjective:      Patient ID: Shelly Kam is a 44 y.o. female.    Chief Complaint: Arm Pain (BUE) and Leg Pain (BLE)    Rash   This is a new problem. The current episode started 1 to 4 weeks ago. The problem has been gradually worsening since onset. Location: right ankle. The rash is characterized by itchiness. She was exposed to nothing. Pertinent negatives include no anorexia, congestion, cough, diarrhea, eye pain, facial edema, fatigue, fever, joint pain, nail changes, rhinorrhea, shortness of breath, sore throat or vomiting. Treatments tried: OTC anti-itch cream. The treatment provided no relief. There is no history of allergies, asthma, eczema or varicella.   Also reports numbness and tingling in bilateral arms and legs. Sharp pains that come and go. Scheduled to see pain management in December.     Review of Systems   Constitutional: Negative for activity change, appetite change, chills, diaphoresis, fatigue, fever and unexpected weight change.   HENT: Negative.  Negative for congestion, hearing loss, postnasal drip, rhinorrhea, sore throat, trouble swallowing and voice change.    Eyes: Negative.  Negative for pain and visual disturbance.   Respiratory: Negative.  Negative for cough, choking, chest tightness and shortness of breath.    Cardiovascular: Negative for chest pain, palpitations and leg swelling.   Gastrointestinal: Negative for abdominal distention, abdominal pain, anorexia, blood in stool, constipation, diarrhea, nausea and vomiting.   Endocrine: Negative for cold intolerance, heat intolerance, polydipsia and polyuria.   Genitourinary: Negative.  Negative for difficulty urinating and frequency.   Musculoskeletal: Positive for arthralgias and myalgias. Negative for back pain, gait problem, joint pain, joint swelling, neck pain and neck stiffness.   Skin: Positive for rash. Negative for color change, nail changes, pallor and wound.   Neurological: Positive for numbness. Negative for dizziness,  tremors, seizures, syncope, facial asymmetry, speech difficulty, weakness, light-headedness and headaches.   Hematological: Negative for adenopathy.   Psychiatric/Behavioral: Negative for behavioral problems, confusion, self-injury, sleep disturbance and suicidal ideas. The patient is not nervous/anxious.      Objective:   BP (!) 142/72 (BP Location: Left arm, Patient Position: Sitting, BP Method: Large (Manual))   Pulse 91   Temp 98.7 °F (37.1 °C) (Tympanic)   Wt 98.3 kg (216 lb 11.4 oz)   LMP 08/26/2019 (Within Weeks)   SpO2 98%   BMI 34.98 kg/m²     Physical Exam   Constitutional: She is oriented to person, place, and time. She appears well-developed and well-nourished.   HENT:   Head: Normocephalic and atraumatic.   Right Ear: External ear normal.   Left Ear: External ear normal.   Nose: Nose normal.   Mouth/Throat: Oropharynx is clear and moist.   Eyes: Pupils are equal, round, and reactive to light. Conjunctivae and EOM are normal.   Neck: Normal range of motion. Neck supple.   Cardiovascular: Normal rate, regular rhythm and normal heart sounds. Exam reveals no gallop and no friction rub.   No murmur heard.  Pulmonary/Chest: Effort normal and breath sounds normal. No respiratory distress. She has no wheezes. She has no rales. She exhibits no tenderness.   Abdominal: Soft. She exhibits no distension. There is no tenderness.   Musculoskeletal: Normal range of motion.   Lymphadenopathy:     She has no cervical adenopathy.   Neurological: She is alert and oriented to person, place, and time.   Skin: Skin is warm and dry. Rash noted. Rash is urticarial. Rash is not vesicular.        Psychiatric: She has a normal mood and affect. Her behavior is normal. Judgment and thought content normal.   Vitals reviewed.      Assessment:     1. Atopic dermatitis, unspecified type    2. Fibromyalgia      Plan:   Atopic dermatitis, unspecified type  -     triamcinolone acetonide 0.1% (KENALOG) 0.1 % cream; Apply topically 2  (two) times daily. for 7 days  Dispense: 80 g; Refill: 0    Fibromyalgia  -     Cancel: Ambulatory Referral to Pain Clinic  -     gabapentin (NEURONTIN) 300 MG capsule; Take 1 capsule (300 mg total) by mouth every evening.  Dispense: 30 capsule; Refill: 0    Educational handout on over-the-counter medications and at-home conservative care, pertinent to the patients diagnosis today, was handed to the patient and discussed in detail.    Follow up if symptoms worsen or fail to improve.

## 2019-09-08 ENCOUNTER — NURSE TRIAGE (OUTPATIENT)
Dept: ADMINISTRATIVE | Facility: CLINIC | Age: 44
End: 2019-09-08

## 2019-09-08 NOTE — TELEPHONE ENCOUNTER
Reason for Disposition   [1] Caller requesting NON-URGENT health information AND [2] PCP's office is the best resource    Protocols used: INFORMATION ONLY CALL-A-    Pt called to request another pain pill,stated the Gabapentin relieved her joint pain, but has her too sleepy. Advised to discuss with PCP.

## 2019-09-10 ENCOUNTER — TELEPHONE (OUTPATIENT)
Dept: INTERNAL MEDICINE | Facility: CLINIC | Age: 44
End: 2019-09-10

## 2019-09-10 ENCOUNTER — NURSE TRIAGE (OUTPATIENT)
Dept: ADMINISTRATIVE | Facility: CLINIC | Age: 44
End: 2019-09-10

## 2019-09-10 NOTE — TELEPHONE ENCOUNTER
----- Message from Bibiana Blevins MA sent at 9/10/2019 12:25 PM CDT -----  Contact: Pt  See nurse triage call, Pt stated Gabapentin has her too sleepy and difficult to function at work. Please follow up with pt.

## 2019-09-10 NOTE — TELEPHONE ENCOUNTER
"  Pt has questions regarding her medicine. Please contact to advise  Reason for Disposition   Caller has NON-URGENT medication question about med that PCP prescribed and triager unable to answer question    Additional Information   Negative: Drug overdose and nurse unable to answer question   Negative: Caller requesting information not related to medicine   Negative: Caller requesting a prescription for Strep throat and has a positive culture result   Negative: Rash while taking a medication or within 3 days of stopping it   Negative: Immunization reaction suspected   Negative: [1] Asthma AND [2] having symptoms of asthma (cough, wheezing, etc)   Negative: MORE THAN A DOUBLE DOSE of a prescription or over-the-counter (OTC) drug   Negative: [1] DOUBLE DOSE (an extra dose or lesser amount) of over-the-counter (OTC) drug AND [2] any symptoms (e.g., dizziness, nausea, pain, sleepiness)   Negative: [1] DOUBLE DOSE (an extra dose or lesser amount) of prescription drug AND [2] any symptoms (e.g., dizziness, nausea, pain, sleepiness)   Negative: Took another person's prescription drug   Negative: [1] DOUBLE DOSE (an extra dose or lesser amount) of prescription drug AND [2] NO symptoms (Exception: a double dose of antibiotics)   Negative: Diabetes drug error or overdose (e.g., insulin or extra dose)   Negative: [1] Request for URGENT new prescription or refill of "essential" medication (i.e., likelihood of harm to patient if not taken) AND [2] triager unable to fill per unit policy   Negative: [1] Prescription not at pharmacy AND [2] was prescribed today by PCP   Negative: Pharmacy calling with prescription questions and triager unable to answer question   Negative: Caller has urgent medication question about med that PCP prescribed and triager unable to answer question    Protocols used: MEDICATION QUESTION CALL-A-AH      "

## 2019-09-10 NOTE — TELEPHONE ENCOUNTER
S/w pt c/o stating she took 1st dose Gabapentin in evening 09/06/19 6:15pm and made her groggy during work the next day till after 1pm, pt confirms it did relieve pain. Advised pt would send message to ZACH Lee, and let pt know when we receive provider's response. Pt voiced understanding.

## 2019-09-12 NOTE — TELEPHONE ENCOUNTER
S/w pt asking if she'd be willing to try lower dose 100mg of Gabapentin, pt denies wanting to take Gabapentin at all and requests different rx. Advised pt would send request to ZACH Lee, and let pt know when we receive her response, pt voiced understanding.

## 2019-09-13 ENCOUNTER — TELEPHONE (OUTPATIENT)
Dept: INTERNAL MEDICINE | Facility: CLINIC | Age: 44
End: 2019-09-13

## 2019-09-13 DIAGNOSIS — M79.7 FIBROMYALGIA: Primary | ICD-10-CM

## 2019-09-13 NOTE — TELEPHONE ENCOUNTER
(See [facility] call) Reviewed ZACH Lee's instructions w/ pt (see below), pt states she wants to see Pain Mgt not PCP, advised pt since unable to sched Pain Mgt referral would send their office message to call pt for appt. Pt voiced understating.

## 2019-10-17 ENCOUNTER — TELEPHONE (OUTPATIENT)
Dept: INTERNAL MEDICINE | Facility: CLINIC | Age: 44
End: 2019-10-17

## 2019-10-17 NOTE — TELEPHONE ENCOUNTER
----- Message from Gwendolyn Valentin sent at 10/17/2019  7:12 AM CDT -----  Contact: pt  Type:  Needs Medical Advice    Who Called: Patient  Symptoms (please be specific): headache,sinus infection,body ache   How long has patient had these symptoms:  yesterday  Pharmacy name and phone #:  [facility]  Would the patient rather a call back or a response via [facility]? Call back  Best Call Back Number: [phone]  Additional Information: pt need a script called into pharmacy

## 2019-11-07 ENCOUNTER — TELEPHONE (OUTPATIENT)
Dept: INTERNAL MEDICINE | Facility: CLINIC | Age: 44
End: 2019-11-07

## 2019-11-07 NOTE — TELEPHONE ENCOUNTER
----- Message from Pili Mendoza sent at 11/7/2019 12:04 PM CST -----  Contact: Pt   Pt is calling  .Type:  Needs Medical Advice    Who Called: Pt   Symptoms (please be specific):  Pt states that she is going on a cruise and is requesting a motion sickness patches   How long has patient had these symptoms:    Pharmacy name and phone #:  .  [id number]  [place] [address]  [address] [place] [address]  Phone: [phone] Fax: [phone]  Would the patient rather a call back or a response via [facility]?Call Back   Best Call Back Number:  [phone]             .Thank You  Pili Mendoza

## 2019-11-08 ENCOUNTER — NURSE TRIAGE (OUTPATIENT)
Dept: ADMINISTRATIVE | Facility: CLINIC | Age: 44
End: 2019-11-08

## 2019-11-08 NOTE — TELEPHONE ENCOUNTER
Pt states she is going on a cruise and wants a prescription for motion sickness medication, advised her I would send a message to her provider, caller agreed

## 2019-11-11 ENCOUNTER — PATIENT MESSAGE (OUTPATIENT)
Dept: INTERNAL MEDICINE | Facility: CLINIC | Age: 44
End: 2019-11-11

## 2019-11-11 ENCOUNTER — TELEPHONE (OUTPATIENT)
Dept: INTERNAL MEDICINE | Facility: CLINIC | Age: 44
End: 2019-11-11

## 2019-11-11 DIAGNOSIS — Z87.898 HISTORY OF MOTION SICKNESS: Primary | ICD-10-CM

## 2019-11-11 RX ORDER — SCOLOPAMINE TRANSDERMAL SYSTEM 1 MG/1
1 PATCH, EXTENDED RELEASE TRANSDERMAL
Qty: 4 PATCH | Refills: 0 | Status: SHIPPED | OUTPATIENT
Start: 2019-11-11 | End: 2020-04-07

## 2019-11-11 NOTE — TELEPHONE ENCOUNTER
I will send a prescription for the scopolamine patches.    Start day before she gets on boat, change every 3 days.    One box has four patches so she should be good for 12 days.    If her cruise is longer than that, let me know.

## 2019-11-11 NOTE — TELEPHONE ENCOUNTER
----- Message from Florencia Delgado MA sent at 11/8/2019  4:07 PM CST -----  Contact: pt      ----- Message -----  From: Maribel Vann  Sent: 11/8/2019   3:42 PM CST  To: Rosa ARECHIGA Staff (Int Med)    1. What is the name of the medication you are requesting? Motion sickness patch  2. What is the dose? n/a  3. How do you take the medication? Orally, topically, etc? n/a  4. How often do you take this medication? n/a  5. Do you need a 30 day or 90 day supply? n/a  6. How many refills are you requesting? n/a  7. What is your preferred pharmacy and location of the pharmacy? [facility] [address]  8. Who can we contact with further questions? The pt request a return call at [phone] or [phone]

## 2019-11-11 NOTE — TELEPHONE ENCOUNTER
Pt is requesting something for motion sickness for a cruise. Please advise.////[facility]

## 2019-11-12 ENCOUNTER — TELEPHONE (OUTPATIENT)
Dept: INTERNAL MEDICINE | Facility: CLINIC | Age: 44
End: 2019-11-12

## 2019-12-06 DIAGNOSIS — E11.9 TYPE 2 DIABETES MELLITUS WITHOUT COMPLICATION: ICD-10-CM

## 2019-12-13 DIAGNOSIS — E11.9 TYPE 2 DIABETES MELLITUS WITHOUT COMPLICATION: ICD-10-CM

## 2020-01-27 DIAGNOSIS — I15.2 HYPERTENSION ASSOCIATED WITH DIABETES: Chronic | ICD-10-CM

## 2020-01-27 DIAGNOSIS — E11.59 HYPERTENSION ASSOCIATED WITH DIABETES: Chronic | ICD-10-CM

## 2020-01-27 DIAGNOSIS — E11.69 HYPERLIPIDEMIA ASSOCIATED WITH TYPE 2 DIABETES MELLITUS: Chronic | ICD-10-CM

## 2020-01-27 DIAGNOSIS — E78.5 HYPERLIPIDEMIA ASSOCIATED WITH TYPE 2 DIABETES MELLITUS: Chronic | ICD-10-CM

## 2020-01-27 DIAGNOSIS — E11.9 TYPE 2 DIABETES MELLITUS WITHOUT COMPLICATION, WITHOUT LONG-TERM CURRENT USE OF INSULIN: Chronic | ICD-10-CM

## 2020-01-27 NOTE — TELEPHONE ENCOUNTER
----- Message from Caty Blevins sent at 1/27/2020  7:54 AM CST -----  Contact: pt  Type:  RX Refill Request    Who Called: pt  Refill or New Rx:refill  RX Name and Strength:metformin, amlodipine and symbastatin  How is the patient currently taking it? (ex. 1XDay):2xday & 1XDay  Is this a 30 day or 90 day RX:30  Preferred Pharmacy with phone number:.  [facility] [place] - [address]  [address] [place] [address]  Phone: [phone] Fax: [phone]  Local or Mail Order:local  Ordering Provider:Dr Farrell  Would the patient rather a call back or a response via [facility]? Call back  Best Call Back Number:[phone]  Additional Information: #    Thank you

## 2020-01-31 ENCOUNTER — IMMUNIZATION (OUTPATIENT)
Dept: INTERNAL MEDICINE | Facility: CLINIC | Age: 45
End: 2020-01-31
Payer: MEDICAID

## 2020-01-31 PROCEDURE — 90686 IIV4 VACC NO PRSV 0.5 ML IM: CPT | Mod: PBBFAC

## 2020-02-02 RX ORDER — AMLODIPINE BESYLATE 10 MG/1
10 TABLET ORAL DAILY
Qty: 90 TABLET | Refills: 1 | OUTPATIENT
Start: 2020-02-02 | End: 2021-02-01

## 2020-02-02 RX ORDER — METFORMIN HYDROCHLORIDE 1000 MG/1
1000 TABLET ORAL 2 TIMES DAILY WITH MEALS
Qty: 180 TABLET | Refills: 1 | OUTPATIENT
Start: 2020-02-02 | End: 2021-02-01

## 2020-02-02 RX ORDER — SIMVASTATIN 10 MG/1
10 TABLET, FILM COATED ORAL NIGHTLY
Qty: 90 TABLET | Refills: 1 | OUTPATIENT
Start: 2020-02-02 | End: 2021-02-01

## 2020-02-03 NOTE — TELEPHONE ENCOUNTER
Refill denied.   Please call the patient and schedule an appointment for any refills  OK to medically override for visit

## 2020-02-05 ENCOUNTER — TELEPHONE (OUTPATIENT)
Dept: INTERNAL MEDICINE | Facility: CLINIC | Age: 45
End: 2020-02-05

## 2020-02-18 ENCOUNTER — TELEPHONE (OUTPATIENT)
Dept: INTERNAL MEDICINE | Facility: CLINIC | Age: 45
End: 2020-02-18

## 2020-02-18 NOTE — TELEPHONE ENCOUNTER
----- Message from Maribel Vann sent at 2/18/2020 11:45 AM CST -----  Contact: pt  Type:  Same Day Appointment Request    Caller is requesting a same day appointment.  Caller declined first available appointment listed below.    Name of Caller: the pt   When is the first available appointment? 04/07/2020  Symptoms: 6mth f/u  Best Call Back Number: [phone] or [phone]  Additional Information: n/a

## 2020-02-20 ENCOUNTER — TELEPHONE (OUTPATIENT)
Dept: INTERNAL MEDICINE | Facility: CLINIC | Age: 45
End: 2020-02-20

## 2020-02-20 NOTE — TELEPHONE ENCOUNTER
----- Message from Savana Samano sent at 2/19/2020 12:45 PM CST -----  Contact: Self  Type:  Patient Returning Call    Who Called:Shelly  Who Left Message for Patient:  Does the patient know what this is regarding?:yes  Would the patient rather a call back or a response via [facility]? call  Best Call Back Number:[phone]  Additional Information:

## 2020-02-23 ENCOUNTER — HOSPITAL ENCOUNTER (EMERGENCY)
Facility: HOSPITAL | Age: 45
Discharge: HOME | End: 2020-02-23
Attending: INTERNAL MEDICINE
Payer: MEDICAID

## 2020-02-23 VITALS
OXYGEN SATURATION: 98 % | SYSTOLIC BLOOD PRESSURE: 162 MMHG | HEART RATE: 88 BPM | RESPIRATION RATE: 21 BRPM | DIASTOLIC BLOOD PRESSURE: 84 MMHG | WEIGHT: 198.88 LBS | BODY MASS INDEX: 32.1 KG/M2 | TEMPERATURE: 99 F

## 2020-02-23 DIAGNOSIS — R07.9 CHEST PAIN: ICD-10-CM

## 2020-02-23 DIAGNOSIS — M94.0 COSTOCHONDRITIS: ICD-10-CM

## 2020-02-23 LAB — HIV 1+2 AB+HIV1 P24 AG SERPL QL IA: NEGATIVE

## 2020-02-23 PROCEDURE — 99284 EMERGENCY DEPT VISIT MOD MDM: CPT | Mod: 25

## 2020-02-23 PROCEDURE — 93005 ELECTROCARDIOGRAM TRACING: CPT

## 2020-02-23 PROCEDURE — 63600175 PHARM REV CODE 636 W HCPCS: Performed by: INTERNAL MEDICINE

## 2020-02-23 PROCEDURE — 93010 EKG 12-LEAD: ICD-10-PCS | Mod: ,,, | Performed by: INTERNAL MEDICINE

## 2020-02-23 PROCEDURE — 93010 ELECTROCARDIOGRAM REPORT: CPT | Mod: ,,, | Performed by: INTERNAL MEDICINE

## 2020-02-23 PROCEDURE — 25000003 PHARM REV CODE 250: Performed by: INTERNAL MEDICINE

## 2020-02-23 PROCEDURE — 86703 HIV-1/HIV-2 1 RESULT ANTBDY: CPT

## 2020-02-23 PROCEDURE — 96372 THER/PROPH/DIAG INJ SC/IM: CPT

## 2020-02-23 RX ORDER — METHYLPREDNISOLONE 4 MG/1
TABLET ORAL
Qty: 1 PACKAGE | Refills: 0 | Status: SHIPPED | OUTPATIENT
Start: 2020-02-23 | End: 2020-03-15

## 2020-02-23 RX ORDER — OXYCODONE AND ACETAMINOPHEN 10; 325 MG/1; MG/1
1 TABLET ORAL
Status: DISCONTINUED | OUTPATIENT
Start: 2020-02-23 | End: 2020-02-23

## 2020-02-23 RX ORDER — MELOXICAM 15 MG/1
15 TABLET ORAL DAILY
Qty: 30 TABLET | Refills: 0 | Status: SHIPPED | OUTPATIENT
Start: 2020-02-23 | End: 2020-04-07

## 2020-02-23 RX ORDER — COLCHICINE 0.6 MG/1
0.6 TABLET, FILM COATED ORAL
Status: COMPLETED | OUTPATIENT
Start: 2020-02-23 | End: 2020-02-23

## 2020-02-23 RX ORDER — HYDROMORPHONE HYDROCHLORIDE 2 MG/ML
1 INJECTION, SOLUTION INTRAMUSCULAR; INTRAVENOUS; SUBCUTANEOUS
Status: COMPLETED | OUTPATIENT
Start: 2020-02-23 | End: 2020-02-23

## 2020-02-23 RX ORDER — PREDNISONE 20 MG/1
60 TABLET ORAL
Status: COMPLETED | OUTPATIENT
Start: 2020-02-23 | End: 2020-02-23

## 2020-02-23 RX ORDER — METOPROLOL TARTRATE 50 MG/1
50 TABLET ORAL
Status: COMPLETED | OUTPATIENT
Start: 2020-02-23 | End: 2020-02-23

## 2020-02-23 RX ORDER — KETOROLAC TROMETHAMINE 30 MG/ML
30 INJECTION, SOLUTION INTRAMUSCULAR; INTRAVENOUS
Status: COMPLETED | OUTPATIENT
Start: 2020-02-23 | End: 2020-02-23

## 2020-02-23 RX ADMIN — METOPROLOL TARTRATE 50 MG: 50 TABLET ORAL at 11:02

## 2020-02-23 RX ADMIN — HYDROMORPHONE HYDROCHLORIDE 1 MG: 2 INJECTION INTRAMUSCULAR; INTRAVENOUS; SUBCUTANEOUS at 10:02

## 2020-02-23 RX ADMIN — KETOROLAC TROMETHAMINE 30 MG: 30 INJECTION, SOLUTION INTRAMUSCULAR at 09:02

## 2020-02-23 RX ADMIN — PREDNISONE 60 MG: 20 TABLET ORAL at 09:02

## 2020-02-23 RX ADMIN — COLCHICINE 0.6 MG: 0.6 TABLET, FILM COATED ORAL at 10:02

## 2020-02-23 NOTE — ED PROVIDER NOTES
SCRIBE #1 NOTE: I, Mazin Bravo, am scribing for, and in the presence of, Skyler Negron MD. I have scribed the entire note.       History     Chief Complaint   Patient presents with    Chest Pain     x 4 days, worse with inspirations     Review of patient's allergies indicates:   Allergen Reactions    Pcn [penicillins] Anaphylaxis    Morphine Other (See Comments)     Unknown         History of Present Illness     HPI    2020, 9:30 AM  History obtained from the patient      History of Present Illness: Shelly Kam is a 45 y.o. female patient with a PMHx of asthma, DM, HTN, who presents to the Emergency Department for evaluation of mid-sternal CP which onset gradually 4 days PTA. Symptoms are constant and moderate in severity. No mitigating or exacerbating factors reported. Associated sxs include cough. Patient denies any fever, chills, sore throat, n/v/d, SOB, HA, syncope, weakness, and all other sxs at this time. Pt reports that she was seen at this ED in 2018 by Dr. Farrell for similar sxs. No further complaints or concerns at this time.       Arrival mode: Personal vehicle     PCP: Heraclio Farrell MD        Past Medical History:  Past Medical History:   Diagnosis Date    Asthma     Diabetes mellitus     Fibromyalgia     Hypertension     Panic attack     Prozac in past       Past Surgical History:  Past Surgical History:   Procedure Laterality Date    BRONCHOSCOPY      HERNIA REPAIR      TONSILLECTOMY      TRACHEOSTOMY      TRACHEOSTOMY TUBE PLACEMENT           Family History:  Family History   Problem Relation Age of Onset    Cancer Paternal Aunt        Social History:  Social History     Tobacco Use    Smoking status: Current Every Day Smoker     Packs/day: 0.25     Last attempt to quit: 3/1/2011     Years since quittin.9    Smokeless tobacco: Never Used   Substance and Sexual Activity    Alcohol use: Yes     Frequency: Never     Drinks per session: 1 or 2     Binge  frequency: Never     Comment: occasionally    Drug use: No    Sexual activity: Yes     Partners: Male        Review of Systems     Review of Systems   Constitutional: Negative for activity change, appetite change, chills, diaphoresis and fever.   HENT: Negative for congestion, drooling, ear pain, mouth sores, rhinorrhea, sinus pain, sore throat and trouble swallowing.    Eyes: Negative for pain and discharge.   Respiratory: Positive for cough. Negative for chest tightness, shortness of breath, wheezing and stridor.    Cardiovascular: Positive for chest pain (mid-sternal). Negative for palpitations and leg swelling.   Gastrointestinal: Negative for abdominal distention, abdominal pain, blood in stool, constipation, diarrhea, nausea and vomiting.   Genitourinary: Negative for difficulty urinating, dysuria, flank pain, frequency, hematuria and urgency.   Musculoskeletal: Negative for arthralgias, back pain and myalgias.   Skin: Negative for pallor, rash and wound.   Neurological: Negative for dizziness, seizures, syncope, weakness, light-headedness, numbness and headaches.   All other systems reviewed and are negative.       Physical Exam     Initial Vitals [02/23/20 0847]   BP Pulse Resp Temp SpO2   (!) 152/73 90 18 98.5 °F (36.9 °C) 97 %      MAP       --          Physical Exam  Nursing Notes and Vital Signs Reviewed.  Constitutional: Patient is in no acute distress. Well-developed and well-nourished.  Head: Atraumatic. Normocephalic.  Eyes: PERRL. EOM intact. Conjunctivae are not pale. No scleral icterus.  ENT: Mucous membranes are moist. Oropharynx is clear and symmetric.    Neck: Supple. Full ROM. No lymphadenopathy.  Cardiovascular: Regular rate. Regular rhythm. No murmurs, rubs, or gallops. Distal pulses are 2+ and symmetric.  Pulmonary/Chest: Tenderness to costochondral area, consistent with severe constrochondiritis. No respiratory distress. Clear to auscultation bilaterally. No wheezing or  rales.  Abdominal: Soft and non-distended.  There is no tenderness.  No rebound, guarding, or rigidity. Good bowel sounds.  Genitourinary: No CVA tenderness  Musculoskeletal: Moves all extremities. No obvious deformities. No edema. No calf tenderness.  Skin: Warm and dry.  Neurological:  Alert, awake, and appropriate.  Normal speech.  No acute focal neurological deficits are appreciated.  Psychiatric: Normal affect. Good eye contact. Appropriate in content.     ED Course   Procedures  ED Vital Signs:  Vitals:    02/23/20 0847 02/23/20 0903 02/23/20 0906 02/23/20 1001   BP: (!) 152/73 (!) 148/75  (!) 151/74   Pulse: 90  93 93   Resp: 18      Temp: 98.5 °F (36.9 °C)      TempSrc: Oral      SpO2: 97%  98% 100%   Weight: 90.2 kg (198 lb 13.7 oz)       02/23/20 1032 02/23/20 1101 02/23/20 1111   BP: (!) 170/88 (!) 162/84 (!) 162/84   Pulse:  92 88   Resp:  (!) 24 (!) 21   Temp:      TempSrc:      SpO2: 100% 99% 98%   Weight:          Abnormal Lab Results:  Labs Reviewed   HIV 1 / 2 ANTIBODY        All Lab Results:  Results for orders placed or performed during the hospital encounter of 02/23/20   HIV 1/2 Ag/Ab (4th Gen)   Result Value Ref Range    HIV 1/2 Ag/Ab Negative Negative         Imaging Results:  Imaging Results          X-Ray Chest 1 View (Final result)  Result time 02/23/20 09:14:39    Final result by Terrell Tompkins MD (02/23/20 09:14:39)                 Impression:      Cardiomegaly without evidence of overt pulmonary edema.      Electronically signed by: Terrell Tompkins MD  Date:    02/23/2020  Time:    09:14             Narrative:    EXAMINATION:  XR CHEST 1 VIEW    CLINICAL HISTORY:  Chondrocostal junction syndrome (tietze)    TECHNIQUE:  Single frontal view of the chest was performed.    COMPARISON:  10/02/2017    FINDINGS:  Lungs are clear.  Cardiomegaly.  No overt pulmonary edema.                                 The EKG was ordered, reviewed, and independently interpreted by the ED provider.  Interpretation  time: 849  Rate: 85 BPM  Rhythm: normal sinus rhythm  Interpretation: L atrial enlargement. No STEMI.           The Emergency Provider reviewed the vital signs and test results, which are outlined above.     ED Discussion       10:50 AM: Reassessed pt at this time.  Pt states her condition has improved at this time. Discussed with pt all pertinent ED information and results. Discussed pt dx and plan of tx. Gave pt all f/u and return to the ED instructions. All questions and concerns were addressed at this time. Pt expresses understanding of information and instructions, and is comfortable with plan to discharge. Pt is stable for discharge.    I discussed with patient and/or family/caretaker that evaluation in the ED does not suggest any emergent or life threatening medical conditions requiring immediate intervention beyond what was provided in the ED, and I believe patient is safe for discharge.  Regardless, an unremarkable evaluation in the ED does not preclude the development or presence of a serious of life threatening condition. As such, patient was instructed to return immediately for any worsening or change in current symptoms.         Medical Decision Making:   Clinical Tests:   Lab Tests: Ordered and Reviewed  Radiological Study: Ordered and Reviewed  Medical Tests: Ordered and Reviewed     Additional MDM:   Smoking Cessation: The patient was counseled on tobacco related  health complications. Appropriate patient literature was given to the patient concerning tobacco cessation. The patient was counseled on how exercise will aide in tobacco cessation. The patient was counseled on the adverse effects of second hand smoke. The patient was counseled on hazards of smoking while driving.        ED Medication(s):  Medications   ketorolac injection 30 mg (30 mg Intramuscular Given 2/23/20 0914)   predniSONE tablet 60 mg (60 mg Oral Given 2/23/20 0914)   colchicine capsule/tablet 0.6 mg (0.6 mg Oral Given 2/23/20  1017)   hydromorphone (PF) injection 1 mg (1 mg Intramuscular Given 2/23/20 1019)   metoprolol tartrate (LOPRESSOR) tablet 50 mg (50 mg Oral Given 2/23/20 1111)       Discharge Medication List as of 2/23/2020 10:48 AM      START taking these medications    Details   meloxicam (MOBIC) 15 MG tablet Take 1 tablet (15 mg total) by mouth once daily., Starting Sun 2/23/2020, Normal      methylPREDNISolone (MEDROL DOSEPACK) 4 mg tablet As per box, Normal             Follow-up Information     Go to  [facility]    Specialty:  Emergency Medicine  Why:  If symptoms worsen  Contact information:  [address] [place] [address] [phone]           [facility]    Specialty:  Emergency Medicine  Contact information:  [address] [place] [address] [phone]           Schedule an appointment as soon as possible for a visit  with Heraclio Farrell MD.    Specialty:  Family Medicine  Why:  costochondritis follow-up  Contact information:  [id number] [place] [id number] [phone]                       Scribe Attestation:   Scribe #1: I performed the above scribed service and the documentation accurately describes the services I performed. I attest to the accuracy of the note.     Attending:   Physician Attestation Statement for Scribe #1: I, Skyler Negron MD, personally performed the services described in this documentation, as scribed by Mazin Bravo, in my presence, and it is both accurate and complete.           Clinical Impression       ICD-10-CM ICD-9-CM   1. Chest pain R07.9 786.50   2. Costochondritis M94.0 733.6       Disposition:   Disposition: Discharged  Condition: Stable         Skyler Negron MD  02/23/20 1223

## 2020-02-23 NOTE — ED NOTES
Patient lying in bed, pain medication given for stated 5/10, no acute distress noted, awake, alert, and oriented x 3, calm, respirations even and unlabored, and skin is warm and dry. Patient updated of status and plan of care. Side rails up x 2, call light in reach, bed low and locked. Family at bedside. Will continue to monitor.

## 2020-02-23 NOTE — ED TRIAGE NOTES
46 y/o F presents to ED c/o midsternal chest pain x4 days, worse with inspiration, radiating to back.

## 2020-02-24 ENCOUNTER — TELEPHONE (OUTPATIENT)
Dept: INTERNAL MEDICINE | Facility: CLINIC | Age: 45
End: 2020-02-24

## 2020-02-24 NOTE — TELEPHONE ENCOUNTER
----- Message from Skyler Negron MD sent at 2/23/2020 11:28 AM CST -----  Patient was seen in ER     Please accommodate for follow up appointment after d/c from ER    Thanks     Everton Negron MD

## 2020-02-26 ENCOUNTER — OFFICE VISIT (OUTPATIENT)
Dept: INTERNAL MEDICINE | Facility: CLINIC | Age: 45
End: 2020-02-26
Payer: MEDICAID

## 2020-02-26 VITALS
BODY MASS INDEX: 31.42 KG/M2 | DIASTOLIC BLOOD PRESSURE: 80 MMHG | HEART RATE: 85 BPM | SYSTOLIC BLOOD PRESSURE: 140 MMHG | WEIGHT: 194.69 LBS | OXYGEN SATURATION: 97 % | TEMPERATURE: 98 F

## 2020-02-26 DIAGNOSIS — M94.0 COSTOCHONDRITIS: Primary | ICD-10-CM

## 2020-02-26 PROCEDURE — 99213 OFFICE O/P EST LOW 20 MIN: CPT | Mod: PBBFAC | Performed by: INTERNAL MEDICINE

## 2020-02-26 PROCEDURE — 99213 OFFICE O/P EST LOW 20 MIN: CPT | Mod: S$PBB,,, | Performed by: INTERNAL MEDICINE

## 2020-02-26 PROCEDURE — 99999 PR PBB SHADOW E&M-EST. PATIENT-LVL III: ICD-10-PCS | Mod: PBBFAC,,, | Performed by: INTERNAL MEDICINE

## 2020-02-26 PROCEDURE — 99999 PR PBB SHADOW E&M-EST. PATIENT-LVL III: CPT | Mod: PBBFAC,,, | Performed by: INTERNAL MEDICINE

## 2020-02-26 PROCEDURE — 99213 PR OFFICE/OUTPT VISIT, EST, LEVL III, 20-29 MIN: ICD-10-PCS | Mod: S$PBB,,, | Performed by: INTERNAL MEDICINE

## 2020-02-26 RX ORDER — CEPHALEXIN 500 MG/1
CAPSULE ORAL
COMMUNITY
Start: 2020-01-23 | End: 2020-04-07

## 2020-03-02 NOTE — PROGRESS NOTES
Subjective:      Patient ID: Shelly Kam is a 45 y.o. female.    Chief Complaint: Hospital Follow Up    HPI   44 yo with   Patient Active Problem List   Diagnosis    Anxiety    Dysphagia, pharyngoesophageal    Hypertension associated with diabetes    Type 2 diabetes mellitus without complication, without long-term current use of insulin    Fibromyalgia    Hyperlipidemia associated with type 2 diabetes mellitus    Mild intermittent asthma without complication    Hyperthyroidism    Type 2 diabetes mellitus without retinopathy    Anemia     Past Medical History:   Diagnosis Date    Asthma     Diabetes mellitus     Fibromyalgia     Hypertension     Panic attack     Prozac in past     Here today for hospital f/u after recent er visit for costochondritis. Started medrol and mobic yesterday. Mild improvement in symptoms. Continues with left sided chest pain worse with palpation and certain movements. No exertional pain.     Review of Systems   Constitutional: Negative for chills, diaphoresis, fatigue and fever.   HENT: Negative for sinus pressure.    Eyes: Negative for visual disturbance.   Respiratory: Negative for cough, shortness of breath and wheezing.    Cardiovascular: Negative for chest pain, palpitations and leg swelling.   Gastrointestinal: Negative for abdominal pain and vomiting.   Skin: Negative for rash and wound.   Neurological: Negative for dizziness, syncope and light-headedness.     Objective:   BP (!) 140/80 (BP Location: Right arm, Patient Position: Sitting, BP Method: Medium (Manual))   Pulse 85   Temp 98 °F (36.7 °C) (Oral)   Wt 88.3 kg (194 lb 10.7 oz)   LMP 02/22/2020   SpO2 97%   BMI 31.42 kg/m²     Physical Exam   Constitutional: She appears well-developed and well-nourished. No distress.   HENT:   Head: Normocephalic and atraumatic.   Eyes: Conjunctivae and EOM are normal.   Cardiovascular: Normal rate and regular rhythm.   Pulmonary/Chest: Effort normal.        Musculoskeletal: She exhibits no edema.   Neurological: She is alert.   Skin: Skin is warm and dry.   Psychiatric: She has a normal mood and affect. Her behavior is normal.       Assessment:     1. Costochondritis      Plan:   Costochondritis  Comments:  complete meds as prescribed by ER.         Lab Frequency Next Occurrence   Microalbumin/creatinine urine ratio Once 12/06/2019   Hemoglobin A1c Once 12/13/2019       Problem List Items Addressed This Visit     None      Visit Diagnoses     Costochondritis    -  Primary    complete meds as prescribed by ER.           Follow up if symptoms worsen or fail to improve.

## 2020-04-06 ENCOUNTER — TELEPHONE (OUTPATIENT)
Dept: INTERNAL MEDICINE | Facility: CLINIC | Age: 45
End: 2020-04-06

## 2020-04-06 DIAGNOSIS — F41.9 ANXIETY: Chronic | ICD-10-CM

## 2020-04-06 DIAGNOSIS — M79.7 FIBROMYALGIA: Chronic | ICD-10-CM

## 2020-04-06 RX ORDER — CITALOPRAM 20 MG/1
20 TABLET, FILM COATED ORAL DAILY
Qty: 90 TABLET | Refills: 0 | Status: SHIPPED | OUTPATIENT
Start: 2020-04-06 | End: 2020-04-07 | Stop reason: SDUPTHER

## 2020-04-06 NOTE — TELEPHONE ENCOUNTER
Approved but for a three month supply only   Patient needs a follow up appointment.  Please call and schedule.  Okay for telemedicine visit

## 2020-04-06 NOTE — TELEPHONE ENCOUNTER
----- Message from Destiny Gibbons sent at 4/6/2020  1:21 PM CDT -----  Contact: pt  .Type:  Patient Returning Call    Who Called: pt  Who Left Message for Patient:   Does the patient know what this is regarding?: appt verification   Would the patient rather a call back or a response via [facility]?  Call back   Best Call Back Number: [phone] (home)   Additional Information:

## 2020-04-06 NOTE — TELEPHONE ENCOUNTER
----- Message from Caty Blevins sent at 4/6/2020 12:18 PM CDT -----  Contact: pt  Type:  Patient Returning Call    Who Called:pt  Who Left Message for Patient:nurse  Does the patient know what this is regarding?:she has an appt tomorrow  Would the patient rather a call back or a response via [facility]? Call back  Best Call Back Number:[phone]  Additional Information: .    Thank you

## 2020-04-07 ENCOUNTER — TELEPHONE (OUTPATIENT)
Dept: INTERNAL MEDICINE | Facility: CLINIC | Age: 45
End: 2020-04-07

## 2020-04-07 ENCOUNTER — OFFICE VISIT (OUTPATIENT)
Dept: INTERNAL MEDICINE | Facility: CLINIC | Age: 45
End: 2020-04-07
Payer: MEDICAID

## 2020-04-07 DIAGNOSIS — R09.82 POST-NASAL DRIP: Primary | ICD-10-CM

## 2020-04-07 DIAGNOSIS — J45.20 MILD INTERMITTENT ASTHMA WITHOUT COMPLICATION: Chronic | ICD-10-CM

## 2020-04-07 DIAGNOSIS — E05.90 HYPERTHYROIDISM: Chronic | ICD-10-CM

## 2020-04-07 DIAGNOSIS — I15.2 HYPERTENSION ASSOCIATED WITH DIABETES: Chronic | ICD-10-CM

## 2020-04-07 DIAGNOSIS — M79.7 FIBROMYALGIA: Chronic | ICD-10-CM

## 2020-04-07 DIAGNOSIS — E78.5 HYPERLIPIDEMIA ASSOCIATED WITH TYPE 2 DIABETES MELLITUS: Chronic | ICD-10-CM

## 2020-04-07 DIAGNOSIS — E11.69 HYPERLIPIDEMIA ASSOCIATED WITH TYPE 2 DIABETES MELLITUS: Chronic | ICD-10-CM

## 2020-04-07 DIAGNOSIS — D64.9 ANEMIA, UNSPECIFIED TYPE: ICD-10-CM

## 2020-04-07 DIAGNOSIS — F41.9 ANXIETY: Chronic | ICD-10-CM

## 2020-04-07 DIAGNOSIS — E11.59 HYPERTENSION ASSOCIATED WITH DIABETES: Chronic | ICD-10-CM

## 2020-04-07 DIAGNOSIS — E11.9 TYPE 2 DIABETES MELLITUS WITHOUT COMPLICATION, WITHOUT LONG-TERM CURRENT USE OF INSULIN: Chronic | ICD-10-CM

## 2020-04-07 PROCEDURE — 99214 OFFICE O/P EST MOD 30 MIN: CPT | Mod: 95,,, | Performed by: FAMILY MEDICINE

## 2020-04-07 PROCEDURE — 99214 PR OFFICE/OUTPT VISIT, EST, LEVL IV, 30-39 MIN: ICD-10-PCS | Mod: 95,,, | Performed by: FAMILY MEDICINE

## 2020-04-07 RX ORDER — AMLODIPINE BESYLATE 10 MG/1
10 TABLET ORAL DAILY
Qty: 90 TABLET | Refills: 1 | Status: SHIPPED | OUTPATIENT
Start: 2020-04-07 | End: 2020-11-17 | Stop reason: SDUPTHER

## 2020-04-07 RX ORDER — ALBUTEROL SULFATE 90 UG/1
2 AEROSOL, METERED RESPIRATORY (INHALATION) EVERY 4 HOURS PRN
Qty: 18 G | Refills: 0 | Status: SHIPPED | OUTPATIENT
Start: 2020-04-07 | End: 2020-07-06 | Stop reason: SDUPTHER

## 2020-04-07 RX ORDER — SIMVASTATIN 10 MG/1
10 TABLET, FILM COATED ORAL NIGHTLY
Qty: 90 TABLET | Refills: 1 | Status: SHIPPED | OUTPATIENT
Start: 2020-04-07 | End: 2020-11-17 | Stop reason: SDUPTHER

## 2020-04-07 RX ORDER — ASPIRIN 81 MG/1
81 TABLET ORAL DAILY
Qty: 90 TABLET | Refills: 3 | Status: ON HOLD | OUTPATIENT
Start: 2020-04-07 | End: 2021-03-26 | Stop reason: SDUPTHER

## 2020-04-07 RX ORDER — METFORMIN HYDROCHLORIDE 1000 MG/1
1000 TABLET ORAL 2 TIMES DAILY WITH MEALS
Qty: 180 TABLET | Refills: 1 | Status: SHIPPED | OUTPATIENT
Start: 2020-04-07 | End: 2020-11-17 | Stop reason: SDUPTHER

## 2020-04-07 RX ORDER — CITALOPRAM 20 MG/1
20 TABLET, FILM COATED ORAL DAILY
Qty: 90 TABLET | Refills: 1 | Status: SHIPPED | OUTPATIENT
Start: 2020-04-07 | End: 2020-11-17 | Stop reason: SDUPTHER

## 2020-04-07 RX ORDER — AZELASTINE 1 MG/ML
2 SPRAY, METERED NASAL 2 TIMES DAILY PRN
Qty: 30 ML | Refills: 5 | Status: SHIPPED | OUTPATIENT
Start: 2020-04-07 | End: 2021-03-18 | Stop reason: CLARIF

## 2020-04-07 RX ORDER — FLUTICASONE PROPIONATE 50 MCG
2 SPRAY, SUSPENSION (ML) NASAL DAILY
Qty: 16 G | Refills: 5 | Status: SHIPPED | OUTPATIENT
Start: 2020-04-07 | End: 2020-04-17

## 2020-04-07 NOTE — TELEPHONE ENCOUNTER
----- Message from Oscar Eaton sent at 4/7/2020  9:53 AM CDT -----  Contact: pt   Pt unable to connect to virtual visit due to incompatible device. pls return call.       [phone]

## 2020-04-07 NOTE — PROGRESS NOTES
Subjective:   Patient ID: Shelly Kam is a 45 y.o. female.  Chief Complaint:  No chief complaint on file.    The patient location is: Home  The chief complaint leading to consultation is:   Medication refill  Visit type: Virtual visit with synchronous audio and video  Total time spent with patient: 30 minutes  Each patient to whom he or she provides medical services by telemedicine is:  (1) informed of the relationship between the physician and patient and the respective role of any other health care provider with respect to management of the patient; and (2) notified that he or she may decline to receive medical services by telemedicine and may withdraw from such care at any time.    Patient presents for overdue follow-up on multiple chronic medical conditions.    Last visit with me June 2019.  Present reports out of all medications for months.    Medical history significant for:   Diabetes mellitus.  A1c June 2019 5.7%.  Compliant with metformin 1000 mg  Twice a day at the time.  Deferred eye exam and micro albumin due to cost.  Denies symptoms hypo or hyperglycemia.  Overdue for repeat A1c.  However off medication.    Hypertension.  Previously uncontrolled.  Restarted amlodipine 10 mg daily.  June 2018 CMP was stable.  Off medications.  Reports most recent blood pressure 172/90.    Hyperlipidemia.  Last lipid panel with LDL 71.  At goal for treatment.  Simvastatin 10 mg daily.  Presently off medications.  No side effects previously.    Mild intermittent asthma.  Needs albuterol inhaler refill.  Stable.    Hyperthyroidism.  June 2019 TSH low, free T4 high.  No medications.  Not follow-up with endocrinology.    Anemia.  CBC June 2019 with stable hemoglobin/ hematocrit 9.7/32.3.    Fibromyalgia and anxiety.  Step symptoms stable and controlled previously.  On Celexa 20 mg daily.  No side effects.    Today complains of increased cough/wheezing from asthma and no inhaler.  Also with sinus congestion most  significantly postnasal drip and irritated throat.  Three weeks duration.  No fever.  No other constitutional symptoms.  Out of Flonase and Astelin previously prescribed.    In addition to eye exam, foot exam, repeat A1c, and micro albumin for diabetes patient also overdue for gyn/pelvic exam.      Current Outpatient Medications:     albuterol (PROVENTIL/VENTOLIN HFA) 90 mcg/actuation inhaler, Inhale 2 puffs into the lungs every 4 (four) hours as needed for Wheezing or Shortness of Breath., Disp: 18 g, Rfl: 0    amLODIPine (NORVASC) 10 MG tablet, Take 1 tablet (10 mg total) by mouth once daily., Disp: 90 tablet, Rfl: 1    aspirin (ECOTRIN) 81 MG EC tablet, Take 1 tablet (81 mg total) by mouth once daily., Disp: 90 tablet, Rfl: 3    azelastine (ASTELIN) 137 mcg (0.1 %) nasal spray, 2 sprays (274 mcg total) by Nasal route 2 (two) times daily as needed for Rhinitis., Disp: 30 mL, Rfl: 5    citalopram (CELEXA) 20 MG tablet, Take 1 tablet (20 mg total) by mouth once daily., Disp: 90 tablet, Rfl: 1    fluticasone propionate (FLONASE) 50 mcg/actuation nasal spray, 2 sprays (100 mcg total) by Each Nostril route once daily., Disp: 16 g, Rfl: 5    metFORMIN (GLUCOPHAGE) 1000 MG tablet, Take 1 tablet (1,000 mg total) by mouth 2 (two) times daily with meals., Disp: 180 tablet, Rfl: 1    simvastatin (ZOCOR) 10 MG tablet, Take 1 tablet (10 mg total) by mouth every evening., Disp: 90 tablet, Rfl: 1    Review of Systems   Constitutional: Negative for appetite change, chills, diaphoresis, fatigue and fever.   HENT: Positive for congestion, postnasal drip, rhinorrhea and sinus pressure. Negative for dental problem, ear discharge, ear pain, sore throat and trouble swallowing.    Eyes: Negative for visual disturbance.   Respiratory: Positive for wheezing. Negative for cough, chest tightness and shortness of breath.    Cardiovascular: Negative for chest pain, palpitations and leg swelling.   Gastrointestinal: Negative for  abdominal distention, abdominal pain, blood in stool, constipation, diarrhea, nausea and vomiting.   Endocrine: Negative for polydipsia, polyphagia and polyuria.   Genitourinary: Negative for difficulty urinating, dysuria, flank pain, frequency, hematuria, pelvic pain and urgency.   Musculoskeletal: Negative for myalgias.   Skin: Negative for rash.   Neurological: Negative for dizziness, syncope, weakness, light-headedness, numbness and headaches.   Hematological: Negative for adenopathy.   Psychiatric/Behavioral: Negative for decreased concentration, dysphoric mood and sleep disturbance. The patient is not nervous/anxious.      Objective:   There were no vitals taken for this visit.    Physical Exam   Constitutional: She is oriented to person, place, and time. She appears well-developed and well-nourished.  Non-toxic appearance. She does not have a sickly appearance. She does not appear ill. No distress.   HENT:   Nose: Mucosal edema and rhinorrhea present. Right sinus exhibits no maxillary sinus tenderness and no frontal sinus tenderness. Left sinus exhibits no maxillary sinus tenderness and no frontal sinus tenderness.   Eyes: Conjunctivae are normal. Right eye exhibits no discharge. Left eye exhibits no discharge. Right conjunctiva is not injected. Left conjunctiva is not injected. No scleral icterus.   Neck: No JVD present.   Healed tracheostomy scar midline neck    Pulmonary/Chest: Effort normal. No accessory muscle usage. No tachypnea. No respiratory distress.   Musculoskeletal: She exhibits no edema.   Neurological: She is alert and oriented to person, place, and time.   Skin: No rash noted.   Psychiatric: She has a normal mood and affect. Judgment normal.     Assessment:       ICD-10-CM ICD-9-CM   1. Post-nasal drip R09.82 784.91   2. Type 2 diabetes mellitus without complication, without long-term current use of insulin E11.9 250.00   3. Hyperthyroidism E05.90 242.90   4. Hypertension associated with  diabetes E11.59 250.80    I10 401.9   5. Hyperlipidemia associated with type 2 diabetes mellitus E11.69 250.80    E78.5 272.4   6. Mild intermittent asthma without complication J45.20 493.90   7. Fibromyalgia M79.7 729.1   8. Anxiety F41.9 300.00   9. Anemia, unspecified type D64.9 285.9     Plan:   Post-nasal drip  -     fluticasone propionate (FLONASE) 50 mcg/actuation nasal spray; 2 sprays (100 mcg total) by Each Nostril route once daily.  Dispense: 16 g; Refill: 5  -     azelastine (ASTELIN) 137 mcg (0.1 %) nasal spray; 2 sprays (274 mcg total) by Nasal route 2 (two) times daily as needed for Rhinitis.  Dispense: 30 mL; Refill: 5    Type 2 diabetes mellitus without complication, without long-term current use of insulin  -     metFORMIN (GLUCOPHAGE) 1000 MG tablet; Take 1 tablet (1,000 mg total) by mouth 2 (two) times daily with meals.  Dispense: 180 tablet; Refill: 1  Restart metformin 1000 mg twice a day  A1c and micro albumin in 3 months   Foot exam at follow-up visit   Eye exam when able to schedule in light of COVID-19 pandemic    Hyperthyroidism   repeat TSH, free T4, and endocrinology referral when able to schedule in light of COVID-19 pandemic    Hypertension associated with diabetes  -     amLODIPine (NORVASC) 10 MG tablet; Take 1 tablet (10 mg total) by mouth once daily.  Dispense: 90 tablet; Refill: 1  Uncontrolled.  BP elevated.  Not at goal.    Restart amlodipine 10 mg daily.    Recheck blood pressure at follow-up visit.      Hyperlipidemia associated with type 2 diabetes mellitus  -     simvastatin (ZOCOR) 10 MG tablet; Take 1 tablet (10 mg total) by mouth every evening.  Dispense: 90 tablet; Refill: 1  -     aspirin (ECOTRIN) 81 MG EC tablet; Take 1 tablet (81 mg total) by mouth once daily.  Dispense: 90 tablet; Refill: 3  Previously controlled/ stable.  LDL at goal.  Asymptomatic.    Restart aspirin 81 mg daily   Restart simvastatin 10 mg daily   Repeat lipid panel 3 months     Mild intermittent  asthma without complication  -     albuterol (PROVENTIL/VENTOLIN HFA) 90 mcg/actuation inhaler; Inhale 2 puffs into the lungs every 4 (four) hours as needed for Wheezing or Shortness of Breath.  Dispense: 18 g; Refill: 0  Refill abuse or all as needed   Based on present use, controller medication not indicated.      Fibromyalgia  Anxiety  -     citalopram (CELEXA) 20 MG tablet; Take 1 tablet (20 mg total) by mouth once daily.  Dispense: 90 tablet; Refill: 1  Stable, no side effects, mood and symptoms well controlled on present medication .  Continue Celexa 20 mg daily.    Anemia, unspecified type  Stable.  Asymptomatic.    Continue daily multivitamin with iron.    Return to clinic 3 months or sooner as needed.

## 2020-04-16 ENCOUNTER — TELEPHONE (OUTPATIENT)
Dept: INTERNAL MEDICINE | Facility: CLINIC | Age: 45
End: 2020-04-16

## 2020-04-16 NOTE — TELEPHONE ENCOUNTER
----- Message from Ashley Herrera sent at 4/16/2020  4:44 PM CDT -----  Contact: pt  Would like to consult with nurse regarding nasal pump not working, states she is in a lot of pain and sinus acting up real bad. Please give a call back at [phone].        [facility] [place] - [id number]  [address] [id number]  Phone: [phone] Fax: [phone]

## 2020-04-17 RX ORDER — TRIAMCINOLONE ACETONIDE 55 UG/1
2 SPRAY, METERED NASAL DAILY
Qty: 16.9 ML | Refills: 0 | Status: SHIPPED | OUTPATIENT
Start: 2020-04-17 | End: 2020-09-16

## 2020-04-17 NOTE — TELEPHONE ENCOUNTER
Stop the Flonase.  Will try and send prescription for Nasacort AQ.  Continue the Astelin.  If she is having any fever or sinus tenderness, let me know and I will send a prescription for an antibiotic.    If no fever or sinus tenderness, in addition to medication changes just needs more time.

## 2020-06-08 ENCOUNTER — TELEPHONE (OUTPATIENT)
Dept: INTERNAL MEDICINE | Facility: CLINIC | Age: 45
End: 2020-06-08

## 2020-06-08 NOTE — TELEPHONE ENCOUNTER
Spoke with patient in regards to her phone call. She stated that she is having a hard time trying to breathe with the masks on. She states she has a variety of masks cloth, N-95, and surgical mask and they are still hard for her to breathe with them on. She knows that she has underlying conditions that she has to wear her masks. She just would advised on what to do with that. Also she has scheduled an appointment for 07/06/2020 at 0800 am she would like to do her labs before hand and she is requesting an handicapped sticker, please advised.//KM

## 2020-06-11 DIAGNOSIS — E78.5 HYPERLIPIDEMIA ASSOCIATED WITH TYPE 2 DIABETES MELLITUS: ICD-10-CM

## 2020-06-11 DIAGNOSIS — E05.90 HYPERTHYROIDISM: ICD-10-CM

## 2020-06-11 DIAGNOSIS — D64.9 ANEMIA, UNSPECIFIED TYPE: ICD-10-CM

## 2020-06-11 DIAGNOSIS — E11.9 TYPE 2 DIABETES MELLITUS WITHOUT COMPLICATION, WITHOUT LONG-TERM CURRENT USE OF INSULIN: Primary | ICD-10-CM

## 2020-06-11 DIAGNOSIS — E11.69 HYPERLIPIDEMIA ASSOCIATED WITH TYPE 2 DIABETES MELLITUS: ICD-10-CM

## 2020-06-11 NOTE — TELEPHONE ENCOUNTER
Labs ordered.  Schedule before visit.    Can provide handicap license form at visit.    Cloth mask should be easiest to tolerate and for what she needs just as effective as and 95 or surgical mask.

## 2020-06-11 NOTE — TELEPHONE ENCOUNTER
Spoke with patient in regards to Dr. Farrell's recommedation. Scheduled labs for 07/06/2020 at 0745 and kept appointment scheduled for 07/06/2020 at 0800. Informed patient that she can purchased air filters at [facility] to help put in her cloth masks to make breathing a little bit better, verbalized understanding.//KM

## 2020-07-06 ENCOUNTER — OFFICE VISIT (OUTPATIENT)
Dept: INTERNAL MEDICINE | Facility: CLINIC | Age: 45
End: 2020-07-06
Payer: MEDICAID

## 2020-07-06 ENCOUNTER — LAB VISIT (OUTPATIENT)
Dept: LAB | Facility: HOSPITAL | Age: 45
End: 2020-07-06
Attending: FAMILY MEDICINE
Payer: MEDICAID

## 2020-07-06 VITALS
TEMPERATURE: 98 F | DIASTOLIC BLOOD PRESSURE: 74 MMHG | OXYGEN SATURATION: 96 % | SYSTOLIC BLOOD PRESSURE: 174 MMHG | HEIGHT: 66 IN | HEART RATE: 98 BPM | WEIGHT: 196.63 LBS | BODY MASS INDEX: 31.6 KG/M2

## 2020-07-06 DIAGNOSIS — E11.9 TYPE 2 DIABETES MELLITUS WITHOUT COMPLICATION, WITHOUT LONG-TERM CURRENT USE OF INSULIN: Primary | ICD-10-CM

## 2020-07-06 DIAGNOSIS — F41.9 ANXIETY: ICD-10-CM

## 2020-07-06 DIAGNOSIS — J45.20 MILD INTERMITTENT ASTHMA WITHOUT COMPLICATION: ICD-10-CM

## 2020-07-06 DIAGNOSIS — M79.7 FIBROMYALGIA: ICD-10-CM

## 2020-07-06 DIAGNOSIS — E11.69 HYPERLIPIDEMIA ASSOCIATED WITH TYPE 2 DIABETES MELLITUS: ICD-10-CM

## 2020-07-06 DIAGNOSIS — E78.5 HYPERLIPIDEMIA ASSOCIATED WITH TYPE 2 DIABETES MELLITUS: ICD-10-CM

## 2020-07-06 DIAGNOSIS — E11.9 TYPE 2 DIABETES MELLITUS WITHOUT COMPLICATION, WITHOUT LONG-TERM CURRENT USE OF INSULIN: ICD-10-CM

## 2020-07-06 DIAGNOSIS — R06.09 DOE (DYSPNEA ON EXERTION): ICD-10-CM

## 2020-07-06 DIAGNOSIS — E11.59 HYPERTENSION ASSOCIATED WITH DIABETES: ICD-10-CM

## 2020-07-06 DIAGNOSIS — I15.2 HYPERTENSION ASSOCIATED WITH DIABETES: ICD-10-CM

## 2020-07-06 DIAGNOSIS — D64.9 ANEMIA, UNSPECIFIED TYPE: ICD-10-CM

## 2020-07-06 DIAGNOSIS — R09.82 POST-NASAL DRIP: ICD-10-CM

## 2020-07-06 DIAGNOSIS — Z01.419 ENCOUNTER FOR GYNECOLOGICAL EXAMINATION: ICD-10-CM

## 2020-07-06 DIAGNOSIS — H60.502 ACUTE OTITIS EXTERNA OF LEFT EAR, UNSPECIFIED TYPE: ICD-10-CM

## 2020-07-06 DIAGNOSIS — E05.90 HYPERTHYROIDISM: ICD-10-CM

## 2020-07-06 LAB
ALBUMIN SERPL BCP-MCNC: 3.2 G/DL (ref 3.5–5.2)
ALP SERPL-CCNC: 97 U/L (ref 55–135)
ALT SERPL W/O P-5'-P-CCNC: 21 U/L (ref 10–44)
ANION GAP SERPL CALC-SCNC: 7 MMOL/L (ref 8–16)
AST SERPL-CCNC: 23 U/L (ref 10–40)
BILIRUB SERPL-MCNC: 0.3 MG/DL (ref 0.1–1)
BUN SERPL-MCNC: 8 MG/DL (ref 6–20)
CALCIUM SERPL-MCNC: 9.2 MG/DL (ref 8.7–10.5)
CHLORIDE SERPL-SCNC: 107 MMOL/L (ref 95–110)
CHOLEST SERPL-MCNC: 109 MG/DL (ref 120–199)
CHOLEST/HDLC SERPL: 2.8 {RATIO} (ref 2–5)
CO2 SERPL-SCNC: 25 MMOL/L (ref 23–29)
CREAT SERPL-MCNC: 0.6 MG/DL (ref 0.5–1.4)
ERYTHROCYTE [DISTWIDTH] IN BLOOD BY AUTOMATED COUNT: 14.7 % (ref 11.5–14.5)
EST. GFR  (AFRICAN AMERICAN): >60 ML/MIN/1.73 M^2
EST. GFR  (NON AFRICAN AMERICAN): >60 ML/MIN/1.73 M^2
ESTIMATED AVG GLUCOSE: 105 MG/DL (ref 68–131)
GLUCOSE SERPL-MCNC: 104 MG/DL (ref 70–110)
HBA1C MFR BLD HPLC: 5.3 % (ref 4–5.6)
HCT VFR BLD AUTO: 36.7 % (ref 37–48.5)
HDLC SERPL-MCNC: 39 MG/DL (ref 40–75)
HDLC SERPL: 35.8 % (ref 20–50)
HGB BLD-MCNC: 10.8 G/DL (ref 12–16)
LDLC SERPL CALC-MCNC: 62 MG/DL (ref 63–159)
MCH RBC QN AUTO: 24.7 PG (ref 27–31)
MCHC RBC AUTO-ENTMCNC: 29.4 G/DL (ref 32–36)
MCV RBC AUTO: 84 FL (ref 82–98)
NONHDLC SERPL-MCNC: 70 MG/DL
PLATELET # BLD AUTO: 161 K/UL (ref 150–350)
PMV BLD AUTO: ABNORMAL FL (ref 9.2–12.9)
POTASSIUM SERPL-SCNC: 3.8 MMOL/L (ref 3.5–5.1)
PROT SERPL-MCNC: 6.6 G/DL (ref 6–8.4)
RBC # BLD AUTO: 4.37 M/UL (ref 4–5.4)
SODIUM SERPL-SCNC: 139 MMOL/L (ref 136–145)
T4 FREE SERPL-MCNC: 2.7 NG/DL (ref 0.71–1.51)
TRIGL SERPL-MCNC: 40 MG/DL (ref 30–150)
TSH SERPL DL<=0.005 MIU/L-ACNC: <0.01 UIU/ML (ref 0.4–4)
WBC # BLD AUTO: 4.64 K/UL (ref 3.9–12.7)

## 2020-07-06 PROCEDURE — 84439 ASSAY OF FREE THYROXINE: CPT

## 2020-07-06 PROCEDURE — 83036 HEMOGLOBIN GLYCOSYLATED A1C: CPT

## 2020-07-06 PROCEDURE — 99999 PR PBB SHADOW E&M-EST. PATIENT-LVL V: CPT | Mod: PBBFAC,,, | Performed by: FAMILY MEDICINE

## 2020-07-06 PROCEDURE — 99215 OFFICE O/P EST HI 40 MIN: CPT | Mod: PBBFAC | Performed by: FAMILY MEDICINE

## 2020-07-06 PROCEDURE — 84443 ASSAY THYROID STIM HORMONE: CPT

## 2020-07-06 PROCEDURE — 80061 LIPID PANEL: CPT

## 2020-07-06 PROCEDURE — 99215 PR OFFICE/OUTPT VISIT, EST, LEVL V, 40-54 MIN: ICD-10-PCS | Mod: S$PBB,,, | Performed by: FAMILY MEDICINE

## 2020-07-06 PROCEDURE — 99999 PR PBB SHADOW E&M-EST. PATIENT-LVL V: ICD-10-PCS | Mod: PBBFAC,,, | Performed by: FAMILY MEDICINE

## 2020-07-06 PROCEDURE — 85027 COMPLETE CBC AUTOMATED: CPT

## 2020-07-06 PROCEDURE — 99215 OFFICE O/P EST HI 40 MIN: CPT | Mod: S$PBB,,, | Performed by: FAMILY MEDICINE

## 2020-07-06 PROCEDURE — 36415 COLL VENOUS BLD VENIPUNCTURE: CPT

## 2020-07-06 PROCEDURE — 80053 COMPREHEN METABOLIC PANEL: CPT

## 2020-07-06 RX ORDER — ALBUTEROL SULFATE 90 UG/1
2 AEROSOL, METERED RESPIRATORY (INHALATION) EVERY 4 HOURS PRN
Qty: 18 G | Refills: 0 | Status: SHIPPED | OUTPATIENT
Start: 2020-07-06 | End: 2020-09-16 | Stop reason: SDUPTHER

## 2020-07-06 RX ORDER — NEOMYCIN SULFATE, POLYMYXIN B SULFATE AND HYDROCORTISONE 10; 3.5; 1 MG/ML; MG/ML; [USP'U]/ML
3 SUSPENSION/ DROPS AURICULAR (OTIC) 4 TIMES DAILY
Qty: 10 ML | Refills: 0 | Status: SHIPPED | OUTPATIENT
Start: 2020-07-06 | End: 2020-07-16

## 2020-07-06 RX ORDER — PROPRANOLOL HYDROCHLORIDE 40 MG/1
40 TABLET ORAL 2 TIMES DAILY
Qty: 180 TABLET | Refills: 3 | Status: SHIPPED | OUTPATIENT
Start: 2020-07-06 | End: 2020-09-22

## 2020-07-06 NOTE — PROGRESS NOTES
Subjective:   Patient ID: Shelly Kam is a 45 y.o. female.  Chief Complaint:  Follow-up      Presents follow-up chronic medical conditions.    Last visit April 2020 via telemedicine do COVID.    Was off all medications at that time.    Restarted reports compliance to medications.    Main concern is that blood pressure remains elevated.  Also with increased stressors citalopram not working as well as in past for her mood.    Finally with some left ear pain and drainage.      Medical history for:   -Hypertension.  Will be in 10 mg daily.  Blood pressure remains elevated.  Had also been on propanolol 40 mg twice a day past when being treated for hyperthyroidism.  Presently not taking any beta-blocker.    - Diabetes mellitus.  Metformin 1000 mg twice a day.  Denies symptoms hypo hyperglycemia.  Needs repeat A1c, micro albumin, and foot exam.  Reports eye exam done by Dr. Olson and told hypertensive retinopathy, but no diabetic retinopathy.    -Hyperlipidemia.  Aspirin 81 mg daily.  Simvastatin 10 mg daily.  Needs repeat lipid panel.    - mild intermittent asthma.  Last epidural refilled April.  Is having increased symptoms related to mask where, although may be more anxiety related.  Had pulmonary function testing done July 2019 through [facility], records not available.  -Postnasal drip improved with Nasacort and Astelin.    - hyperthyroidism.  No present symptoms.  No repeat TSH free T4 on file.  -Anemia.  Previously stable hemoglobin/ hematocrit.  Needs repeat CBC.    -Anxiety.  Not as well controlled as in past.  Compliance Celexa 20 mg daily.  Would like to see Psychology.    Health maintenance needs include gyn exam.      Otalgia   There is pain in the left ear. This is a new problem. The current episode started in the past 7 days. The problem occurs constantly. The problem has been unchanged. There has been no fever. The pain is at a severity of 3/10. The pain is mild. Associated symptoms include ear discharge.  Pertinent negatives include no abdominal pain, coughing, diarrhea, headaches, hearing loss, neck pain, rash, rhinorrhea, sore throat or vomiting. She has tried nothing for the symptoms. There is no history of a chronic ear infection, hearing loss or a tympanostomy tube.       Current Outpatient Medications:     albuterol (PROVENTIL/VENTOLIN HFA) 90 mcg/actuation inhaler, Inhale 2 puffs into the lungs every 4 (four) hours as needed for Wheezing or Shortness of Breath., Disp: 18 g, Rfl: 0    amLODIPine (NORVASC) 10 MG tablet, Take 1 tablet (10 mg total) by mouth once daily., Disp: 90 tablet, Rfl: 1    aspirin (ECOTRIN) 81 MG EC tablet, Take 1 tablet (81 mg total) by mouth once daily., Disp: 90 tablet, Rfl: 3    azelastine (ASTELIN) 137 mcg (0.1 %) nasal spray, 2 sprays (274 mcg total) by Nasal route 2 (two) times daily as needed for Rhinitis., Disp: 30 mL, Rfl: 5    citalopram (CELEXA) 20 MG tablet, Take 1 tablet (20 mg total) by mouth once daily., Disp: 90 tablet, Rfl: 1    metFORMIN (GLUCOPHAGE) 1000 MG tablet, Take 1 tablet (1,000 mg total) by mouth 2 (two) times daily with meals., Disp: 180 tablet, Rfl: 1    simvastatin (ZOCOR) 10 MG tablet, Take 1 tablet (10 mg total) by mouth every evening., Disp: 90 tablet, Rfl: 1    triamcinolone (NASACORT) 55 mcg nasal inhaler, 2 sprays by Nasal route once daily., Disp: 16.9 mL, Rfl: 0    neomycin-polymyxin-hydrocortisone (CORTISPORIN) 3.5-10,000-1 mg/mL-unit/mL-% otic suspension, Place 3 drops into the left ear 4 (four) times daily. for 10 days, Disp: 10 mL, Rfl: 0    propranoloL (INDERAL) 40 MG tablet, Take 1 tablet (40 mg total) by mouth 2 (two) times daily., Disp: 180 tablet, Rfl: 3    Review of Systems   Constitutional: Negative for activity change, appetite change, chills, diaphoresis, fatigue and fever.   HENT: Positive for ear discharge and ear pain. Negative for congestion, dental problem, hearing loss, nosebleeds, postnasal drip, rhinorrhea, sinus  "pressure, sneezing, sore throat, tinnitus, trouble swallowing and voice change.    Eyes: Negative for pain, discharge, redness, itching and visual disturbance.   Respiratory: Negative for cough, chest tightness, shortness of breath and wheezing.    Cardiovascular: Negative for chest pain, palpitations and leg swelling.   Gastrointestinal: Negative for abdominal distention, abdominal pain, constipation, diarrhea, nausea and vomiting.   Endocrine: Negative for polydipsia, polyphagia and polyuria.   Genitourinary: Negative for difficulty urinating, dysuria, flank pain, frequency, hematuria, pelvic pain and urgency.   Musculoskeletal: Positive for myalgias. Negative for back pain and neck pain.   Skin: Negative for rash.   Neurological: Negative for dizziness, tremors, syncope, facial asymmetry, speech difficulty, weakness, light-headedness, numbness and headaches.   Hematological: Negative for adenopathy.   Psychiatric/Behavioral: Positive for sleep disturbance. Negative for agitation, behavioral problems, confusion, decreased concentration, dysphoric mood, hallucinations, self-injury and suicidal ideas. The patient is nervous/anxious. The patient is not hyperactive.      Objective:   BP (!) 174/74 (BP Location: Left arm, Patient Position: Sitting, BP Method: Medium (Manual))   Pulse 98   Temp 97.7 °F (36.5 °C) (Tympanic)   Ht 5' 6" (1.676 m)   Wt 89.2 kg (196 lb 10.4 oz)   LMP 06/22/2020   SpO2 96%   BMI 31.74 kg/m²     Physical Exam  Vitals signs and nursing note reviewed.   Constitutional:       General: She is not in acute distress.     Appearance: Normal appearance. She is well-developed. She is obese. She is not ill-appearing or toxic-appearing.      Comments: Blood pressure elevated   HENT:      Right Ear: Hearing, tympanic membrane, ear canal and external ear normal.      Left Ear: Hearing, tympanic membrane and external ear normal. Drainage and swelling present.  No middle ear effusion. No foreign body. " Tympanic membrane is not injected, scarred, perforated, erythematous, retracted or bulging. Tympanic membrane has normal mobility.   Eyes:      General: No scleral icterus.     Conjunctiva/sclera: Conjunctivae normal.      Right eye: Right conjunctiva is not injected.      Left eye: Left conjunctiva is not injected.   Neck:      Thyroid: No thyroid mass or thyromegaly.      Vascular: No carotid bruit or JVD.   Cardiovascular:      Rate and Rhythm: Normal rate and regular rhythm.      Pulses:           Radial pulses are 2+ on the right side and 2+ on the left side.        Dorsalis pedis pulses are 2+ on the right side and 2+ on the left side.        Posterior tibial pulses are 2+ on the right side and 2+ on the left side.      Heart sounds: Normal heart sounds. No murmur. No friction rub. No gallop.    Pulmonary:      Effort: Pulmonary effort is normal. No tachypnea, bradypnea or respiratory distress.      Breath sounds: Normal breath sounds. No wheezing, rhonchi or rales.   Abdominal:      General: There is no distension.      Palpations: Abdomen is soft. There is no hepatomegaly.      Tenderness: There is no abdominal tenderness. There is no guarding or rebound.   Musculoskeletal:      Right lower leg: No edema.      Left lower leg: No edema.      Right foot: Normal range of motion. No deformity or bunion.      Left foot: Normal range of motion. No deformity or bunion.   Feet:      Right foot:      Protective Sensation: 5 sites tested. 5 sites sensed.      Skin integrity: No ulcer, blister, skin breakdown, erythema, warmth, callus, dry skin or fissure.      Toenail Condition: Right toenails are normal.      Left foot:      Protective Sensation: 5 sites tested. 5 sites sensed.      Skin integrity: No ulcer, blister, skin breakdown, erythema, warmth, callus, dry skin or fissure.      Toenail Condition: Left toenails are normal.   Skin:     General: Skin is warm and dry.      Capillary Refill: Capillary refill takes  less than 2 seconds.      Findings: No abrasion, bruising, ecchymosis or rash.   Neurological:      Mental Status: She is alert.      Coordination: Coordination is intact. Coordination normal.      Gait: Gait is intact. Gait normal.   Psychiatric:         Attention and Perception: Attention normal. She is attentive.         Mood and Affect: Affect normal. Mood is anxious. Mood is not depressed or elated. Affect is not labile, blunt, flat, angry, tearful or inappropriate.         Speech: She is communicative. Speech is rapid and pressured and tangential. Speech is not delayed or slurred.         Behavior: Behavior normal. Behavior is not agitated, slowed, aggressive, withdrawn, hyperactive or combative. Behavior is cooperative.         Thought Content: Thought content normal. Thought content is not paranoid or delusional. Thought content does not include homicidal or suicidal ideation.         Cognition and Memory: Cognition normal.         Judgment: Judgment normal.       Assessment:       ICD-10-CM ICD-9-CM   1. Type 2 diabetes mellitus without complication, without long-term current use of insulin  E11.9 250.00   2. Hyperlipidemia associated with type 2 diabetes mellitus  E11.69 250.80    E78.5 272.4   3. Hypertension associated with diabetes  E11.59 250.80    I10 401.9   4. Hyperthyroidism  E05.90 242.90   5. Anemia, unspecified type  D64.9 285.9   6. Mild intermittent asthma without complication  J45.20 493.90   7. Post-nasal drip  R09.82 784.91   8. GILL (dyspnea on exertion)  R06.09 786.09   9. Fibromyalgia  M79.7 729.1   10. Anxiety  F41.9 300.00   11. Encounter for gynecological examination  Z01.419 V72.31   12. Acute otitis externa of left ear, unspecified type  H60.502 380.10     Plan:   Type 2 diabetes mellitus without complication, without long-term current use of insulin  A1c and micro albumin done today.    Foot exam up-to-date.  Continue metformin a 1000 mg twice a day   Additional agent as  indicated  Requested eye exam from Dr Olson.    Hyperlipidemia associated with type 2 diabetes mellitus  Continue aspirin 81 mg daily  Adjust simvastatin 10 mg daily as needed.    Goal LDL less than 100 diabetic.      Hypertension associated with diabetes  -     propranoloL (INDERAL) 40 MG tablet; Take 1 tablet (40 mg total) by mouth 2 (two) times daily.  Dispense: 180 tablet; Refill: 3  Uncontrolled.  BP not at goal.    Continue amlodipine 10 mg daily.    Restart propanolol 40 mg twice a day.      Hyperthyroidism  Recheck free T4 and TSH.    If persistent hyperthyroidism, refer back to Endocrinology.    If hypothyroid, start supplement.      Anemia, unspecified type  Continue daily multivitamin with iron.    Additional iron if indicated.      Mild intermittent asthma without complication  -     albuterol (PROVENTIL/VENTOLIN HFA) 90 mcg/actuation inhaler; Inhale 2 puffs into the lungs every 4 (four) hours as needed for Wheezing or Shortness of Breath.  Dispense: 18 g; Refill: 0  Try and obtain previous PFTs July 2019 at [facility].    Post-nasal drip  Stable.  Symptoms controlled.    Continue Astelin and Nasacort.    GILL (dyspnea on exertion)    Most like anxiety related due to wearing mask.    Clinically overall at baseline.    If persist despite adequate anxiety treatment, will need additional cardiac evaluation.      Fibromyalgia  Anxiety  -     Ambulatory referral/consult to Psychology; Future; Expected date: 07/13/2020  Continue citalopram 20 mg daily.    Referral to psychology.      Encounter for gynecological examination  -     Ambulatory referral/consult to Gynecology; Future; Expected date: 07/13/2020    Acute otitis externa of left ear, unspecified type  -     neomycin-polymyxin-hydrocortisone (CORTISPORIN) 3.5-10,000-1 mg/mL-unit/mL-% otic suspension; Place 3 drops into the left ear 4 (four) times daily. for 10 days  Dispense: 10 mL; Refill: 0  Patient education/handout on otitis externa  Use drops as  prescribed  Apply warm compress after drops  Do not place anything else into ear canal  Should improve significantly in next 24-48 hours.    If any worsening, no improvement, or symptoms persist more than 7-10 days please return to clinic.    Return to clinic 1 month

## 2020-07-08 ENCOUNTER — TELEPHONE (OUTPATIENT)
Dept: INTERNAL MEDICINE | Facility: CLINIC | Age: 45
End: 2020-07-08

## 2020-07-08 DIAGNOSIS — E05.90 HYPERTHYROIDISM: Primary | ICD-10-CM

## 2020-07-08 NOTE — TELEPHONE ENCOUNTER
Patient advised [facility] no longer taking new medicaid patients for services.  Patient [facility] that [facility]  Please order.

## 2020-07-08 NOTE — TELEPHONE ENCOUNTER
----- Message from Missy Harmon MA sent at 7/7/2020  6:10 PM CDT -----  The Psychiatry Dept is not accepting new patients for therapy who have Medicaid Ins.       These On license of [facility] clinics all take Medicaid for Psychiatric services:    [facility]  [phone]   Health system  [phone]  [facility] [phone]  [facility]  [phone]  [facility]  [phone]  [facility] [phone]    The order and referral have been cancelled.

## 2020-07-13 ENCOUNTER — PATIENT OUTREACH (OUTPATIENT)
Dept: ADMINISTRATIVE | Facility: HOSPITAL | Age: 45
End: 2020-07-13

## 2020-07-13 NOTE — PROGRESS NOTES
CARLITOS uploaded and faxed to  [facility] [phone] for eye exam results.

## 2020-09-03 ENCOUNTER — TELEPHONE (OUTPATIENT)
Dept: INTERNAL MEDICINE | Facility: CLINIC | Age: 45
End: 2020-09-03

## 2020-09-03 DIAGNOSIS — Z12.31 ENCOUNTER FOR SCREENING MAMMOGRAM FOR MALIGNANT NEOPLASM OF BREAST: Primary | ICD-10-CM

## 2020-09-03 NOTE — TELEPHONE ENCOUNTER
----- Message from Enrique Rollins sent at 9/3/2020  8:57 AM CDT -----  .Type:  Needs Medical Advice    Who Called: Shelly Kam  Symptoms (please be specific): knee/back pain/joint paint  How long has patient had these symptoms: 1 wk   Pharmacy name and phone #:  .  [id number] [place] - [address]  [address] [address] [place] [address]  Phone: [phone] Fax: [phone]      Would the patient rather a call back or a response via [facility]? Call back  Best Call Back Number: [phone]  Additional Information: pt would like to have something called to pharmacy        [facility]  Primary care  Doctor  For follow up  appointment  And PAP

## 2020-09-03 NOTE — TELEPHONE ENCOUNTER
Patient is having legs and arms.  Feeling sore and tight.  Having symptoms for 1 week.  Requesting something to be called in to [facility] [address].

## 2020-09-03 NOTE — TELEPHONE ENCOUNTER
----- Message from Enrique Rollins sent at 9/3/2020  8:55 AM CDT -----  .Type:  Mammogram    Caller is requesting to schedule their annual mammogram appointment.  Order is not listed in EPIC.  Please enter order and contact patient to schedule.  Name of Caller:Shelly Kam  Where would they like the mammogram performed? [facility]  Would the patient rather a call back or a response via [facility]? Call back  Best Call Back Number:[phone]  Additional Information:

## 2020-09-10 ENCOUNTER — TELEPHONE (OUTPATIENT)
Dept: INTERNAL MEDICINE | Facility: CLINIC | Age: 45
End: 2020-09-10

## 2020-09-10 NOTE — TELEPHONE ENCOUNTER
----- Message from Cassie Lofton sent at 9/10/2020  1:33 PM CDT -----  Type:  Patient Returning Call    Who Called: MARTHA CLEANING  Who Left Message for Patient: nurse   Does the patient know what this is regarding?:   Would the patient rather a call back or a response via [facility]?: both   Best Call Back Number: [phone] ([place])    Additional Information:

## 2020-09-10 NOTE — TELEPHONE ENCOUNTER
Patient requesting something for pain to be called in to the pharmacy for pain like Ibuprofen 800 or tramadol.

## 2020-09-11 ENCOUNTER — HOSPITAL ENCOUNTER (OUTPATIENT)
Dept: RADIOLOGY | Facility: HOSPITAL | Age: 45
Discharge: HOME | End: 2020-09-11
Attending: FAMILY MEDICINE
Payer: MEDICAID

## 2020-09-11 DIAGNOSIS — Z12.31 ENCOUNTER FOR SCREENING MAMMOGRAM FOR MALIGNANT NEOPLASM OF BREAST: ICD-10-CM

## 2020-09-11 PROCEDURE — 77063 BREAST TOMOSYNTHESIS BI: CPT | Mod: 26,,, | Performed by: RADIOLOGY

## 2020-09-11 PROCEDURE — 77067 MAMMO DIGITAL SCREENING BILAT WITH TOMOSYNTHESIS_CAD: ICD-10-PCS | Mod: 26,,, | Performed by: RADIOLOGY

## 2020-09-11 PROCEDURE — 77067 SCR MAMMO BI INCL CAD: CPT | Mod: TC

## 2020-09-11 PROCEDURE — 77063 MAMMO DIGITAL SCREENING BILAT WITH TOMOSYNTHESIS_CAD: ICD-10-PCS | Mod: 26,,, | Performed by: RADIOLOGY

## 2020-09-11 PROCEDURE — 77067 SCR MAMMO BI INCL CAD: CPT | Mod: 26,,, | Performed by: RADIOLOGY

## 2020-09-15 ENCOUNTER — NURSE TRIAGE (OUTPATIENT)
Dept: ADMINISTRATIVE | Facility: CLINIC | Age: 45
End: 2020-09-15

## 2020-09-15 RX ORDER — IBUPROFEN 800 MG/1
800 TABLET ORAL EVERY 8 HOURS PRN
Qty: 90 TABLET | Refills: 0 | Status: SHIPPED | OUTPATIENT
Start: 2020-09-15 | End: 2021-03-26

## 2020-09-15 NOTE — TELEPHONE ENCOUNTER
Patient needs to be physically seen for tramadol.    Sent prescription for ibuprofen 800 mg to her pharmacy.

## 2020-09-15 NOTE — TELEPHONE ENCOUNTER
Pt states she had an episode of sneezing earlier and admits to a runny nose, she admits to allergies, denies any other symptoms, advised her to see pcp within 2 weeks and to call back with any worsening symptoms, needs, or concerns, caller agreed     Reason for Disposition   Runny nose is caused by pollen or other allergies   [1] Nasal allergies AND [2] year-round symptoms    Additional Information   Negative: Severe difficulty breathing (e.g., struggling for each breath, speaks in single words)   Negative: Sounds like a life-threatening emergency to the triager   Negative: [1] Wheezing (high pitched whistling sound) AND [2] previous asthma attacks or use of asthma medicines   Negative: [1] Wheezing (high pitched whistling sound) AND [2] no history of asthma   Negative: Eye redness and itching are the only symptoms   Negative: Doesn't match the SYMPTOMS for Hay Fever   Negative: Patient sounds very sick or weak to the triager   Negative: Lots of coughing   Negative: [1] Lots of yellow or green discharge from nose AND [2] present > 3 days   Negative: [1] Taking antihistamines > 2 days AND [2] nasal allergy symptoms interfere with sleep, school, or work   Negative: [1] Nasal allergies AND [2] only certain times of year AND [3] hay fever diagnosis has never been confirmed by a HCP    Protocols used: COMMON COLD-A-AH, NASAL ALLERGIES (HAY FEVER)-A-AH

## 2020-09-16 ENCOUNTER — TELEPHONE (OUTPATIENT)
Dept: INTERNAL MEDICINE | Facility: CLINIC | Age: 45
End: 2020-09-16

## 2020-09-16 DIAGNOSIS — J45.20 MILD INTERMITTENT ASTHMA WITHOUT COMPLICATION: ICD-10-CM

## 2020-09-16 RX ORDER — FLUTICASONE PROPIONATE 50 MCG
2 SPRAY, SUSPENSION (ML) NASAL DAILY
Qty: 16 G | Refills: 11 | Status: SHIPPED | OUTPATIENT
Start: 2020-09-16 | End: 2024-03-07

## 2020-09-16 RX ORDER — ALBUTEROL SULFATE 90 UG/1
2 AEROSOL, METERED RESPIRATORY (INHALATION) EVERY 4 HOURS PRN
Qty: 18 G | Refills: 0 | Status: SHIPPED | OUTPATIENT
Start: 2020-09-16 | End: 2020-11-17 | Stop reason: SDUPTHER

## 2020-09-16 NOTE — TELEPHONE ENCOUNTER
Patient also requesting script for flonase nasal spray.  Patient says she can't tolerate astelin nose spray.

## 2020-09-16 NOTE — TELEPHONE ENCOUNTER
----- Message from Kajal Hewitt sent at 9/16/2020 11:52 AM CDT -----  Regarding: medication  Good morning,      Pt is currently out of all medication for asthma and is having a hard time breathing. Pt would like refills  Pt can be reached at [phone]    [facility] [place] - [address]  [address] [address] [address]  Phone: [phone] Fax: [phone]    Thanks,  Kajal Hewitt

## 2020-09-16 NOTE — TELEPHONE ENCOUNTER
----- Message from Vitaliy Paris sent at 9/16/2020  3:29 PM CDT -----  Regarding: Return call  Type:  Patient Returning Call    Who Called:Shelly  Who Left Message for Patient:na  Does the patient know what this is regarding?:na  Would the patient rather a call back or a response via [facility]? Call back  Best Call Back Number:[phone] ([place])   Additional Information: rivera

## 2020-09-22 ENCOUNTER — LAB VISIT (OUTPATIENT)
Dept: LAB | Facility: HOSPITAL | Age: 45
End: 2020-09-22
Attending: INTERNAL MEDICINE
Payer: MEDICAID

## 2020-09-22 ENCOUNTER — OFFICE VISIT (OUTPATIENT)
Dept: ENDOCRINOLOGY | Facility: CLINIC | Age: 45
End: 2020-09-22
Payer: MEDICAID

## 2020-09-22 ENCOUNTER — PATIENT OUTREACH (OUTPATIENT)
Dept: ADMINISTRATIVE | Facility: OTHER | Age: 45
End: 2020-09-22

## 2020-09-22 VITALS
WEIGHT: 201.5 LBS | HEIGHT: 66 IN | SYSTOLIC BLOOD PRESSURE: 141 MMHG | DIASTOLIC BLOOD PRESSURE: 78 MMHG | HEART RATE: 97 BPM | BODY MASS INDEX: 32.38 KG/M2 | RESPIRATION RATE: 18 BRPM

## 2020-09-22 DIAGNOSIS — R94.6 ABNORMAL THYROID FUNCTION TEST: ICD-10-CM

## 2020-09-22 DIAGNOSIS — R63.4 WEIGHT LOSS: ICD-10-CM

## 2020-09-22 DIAGNOSIS — E05.90 HYPERTHYROIDISM: ICD-10-CM

## 2020-09-22 DIAGNOSIS — E05.90 HYPERTHYROIDISM: Primary | ICD-10-CM

## 2020-09-22 PROCEDURE — 99215 OFFICE O/P EST HI 40 MIN: CPT | Mod: S$PBB,,, | Performed by: INTERNAL MEDICINE

## 2020-09-22 PROCEDURE — 84443 ASSAY THYROID STIM HORMONE: CPT

## 2020-09-22 PROCEDURE — 36415 COLL VENOUS BLD VENIPUNCTURE: CPT

## 2020-09-22 PROCEDURE — 84480 ASSAY TRIIODOTHYRONINE (T3): CPT

## 2020-09-22 PROCEDURE — 84445 ASSAY OF TSI GLOBULIN: CPT

## 2020-09-22 PROCEDURE — 99999 PR PBB SHADOW E&M-EST. PATIENT-LVL IV: ICD-10-PCS | Mod: PBBFAC,,, | Performed by: INTERNAL MEDICINE

## 2020-09-22 PROCEDURE — 99215 PR OFFICE/OUTPT VISIT, EST, LEVL V, 40-54 MIN: ICD-10-PCS | Mod: S$PBB,,, | Performed by: INTERNAL MEDICINE

## 2020-09-22 PROCEDURE — 99214 OFFICE O/P EST MOD 30 MIN: CPT | Mod: PBBFAC | Performed by: INTERNAL MEDICINE

## 2020-09-22 PROCEDURE — 99999 PR PBB SHADOW E&M-EST. PATIENT-LVL IV: CPT | Mod: PBBFAC,,, | Performed by: INTERNAL MEDICINE

## 2020-09-22 PROCEDURE — 84439 ASSAY OF FREE THYROXINE: CPT

## 2020-09-22 RX ORDER — METOPROLOL SUCCINATE 25 MG/1
TABLET, EXTENDED RELEASE ORAL
Qty: 30 TABLET | Refills: 1 | Status: SHIPPED | OUTPATIENT
Start: 2020-09-22 | End: 2020-10-13 | Stop reason: SDUPTHER

## 2020-09-22 RX ORDER — PROPYLTHIOURACIL 50 MG/1
TABLET ORAL
Qty: 150 TABLET | Refills: 0 | Status: SHIPPED | OUTPATIENT
Start: 2020-09-22 | End: 2020-10-06 | Stop reason: SDUPTHER

## 2020-09-22 RX ORDER — METHIMAZOLE 10 MG/1
TABLET ORAL
Qty: 95 TABLET | Refills: 0 | Status: SHIPPED | OUTPATIENT
Start: 2020-09-22 | End: 2020-09-22

## 2020-09-22 NOTE — LETTER
September 22, 2020      [facility]  [address]  [place] [address]  Phone: [phone]  Fax: [phone]       Patient: Shelly Kam   YOB: 1975  Date of Visit: 09/22/2020    To Whom It May Concern:    Yashira Kam  was at [facility] on 09/22/2020. She may return to work/school on 09/28/2020 with no restrictions. If you have any questions or concerns, or if I can be of further assistance, please do not hesitate to contact me.    Sincerely,              Katiana Gibbons LPN

## 2020-09-22 NOTE — PROGRESS NOTES
Referring Provider:  Heraclio Farrell MD    PCP:  Heraclio Farrell MD    Reason for referral:   Hyperthyroidism  CC:  Tired    HPI:  Shelly Kam 45 y.o. female  Patient was referred for management of hyperthyroidism.  Her free T4 was high.  Patient has not been feeling well, complaining of feeling tired, all was with muscle pain,  Having palpitations sometimes.  She said she was on methimazole for about 5 months  Sometimes may be couple years ago.  The history is not clear from the patient regarding treatment of the thyroid disorder.  From the record, radioactive iodine uptake/scan of the thyroid was done in the past and the uptake was elevated.  No complaints of chest pain, nausea, vomiting, edema, or rash.    Patient believes methimazole cause to her very much nausea.  On propranolol 40 mg tablet daily  There is history of asthma.  Patient use the inhaler sometimes.  Patient is a smoker.    ---------------------------  Following is a copy from  note from 2018:  HPI  Consultation was requested by Dr. Heraclio Farrell        Diagnosed: around October 2016, positive TSI ANTIBODY; HAD SEEN Dr. Lugo once and was started on methimazole     Past hx of tracheotomy, had severe pharnygotonsillitis complicated by respiratory insufficiency     She reports worsening hoarseness and trouble swallowing pills especially of the methimazole and always having a sensation of feeling something at the back of her throat, sometimes can't completely swallow her food and has to spit it out, has not seen ENT here since June 2016 but was also seen Dr. Olivo in [place]     She works with residence of an Alzheimer's unit and gets frequent infections and has a cough often     Today has a cough   She is a smoker    Past Medical History:   Diagnosis Date    Asthma     Diabetes mellitus     Fibromyalgia     Hypertension     Panic attack     Prozac in past       Past Surgical History:   Procedure Laterality Date     BRONCHOSCOPY      HERNIA REPAIR      TONSILLECTOMY      TRACHEOSTOMY      TRACHEOSTOMY TUBE PLACEMENT        x 4 years  No pregnancy      ROS:   Fatigue  Weight loss of about 40 lb  Regular menstrual cycle mostly  Last menstrual cycle started like to days ago  History of tracheostomy  History of asthma  ROS otherwise neg except for what is mentioned in the PMH, PSH and HPI    PE:  Vitals:    09/22/20 1623   BP: (!) 141/78   Pulse: 97   Resp: 18    Patient looks tired   Pulse regular  Alert and oriented  No acute distress  No acne  No Proptosis or conjunctivitis  No rash on tongue, dentures  Thyromegaly mostly in right thyroid lobe  Thyroid gland is + palpable  Pulsation is noticeable in the right side of the neck  Pounding pulse a she in in the Neck  No thyroid bruit  No cervical lymphadenopathy  Heart reg, no gallop  Lungs cta, no wheezing  Abd soft, no tnd  No edema in lower legs  No rash  No bruises  Speech normal  Behavior normal  Minimal tremor if any in hands  Overweight  Body mass index is 32.52 kg/m².      Lab:    Lab Results   Component Value Date    TSH <0.010 (L) 07/06/2020    C2KVQAO 190 (H) 10/11/2016    FREET4 2.70 (H) 07/06/2020      Ref. Range 2/6/2017 10:20 7/24/2017 15:49 11/22/2017 09:14 6/5/2019 12:05 7/6/2020 07:52   TSH Latest Ref Range: 0.400 - 4.000 uIU/mL <0.010 (L) <0.010 (L) 0.010 (L) <0.010 (L) <0.010 (L)      Ref. Range 7/6/2020 07:52   Free T4 Latest Ref Range: 0.71 - 1.51 ng/dL 2.70 (H)        Ref. Range 10/11/2016 07:46   Thyroid-Stim IG Quantitative Latest Ref Range: <0.10 IU/L 2.21 (H)       Lab Results   Component Value Date    CHOL 109 (L) 07/06/2020    TRIG 40 07/06/2020    HDL 39 (L) 07/06/2020    CHOLHDL 35.8 07/06/2020    TOTALCHOLEST 2.8 07/06/2020    NONHDLCHOL 70 07/06/2020       Lab Results   Component Value Date     07/06/2020    K 3.8 07/06/2020     07/06/2020    CO2 25 07/06/2020    BUN 8 07/06/2020    CREATININE 0.6 07/06/2020    CALCIUM 9.2  07/06/2020    ANIONGAP 7 (L) 07/06/2020    ESTGFRAFRICA >60.0 07/06/2020    EGFRNONAA >60.0 07/06/2020     Lab Results   Component Value Date    CAROL 78.0 07/06/2020    MICALBCREAT 14.1 07/06/2020       A/P:  Abnormal thyroid function test  Hyperthyroidism  Fatigue  Weight loss  Elevated diffuse radioactive iodine uptake of the thyroid gland  Methimazole discussed.  Patient wants to avoid taking methimazole because of the history of nausea when she took methimazole  And Propranolol together  PTU will be prescribed.  Risk of the medication on pregnancy and on fetus discussed.  Patient said she never got pregnant.  To stop the medication if rash or allergic reaction occurs.  First dose of PTU to be taken tonight.  Metoprolol succinate 25 mg will replace Propranolol  Patient has a history of asthma so there is some possibility that beta-blocker may worsen the asthma but  For now the benefits seem to outweigh the risk by reducing the heart rate and reducing risk of tachycardia    -     TSH; Future; Expected date: 09/22/2020  -     T4, free; Future; Expected date: 09/22/2020  -     T3; Future; Expected date: 09/22/2020  -     Thyroid Stimulating Immunoglobulin; Future; Expected date: 09/22/2020  -     propylthiouraciL (PTU) 50 mg Tab; TWO TABS TID  Dispense: 150 tablet; Refill: 0    Weight loss    Patient is advised to be off work until Monday.       Appt in 2 weeks      Pt understands the plan and instructions.     Mild pitting edema by exam of lower legs.

## 2020-09-22 NOTE — LETTER
September 22, 2020      Heraclio Farrell MD  [address]  [place] [address]           The [facility]  [address]  [place] [address]  Phone: [phone]  Fax: [phone]          Patient: Shelly Kam   MR Number: [id number]   YOB: 1975   Date of Visit: 9/22/2020       Dear Dr. Heraclio Farrell:    Thank you for referring Shelly Kam to me for evaluation. Attached you will find relevant portions of my assessment and plan of care.    If you have questions, please do not hesitate to call me. I look forward to following Shelly Kam along with you.    Sincerely,    Adan Beck MD    Enclosure  CC:  No Recipients    If you would like to receive this communication electronically, please contact [email or web address] or [phone] to request more information on [employer] Link access.    For providers and/or their staff who would like to refer a patient to [facility], please contact us through our one-stop-shop provider referral line, [facility], at [phone].    If you feel you have received this communication in error or would no longer like to receive these types of communications, please e-mail [email or web address]

## 2020-09-22 NOTE — PATIENT INSTRUCTIONS
Stop taking PTU if any of the following problems occurs:   rash or itching, fever , sore throat, significant joint pain or yellow eyes   There is very rare occurrence of liver failure or low white cells .    PTU MEDICATION  TWO TABLETS EVERY 8 HOURS

## 2020-09-23 LAB
T3 SERPL-MCNC: 415 NG/DL (ref 60–180)
T4 FREE SERPL-MCNC: 3.14 NG/DL (ref 0.71–1.51)
TSH SERPL DL<=0.005 MIU/L-ACNC: <0.01 UIU/ML (ref 0.4–4)

## 2020-09-28 LAB — TSI SER-ACNC: 9.23 IU/L

## 2020-10-06 ENCOUNTER — OFFICE VISIT (OUTPATIENT)
Dept: ENDOCRINOLOGY | Facility: CLINIC | Age: 45
End: 2020-10-06
Payer: MEDICAID

## 2020-10-06 ENCOUNTER — LAB VISIT (OUTPATIENT)
Dept: LAB | Facility: HOSPITAL | Age: 45
End: 2020-10-06
Attending: INTERNAL MEDICINE
Payer: MEDICAID

## 2020-10-06 VITALS
SYSTOLIC BLOOD PRESSURE: 136 MMHG | WEIGHT: 202.63 LBS | DIASTOLIC BLOOD PRESSURE: 80 MMHG | HEIGHT: 66 IN | BODY MASS INDEX: 32.56 KG/M2 | RESPIRATION RATE: 18 BRPM | HEART RATE: 80 BPM

## 2020-10-06 DIAGNOSIS — E05.90 HYPERTHYROIDISM: ICD-10-CM

## 2020-10-06 DIAGNOSIS — R94.6 ABNORMAL THYROID FUNCTION TEST: ICD-10-CM

## 2020-10-06 DIAGNOSIS — E05.90 HYPERTHYROIDISM: Primary | ICD-10-CM

## 2020-10-06 LAB
ALBUMIN SERPL BCP-MCNC: 3.3 G/DL (ref 3.5–5.2)
ALP SERPL-CCNC: 114 U/L (ref 55–135)
ALT SERPL W/O P-5'-P-CCNC: 18 U/L (ref 10–44)
ANION GAP SERPL CALC-SCNC: 9 MMOL/L (ref 8–16)
AST SERPL-CCNC: 16 U/L (ref 10–40)
BILIRUB SERPL-MCNC: 0.3 MG/DL (ref 0.1–1)
BUN SERPL-MCNC: 10 MG/DL (ref 6–20)
CALCIUM SERPL-MCNC: 8.5 MG/DL (ref 8.7–10.5)
CHLORIDE SERPL-SCNC: 105 MMOL/L (ref 95–110)
CO2 SERPL-SCNC: 26 MMOL/L (ref 23–29)
CREAT SERPL-MCNC: 0.6 MG/DL (ref 0.5–1.4)
EST. GFR  (AFRICAN AMERICAN): >60 ML/MIN/1.73 M^2
EST. GFR  (NON AFRICAN AMERICAN): >60 ML/MIN/1.73 M^2
GLUCOSE SERPL-MCNC: 123 MG/DL (ref 70–110)
POTASSIUM SERPL-SCNC: 3.8 MMOL/L (ref 3.5–5.1)
PROT SERPL-MCNC: 6.4 G/DL (ref 6–8.4)
SODIUM SERPL-SCNC: 140 MMOL/L (ref 136–145)
T3 SERPL-MCNC: 336 NG/DL (ref 60–180)
T4 FREE SERPL-MCNC: 2.57 NG/DL (ref 0.71–1.51)
TSH SERPL DL<=0.005 MIU/L-ACNC: <0.01 UIU/ML (ref 0.4–4)

## 2020-10-06 PROCEDURE — 84439 ASSAY OF FREE THYROXINE: CPT

## 2020-10-06 PROCEDURE — 99999 PR PBB SHADOW E&M-EST. PATIENT-LVL IV: CPT | Mod: PBBFAC,,, | Performed by: INTERNAL MEDICINE

## 2020-10-06 PROCEDURE — 84480 ASSAY TRIIODOTHYRONINE (T3): CPT

## 2020-10-06 PROCEDURE — 99999 PR PBB SHADOW E&M-EST. PATIENT-LVL IV: ICD-10-PCS | Mod: PBBFAC,,, | Performed by: INTERNAL MEDICINE

## 2020-10-06 PROCEDURE — 99214 OFFICE O/P EST MOD 30 MIN: CPT | Mod: S$PBB,,, | Performed by: INTERNAL MEDICINE

## 2020-10-06 PROCEDURE — 84443 ASSAY THYROID STIM HORMONE: CPT

## 2020-10-06 PROCEDURE — 99214 OFFICE O/P EST MOD 30 MIN: CPT | Mod: PBBFAC | Performed by: INTERNAL MEDICINE

## 2020-10-06 PROCEDURE — 99214 PR OFFICE/OUTPT VISIT, EST, LEVL IV, 30-39 MIN: ICD-10-PCS | Mod: S$PBB,,, | Performed by: INTERNAL MEDICINE

## 2020-10-06 PROCEDURE — 36415 COLL VENOUS BLD VENIPUNCTURE: CPT

## 2020-10-06 PROCEDURE — 80053 COMPREHEN METABOLIC PANEL: CPT

## 2020-10-06 RX ORDER — PROPYLTHIOURACIL 50 MG/1
TABLET ORAL
Qty: 120 TABLET | Refills: 0 | Status: SHIPPED | OUTPATIENT
Start: 2020-10-06 | End: 2021-01-26 | Stop reason: SDUPTHER

## 2020-10-06 RX ORDER — METHIMAZOLE 10 MG/1
TABLET ORAL
COMMUNITY
Start: 2020-09-22 | End: 2020-10-06

## 2020-10-12 DIAGNOSIS — J45.20 MILD INTERMITTENT ASTHMA WITHOUT COMPLICATION: ICD-10-CM

## 2020-10-12 RX ORDER — ALBUTEROL SULFATE 90 UG/1
2 AEROSOL, METERED RESPIRATORY (INHALATION) EVERY 4 HOURS PRN
Qty: 18 G | Refills: 0 | OUTPATIENT
Start: 2020-10-12

## 2020-10-12 NOTE — TELEPHONE ENCOUNTER
Refill denied.   Too soon for additional refill.    Frequency of need/use is inappropriate.    Needs to be evaluated for daily controller medication.

## 2020-10-13 ENCOUNTER — TELEPHONE (OUTPATIENT)
Dept: INTERNAL MEDICINE | Facility: CLINIC | Age: 45
End: 2020-10-13

## 2020-10-13 ENCOUNTER — OFFICE VISIT (OUTPATIENT)
Dept: CARDIOLOGY | Facility: CLINIC | Age: 45
End: 2020-10-13
Payer: MEDICAID

## 2020-10-13 VITALS
SYSTOLIC BLOOD PRESSURE: 178 MMHG | BODY MASS INDEX: 31.6 KG/M2 | DIASTOLIC BLOOD PRESSURE: 80 MMHG | OXYGEN SATURATION: 98 % | WEIGHT: 196.63 LBS | HEART RATE: 91 BPM | HEIGHT: 66 IN

## 2020-10-13 DIAGNOSIS — J45.20 MILD INTERMITTENT ASTHMA WITHOUT COMPLICATION: Chronic | ICD-10-CM

## 2020-10-13 DIAGNOSIS — E11.9 TYPE 2 DIABETES MELLITUS WITHOUT COMPLICATION, WITHOUT LONG-TERM CURRENT USE OF INSULIN: Chronic | ICD-10-CM

## 2020-10-13 DIAGNOSIS — E11.69 HYPERLIPIDEMIA ASSOCIATED WITH TYPE 2 DIABETES MELLITUS: Chronic | ICD-10-CM

## 2020-10-13 DIAGNOSIS — E11.59 HYPERTENSION ASSOCIATED WITH DIABETES: Primary | Chronic | ICD-10-CM

## 2020-10-13 DIAGNOSIS — I15.2 HYPERTENSION ASSOCIATED WITH DIABETES: Primary | Chronic | ICD-10-CM

## 2020-10-13 DIAGNOSIS — E05.90 HYPERTHYROIDISM: ICD-10-CM

## 2020-10-13 DIAGNOSIS — R07.89 OTHER CHEST PAIN: ICD-10-CM

## 2020-10-13 DIAGNOSIS — E78.5 HYPERLIPIDEMIA ASSOCIATED WITH TYPE 2 DIABETES MELLITUS: Chronic | ICD-10-CM

## 2020-10-13 PROCEDURE — 99999 PR PBB SHADOW E&M-EST. PATIENT-LVL IV: CPT | Mod: PBBFAC,,, | Performed by: INTERNAL MEDICINE

## 2020-10-13 PROCEDURE — 99204 OFFICE O/P NEW MOD 45 MIN: CPT | Mod: S$PBB,,, | Performed by: INTERNAL MEDICINE

## 2020-10-13 PROCEDURE — 99214 OFFICE O/P EST MOD 30 MIN: CPT | Mod: PBBFAC | Performed by: INTERNAL MEDICINE

## 2020-10-13 PROCEDURE — 99204 PR OFFICE/OUTPT VISIT, NEW, LEVL IV, 45-59 MIN: ICD-10-PCS | Mod: S$PBB,,, | Performed by: INTERNAL MEDICINE

## 2020-10-13 PROCEDURE — 99999 PR PBB SHADOW E&M-EST. PATIENT-LVL IV: ICD-10-PCS | Mod: PBBFAC,,, | Performed by: INTERNAL MEDICINE

## 2020-10-13 RX ORDER — METOPROLOL SUCCINATE 50 MG/1
TABLET, EXTENDED RELEASE ORAL
Qty: 60 TABLET | Refills: 2 | Status: SHIPPED | OUTPATIENT
Start: 2020-10-13 | End: 2020-11-17 | Stop reason: SDUPTHER

## 2020-10-13 RX ORDER — LOSARTAN POTASSIUM 25 MG/1
25 TABLET ORAL DAILY
Qty: 30 TABLET | Refills: 3 | Status: SHIPPED | OUTPATIENT
Start: 2020-10-13 | End: 2020-11-17 | Stop reason: SDUPTHER

## 2020-10-13 NOTE — PROGRESS NOTES
Subjective:   Patient ID:  Shelly Kam is a 45 y.o. female who presents for cardiac consult of [facility]    Referring Physician: Adan Beck MD   Reason for consult: HTN    HPI  The patient came in today for cardiac consult of [facility]    10/13/20  Shelly Kam is a 45 y.o. female pt with HTN, HLD, obesity, DM2, asthma, Fibromyalgia, anxiety presents for initial CV eval of HTN.     She had recent BB changed to Toprol. She has intermittent chest pain, hard to describe. She was on on ACE-I in the past but had lip swelling.   She has a lot of joint pain as well.     Patient feels no leg swelling, no PND, no palpitation, no dizziness, no syncope, no CNS symptoms.    Patient has fairly good exercise tolerance. Takes care of elderly.     Patient is compliant with medications.    Past Medical History:   Diagnosis Date    Asthma     Diabetes mellitus     Fibromyalgia     Hypertension     Panic attack     Prozac in past       Past Surgical History:   Procedure Laterality Date    BRONCHOSCOPY      HERNIA REPAIR      TONSILLECTOMY      TRACHEOSTOMY      TRACHEOSTOMY TUBE PLACEMENT         Social History     Tobacco Use    Smoking status: Current Every Day Smoker     Packs/day: 0.25     Types: Cigarettes     Last attempt to quit: 3/1/2011     Years since quittin.6    Smokeless tobacco: Never Used   Substance Use Topics    Alcohol use: Yes     Frequency: Never     Drinks per session: 1 or 2     Binge frequency: Never     Comment: occasionally    Drug use: No       Family History   Problem Relation Age of Onset    Cancer Paternal Aunt        Patient's Medications   New Prescriptions    LOSARTAN (COZAAR) 25 MG TABLET    Take 1 tablet (25 mg total) by mouth once daily.   Previous Medications    ALBUTEROL (PROVENTIL/VENTOLIN HFA) 90 MCG/ACTUATION INHALER    Inhale 2 puffs into the lungs every 4 (four) hours as needed for Wheezing or Shortness of Breath.    AMLODIPINE (NORVASC) 10 MG TABLET     Take 1 tablet (10 mg total) by mouth once daily.    ASPIRIN (ECOTRIN) 81 MG EC TABLET    Take 1 tablet (81 mg total) by mouth once daily.    AZELASTINE (ASTELIN) 137 MCG (0.1 %) NASAL SPRAY    2 sprays (274 mcg total) by Nasal route 2 (two) times daily as needed for Rhinitis.    CITALOPRAM (CELEXA) 20 MG TABLET    Take 1 tablet (20 mg total) by mouth once daily.    FLUTICASONE PROPIONATE (FLONASE) 50 MCG/ACTUATION NASAL SPRAY    2 sprays (100 mcg total) by Each Nostril route once daily.    IBUPROFEN (ADVIL,MOTRIN) 800 MG TABLET    Take 1 tablet (800 mg total) by mouth every 8 (eight) hours as needed for Pain.    METFORMIN (GLUCOPHAGE) 1000 MG TABLET    Take 1 tablet (1,000 mg total) by mouth 2 (two) times daily with meals.    METOPROLOL SUCCINATE (TOPROL-XL) 25 MG 24 HR TABLET    QD    PROPYLTHIOURACIL (PTU) 50 MG TAB    TWO TABS bid    SIMVASTATIN (ZOCOR) 10 MG TABLET    Take 1 tablet (10 mg total) by mouth every evening.   Modified Medications    No medications on file   Discontinued Medications    No medications on file       Review of Systems   Constitutional: Positive for malaise/fatigue.   HENT: Negative.    Eyes: Negative.    Respiratory: Negative.    Cardiovascular: Positive for chest pain and palpitations.   Gastrointestinal: Negative.    Genitourinary: Negative.    Musculoskeletal: Positive for back pain and joint pain.   Skin: Negative.    Neurological: Negative.    Endo/Heme/Allergies: Negative.    Psychiatric/Behavioral: Negative.    All 12 systems otherwise negative.      Wt Readings from Last 3 Encounters:   10/13/20 89.2 kg (196 lb 10.4 oz)   10/06/20 91.9 kg (202 lb 9.6 oz)   09/22/20 91.4 kg (201 lb 8 oz)     Temp Readings from Last 3 Encounters:   07/06/20 97.7 °F (36.5 °C) (Tympanic)   02/26/20 98 °F (36.7 °C) (Oral)   02/23/20 98.5 °F (36.9 °C) (Oral)     BP Readings from Last 3 Encounters:   10/13/20 (!) 178/80   10/06/20 136/80   09/22/20 (!) 141/78     Pulse Readings from Last 3  "Encounters:   10/13/20 91   10/06/20 80   09/22/20 97       BP (!) 178/80 (BP Location: Right arm, Patient Position: Sitting, BP Method: Large (Manual))   Pulse 91   Ht 5' 6" (1.676 m)   Wt 89.2 kg (196 lb 10.4 oz)   SpO2 98%   BMI 31.74 kg/m²     Objective:   Physical Exam   Constitutional: She is oriented to person, place, and time. She appears well-developed and well-nourished. No distress.   HENT:   Head: Normocephalic and atraumatic.   Nose: Nose normal.   Mouth/Throat: Oropharynx is clear and moist.   Eyes: Conjunctivae and EOM are normal. No scleral icterus.   Neck: Normal range of motion. Neck supple. No JVD present. No thyromegaly present.   Cardiovascular: Normal rate, regular rhythm, S1 normal and S2 normal. Exam reveals no gallop, no S3, no S4 and no friction rub.   No murmur heard.  Pulmonary/Chest: Effort normal and breath sounds normal. No stridor. No respiratory distress. She has no wheezes. She has no rales. She exhibits no tenderness.   Abdominal: Soft. Bowel sounds are normal. She exhibits no distension and no mass. There is no abdominal tenderness. There is no rebound.   Genitourinary:    Genitourinary Comments: Deferred     Musculoskeletal: Normal range of motion.         General: No tenderness, deformity or edema.   Lymphadenopathy:     She has no cervical adenopathy.   Neurological: She is alert and oriented to person, place, and time. She exhibits normal muscle tone. Coordination normal.   Skin: Skin is warm and dry. No rash noted. She is not diaphoretic. No erythema. No pallor.   Psychiatric: She has a normal mood and affect. Her behavior is normal. Judgment and thought content normal.   Nursing note and vitals reviewed.      Lab Results   Component Value Date     10/06/2020    K 3.8 10/06/2020     10/06/2020    CO2 26 10/06/2020    BUN 10 10/06/2020    CREATININE 0.6 10/06/2020     (H) 10/06/2020    HGBA1C 5.3 07/06/2020    MG 1.9 03/23/2016    AST 16 10/06/2020    " ALT 18 10/06/2020    ALBUMIN 3.3 (L) 10/06/2020    PROT 6.4 10/06/2020    BILITOT 0.3 10/06/2020    WBC 4.64 07/06/2020    HGB 10.8 (L) 07/06/2020    HCT 36.7 (L) 07/06/2020    MCV 84 07/06/2020     07/06/2020    INR 1.0 03/14/2016    TSH <0.010 (L) 10/06/2020    CHOL 109 (L) 07/06/2020    HDL 39 (L) 07/06/2020    LDLCALC 62.0 (L) 07/06/2020    TRIG 40 07/06/2020    BNP 24 10/02/2017     Assessment:      1. Hypertension associated with diabetes    2. Hyperthyroidism    3. Hyperlipidemia associated with type 2 diabetes mellitus    4. Mild intermittent asthma without complication    5. Type 2 diabetes mellitus without complication, without long-term current use of insulin    6. Other chest pain        Plan:   1. HTN with atypical CP  - add Losartan  - increase BB  - needs pain control  - order ECHO    2. Hyperthyroidism  - cont tx per endo    3. HLD  - cont statin    4. Asthma  - cont meds per PCP    5. DM2  - cont meds per PCP    Thank you for allowing me to participate in this patient's care. Please do not hesitate to contact me with any questions or concerns. Consult note has been forwarded to the referral physician.

## 2020-10-13 NOTE — TELEPHONE ENCOUNTER
----- Message from Izabella Hanson sent at 10/13/2020 10:30 AM CDT -----  Type:  Needs Medical Advice    Who Called:  Pt  Shelly   Symptoms (please be specific):   States she is having pain in her limbs/joints//   How long has patient had these symptoms:     Pharmacy name and phone #:      [facility] on [address]  Would the patient rather a call back or a response via [facility]?   Call back   Best Call Back Number:    [phone]  Additional Information:   please call to discuss//thanks/adwoa

## 2020-10-13 NOTE — LETTER
October 13, 2020      Adan Beck MD  [address]  [place] [address]           The [facility]  [address]  [place] [address]  Phone: [phone]  Fax: [phone]          Patient: Shelly Kam   MR Number: [id number]   YOB: 1975   Date of Visit: 10/13/2020       Dear Dr. Adan Beck:    Thank you for referring Shelly Kam to me for evaluation. Attached you will find relevant portions of my assessment and plan of care.    If you have questions, please do not hesitate to call me. I look forward to following Shelly Kam along with you.    Sincerely,    Karthik Burger MD    Enclosure  CC:  No Recipients    If you would like to receive this communication electronically, please contact [email or web address] or [phone] to request more information on [employer] Link access.    For providers and/or their staff who would like to refer a patient to [facility], please contact us through our one-stop-shop provider referral line, [facility], at [phone].    If you feel you have received this communication in error or would no longer like to receive these types of communications, please e-mail [email or web address]

## 2020-10-19 ENCOUNTER — PATIENT MESSAGE (OUTPATIENT)
Dept: ENDOCRINOLOGY | Facility: CLINIC | Age: 45
End: 2020-10-19

## 2020-10-19 DIAGNOSIS — E05.90 HYPERTHYROIDISM: Primary | ICD-10-CM

## 2020-10-27 ENCOUNTER — HOSPITAL ENCOUNTER (OUTPATIENT)
Dept: CARDIOLOGY | Facility: HOSPITAL | Age: 45
Discharge: HOME | End: 2020-10-27
Attending: INTERNAL MEDICINE
Payer: MEDICAID

## 2020-10-27 VITALS
HEART RATE: 85 BPM | HEIGHT: 66 IN | SYSTOLIC BLOOD PRESSURE: 178 MMHG | BODY MASS INDEX: 31.5 KG/M2 | DIASTOLIC BLOOD PRESSURE: 80 MMHG | WEIGHT: 196 LBS

## 2020-10-27 DIAGNOSIS — J45.20 MILD INTERMITTENT ASTHMA WITHOUT COMPLICATION: ICD-10-CM

## 2020-10-27 DIAGNOSIS — E11.59 HYPERTENSION ASSOCIATED WITH DIABETES: ICD-10-CM

## 2020-10-27 DIAGNOSIS — R07.89 OTHER CHEST PAIN: ICD-10-CM

## 2020-10-27 DIAGNOSIS — I15.2 HYPERTENSION ASSOCIATED WITH DIABETES: ICD-10-CM

## 2020-10-27 LAB
AORTIC ROOT ANNULUS: 2.57 CM
ASCENDING AORTA: 2.32 CM
AV INDEX (PROSTH): 0.68
AV MEAN GRADIENT: 11 MMHG
AV PEAK GRADIENT: 25 MMHG
AV VALVE AREA: 2.63 CM2
AV VELOCITY RATIO: 0.57
BSA FOR ECHO PROCEDURE: 2.03 M2
CV ECHO LV RWT: 0.33 CM
DOP CALC AO PEAK VEL: 2.5 M/S
DOP CALC AO VTI: 38.81 CM
DOP CALC LVOT AREA: 3.9 CM2
DOP CALC LVOT DIAMETER: 2.22 CM
DOP CALC LVOT PEAK VEL: 1.42 M/S
DOP CALC LVOT STROKE VOLUME: 102.25 CM3
DOP CALC RVOT PEAK VEL: 1.07 M/S
DOP CALC RVOT VTI: 23.13 CM
DOP CALCLVOT PEAK VEL VTI: 26.43 CM
E WAVE DECELERATION TIME: 135.95 MSEC
E/A RATIO: 1.66
E/E' RATIO: 10.08 M/S
ECHO LV POSTERIOR WALL: 0.77 CM (ref 0.6–1.1)
FRACTIONAL SHORTENING: 37 % (ref 28–44)
INTERVENTRICULAR SEPTUM: 0.85 CM (ref 0.6–1.1)
IVRT: 62.8 MSEC
LA MAJOR: 5.56 CM
LA MINOR: 5.93 CM
LA WIDTH: 4 CM
LEFT ATRIUM SIZE: 3.47 CM
LEFT ATRIUM VOLUME INDEX: 34.1 ML/M2
LEFT ATRIUM VOLUME: 67.71 CM3
LEFT INTERNAL DIMENSION IN SYSTOLE: 2.93 CM (ref 2.1–4)
LEFT VENTRICLE DIASTOLIC VOLUME INDEX: 49.61 ML/M2
LEFT VENTRICLE DIASTOLIC VOLUME: 98.37 ML
LEFT VENTRICLE MASS INDEX: 61 G/M2
LEFT VENTRICLE SYSTOLIC VOLUME INDEX: 16.6 ML/M2
LEFT VENTRICLE SYSTOLIC VOLUME: 33.01 ML
LEFT VENTRICULAR INTERNAL DIMENSION IN DIASTOLE: 4.62 CM (ref 3.5–6)
LEFT VENTRICULAR MASS: 120.71 G
LV LATERAL E/E' RATIO: 10.08 M/S
LV SEPTAL E/E' RATIO: 10.08 M/S
MV PEAK A VEL: 0.79 M/S
MV PEAK E VEL: 1.31 M/S
MV STENOSIS PRESSURE HALF TIME: 39.43 MS
MV VALVE AREA P 1/2 METHOD: 5.58 CM2
PISA TR MAX VEL: 2.53 M/S
PULM VEIN S/D RATIO: 1.41
PV MEAN GRADIENT: 2.44 MMHG
PV PEAK D VEL: 0.61 M/S
PV PEAK S VEL: 0.86 M/S
PV PEAK VELOCITY: 1.38 CM/S
RA MAJOR: 5.1 CM
RA PRESSURE: 3 MMHG
RA WIDTH: 4.94 CM
RIGHT VENTRICULAR END-DIASTOLIC DIMENSION: 2.55 CM
SINUS: 2.5 CM
STJ: 2.46 CM
TDI LATERAL: 0.13 M/S
TDI SEPTAL: 0.13 M/S
TDI: 0.13 M/S
TR MAX PG: 26 MMHG
TRICUSPID ANNULAR PLANE SYSTOLIC EXCURSION: 2.29 CM
TV REST PULMONARY ARTERY PRESSURE: 29 MMHG

## 2020-10-27 PROCEDURE — 93306 TTE W/DOPPLER COMPLETE: CPT

## 2020-10-27 PROCEDURE — 93306 TTE W/DOPPLER COMPLETE: CPT | Mod: 26,,, | Performed by: INTERNAL MEDICINE

## 2020-10-27 PROCEDURE — A4216 STERILE WATER/SALINE, 10 ML: HCPCS | Mod: PBBFAC

## 2020-10-27 PROCEDURE — 93306 ECHO (CUPID ONLY): ICD-10-PCS | Mod: 26,,, | Performed by: INTERNAL MEDICINE

## 2020-10-27 RX ORDER — SODIUM CHLORIDE 0.9 % (FLUSH) 0.9 %
10 SYRINGE (ML) INJECTION
Status: DISCONTINUED | OUTPATIENT
Start: 2020-10-27 | End: 2021-03-18 | Stop reason: CLARIF

## 2020-10-27 RX ADMIN — Medication 10 ML: at 03:10

## 2020-10-28 ENCOUNTER — TELEPHONE (OUTPATIENT)
Dept: INTERNAL MEDICINE | Facility: CLINIC | Age: 45
End: 2020-10-28

## 2020-10-28 NOTE — TELEPHONE ENCOUNTER
----- Message from Magdalena Azevedo sent at 10/28/2020  1:26 PM CDT -----  Contact: [phone] @ Patient  ESTABLISHED patient with Medicaid insurance is requesting an appointment and first available is too far out.  Patient is established with which PCP: Dr Farrell  Reason for the visit: Rash grown/ Get Rx for pain ... Patient would like a call to get Rx for Rash

## 2020-10-29 ENCOUNTER — OFFICE VISIT (OUTPATIENT)
Dept: INTERNAL MEDICINE | Facility: CLINIC | Age: 45
End: 2020-10-29
Payer: MEDICAID

## 2020-10-29 ENCOUNTER — HOSPITAL ENCOUNTER (OUTPATIENT)
Dept: RADIOLOGY | Facility: HOSPITAL | Age: 45
Discharge: HOME | End: 2020-10-29
Attending: FAMILY MEDICINE
Payer: MEDICAID

## 2020-10-29 VITALS
WEIGHT: 202.63 LBS | DIASTOLIC BLOOD PRESSURE: 60 MMHG | BODY MASS INDEX: 32.56 KG/M2 | TEMPERATURE: 98 F | HEIGHT: 66 IN | OXYGEN SATURATION: 97 % | HEART RATE: 97 BPM | SYSTOLIC BLOOD PRESSURE: 150 MMHG

## 2020-10-29 DIAGNOSIS — R21 RASH: Primary | ICD-10-CM

## 2020-10-29 DIAGNOSIS — M79.672 LEFT FOOT PAIN: ICD-10-CM

## 2020-10-29 PROCEDURE — 73630 X-RAY EXAM OF FOOT: CPT | Mod: TC,LT

## 2020-10-29 PROCEDURE — 73630 XR FOOT COMPLETE 3 VIEW LEFT: ICD-10-PCS | Mod: 26,LT,, | Performed by: RADIOLOGY

## 2020-10-29 PROCEDURE — 99999 PR PBB SHADOW E&M-EST. PATIENT-LVL V: CPT | Mod: PBBFAC,,, | Performed by: FAMILY MEDICINE

## 2020-10-29 PROCEDURE — 99215 OFFICE O/P EST HI 40 MIN: CPT | Mod: PBBFAC,25 | Performed by: FAMILY MEDICINE

## 2020-10-29 PROCEDURE — 99999 PR PBB SHADOW E&M-EST. PATIENT-LVL V: ICD-10-PCS | Mod: PBBFAC,,, | Performed by: FAMILY MEDICINE

## 2020-10-29 PROCEDURE — 99214 PR OFFICE/OUTPT VISIT, EST, LEVL IV, 30-39 MIN: ICD-10-PCS | Mod: S$PBB,,, | Performed by: FAMILY MEDICINE

## 2020-10-29 PROCEDURE — 99214 OFFICE O/P EST MOD 30 MIN: CPT | Mod: S$PBB,,, | Performed by: FAMILY MEDICINE

## 2020-10-29 PROCEDURE — 73630 X-RAY EXAM OF FOOT: CPT | Mod: 26,LT,, | Performed by: RADIOLOGY

## 2020-10-29 RX ORDER — BETAMETHASONE DIPROPIONATE 0.5 MG/G
CREAM TOPICAL 2 TIMES DAILY
Qty: 50 G | Refills: 0 | Status: SHIPPED | OUTPATIENT
Start: 2020-10-29 | End: 2021-06-09

## 2020-10-29 RX ORDER — TRAMADOL HYDROCHLORIDE 50 MG/1
50 TABLET ORAL EVERY 6 HOURS PRN
Qty: 28 TABLET | Refills: 0 | Status: SHIPPED | OUTPATIENT
Start: 2020-10-29 | End: 2020-11-05

## 2020-10-29 RX ORDER — DOXYCYCLINE 100 MG/1
100 CAPSULE ORAL 2 TIMES DAILY
Qty: 20 CAPSULE | Refills: 0 | Status: SHIPPED | OUTPATIENT
Start: 2020-10-29 | End: 2020-11-08

## 2020-10-29 NOTE — PROGRESS NOTES
Subjective:   Patient ID: Shelly Kam is a 45 y.o. female.  Chief Complaint:  Rash and Foot Pain     Patient presents for evaluation of chronic rash and left foot pain.      Medical history for:   -Hypertension.  On Toprol-XL 50 mg daily, amlodipine 10 mg daily, and losartan 25 mg daily.  Reports compliance.  Denies side effects.  Blood pressure improved.  Remains somewhat difficult to control due to her hyperthyroidism.  No shortness of breath or swelling.  - Diabetes mellitus.  Metformin 1000 mg twice a day.   Reports compliance.  Denies side effects. Denies symptoms hypo hyperglycemia.   Last A1c 5.3%.  - Hyperlipidemia.  Aspirin 81 mg daily.  Simvastatin 10 mg daily.   Reports compliance.  Denies side effects.  No chest pain or claudication.  Recent LFT stable.  Last lipid panel with LDL 60 and at goal.  - Mild intermittent asthma.    Stable.  No recent rescue inhaler use needed.  - Postnasal drip improved with Nasacort and Astelin.    - Hyperthyroidism.    Now followed by endocrinology.  On PTU.  Most recent TSH 0.010 and free T4 2.51..  - Anemia.  Stable.  Most recent hemoglobin hematocrit 10.8/36.7.   - Anxiety.  Reports stable.  Compliant with Celexa 20 mg daily.     Rash  This is a chronic problem. The current episode started more than 1 year ago. The problem has been waxing and waning since onset. The affected locations include the right ankle. The rash is characterized by blistering, itchiness, peeling, redness and scaling. She was exposed to nothing. Pertinent negatives include no anorexia, congestion, cough, diarrhea, eye pain, facial edema, fatigue, fever, joint pain, nail changes, rhinorrhea, shortness of breath, sore throat or vomiting. Past treatments include antibiotic cream and topical steroids. The treatment provided mild relief. Her past medical history is significant for allergies, asthma and eczema.   Foot Pain  This is a new problem. The current episode started 1 to 4 weeks ago. The  "problem occurs constantly. The problem has been unchanged. Associated symptoms include arthralgias and a rash. Pertinent negatives include no abdominal pain, anorexia, change in bowel habit, chest pain, chills, congestion, coughing, diaphoresis, fatigue, fever, headaches, joint swelling, myalgias, nausea, neck pain, numbness, sore throat, swollen glands, urinary symptoms, vertigo, vomiting or weakness. The symptoms are aggravated by walking and standing. She has tried nothing for the symptoms.     Review of Systems   Constitutional: Negative for chills, diaphoresis, fatigue and fever.   HENT: Negative for congestion, postnasal drip, rhinorrhea, sneezing and sore throat.    Eyes: Negative for pain.   Respiratory: Negative for cough, chest tightness, shortness of breath and wheezing.    Cardiovascular: Negative for chest pain and leg swelling.   Gastrointestinal: Negative for abdominal pain, anorexia, change in bowel habit, diarrhea, nausea and vomiting.   Endocrine: Negative for polydipsia, polyphagia and polyuria.   Genitourinary: Negative for difficulty urinating.   Musculoskeletal: Positive for arthralgias and gait problem. Negative for back pain, joint pain, joint swelling, myalgias, neck pain and neck stiffness.   Skin: Positive for rash. Negative for nail changes and wound.   Neurological: Negative for vertigo, weakness, numbness and headaches.     Objective:   BP (!) 150/60 (BP Location: Right arm, Patient Position: Sitting, BP Method: Medium (Manual))   Pulse 97   Temp 97.8 °F (36.6 °C) (Tympanic)   Ht 5' 6" (1.676 m)   Wt 91.9 kg (202 lb 9.6 oz)   SpO2 97%   BMI 32.70 kg/m²     Physical Exam  Vitals signs and nursing note reviewed.   Constitutional:       General: She is not in acute distress.     Appearance: She is well-developed.   HENT:      Nose: Nose normal. No mucosal edema or rhinorrhea.   Eyes:      Conjunctiva/sclera:      Right eye: Right conjunctiva is not injected.      Left eye: Left " conjunctiva is not injected.   Cardiovascular:      Rate and Rhythm: Normal rate and regular rhythm.   Pulmonary:      Effort: Pulmonary effort is normal. No respiratory distress.      Breath sounds: Normal breath sounds. No wheezing, rhonchi or rales.   Abdominal:      General: There is no distension.      Palpations: Abdomen is soft.      Tenderness: There is no abdominal tenderness.   Musculoskeletal:      Left ankle: Normal. No tenderness. Achilles tendon normal.      Left foot: Decreased range of motion (Midfoot laterally). Bony tenderness (Cuneiform area laterally) and deformity (Bony prominence mid foot area laterally) present.   Skin:     General: Skin is warm and dry.      Findings: Rash present.      Comments: Circumscribed area right lateral ankle with yellow discoloration and papular pustular lesions consistent with secondary infection.    No central clearing or definitive red raised border  Significantly excoriated.       Assessment:       ICD-10-CM ICD-9-CM   1. Rash  R21 782.1   2. Left foot pain  M79.672 729.5     Plan:   Rash  -     doxycycline (VIBRAMYCIN) 100 MG Cap; Take 1 capsule (100 mg total) by mouth 2 (two) times daily. for 10 days  Dispense: 20 capsule; Refill: 0  -     augmented betamethasone dipropionate (DIPROLENE-AF) 0.05 % cream; Apply topically 2 (two) times daily.  Dispense: 50 g; Refill: 0  Very high potency steroid cream topically  Doxycycline for secondary bacterial infection with history of MRSA  Clean daily with soap and water  Otherwise nothing else to area   Reassess next week    Left foot pain  -     X-Ray Foot Complete 3 view Left; Future; Expected date: 10/29/2020  -     traMADoL (ULTRAM) 50 mg tablet; Take 1 tablet (50 mg total) by mouth every 6 (six) hours as needed for Pain.  Dispense: 28 tablet; Refill: 0  Midfoot area.    Questionable arthritis versus spur/exostosis  Check x-ray  Tramadol for pain  Reassess next week    Return to clinic 1 week

## 2020-10-30 ENCOUNTER — TELEPHONE (OUTPATIENT)
Dept: INTERNAL MEDICINE | Facility: CLINIC | Age: 45
End: 2020-10-30

## 2020-10-30 NOTE — TELEPHONE ENCOUNTER
----- Message from Heraclio Farrell MD sent at 10/29/2020 12:56 PM CDT -----  Mild arthritic changes in the joint of her great toe.    Dorsal heel spur.    Neither change is in area or related to her pain.    Overall area of her pain is normal on x-ray.    Will discuss additional treatment at follow-up visit.

## 2020-11-04 ENCOUNTER — TELEPHONE (OUTPATIENT)
Dept: INTERNAL MEDICINE | Facility: CLINIC | Age: 45
End: 2020-11-04

## 2020-11-04 NOTE — TELEPHONE ENCOUNTER
----- Message from Gwendolyn Valentin sent at 11/4/2020 10:31 AM CST -----  Please call pt @ [phone] regarding appt today, pt states she will be running late, pt do not know how long, pt need to know what to do, pt is medicaid , no available appts.

## 2020-11-09 ENCOUNTER — TELEPHONE (OUTPATIENT)
Dept: ENDOCRINOLOGY | Facility: CLINIC | Age: 45
End: 2020-11-09

## 2020-11-09 NOTE — TELEPHONE ENCOUNTER
Left message----- Message from Adan Beck MD sent at 11/8/2020  9:13 AM CST -----  Call the pt. Her thyroxine level is still high, and it has not changed much.  IS SHE SKIPPING TAKING PTU, THE MED FOR THE THYROID?  DID SHE STOP TAKING?

## 2020-11-11 ENCOUNTER — TELEPHONE (OUTPATIENT)
Dept: ENDOCRINOLOGY | Facility: CLINIC | Age: 45
End: 2020-11-11

## 2020-11-11 NOTE — TELEPHONE ENCOUNTER
Calling patient regarding question    Call the pt. Her thyroxine level is still high, and it has not changed much.  IS SHE SKIPPING TAKING PTU, THE MED FOR THE THYROID?  DID SHE STOP TAKING?    She is still taking PTU but has missed couple of days

## 2020-11-17 ENCOUNTER — OFFICE VISIT (OUTPATIENT)
Dept: INTERNAL MEDICINE | Facility: CLINIC | Age: 45
End: 2020-11-17
Payer: MEDICAID

## 2020-11-17 ENCOUNTER — LAB VISIT (OUTPATIENT)
Dept: LAB | Facility: HOSPITAL | Age: 45
End: 2020-11-17
Attending: FAMILY MEDICINE
Payer: MEDICAID

## 2020-11-17 VITALS
BODY MASS INDEX: 31.22 KG/M2 | HEIGHT: 66 IN | WEIGHT: 194.25 LBS | HEART RATE: 85 BPM | DIASTOLIC BLOOD PRESSURE: 78 MMHG | TEMPERATURE: 97 F | SYSTOLIC BLOOD PRESSURE: 136 MMHG | OXYGEN SATURATION: 98 %

## 2020-11-17 DIAGNOSIS — Z01.419 ENCOUNTER FOR GYNECOLOGICAL EXAMINATION: ICD-10-CM

## 2020-11-17 DIAGNOSIS — Z11.59 NEED FOR HEPATITIS C SCREENING TEST: ICD-10-CM

## 2020-11-17 DIAGNOSIS — M79.672 LEFT FOOT PAIN: ICD-10-CM

## 2020-11-17 DIAGNOSIS — R21 RASH: ICD-10-CM

## 2020-11-17 DIAGNOSIS — E05.90 HYPERTHYROIDISM: ICD-10-CM

## 2020-11-17 DIAGNOSIS — R73.03 PREDIABETES: ICD-10-CM

## 2020-11-17 DIAGNOSIS — R73.03 PREDIABETES: Primary | ICD-10-CM

## 2020-11-17 DIAGNOSIS — E78.5 HYPERLIPIDEMIA ASSOCIATED WITH TYPE 2 DIABETES MELLITUS: Chronic | ICD-10-CM

## 2020-11-17 DIAGNOSIS — J45.20 MILD INTERMITTENT ASTHMA WITHOUT COMPLICATION: ICD-10-CM

## 2020-11-17 DIAGNOSIS — I15.2 HYPERTENSION ASSOCIATED WITH DIABETES: Chronic | ICD-10-CM

## 2020-11-17 DIAGNOSIS — E11.59 HYPERTENSION ASSOCIATED WITH DIABETES: Chronic | ICD-10-CM

## 2020-11-17 DIAGNOSIS — M79.7 FIBROMYALGIA: Chronic | ICD-10-CM

## 2020-11-17 DIAGNOSIS — F41.9 ANXIETY: Chronic | ICD-10-CM

## 2020-11-17 DIAGNOSIS — E11.69 HYPERLIPIDEMIA ASSOCIATED WITH TYPE 2 DIABETES MELLITUS: Chronic | ICD-10-CM

## 2020-11-17 LAB
ESTIMATED AVG GLUCOSE: 111 MG/DL (ref 68–131)
HBA1C MFR BLD HPLC: 5.5 % (ref 4–5.6)

## 2020-11-17 PROCEDURE — 36415 COLL VENOUS BLD VENIPUNCTURE: CPT

## 2020-11-17 PROCEDURE — 99999 PR PBB SHADOW E&M-EST. PATIENT-LVL IV: CPT | Mod: PBBFAC,,, | Performed by: FAMILY MEDICINE

## 2020-11-17 PROCEDURE — 86803 HEPATITIS C AB TEST: CPT

## 2020-11-17 PROCEDURE — 99215 PR OFFICE/OUTPT VISIT, EST, LEVL V, 40-54 MIN: ICD-10-PCS | Mod: S$PBB,,, | Performed by: FAMILY MEDICINE

## 2020-11-17 PROCEDURE — 99214 OFFICE O/P EST MOD 30 MIN: CPT | Mod: PBBFAC | Performed by: FAMILY MEDICINE

## 2020-11-17 PROCEDURE — 99999 PR PBB SHADOW E&M-EST. PATIENT-LVL IV: ICD-10-PCS | Mod: PBBFAC,,, | Performed by: FAMILY MEDICINE

## 2020-11-17 PROCEDURE — 99215 OFFICE O/P EST HI 40 MIN: CPT | Mod: S$PBB,,, | Performed by: FAMILY MEDICINE

## 2020-11-17 PROCEDURE — 83036 HEMOGLOBIN GLYCOSYLATED A1C: CPT

## 2020-11-17 RX ORDER — CITALOPRAM 20 MG/1
20 TABLET, FILM COATED ORAL DAILY
Qty: 90 TABLET | Refills: 3 | Status: SHIPPED | OUTPATIENT
Start: 2020-11-17 | End: 2021-06-09 | Stop reason: SDUPTHER

## 2020-11-17 RX ORDER — AMLODIPINE BESYLATE 10 MG/1
10 TABLET ORAL DAILY
Qty: 90 TABLET | Refills: 3 | Status: SHIPPED | OUTPATIENT
Start: 2020-11-17 | End: 2022-04-12 | Stop reason: SDUPTHER

## 2020-11-17 RX ORDER — ALBUTEROL SULFATE 90 UG/1
2 AEROSOL, METERED RESPIRATORY (INHALATION) EVERY 4 HOURS PRN
Qty: 18 G | Refills: 0 | Status: SHIPPED | OUTPATIENT
Start: 2020-11-17 | End: 2021-03-24 | Stop reason: SDUPTHER

## 2020-11-17 RX ORDER — LOSARTAN POTASSIUM 25 MG/1
25 TABLET ORAL DAILY
Qty: 90 TABLET | Refills: 3 | Status: SHIPPED | OUTPATIENT
Start: 2020-11-17 | End: 2022-04-12

## 2020-11-17 RX ORDER — METOPROLOL SUCCINATE 50 MG/1
50 TABLET, EXTENDED RELEASE ORAL DAILY
Qty: 90 TABLET | Refills: 3 | Status: SHIPPED | OUTPATIENT
Start: 2020-11-17 | End: 2021-06-02 | Stop reason: SDUPTHER

## 2020-11-17 RX ORDER — METFORMIN HYDROCHLORIDE 1000 MG/1
1000 TABLET ORAL 2 TIMES DAILY WITH MEALS
Qty: 180 TABLET | Refills: 3 | Status: SHIPPED | OUTPATIENT
Start: 2020-11-17 | End: 2021-06-09

## 2020-11-17 RX ORDER — SIMVASTATIN 10 MG/1
10 TABLET, FILM COATED ORAL NIGHTLY
Qty: 90 TABLET | Refills: 3 | Status: SHIPPED | OUTPATIENT
Start: 2020-11-17 | End: 2021-07-20

## 2020-11-17 NOTE — PROGRESS NOTES
Subjective:   Patient ID: Shelly Kam is a 45 y.o. female.  Chief Complaint:  Follow-up    Patient presents for follow-up   Last visit 10/29/2020   Had rash lower ankle.  Treated with doxycycline and depleting cream.  Healed.  No longer with open wound.    Also with midfoot pain.  X-ray showed hallux valgus deformity, arthritis 1st metatarsophalangeal joint, and dorsal Achilles heel spur.  Pain has improved/ resolved.    Additional medical history for :   pre diabetes/diabetes.  Last A1c normal.  Metformin 1000 mg twice a day.  Micro albumin negative.  CMP stable.  Denies symptoms hypo or hyperglycemia.  Needs repeat A1c.    Hyperlipidemia.  On simvastatin 10 mg daily.  Lipid panel with LDL at goal.  CMP stable.  Reports compliance.  Denies side effects.  No chest pain or claudication.    Hypertension.  Well controlled on amlodipine 10 mg daily, losartan 25 mg daily, and Toprol-XL 50 mg daily.  Reports compliance.  Denies side effects.    Hyperthyroidism.  Most recent testing with persistent low TSH and increased free T4.  Saw endocrinology.  Recommended PTU 50 mg to twice a day.  Questionable compliance.    Mild intermittent asthma.  Needs refill of albuterol inhaler.    Anxiety/fibromyalgia.  Stable on citalopram 20 mg daily.    Health maintenance needs include:  Gyn exam, hepatitis-C screening    No new complaints or concerns today    Review of Systems   Constitutional: Negative for activity change, appetite change, chills, diaphoresis, fatigue and fever.   HENT: Negative for congestion, dental problem, ear discharge, ear pain, hearing loss, nosebleeds, postnasal drip, rhinorrhea, sinus pressure, sneezing, sore throat, tinnitus, trouble swallowing and voice change.    Eyes: Negative for pain, discharge, redness, itching and visual disturbance.   Respiratory: Negative for cough, chest tightness, shortness of breath and wheezing.    Cardiovascular: Negative for chest pain, palpitations and leg swelling.  "  Gastrointestinal: Negative for abdominal distention, abdominal pain, constipation, diarrhea, nausea and vomiting.   Endocrine: Negative for cold intolerance, heat intolerance, polydipsia, polyphagia and polyuria.   Genitourinary: Negative for difficulty urinating, dysuria, flank pain, frequency, hematuria, menstrual problem, pelvic pain and urgency.   Musculoskeletal: Negative for arthralgias, back pain, gait problem, joint swelling, myalgias, neck pain and neck stiffness.   Skin: Negative for rash and wound.   Neurological: Negative for dizziness, tremors, syncope, facial asymmetry, speech difficulty, weakness, light-headedness, numbness and headaches.   Hematological: Negative for adenopathy.   Psychiatric/Behavioral: Negative for agitation, behavioral problems, confusion, decreased concentration, dysphoric mood, hallucinations, self-injury, sleep disturbance and suicidal ideas. The patient is not nervous/anxious and is not hyperactive.      Objective:   /78 (BP Location: Left arm, Patient Position: Sitting, BP Method: Large (Manual))   Pulse 85   Temp 96.8 °F (36 °C) (Tympanic)   Ht 5' 6" (1.676 m)   Wt 88.1 kg (194 lb 3.6 oz)   SpO2 98%   BMI 31.35 kg/m²     Physical Exam  Vitals signs and nursing note reviewed.   Constitutional:       Appearance: Normal appearance. She is well-developed. She is obese.   Eyes:      General: No scleral icterus.     Conjunctiva/sclera: Conjunctivae normal.      Right eye: Right conjunctiva is not injected.      Left eye: Left conjunctiva is not injected.   Neck:      Thyroid: No thyroid mass, thyromegaly or thyroid tenderness.      Vascular: No carotid bruit or JVD.   Cardiovascular:      Rate and Rhythm: Normal rate and regular rhythm.      Pulses:           Radial pulses are 2+ on the right side and 2+ on the left side.      Heart sounds: Normal heart sounds. No murmur. No friction rub. No gallop.    Pulmonary:      Effort: Pulmonary effort is normal.      Breath " sounds: Normal breath sounds. No wheezing, rhonchi or rales.   Abdominal:      General: There is no distension.      Palpations: Abdomen is soft. There is no hepatomegaly.      Tenderness: There is no abdominal tenderness. There is no guarding or rebound.   Musculoskeletal:      Right lower leg: No edema.      Left lower leg: No edema.   Skin:     General: Skin is warm and dry.      Capillary Refill: Capillary refill takes less than 2 seconds.      Findings: No rash.   Neurological:      Mental Status: She is alert.      Coordination: Coordination is intact. Coordination normal.      Gait: Gait is intact. Gait normal.   Psychiatric:         Attention and Perception: Attention normal.         Mood and Affect: Mood and affect normal.         Speech: Speech normal.         Behavior: Behavior normal.         Thought Content: Thought content normal.         Cognition and Memory: Cognition normal.         Judgment: Judgment normal.       Assessment:       ICD-10-CM ICD-9-CM   1. Prediabetes  R73.03 790.29   2. Hyperlipidemia associated with type 2 diabetes mellitus  E11.69 250.80    E78.5 272.4   3. Hypertension associated with diabetes  E11.59 250.80    I10 401.9   4. Anxiety  F41.9 300.00   5. Fibromyalgia  M79.7 729.1   6. Mild intermittent asthma without complication  J45.20 493.90   7. Hyperthyroidism  E05.90 242.90   8. Rash  R21 782.1   9. Left foot pain  M79.672 729.5   10. Encounter for gynecological examination  Z01.419 V72.31   11. Need for hepatitis C screening test  Z11.59 V73.89     Plan:   Prediabetes  -     metFORMIN (GLUCOPHAGE) 1000 MG tablet; Take 1 tablet (1,000 mg total) by mouth 2 (two) times daily with meals.  Dispense: 180 tablet; Refill: 3  -     Hemoglobin A1C; Future; Expected date: 11/17/2020  Review of records show all readings less than 6.5% for greater than 3 years, more likely prediabetic than diabetic   If A1c normal, can decrease metformin 1000 mg daily    Hyperlipidemia associated with  type 2 diabetes mellitus  -     simvastatin (ZOCOR) 10 MG tablet; Take 1 tablet (10 mg total) by mouth every evening.  Dispense: 90 tablet; Refill: 3  Controlled.  Lipid panel at goal.  Asymptomatic.    Continue simvastatin 10 mg daily     Hypertension associated with diabetes  -     amLODIPine (NORVASC) 10 MG tablet; Take 1 tablet (10 mg total) by mouth once daily.  Dispense: 90 tablet; Refill: 3  -     losartan (COZAAR) 25 MG tablet; Take 1 tablet (25 mg total) by mouth once daily.  Dispense: 90 tablet; Refill: 3  -     metoprolol succinate (TOPROL-XL) 50 MG 24 hr tablet; Take 1 tablet (50 mg total) by mouth once daily. QD  Dispense: 90 tablet; Refill: 3  Controlled.  BP goal.  Asymptomatic.    Continue current medications     Anxiety  Fibromyalgia  -     citalopram (CELEXA) 20 MG tablet; Take 1 tablet (20 mg total) by mouth once daily.  Dispense: 90 tablet; Refill: 3  Stable, no side effects, mood and symptoms well controlled on present medication .  Continue Citalopram 20 mg daily    Mild intermittent asthma without complication  -     albuterol (PROVENTIL/VENTOLIN HFA) 90 mcg/actuation inhaler; Inhale 2 puffs into the lungs every 4 (four) hours as needed for Wheezing or Shortness of Breath.  Dispense: 18 g; Refill: 0  No regular/ frequent rescue inhaler use.    Okay to remain off controller medication, has not used Breo for greater than 6 months     Hyperthyroidism  Stress need for compliance with PTU 50 mg 2 twice a day and follow-up with endocrinology.      Rash  Resolved/ improved with doxycycline and typically in.    No additional evaluation treatment needed.      Left foot pain  Resolved/ improved.  X-ray with calcaneal heel spur.    Also hallux valgus deformity with mild arthritis 1st great metatarsophalangeal area.    If pain recurs, will refer to podiatry.      RHM  -     Ambulatory referral/consult to Gynecology; Future; Expected date: 11/24/2020  -     Hepatitis C Antibody; Future; Expected date:  11/17/2020    Return to clinic 6 months or sooner as needed.

## 2020-11-18 ENCOUNTER — TELEPHONE (OUTPATIENT)
Dept: INTERNAL MEDICINE | Facility: CLINIC | Age: 45
End: 2020-11-18

## 2020-11-18 LAB — HCV AB SERPL QL IA: NEGATIVE

## 2020-11-18 NOTE — TELEPHONE ENCOUNTER
----- Message from Heraclio Farrell MD sent at 11/18/2020  8:14 AM CST -----  A1c normal.    Decrease metformin 1000 mg to once a day dosing.    Recheck A1c 6 months

## 2020-11-21 DIAGNOSIS — R94.6 ABNORMAL THYROID FUNCTION TEST: Primary | ICD-10-CM

## 2020-11-23 ENCOUNTER — TELEPHONE (OUTPATIENT)
Dept: ENDOCRINOLOGY | Facility: CLINIC | Age: 45
End: 2020-11-23

## 2020-11-23 NOTE — TELEPHONE ENCOUNTER
No answer Left message to return call----- Message from Adan Beck MD sent at 11/21/2020 11:17 AM CST -----  Call the pt for labs to be done for f/u on overactive thyroid.

## 2020-11-25 ENCOUNTER — TELEPHONE (OUTPATIENT)
Dept: ENDOCRINOLOGY | Facility: CLINIC | Age: 45
End: 2020-11-25

## 2020-11-25 NOTE — TELEPHONE ENCOUNTER
Called pt to let her know that Dr. Beck would like for her to get labs done for follow up on overactive thyroid.

## 2020-12-11 ENCOUNTER — PATIENT MESSAGE (OUTPATIENT)
Dept: OTHER | Facility: OTHER | Age: 45
End: 2020-12-11

## 2020-12-23 ENCOUNTER — OFFICE VISIT (OUTPATIENT)
Dept: CARDIOLOGY | Facility: CLINIC | Age: 45
End: 2020-12-23
Payer: MEDICAID

## 2020-12-23 VITALS
HEART RATE: 83 BPM | OXYGEN SATURATION: 98 % | HEIGHT: 66 IN | SYSTOLIC BLOOD PRESSURE: 130 MMHG | DIASTOLIC BLOOD PRESSURE: 70 MMHG | RESPIRATION RATE: 16 BRPM | WEIGHT: 202.81 LBS | BODY MASS INDEX: 32.59 KG/M2

## 2020-12-23 DIAGNOSIS — E05.90 HYPERTHYROIDISM: Chronic | ICD-10-CM

## 2020-12-23 DIAGNOSIS — J45.909 ASTHMA, UNSPECIFIED ASTHMA SEVERITY, UNSPECIFIED WHETHER COMPLICATED, UNSPECIFIED WHETHER PERSISTENT: ICD-10-CM

## 2020-12-23 DIAGNOSIS — E78.5 HYPERLIPIDEMIA ASSOCIATED WITH TYPE 2 DIABETES MELLITUS: Chronic | ICD-10-CM

## 2020-12-23 DIAGNOSIS — M79.7 FIBROMYALGIA: Chronic | ICD-10-CM

## 2020-12-23 DIAGNOSIS — I15.2 HYPERTENSION ASSOCIATED WITH DIABETES: Primary | Chronic | ICD-10-CM

## 2020-12-23 DIAGNOSIS — K21.9 GASTROESOPHAGEAL REFLUX DISEASE, UNSPECIFIED WHETHER ESOPHAGITIS PRESENT: ICD-10-CM

## 2020-12-23 DIAGNOSIS — E11.69 HYPERLIPIDEMIA ASSOCIATED WITH TYPE 2 DIABETES MELLITUS: Chronic | ICD-10-CM

## 2020-12-23 DIAGNOSIS — M79.604 PAIN IN BOTH LOWER EXTREMITIES: ICD-10-CM

## 2020-12-23 DIAGNOSIS — E11.9 TYPE 2 DIABETES MELLITUS WITHOUT COMPLICATION, WITHOUT LONG-TERM CURRENT USE OF INSULIN: Chronic | ICD-10-CM

## 2020-12-23 DIAGNOSIS — E11.59 HYPERTENSION ASSOCIATED WITH DIABETES: Primary | Chronic | ICD-10-CM

## 2020-12-23 DIAGNOSIS — M79.605 PAIN IN BOTH LOWER EXTREMITIES: ICD-10-CM

## 2020-12-23 DIAGNOSIS — J45.20 MILD INTERMITTENT ASTHMA WITHOUT COMPLICATION: Chronic | ICD-10-CM

## 2020-12-23 PROCEDURE — 99214 OFFICE O/P EST MOD 30 MIN: CPT | Mod: PBBFAC | Performed by: INTERNAL MEDICINE

## 2020-12-23 PROCEDURE — 99999 PR PBB SHADOW E&M-EST. PATIENT-LVL IV: CPT | Mod: PBBFAC,,, | Performed by: INTERNAL MEDICINE

## 2020-12-23 PROCEDURE — 99214 OFFICE O/P EST MOD 30 MIN: CPT | Mod: S$PBB,,, | Performed by: INTERNAL MEDICINE

## 2020-12-23 PROCEDURE — 99214 PR OFFICE/OUTPT VISIT, EST, LEVL IV, 30-39 MIN: ICD-10-PCS | Mod: S$PBB,,, | Performed by: INTERNAL MEDICINE

## 2020-12-23 PROCEDURE — 99999 PR PBB SHADOW E&M-EST. PATIENT-LVL IV: ICD-10-PCS | Mod: PBBFAC,,, | Performed by: INTERNAL MEDICINE

## 2020-12-23 RX ORDER — PANTOPRAZOLE SODIUM 20 MG/1
20 TABLET, DELAYED RELEASE ORAL DAILY
Qty: 30 TABLET | Refills: 3 | Status: SHIPPED | OUTPATIENT
Start: 2020-12-23 | End: 2021-06-09 | Stop reason: SDUPTHER

## 2020-12-23 NOTE — PROGRESS NOTES
Subjective:   Patient ID:  Shelly Kam is a 45 y.o. female who presents for cardiac consult of Follow-up    Referring Physician: Adan Beck MD   Reason for consult: HTN    Follow-up  Associated symptoms include chest pain.     The patient came in today for cardiac consult of Follow-up      Shelly Kam is a 45 y.o. female pt with HTN, HLD, obesity, DM2, asthma, Fibromyalgia, anxiety presents for follow up CV eval of HTN.     10/13/20  She had recent BB changed to Toprol. She has intermittent chest pain, hard to describe. She was on on ACE-I in the past but had lip swelling.   She has a lot of joint pain as well.     12/23/20  BP improved today. ECHO normal Bi V function, PFO noted in ECHO. No further chest pain, has trapped gas/reflux. Will start PPI.     Patient feels no leg swelling, no PND, no palpitation, no dizziness, no syncope, no CNS symptoms.    Patient has fairly good exercise tolerance. Takes care of elderly.     Patient is compliant with medications.    Results for orders placed during the hospital encounter of 10/27/20   Echo Color Flow Doppler? Yes    Narrative · The left ventricle is normal in size with normal systolic function. The   estimated ejection fraction is 60%.  · Normal left ventricular diastolic function.  · Normal right ventricular systolic function.  · The estimated PA systolic pressure is 29 mmHg.  · Normal central venous pressure (3 mmHg).  · Bubble study: shunt across atrial septum noted.            Past Medical History:   Diagnosis Date    Asthma     Diabetes mellitus     Fibromyalgia     Hypertension     Panic attack     Prozac in past       Past Surgical History:   Procedure Laterality Date    BRONCHOSCOPY      HERNIA REPAIR      TONSILLECTOMY      TRACHEOSTOMY      TRACHEOSTOMY TUBE PLACEMENT         Social History     Tobacco Use    Smoking status: Current Every Day Smoker     Packs/day: 0.25     Types: Cigarettes     Last attempt to quit: 3/1/2011      Years since quittin.8    Smokeless tobacco: Never Used   Substance Use Topics    Alcohol use: Yes     Frequency: Never     Drinks per session: 1 or 2     Binge frequency: Never     Comment: occasionally    Drug use: No       Family History   Problem Relation Age of Onset    Cancer Paternal Aunt        Patient's Medications   New Prescriptions    PANTOPRAZOLE (PROTONIX) 20 MG TABLET    Take 1 tablet (20 mg total) by mouth once daily.   Previous Medications    ALBUTEROL (PROVENTIL/VENTOLIN HFA) 90 MCG/ACTUATION INHALER    Inhale 2 puffs into the lungs every 4 (four) hours as needed for Wheezing or Shortness of Breath.    AMLODIPINE (NORVASC) 10 MG TABLET    Take 1 tablet (10 mg total) by mouth once daily.    ASPIRIN (ECOTRIN) 81 MG EC TABLET    Take 1 tablet (81 mg total) by mouth once daily.    AUGMENTED BETAMETHASONE DIPROPIONATE (DIPROLENE-AF) 0.05 % CREAM    Apply topically 2 (two) times daily.    AZELASTINE (ASTELIN) 137 MCG (0.1 %) NASAL SPRAY    2 sprays (274 mcg total) by Nasal route 2 (two) times daily as needed for Rhinitis.    CITALOPRAM (CELEXA) 20 MG TABLET    Take 1 tablet (20 mg total) by mouth once daily.    FLUTICASONE PROPIONATE (FLONASE) 50 MCG/ACTUATION NASAL SPRAY    2 sprays (100 mcg total) by Each Nostril route once daily.    IBUPROFEN (ADVIL,MOTRIN) 800 MG TABLET    Take 1 tablet (800 mg total) by mouth every 8 (eight) hours as needed for Pain.    LOSARTAN (COZAAR) 25 MG TABLET    Take 1 tablet (25 mg total) by mouth once daily.    METFORMIN (GLUCOPHAGE) 1000 MG TABLET    Take 1 tablet (1,000 mg total) by mouth 2 (two) times daily with meals.    METOPROLOL SUCCINATE (TOPROL-XL) 50 MG 24 HR TABLET    Take 1 tablet (50 mg total) by mouth once daily. QD    PROPYLTHIOURACIL (PTU) 50 MG TAB    TWO TABS bid    SIMVASTATIN (ZOCOR) 10 MG TABLET    Take 1 tablet (10 mg total) by mouth every evening.   Modified Medications    No medications on file   Discontinued Medications    No medications on  "file       Review of Systems   Constitutional: Positive for malaise/fatigue.   HENT: Negative.    Eyes: Negative.    Respiratory: Negative.    Cardiovascular: Positive for chest pain and palpitations.   Gastrointestinal: Negative.    Genitourinary: Negative.    Musculoskeletal: Positive for back pain and joint pain.   Skin: Negative.    Neurological: Negative.    Endo/Heme/Allergies: Negative.    Psychiatric/Behavioral: Negative.    All 12 systems otherwise negative.      Wt Readings from Last 3 Encounters:   12/23/20 92 kg (202 lb 13.2 oz)   11/17/20 88.1 kg (194 lb 3.6 oz)   10/29/20 91.9 kg (202 lb 9.6 oz)     Temp Readings from Last 3 Encounters:   11/17/20 96.8 °F (36 °C) (Tympanic)   10/29/20 97.8 °F (36.6 °C) (Tympanic)   07/06/20 97.7 °F (36.5 °C) (Tympanic)     BP Readings from Last 3 Encounters:   12/23/20 130/70   11/17/20 136/78   10/29/20 (!) 150/60     Pulse Readings from Last 3 Encounters:   12/23/20 83   11/17/20 85   10/29/20 97       /70 (BP Location: Right arm, Patient Position: Sitting, BP Method: Medium (Manual))   Pulse 83   Resp 16   Ht 5' 6" (1.676 m)   Wt 92 kg (202 lb 13.2 oz)   SpO2 98%   BMI 32.74 kg/m²     Objective:   Physical Exam   Constitutional: She is oriented to person, place, and time. She appears well-developed and well-nourished. No distress.   HENT:   Head: Normocephalic and atraumatic.   Nose: Nose normal.   Mouth/Throat: Oropharynx is clear and moist.   Eyes: Conjunctivae and EOM are normal. No scleral icterus.   Neck: Normal range of motion. Neck supple. No JVD present. No thyromegaly present.   Cardiovascular: Normal rate, regular rhythm, S1 normal and S2 normal. Exam reveals no gallop, no S3, no S4 and no friction rub.   No murmur heard.  Pulmonary/Chest: Effort normal and breath sounds normal. No stridor. No respiratory distress. She has no wheezes. She has no rales. She exhibits no tenderness.   Abdominal: Soft. Bowel sounds are normal. She exhibits no " distension and no mass. There is no abdominal tenderness. There is no rebound.   Genitourinary:    Genitourinary Comments: Deferred     Musculoskeletal: Normal range of motion.         General: No tenderness, deformity or edema.   Lymphadenopathy:     She has no cervical adenopathy.   Neurological: She is alert and oriented to person, place, and time. She exhibits normal muscle tone. Coordination normal.   Skin: Skin is warm and dry. No rash noted. She is not diaphoretic. No erythema. No pallor.   Psychiatric: She has a normal mood and affect. Her behavior is normal. Judgment and thought content normal.   Nursing note and vitals reviewed.      Lab Results   Component Value Date     10/06/2020    K 3.8 10/06/2020     10/06/2020    CO2 26 10/06/2020    BUN 10 10/06/2020    CREATININE 0.6 10/06/2020     (H) 10/06/2020    HGBA1C 5.5 11/17/2020    MG 1.9 03/23/2016    AST 16 10/06/2020    ALT 18 10/06/2020    ALBUMIN 3.3 (L) 10/06/2020    PROT 6.4 10/06/2020    BILITOT 0.3 10/06/2020    WBC 4.64 07/06/2020    HGB 10.8 (L) 07/06/2020    HCT 36.7 (L) 07/06/2020    MCV 84 07/06/2020     07/06/2020    INR 1.0 03/14/2016    TSH <0.010 (L) 10/27/2020    CHOL 109 (L) 07/06/2020    HDL 39 (L) 07/06/2020    LDLCALC 62.0 (L) 07/06/2020    TRIG 40 07/06/2020    BNP 24 10/02/2017     Assessment:      1. Hypertension associated with diabetes    2. Hyperlipidemia associated with type 2 diabetes mellitus    3. Type 2 diabetes mellitus without complication, without long-term current use of insulin    4. Hyperthyroidism    5. Gastroesophageal reflux disease, unspecified whether esophagitis present    6. Asthma, unspecified asthma severity, unspecified whether complicated, unspecified whether persistent    7. Mild intermittent asthma without complication    8. Pain in both lower extremities    9. Fibromyalgia        Plan:   1. HTN with atypical CP  - cont meds and titrate  - needs pain control  - ECHO with small  PFO    2. Hyperthyroidism  - cont tx per endo    3. HLD  - cont statin    4. Asthma  - cont meds per PCP  - worse lately, f/u pulm    5. DM2  - cont meds per PCP    6. GERD  - cont PPI    7. Fibromyalgia  - refer to pain    Thank you for allowing me to participate in this patient's care. Please do not hesitate to contact me with any questions or concerns. Consult note has been forwarded to the referral physician.

## 2021-01-11 ENCOUNTER — HOSPITAL ENCOUNTER (OUTPATIENT)
Dept: RADIOLOGY | Facility: HOSPITAL | Age: 46
Discharge: HOME | End: 2021-01-11
Attending: NURSE PRACTITIONER
Payer: MEDICAID

## 2021-01-11 ENCOUNTER — TELEPHONE (OUTPATIENT)
Dept: PULMONOLOGY | Facility: HOSPITAL | Age: 46
End: 2021-01-11

## 2021-01-11 ENCOUNTER — OFFICE VISIT (OUTPATIENT)
Dept: PULMONOLOGY | Facility: CLINIC | Age: 46
End: 2021-01-11
Payer: MEDICAID

## 2021-01-11 VITALS
OXYGEN SATURATION: 99 % | SYSTOLIC BLOOD PRESSURE: 180 MMHG | RESPIRATION RATE: 22 BRPM | BODY MASS INDEX: 32.34 KG/M2 | WEIGHT: 201.25 LBS | DIASTOLIC BLOOD PRESSURE: 80 MMHG | HEIGHT: 66 IN | HEART RATE: 93 BPM

## 2021-01-11 DIAGNOSIS — R09.81 NASAL CONGESTION: Primary | ICD-10-CM

## 2021-01-11 DIAGNOSIS — J45.909 ASTHMA, UNSPECIFIED ASTHMA SEVERITY, UNSPECIFIED WHETHER COMPLICATED, UNSPECIFIED WHETHER PERSISTENT: ICD-10-CM

## 2021-01-11 DIAGNOSIS — G47.33 OSA (OBSTRUCTIVE SLEEP APNEA): ICD-10-CM

## 2021-01-11 DIAGNOSIS — Z72.820 LACK OF ADEQUATE SLEEP: ICD-10-CM

## 2021-01-11 DIAGNOSIS — J32.9 CHRONIC SINUSITIS, UNSPECIFIED LOCATION: ICD-10-CM

## 2021-01-11 DIAGNOSIS — F17.200 SMOKER: ICD-10-CM

## 2021-01-11 PROCEDURE — 99215 OFFICE O/P EST HI 40 MIN: CPT | Mod: PBBFAC,25 | Performed by: NURSE PRACTITIONER

## 2021-01-11 PROCEDURE — 71046 X-RAY EXAM CHEST 2 VIEWS: CPT | Mod: 26,,, | Performed by: RADIOLOGY

## 2021-01-11 PROCEDURE — 99999 PR PBB SHADOW E&M-EST. PATIENT-LVL V: ICD-10-PCS | Mod: PBBFAC,,, | Performed by: NURSE PRACTITIONER

## 2021-01-11 PROCEDURE — 99999 PR PBB SHADOW E&M-EST. PATIENT-LVL V: CPT | Mod: PBBFAC,,, | Performed by: NURSE PRACTITIONER

## 2021-01-11 PROCEDURE — 70220 XR SINUSES MIN 3 VIEWS: ICD-10-PCS | Mod: 26,,, | Performed by: RADIOLOGY

## 2021-01-11 PROCEDURE — 99204 OFFICE O/P NEW MOD 45 MIN: CPT | Mod: S$PBB,,, | Performed by: NURSE PRACTITIONER

## 2021-01-11 PROCEDURE — 71046 XR CHEST PA AND LATERAL: ICD-10-PCS | Mod: 26,,, | Performed by: RADIOLOGY

## 2021-01-11 PROCEDURE — 70220 X-RAY EXAM OF SINUSES: CPT | Mod: TC

## 2021-01-11 PROCEDURE — 70220 X-RAY EXAM OF SINUSES: CPT | Mod: 26,,, | Performed by: RADIOLOGY

## 2021-01-11 PROCEDURE — 99204 PR OFFICE/OUTPT VISIT, NEW, LEVL IV, 45-59 MIN: ICD-10-PCS | Mod: S$PBB,,, | Performed by: NURSE PRACTITIONER

## 2021-01-11 PROCEDURE — 71046 X-RAY EXAM CHEST 2 VIEWS: CPT | Mod: TC

## 2021-01-11 RX ORDER — LEVOFLOXACIN 750 MG/1
750 TABLET ORAL DAILY
Qty: 5 TABLET | Refills: 0 | Status: SHIPPED | OUTPATIENT
Start: 2021-01-11 | End: 2021-01-16

## 2021-01-11 RX ORDER — EPINEPHRINE 0.3 MG/.3ML
1 INJECTION SUBCUTANEOUS ONCE
Qty: 2 EACH | Refills: 0 | Status: SHIPPED | OUTPATIENT
Start: 2021-01-11 | End: 2021-04-28 | Stop reason: SDUPTHER

## 2021-01-11 RX ORDER — FLUTICASONE PROPIONATE AND SALMETEROL XINAFOATE 230; 21 UG/1; UG/1
2 AEROSOL, METERED RESPIRATORY (INHALATION) 2 TIMES DAILY
Qty: 12 G | Refills: 11 | Status: SHIPPED | OUTPATIENT
Start: 2021-01-11 | End: 2022-04-28 | Stop reason: SDUPTHER

## 2021-01-18 DIAGNOSIS — R94.6 ABNORMAL THYROID FUNCTION TEST: Primary | ICD-10-CM

## 2021-01-25 ENCOUNTER — HOSPITAL ENCOUNTER (OUTPATIENT)
Dept: RADIOLOGY | Facility: HOSPITAL | Age: 46
Discharge: HOME | End: 2021-01-25
Attending: ANESTHESIOLOGY
Payer: MEDICAID

## 2021-01-25 ENCOUNTER — TELEPHONE (OUTPATIENT)
Dept: ENDOCRINOLOGY | Facility: CLINIC | Age: 46
End: 2021-01-25

## 2021-01-25 ENCOUNTER — OFFICE VISIT (OUTPATIENT)
Dept: PAIN MEDICINE | Facility: CLINIC | Age: 46
End: 2021-01-25
Payer: MEDICAID

## 2021-01-25 VITALS
BODY MASS INDEX: 31.46 KG/M2 | HEART RATE: 94 BPM | HEIGHT: 66 IN | SYSTOLIC BLOOD PRESSURE: 163 MMHG | DIASTOLIC BLOOD PRESSURE: 86 MMHG | WEIGHT: 195.75 LBS

## 2021-01-25 DIAGNOSIS — M79.605 PAIN IN BOTH LOWER EXTREMITIES: ICD-10-CM

## 2021-01-25 DIAGNOSIS — M79.604 PAIN IN BOTH LOWER EXTREMITIES: ICD-10-CM

## 2021-01-25 DIAGNOSIS — E05.90 HYPERTHYROIDISM: ICD-10-CM

## 2021-01-25 DIAGNOSIS — G63 POLYNEUROPATHY ASSOCIATED WITH UNDERLYING DISEASE: ICD-10-CM

## 2021-01-25 DIAGNOSIS — E05.90 HYPERTHYROIDISM: Primary | ICD-10-CM

## 2021-01-25 PROCEDURE — 99204 PR OFFICE/OUTPT VISIT, NEW, LEVL IV, 45-59 MIN: ICD-10-PCS | Mod: S$PBB,,, | Performed by: ANESTHESIOLOGY

## 2021-01-25 PROCEDURE — 99215 OFFICE O/P EST HI 40 MIN: CPT | Mod: PBBFAC,25 | Performed by: ANESTHESIOLOGY

## 2021-01-25 PROCEDURE — 72110 X-RAY EXAM L-2 SPINE 4/>VWS: CPT | Mod: TC

## 2021-01-25 PROCEDURE — 72052 XR CERVICAL SPINE 5 VIEW WITH FLEX AND EXT: ICD-10-PCS | Mod: 26,,, | Performed by: RADIOLOGY

## 2021-01-25 PROCEDURE — 72110 XR LUMBAR SPINE 5 VIEW WITH FLEX AND EXT: ICD-10-PCS | Mod: 26,,, | Performed by: RADIOLOGY

## 2021-01-25 PROCEDURE — 72110 X-RAY EXAM L-2 SPINE 4/>VWS: CPT | Mod: 26,,, | Performed by: RADIOLOGY

## 2021-01-25 PROCEDURE — 99204 OFFICE O/P NEW MOD 45 MIN: CPT | Mod: S$PBB,,, | Performed by: ANESTHESIOLOGY

## 2021-01-25 PROCEDURE — 99999 PR PBB SHADOW E&M-EST. PATIENT-LVL V: CPT | Mod: PBBFAC,,, | Performed by: ANESTHESIOLOGY

## 2021-01-25 PROCEDURE — 72052 X-RAY EXAM NECK SPINE 6/>VWS: CPT | Mod: 26,,, | Performed by: RADIOLOGY

## 2021-01-25 PROCEDURE — 72052 X-RAY EXAM NECK SPINE 6/>VWS: CPT | Mod: TC

## 2021-01-25 PROCEDURE — 99999 PR PBB SHADOW E&M-EST. PATIENT-LVL V: ICD-10-PCS | Mod: PBBFAC,,, | Performed by: ANESTHESIOLOGY

## 2021-01-26 ENCOUNTER — OFFICE VISIT (OUTPATIENT)
Dept: ENDOCRINOLOGY | Facility: CLINIC | Age: 46
End: 2021-01-26
Payer: MEDICAID

## 2021-01-26 ENCOUNTER — OFFICE VISIT (OUTPATIENT)
Dept: ALLERGY | Facility: CLINIC | Age: 46
End: 2021-01-26
Payer: MEDICAID

## 2021-01-26 ENCOUNTER — LAB VISIT (OUTPATIENT)
Dept: LAB | Facility: HOSPITAL | Age: 46
End: 2021-01-26
Attending: FAMILY MEDICINE
Payer: MEDICAID

## 2021-01-26 VITALS
SYSTOLIC BLOOD PRESSURE: 156 MMHG | WEIGHT: 196.19 LBS | DIASTOLIC BLOOD PRESSURE: 86 MMHG | HEIGHT: 66 IN | BODY MASS INDEX: 31.53 KG/M2 | HEART RATE: 96 BPM | RESPIRATION RATE: 18 BRPM

## 2021-01-26 VITALS
HEART RATE: 93 BPM | HEIGHT: 66 IN | BODY MASS INDEX: 31.5 KG/M2 | WEIGHT: 196 LBS | TEMPERATURE: 98 F | SYSTOLIC BLOOD PRESSURE: 146 MMHG | DIASTOLIC BLOOD PRESSURE: 85 MMHG

## 2021-01-26 DIAGNOSIS — R94.6 ABNORMAL THYROID FUNCTION TEST: Primary | ICD-10-CM

## 2021-01-26 DIAGNOSIS — Z91.018 FOOD ALLERGY: ICD-10-CM

## 2021-01-26 DIAGNOSIS — J32.9 CHRONIC SINUSITIS, UNSPECIFIED LOCATION: ICD-10-CM

## 2021-01-26 DIAGNOSIS — Z72.0 TOBACCO ABUSE: Primary | ICD-10-CM

## 2021-01-26 DIAGNOSIS — R09.81 NASAL CONGESTION: ICD-10-CM

## 2021-01-26 DIAGNOSIS — J45.909 ASTHMA, UNSPECIFIED ASTHMA SEVERITY, UNSPECIFIED WHETHER COMPLICATED, UNSPECIFIED WHETHER PERSISTENT: ICD-10-CM

## 2021-01-26 DIAGNOSIS — E05.90 HYPERTHYROIDISM: ICD-10-CM

## 2021-01-26 DIAGNOSIS — R63.4 WEIGHT LOSS: ICD-10-CM

## 2021-01-26 DIAGNOSIS — T78.1XXD POLLEN-FOOD ALLERGY, SUBSEQUENT ENCOUNTER: ICD-10-CM

## 2021-01-26 LAB
BASOPHILS # BLD AUTO: 0.02 K/UL (ref 0–0.2)
BASOPHILS NFR BLD: 0.5 % (ref 0–1.9)
DIFFERENTIAL METHOD: ABNORMAL
EOSINOPHIL # BLD AUTO: 0.3 K/UL (ref 0–0.5)
EOSINOPHIL NFR BLD: 7.7 % (ref 0–8)
ERYTHROCYTE [DISTWIDTH] IN BLOOD BY AUTOMATED COUNT: 15.7 % (ref 11.5–14.5)
HCT VFR BLD AUTO: 38.8 % (ref 37–48.5)
HGB BLD-MCNC: 11.7 G/DL (ref 12–16)
IMM GRANULOCYTES # BLD AUTO: 0.01 K/UL (ref 0–0.04)
IMM GRANULOCYTES NFR BLD AUTO: 0.2 % (ref 0–0.5)
LYMPHOCYTES # BLD AUTO: 1.5 K/UL (ref 1–4.8)
LYMPHOCYTES NFR BLD: 33.7 % (ref 18–48)
MCH RBC QN AUTO: 24.2 PG (ref 27–31)
MCHC RBC AUTO-ENTMCNC: 30.2 G/DL (ref 32–36)
MCV RBC AUTO: 80 FL (ref 82–98)
MONOCYTES # BLD AUTO: 0.5 K/UL (ref 0.3–1)
MONOCYTES NFR BLD: 11.6 % (ref 4–15)
NEUTROPHILS # BLD AUTO: 2 K/UL (ref 1.8–7.7)
NEUTROPHILS NFR BLD: 46.3 % (ref 38–73)
NRBC BLD-RTO: 0 /100 WBC
PLATELET # BLD AUTO: 179 K/UL (ref 150–350)
PMV BLD AUTO: ABNORMAL FL (ref 9.2–12.9)
RBC # BLD AUTO: 4.83 M/UL (ref 4–5.4)
WBC # BLD AUTO: 4.3 K/UL (ref 3.9–12.7)

## 2021-01-26 PROCEDURE — 36415 COLL VENOUS BLD VENIPUNCTURE: CPT

## 2021-01-26 PROCEDURE — 82785 ASSAY OF IGE: CPT

## 2021-01-26 PROCEDURE — 99215 PR OFFICE/OUTPT VISIT, EST, LEVL V, 40-54 MIN: ICD-10-PCS | Mod: S$PBB,,, | Performed by: INTERNAL MEDICINE

## 2021-01-26 PROCEDURE — 99999 PR PBB SHADOW E&M-EST. PATIENT-LVL V: CPT | Mod: PBBFAC,,, | Performed by: INTERNAL MEDICINE

## 2021-01-26 PROCEDURE — 99204 OFFICE O/P NEW MOD 45 MIN: CPT | Mod: S$PBB,,, | Performed by: ALLERGY & IMMUNOLOGY

## 2021-01-26 PROCEDURE — 99999 PR PBB SHADOW E&M-EST. PATIENT-LVL V: CPT | Mod: PBBFAC,,, | Performed by: ALLERGY & IMMUNOLOGY

## 2021-01-26 PROCEDURE — 86003 ALLG SPEC IGE CRUDE XTRC EA: CPT

## 2021-01-26 PROCEDURE — 86003 ALLG SPEC IGE CRUDE XTRC EA: CPT | Mod: 59

## 2021-01-26 PROCEDURE — 99999 PR PBB SHADOW E&M-EST. PATIENT-LVL V: ICD-10-PCS | Mod: PBBFAC,,, | Performed by: ALLERGY & IMMUNOLOGY

## 2021-01-26 PROCEDURE — 99204 PR OFFICE/OUTPT VISIT, NEW, LEVL IV, 45-59 MIN: ICD-10-PCS | Mod: S$PBB,,, | Performed by: ALLERGY & IMMUNOLOGY

## 2021-01-26 PROCEDURE — 99215 OFFICE O/P EST HI 40 MIN: CPT | Mod: PBBFAC | Performed by: INTERNAL MEDICINE

## 2021-01-26 PROCEDURE — 85025 COMPLETE CBC W/AUTO DIFF WBC: CPT

## 2021-01-26 PROCEDURE — 99215 OFFICE O/P EST HI 40 MIN: CPT | Mod: PBBFAC,27 | Performed by: ALLERGY & IMMUNOLOGY

## 2021-01-26 PROCEDURE — 99999 PR PBB SHADOW E&M-EST. PATIENT-LVL V: ICD-10-PCS | Mod: PBBFAC,,, | Performed by: INTERNAL MEDICINE

## 2021-01-26 PROCEDURE — 99215 OFFICE O/P EST HI 40 MIN: CPT | Mod: S$PBB,,, | Performed by: INTERNAL MEDICINE

## 2021-01-26 RX ORDER — IPRATROPIUM BROMIDE 21 UG/1
2 SPRAY, METERED NASAL 3 TIMES DAILY
Qty: 20 ML | Refills: 5 | Status: ON HOLD | OUTPATIENT
Start: 2021-01-26 | End: 2022-09-13 | Stop reason: HOSPADM

## 2021-01-26 RX ORDER — PROPYLTHIOURACIL 50 MG/1
TABLET ORAL
Qty: 135 TABLET | Refills: 0 | Status: SHIPPED | OUTPATIENT
Start: 2021-01-26 | End: 2021-02-02

## 2021-01-27 LAB — IGE SERPL-ACNC: 263 IU/ML (ref 0–100)

## 2021-01-28 LAB
A FUMIGATUS IGE QN: 0.87 KU/L
ALLERGEN BOXELDER MAPLE TREE IGE: 0.43 KU/L
ALLERGEN CHAETOMIUM GLOBOSUM IGE: <0.1 KU/L
ALLERGEN MAPLE (BOX ELDER) CLASS: ABNORMAL
ALLERGEN MULBERRY CLASS: NORMAL
ALLERGEN MULBERRY TREE IGE: <0.1 KU/L
ALLERGEN WHITE ASH TREE IGE: 3.13 KU/L
ALLERGEN WHITE PINE TREE IGE: <0.1 KU/L
AMER SYCAMORE IGE QN: 0.38 KU/L
BAHIA GRASS IGE QN: 3.65 KU/L
BALD CYPRESS IGE QN: <0.1 KU/L
C HERBARUM IGE QN: <0.1 KU/L
C LUNATA IGE QN: 0.31 KU/L
CAT DANDER IGE QN: 11.6 KU/L
CHAETOMIUM GLOB. CLASS: NORMAL
COCKLEBUR IGE QN: 2.41 KU/L
COCKSFOOT IGE QN: 2.11 KU/L
COMMON RAGWEED IGE QN: 7.8 KU/L
COTTONWOOD IGE QN: 0.32 KU/L
D FARINAE IGE QN: 1.84 KU/L
D PTERONYSS IGE QN: 1.41 KU/L
DEPRECATED A FUMIGATUS IGE RAST QL: ABNORMAL
DEPRECATED BAHIA GRASS IGE RAST QL: ABNORMAL
DEPRECATED BALD CYPRESS IGE RAST QL: NORMAL
DEPRECATED C HERBARUM IGE RAST QL: NORMAL
DEPRECATED C LUNATA IGE RAST QL: ABNORMAL
DEPRECATED CAT DANDER IGE RAST QL: ABNORMAL
DEPRECATED COCKLEBUR IGE RAST QL: ABNORMAL
DEPRECATED COCKSFOOT IGE RAST QL: ABNORMAL
DEPRECATED COMMON RAGWEED IGE RAST QL: ABNORMAL
DEPRECATED COTTONWOOD IGE RAST QL: ABNORMAL
DEPRECATED D FARINAE IGE RAST QL: ABNORMAL
DEPRECATED D PTERONYSS IGE RAST QL: ABNORMAL
DEPRECATED DOG DANDER IGE RAST QL: ABNORMAL
DEPRECATED ELDER IGE RAST QL: ABNORMAL
DEPRECATED ENGL PLANTAIN IGE RAST QL: ABNORMAL
DEPRECATED GUM-TREE IGE RAST QL: NORMAL
DEPRECATED HACKBERRY TREE IGE RAST QL: NORMAL
DEPRECATED JOHNSON GRASS IGE RAST QL: ABNORMAL
DEPRECATED KENT BLUE GRASS IGE RAST QL: ABNORMAL
DEPRECATED MUGWORT IGE RAST QL: ABNORMAL
DEPRECATED NETTLE IGE RAST QL: NORMAL
DEPRECATED PECAN/HICK TREE IGE RAST QL: ABNORMAL
DEPRECATED PER RYE GRASS IGE RAST QL: ABNORMAL
DEPRECATED PRIVET IGE RAST QL: NORMAL
DEPRECATED RED TOP GRASS IGE RAST QL: ABNORMAL
DEPRECATED ROACH IGE RAST QL: ABNORMAL
DEPRECATED SALTWORT IGE RAST QL: ABNORMAL
DEPRECATED SHEEP SORREL IGE RAST QL: ABNORMAL
DEPRECATED SILVER BIRCH IGE RAST QL: ABNORMAL
DEPRECATED TIMOTHY IGE RAST QL: ABNORMAL
DEPRECATED WHITE OAK IGE RAST QL: ABNORMAL
DEPRECATED WILLOW IGE RAST QL: ABNORMAL
DOG DANDER IGE QN: 20.7 KU/L
ELDER IGE QN: 0.62 KU/L
ELM CEDAR CLASS: ABNORMAL
ELM CEDAR, IGE: 0.12 KU/L
ENGL PLANTAIN IGE QN: 0.32 KU/L
GUM-TREE IGE QN: <0.1 KU/L
HACKBERRY TREE IGE QN: <0.1 KU/L
JOHNSON GRASS IGE QN: 2.75 KU/L
KENT BLUE GRASS IGE QN: 3.53 KU/L
MUGWORT IGE QN: 0.27 KU/L
NETTLE IGE QN: <0.1 KU/L
PECAN/HICK TREE IGE QN: 0.98 KU/L
PER RYE GRASS IGE QN: 1.59 KU/L
PRIVET IGE QN: <0.1 KU/L
RED TOP GRASS IGE QN: 3.23 KU/L
ROACH IGE QN: 0.66 KU/L
SALTWORT IGE QN: 0.37 KU/L
SHEEP SORREL IGE QN: 0.29 KU/L
SILVER BIRCH IGE QN: 0.16 KU/L
STEMPHYLIUM HERBARUM CLASS: ABNORMAL
STEMPHYLLIUM, IGE: 1.27 KU/L
TIMOTHY IGE QN: 2.47 KU/L
WHITE ASH CLASS: ABNORMAL
WHITE OAK IGE QN: 0.33 KU/L
WHITE PINE CLASS: NORMAL
WILLOW IGE QN: 0.21 KU/L

## 2021-02-02 ENCOUNTER — HOSPITAL ENCOUNTER (OUTPATIENT)
Dept: RADIOLOGY | Facility: HOSPITAL | Age: 46
Discharge: HOME | End: 2021-02-02
Attending: HOSPITALIST
Payer: MEDICAID

## 2021-02-02 ENCOUNTER — TELEPHONE (OUTPATIENT)
Dept: ENDOCRINOLOGY | Facility: CLINIC | Age: 46
End: 2021-02-02

## 2021-02-02 ENCOUNTER — PATIENT MESSAGE (OUTPATIENT)
Dept: ALLERGY | Facility: CLINIC | Age: 46
End: 2021-02-02

## 2021-02-02 ENCOUNTER — OFFICE VISIT (OUTPATIENT)
Dept: ENDOCRINOLOGY | Facility: CLINIC | Age: 46
End: 2021-02-02
Payer: MEDICAID

## 2021-02-02 VITALS
SYSTOLIC BLOOD PRESSURE: 144 MMHG | TEMPERATURE: 99 F | HEART RATE: 86 BPM | WEIGHT: 196.31 LBS | BODY MASS INDEX: 31.69 KG/M2 | DIASTOLIC BLOOD PRESSURE: 76 MMHG

## 2021-02-02 DIAGNOSIS — I10 ESSENTIAL HYPERTENSION: ICD-10-CM

## 2021-02-02 DIAGNOSIS — E05.90 HYPERTHYROIDISM: ICD-10-CM

## 2021-02-02 DIAGNOSIS — E11.9 TYPE 2 DIABETES MELLITUS WITHOUT COMPLICATION, WITHOUT LONG-TERM CURRENT USE OF INSULIN: Chronic | ICD-10-CM

## 2021-02-02 DIAGNOSIS — E05.90 HYPERTHYROIDISM WITHOUT CRISIS: ICD-10-CM

## 2021-02-02 DIAGNOSIS — R13.19 OTHER DYSPHAGIA: ICD-10-CM

## 2021-02-02 DIAGNOSIS — E05.90 HYPERTHYROIDISM: Primary | Chronic | ICD-10-CM

## 2021-02-02 DIAGNOSIS — E05.00 GRAVES DISEASE: ICD-10-CM

## 2021-02-02 DIAGNOSIS — F17.200 TOBACCO USE DISORDER: ICD-10-CM

## 2021-02-02 DIAGNOSIS — R00.2 PALPITATIONS: ICD-10-CM

## 2021-02-02 PROCEDURE — 99215 OFFICE O/P EST HI 40 MIN: CPT | Mod: PBBFAC,25,PN | Performed by: HOSPITALIST

## 2021-02-02 PROCEDURE — 76536 US SOFT TISSUE HEAD NECK THYROID: ICD-10-PCS | Mod: 26,,, | Performed by: RADIOLOGY

## 2021-02-02 PROCEDURE — 76536 US EXAM OF HEAD AND NECK: CPT | Mod: TC

## 2021-02-02 PROCEDURE — 76536 US EXAM OF HEAD AND NECK: CPT | Mod: 26,,, | Performed by: RADIOLOGY

## 2021-02-02 PROCEDURE — 99999 PR PBB SHADOW E&M-EST. PATIENT-LVL V: ICD-10-PCS | Mod: PBBFAC,,, | Performed by: HOSPITALIST

## 2021-02-02 PROCEDURE — 99214 OFFICE O/P EST MOD 30 MIN: CPT | Mod: S$PBB,,, | Performed by: HOSPITALIST

## 2021-02-02 PROCEDURE — 99214 PR OFFICE/OUTPT VISIT, EST, LEVL IV, 30-39 MIN: ICD-10-PCS | Mod: S$PBB,,, | Performed by: HOSPITALIST

## 2021-02-02 PROCEDURE — 99999 PR PBB SHADOW E&M-EST. PATIENT-LVL V: CPT | Mod: PBBFAC,,, | Performed by: HOSPITALIST

## 2021-02-02 RX ORDER — PROPYLTHIOURACIL 50 MG/1
100 TABLET ORAL EVERY 8 HOURS
Qty: 180 TABLET | Refills: 11 | Status: SHIPPED | OUTPATIENT
Start: 2021-02-02 | End: 2021-03-26

## 2021-02-02 RX ORDER — PROPYLTHIOURACIL 50 MG/1
100 TABLET ORAL EVERY 8 HOURS
Qty: 180 TABLET | Refills: 11 | Status: SHIPPED | OUTPATIENT
Start: 2021-02-02 | End: 2021-02-02

## 2021-02-03 ENCOUNTER — PATIENT MESSAGE (OUTPATIENT)
Dept: ENDOCRINOLOGY | Facility: CLINIC | Age: 46
End: 2021-02-03

## 2021-03-03 ENCOUNTER — TELEPHONE (OUTPATIENT)
Dept: INTERNAL MEDICINE | Facility: CLINIC | Age: 46
End: 2021-03-03

## 2021-03-03 RX ORDER — TRAMADOL HYDROCHLORIDE 50 MG/1
50 TABLET ORAL EVERY 6 HOURS PRN
Qty: 28 TABLET | Refills: 0 | Status: SHIPPED | OUTPATIENT
Start: 2021-03-03 | End: 2021-03-10

## 2021-03-04 ENCOUNTER — TELEPHONE (OUTPATIENT)
Dept: INTERNAL MEDICINE | Facility: CLINIC | Age: 46
End: 2021-03-04

## 2021-03-08 ENCOUNTER — HOSPITAL ENCOUNTER (OUTPATIENT)
Dept: RADIOLOGY | Facility: HOSPITAL | Age: 46
Discharge: HOME | End: 2021-03-08
Attending: HOSPITALIST
Payer: MEDICAID

## 2021-03-08 DIAGNOSIS — E05.90 HYPERTHYROIDISM: ICD-10-CM

## 2021-03-08 PROCEDURE — 78014 THYROID IMAGING W/BLOOD FLOW: CPT | Mod: TC

## 2021-03-08 PROCEDURE — 78014 THYROID IMAGING W/BLOOD FLOW: CPT | Mod: 26,,, | Performed by: RADIOLOGY

## 2021-03-08 PROCEDURE — 78014 NM THYROID UPTAKE AND SCAN: ICD-10-PCS | Mod: 26,,, | Performed by: RADIOLOGY

## 2021-03-09 ENCOUNTER — TELEPHONE (OUTPATIENT)
Dept: ENDOCRINOLOGY | Facility: CLINIC | Age: 46
End: 2021-03-09

## 2021-03-09 ENCOUNTER — HOSPITAL ENCOUNTER (OUTPATIENT)
Dept: RADIOLOGY | Facility: HOSPITAL | Age: 46
Discharge: HOME | End: 2021-03-09
Attending: HOSPITALIST
Payer: MEDICAID

## 2021-03-10 ENCOUNTER — TELEPHONE (OUTPATIENT)
Dept: PREADMISSION TESTING | Facility: HOSPITAL | Age: 46
End: 2021-03-10

## 2021-03-10 ENCOUNTER — OFFICE VISIT (OUTPATIENT)
Dept: OTOLARYNGOLOGY | Facility: CLINIC | Age: 46
End: 2021-03-10
Payer: MEDICAID

## 2021-03-10 VITALS
DIASTOLIC BLOOD PRESSURE: 81 MMHG | BODY MASS INDEX: 30.85 KG/M2 | HEART RATE: 85 BPM | SYSTOLIC BLOOD PRESSURE: 133 MMHG | WEIGHT: 191.13 LBS

## 2021-03-10 DIAGNOSIS — E05.90 HYPERTHYROIDISM: Chronic | ICD-10-CM

## 2021-03-10 DIAGNOSIS — E05.00 GRAVES DISEASE: ICD-10-CM

## 2021-03-10 DIAGNOSIS — R13.19 OTHER DYSPHAGIA: ICD-10-CM

## 2021-03-10 PROCEDURE — 99214 PR OFFICE/OUTPT VISIT, EST, LEVL IV, 30-39 MIN: ICD-10-PCS | Mod: 25,S$PBB,, | Performed by: OTOLARYNGOLOGY

## 2021-03-10 PROCEDURE — 99215 OFFICE O/P EST HI 40 MIN: CPT | Mod: PBBFAC | Performed by: OTOLARYNGOLOGY

## 2021-03-10 PROCEDURE — 99999 PR PBB SHADOW E&M-EST. PATIENT-LVL V: ICD-10-PCS | Mod: PBBFAC,,, | Performed by: OTOLARYNGOLOGY

## 2021-03-10 PROCEDURE — 99214 OFFICE O/P EST MOD 30 MIN: CPT | Mod: 25,S$PBB,, | Performed by: OTOLARYNGOLOGY

## 2021-03-10 PROCEDURE — 31575 PR LARYNGOSCOPY, FLEXIBLE; DIAGNOSTIC: ICD-10-PCS | Mod: S$PBB,,, | Performed by: OTOLARYNGOLOGY

## 2021-03-10 PROCEDURE — 31575 DIAGNOSTIC LARYNGOSCOPY: CPT | Mod: PBBFAC | Performed by: OTOLARYNGOLOGY

## 2021-03-10 PROCEDURE — 31575 DIAGNOSTIC LARYNGOSCOPY: CPT | Mod: S$PBB,,, | Performed by: OTOLARYNGOLOGY

## 2021-03-10 PROCEDURE — 99999 PR PBB SHADOW E&M-EST. PATIENT-LVL V: CPT | Mod: PBBFAC,,, | Performed by: OTOLARYNGOLOGY

## 2021-03-10 RX ORDER — POTASSIUM IODIDE 1 G/ML
SOLUTION ORAL
Qty: 30 ML | Refills: 1 | Status: SHIPPED | OUTPATIENT
Start: 2021-03-10 | End: 2021-03-11 | Stop reason: SDUPTHER

## 2021-03-11 RX ORDER — POTASSIUM IODIDE 1 G/ML
SOLUTION ORAL
Qty: 30 ML | Refills: 1 | Status: SHIPPED | OUTPATIENT
Start: 2021-03-11 | End: 2021-03-26

## 2021-03-16 ENCOUNTER — TELEPHONE (OUTPATIENT)
Dept: OTOLARYNGOLOGY | Facility: CLINIC | Age: 46
End: 2021-03-16

## 2021-03-17 ENCOUNTER — TELEPHONE (OUTPATIENT)
Dept: PULMONOLOGY | Facility: CLINIC | Age: 46
End: 2021-03-17

## 2021-03-17 ENCOUNTER — OFFICE VISIT (OUTPATIENT)
Dept: SLEEP MEDICINE | Facility: CLINIC | Age: 46
End: 2021-03-17
Payer: MEDICAID

## 2021-03-17 VITALS
HEIGHT: 66 IN | HEART RATE: 90 BPM | OXYGEN SATURATION: 96 % | BODY MASS INDEX: 30.65 KG/M2 | WEIGHT: 190.69 LBS | RESPIRATION RATE: 17 BRPM | DIASTOLIC BLOOD PRESSURE: 80 MMHG | SYSTOLIC BLOOD PRESSURE: 126 MMHG

## 2021-03-17 DIAGNOSIS — F17.200 TOBACCO USE DISORDER: Primary | ICD-10-CM

## 2021-03-17 DIAGNOSIS — J32.4 CHRONIC PANSINUSITIS: ICD-10-CM

## 2021-03-17 DIAGNOSIS — J45.20 MILD INTERMITTENT ASTHMA WITHOUT COMPLICATION: ICD-10-CM

## 2021-03-17 PROCEDURE — 99999 PR PBB SHADOW E&M-EST. PATIENT-LVL V: CPT | Mod: PBBFAC,,, | Performed by: NURSE PRACTITIONER

## 2021-03-17 PROCEDURE — 99214 OFFICE O/P EST MOD 30 MIN: CPT | Mod: S$PBB,,, | Performed by: NURSE PRACTITIONER

## 2021-03-17 PROCEDURE — 99999 PR PBB SHADOW E&M-EST. PATIENT-LVL V: ICD-10-PCS | Mod: PBBFAC,,, | Performed by: NURSE PRACTITIONER

## 2021-03-17 PROCEDURE — 99215 OFFICE O/P EST HI 40 MIN: CPT | Mod: PBBFAC | Performed by: NURSE PRACTITIONER

## 2021-03-17 PROCEDURE — 99214 PR OFFICE/OUTPT VISIT, EST, LEVL IV, 30-39 MIN: ICD-10-PCS | Mod: S$PBB,,, | Performed by: NURSE PRACTITIONER

## 2021-03-18 ENCOUNTER — TELEPHONE (OUTPATIENT)
Dept: PREADMISSION TESTING | Facility: HOSPITAL | Age: 46
End: 2021-03-18

## 2021-03-18 DIAGNOSIS — Z01.818 PRE-OP TESTING: Primary | ICD-10-CM

## 2021-03-19 ENCOUNTER — HOSPITAL ENCOUNTER (OUTPATIENT)
Dept: CARDIOLOGY | Facility: CLINIC | Age: 46
Discharge: HOME | End: 2021-03-19
Payer: MEDICAID

## 2021-03-19 ENCOUNTER — TELEPHONE (OUTPATIENT)
Dept: CARDIOLOGY | Facility: CLINIC | Age: 46
End: 2021-03-19

## 2021-03-19 ENCOUNTER — HOSPITAL ENCOUNTER (OUTPATIENT)
Dept: PREADMISSION TESTING | Facility: HOSPITAL | Age: 46
Discharge: HOME | End: 2021-03-19
Attending: ANESTHESIOLOGY
Payer: MEDICAID

## 2021-03-19 ENCOUNTER — ANESTHESIA EVENT (OUTPATIENT)
Dept: SURGERY | Facility: HOSPITAL | Age: 46
End: 2021-03-19
Payer: MEDICAID

## 2021-03-19 ENCOUNTER — OFFICE VISIT (OUTPATIENT)
Dept: INTERNAL MEDICINE | Facility: CLINIC | Age: 46
End: 2021-03-19
Payer: MEDICAID

## 2021-03-19 VITALS
DIASTOLIC BLOOD PRESSURE: 74 MMHG | TEMPERATURE: 98 F | SYSTOLIC BLOOD PRESSURE: 152 MMHG | BODY MASS INDEX: 30.78 KG/M2 | HEIGHT: 66 IN | OXYGEN SATURATION: 99 % | HEART RATE: 86 BPM

## 2021-03-19 DIAGNOSIS — E11.69 HYPERLIPIDEMIA ASSOCIATED WITH TYPE 2 DIABETES MELLITUS: Chronic | ICD-10-CM

## 2021-03-19 DIAGNOSIS — E11.59 HYPERTENSION ASSOCIATED WITH DIABETES: Chronic | ICD-10-CM

## 2021-03-19 DIAGNOSIS — Z82.49 FAMILY HISTORY OF THROMBOSIS: ICD-10-CM

## 2021-03-19 DIAGNOSIS — R13.10 DYSPHAGIA, UNSPECIFIED TYPE: ICD-10-CM

## 2021-03-19 DIAGNOSIS — Z86.14 HISTORY OF MRSA INFECTION: ICD-10-CM

## 2021-03-19 DIAGNOSIS — Q21.12 PFO (PATENT FORAMEN OVALE): ICD-10-CM

## 2021-03-19 DIAGNOSIS — Z79.02 LONG TERM (CURRENT) USE OF ANTITHROMBOTICS/ANTIPLATELETS: ICD-10-CM

## 2021-03-19 DIAGNOSIS — E05.90 HYPERTHYROIDISM: Chronic | ICD-10-CM

## 2021-03-19 DIAGNOSIS — K21.9 GASTROESOPHAGEAL REFLUX DISEASE, UNSPECIFIED WHETHER ESOPHAGITIS PRESENT: ICD-10-CM

## 2021-03-19 DIAGNOSIS — R19.00 PALPABLE ABDOMINAL MASS: ICD-10-CM

## 2021-03-19 DIAGNOSIS — F17.200 TOBACCO USE DISORDER: ICD-10-CM

## 2021-03-19 DIAGNOSIS — I15.2 HYPERTENSION ASSOCIATED WITH DIABETES: Chronic | ICD-10-CM

## 2021-03-19 DIAGNOSIS — J45.20 MILD INTERMITTENT ASTHMA WITHOUT COMPLICATION: Chronic | ICD-10-CM

## 2021-03-19 DIAGNOSIS — G47.30 SLEEP APNEA, UNSPECIFIED TYPE: ICD-10-CM

## 2021-03-19 DIAGNOSIS — R00.2 HEART PALPITATIONS: ICD-10-CM

## 2021-03-19 DIAGNOSIS — E78.5 HYPERLIPIDEMIA ASSOCIATED WITH TYPE 2 DIABETES MELLITUS: Chronic | ICD-10-CM

## 2021-03-19 DIAGNOSIS — Z01.818 PRE-OP TESTING: ICD-10-CM

## 2021-03-19 DIAGNOSIS — D64.9 ANEMIA, UNSPECIFIED TYPE: ICD-10-CM

## 2021-03-19 DIAGNOSIS — Z01.818 PREOP EXAMINATION: Primary | ICD-10-CM

## 2021-03-19 DIAGNOSIS — D21.9 FIBROID: ICD-10-CM

## 2021-03-19 DIAGNOSIS — F41.9 ANXIETY: Chronic | ICD-10-CM

## 2021-03-19 PROBLEM — Z86.19 HISTORY OF STAPH INFECTION: Status: ACTIVE | Noted: 2021-03-19

## 2021-03-19 PROCEDURE — 99214 PR OFFICE/OUTPT VISIT, EST, LEVL IV, 30-39 MIN: ICD-10-PCS | Mod: S$PBB,,, | Performed by: HOSPITALIST

## 2021-03-19 PROCEDURE — 93010 ELECTROCARDIOGRAM REPORT: CPT | Mod: S$PBB,,, | Performed by: INTERNAL MEDICINE

## 2021-03-19 PROCEDURE — 99999 PR PBB SHADOW E&M-EST. PATIENT-LVL II: ICD-10-PCS | Mod: PBBFAC,,, | Performed by: HOSPITALIST

## 2021-03-19 PROCEDURE — 99212 OFFICE O/P EST SF 10 MIN: CPT | Mod: PBBFAC,25 | Performed by: HOSPITALIST

## 2021-03-19 PROCEDURE — 99214 OFFICE O/P EST MOD 30 MIN: CPT | Mod: S$PBB,,, | Performed by: HOSPITALIST

## 2021-03-19 PROCEDURE — 99999 PR PBB SHADOW E&M-EST. PATIENT-LVL II: CPT | Mod: PBBFAC,,, | Performed by: HOSPITALIST

## 2021-03-19 PROCEDURE — 93010 EKG 12-LEAD: ICD-10-PCS | Mod: S$PBB,,, | Performed by: INTERNAL MEDICINE

## 2021-03-19 PROCEDURE — 93005 ELECTROCARDIOGRAM TRACING: CPT | Mod: PBBFAC | Performed by: INTERNAL MEDICINE

## 2021-03-19 RX ORDER — ACETAMINOPHEN 500 MG
500 TABLET ORAL DAILY PRN
COMMUNITY
End: 2021-03-26

## 2021-03-19 RX ORDER — MUPIROCIN 20 MG/G
OINTMENT TOPICAL DAILY
Qty: 30 G | Refills: 0 | Status: SHIPPED | OUTPATIENT
Start: 2021-03-23 | End: 2021-03-28

## 2021-03-20 ENCOUNTER — LAB VISIT (OUTPATIENT)
Dept: LAB | Facility: HOSPITAL | Age: 46
End: 2021-03-20
Attending: HOSPITALIST
Payer: MEDICAID

## 2021-03-20 DIAGNOSIS — R19.00 PALPABLE ABDOMINAL MASS: ICD-10-CM

## 2021-03-20 DIAGNOSIS — Z86.14 HISTORY OF MRSA INFECTION: ICD-10-CM

## 2021-03-20 LAB
ALBUMIN SERPL BCP-MCNC: 3.2 G/DL (ref 3.5–5.2)
ALP SERPL-CCNC: 103 U/L (ref 55–135)
ALT SERPL W/O P-5'-P-CCNC: 21 U/L (ref 10–44)
AST SERPL-CCNC: 24 U/L (ref 10–40)
BASOPHILS # BLD AUTO: 0.01 K/UL (ref 0–0.2)
BASOPHILS NFR BLD: 0.2 % (ref 0–1.9)
BILIRUB DIRECT SERPL-MCNC: 0.2 MG/DL (ref 0.1–0.3)
BILIRUB SERPL-MCNC: 0.3 MG/DL (ref 0.1–1)
DIFFERENTIAL METHOD: ABNORMAL
EOSINOPHIL # BLD AUTO: 0.3 K/UL (ref 0–0.5)
EOSINOPHIL NFR BLD: 6.4 % (ref 0–8)
ERYTHROCYTE [DISTWIDTH] IN BLOOD BY AUTOMATED COUNT: 15.9 % (ref 11.5–14.5)
ESTIMATED AVG GLUCOSE: 108 MG/DL (ref 68–131)
HBA1C MFR BLD: 5.4 % (ref 4–5.6)
HCT VFR BLD AUTO: 36.4 % (ref 37–48.5)
HGB BLD-MCNC: 11.4 G/DL (ref 12–16)
IMM GRANULOCYTES # BLD AUTO: 0 K/UL (ref 0–0.04)
IMM GRANULOCYTES NFR BLD AUTO: 0 % (ref 0–0.5)
INR PPP: 0.9 (ref 0.8–1.2)
LYMPHOCYTES # BLD AUTO: 1.2 K/UL (ref 1–4.8)
LYMPHOCYTES NFR BLD: 29.6 % (ref 18–48)
MCH RBC QN AUTO: 24.2 PG (ref 27–31)
MCHC RBC AUTO-ENTMCNC: 31.3 G/DL (ref 32–36)
MCV RBC AUTO: 77 FL (ref 82–98)
MONOCYTES # BLD AUTO: 0.5 K/UL (ref 0.3–1)
MONOCYTES NFR BLD: 11.9 % (ref 4–15)
NEUTROPHILS # BLD AUTO: 2.1 K/UL (ref 1.8–7.7)
NEUTROPHILS NFR BLD: 51.9 % (ref 38–73)
NRBC BLD-RTO: 0 /100 WBC
PLATELET # BLD AUTO: 152 K/UL (ref 150–350)
PMV BLD AUTO: ABNORMAL FL (ref 9.2–12.9)
PROT SERPL-MCNC: 6.4 G/DL (ref 6–8.4)
PROTHROMBIN TIME: 9.9 SEC (ref 9–12.5)
RBC # BLD AUTO: 4.71 M/UL (ref 4–5.4)
WBC # BLD AUTO: 4.05 K/UL (ref 3.9–12.7)

## 2021-03-20 PROCEDURE — 80076 HEPATIC FUNCTION PANEL: CPT | Performed by: HOSPITALIST

## 2021-03-20 PROCEDURE — 83036 HEMOGLOBIN GLYCOSYLATED A1C: CPT | Performed by: HOSPITALIST

## 2021-03-20 PROCEDURE — 85025 COMPLETE CBC W/AUTO DIFF WBC: CPT | Performed by: HOSPITALIST

## 2021-03-20 PROCEDURE — 85610 PROTHROMBIN TIME: CPT | Performed by: HOSPITALIST

## 2021-03-20 PROCEDURE — 36415 COLL VENOUS BLD VENIPUNCTURE: CPT | Performed by: HOSPITALIST

## 2021-03-22 ENCOUNTER — LAB VISIT (OUTPATIENT)
Dept: OTOLARYNGOLOGY | Facility: CLINIC | Age: 46
End: 2021-03-22
Payer: MEDICAID

## 2021-03-22 ENCOUNTER — TELEPHONE (OUTPATIENT)
Dept: INTERNAL MEDICINE | Facility: CLINIC | Age: 46
End: 2021-03-22

## 2021-03-22 ENCOUNTER — TELEPHONE (OUTPATIENT)
Dept: RADIOLOGY | Facility: HOSPITAL | Age: 46
End: 2021-03-22

## 2021-03-22 DIAGNOSIS — Z01.818 PRE-OP TESTING: ICD-10-CM

## 2021-03-22 PROCEDURE — U0005 INFEC AGEN DETEC AMPLI PROBE: HCPCS | Performed by: OTOLARYNGOLOGY

## 2021-03-22 PROCEDURE — U0003 INFECTIOUS AGENT DETECTION BY NUCLEIC ACID (DNA OR RNA); SEVERE ACUTE RESPIRATORY SYNDROME CORONAVIRUS 2 (SARS-COV-2) (CORONAVIRUS DISEASE [COVID-19]), AMPLIFIED PROBE TECHNIQUE, MAKING USE OF HIGH THROUGHPUT TECHNOLOGIES AS DESCRIBED BY CMS-2020-01-R: HCPCS | Performed by: OTOLARYNGOLOGY

## 2021-03-23 ENCOUNTER — TELEPHONE (OUTPATIENT)
Dept: PREADMISSION TESTING | Facility: HOSPITAL | Age: 46
End: 2021-03-23

## 2021-03-23 ENCOUNTER — HOSPITAL ENCOUNTER (OUTPATIENT)
Dept: RADIOLOGY | Facility: HOSPITAL | Age: 46
Discharge: HOME | End: 2021-03-23
Attending: HOSPITALIST
Payer: MEDICAID

## 2021-03-23 DIAGNOSIS — R19.00 PALPABLE ABDOMINAL MASS: ICD-10-CM

## 2021-03-23 LAB — SARS-COV-2 RNA RESP QL NAA+PROBE: NOT DETECTED

## 2021-03-23 PROCEDURE — 76700 US EXAM ABDOM COMPLETE: CPT | Mod: TC

## 2021-03-23 PROCEDURE — 76705 ECHO EXAM OF ABDOMEN: CPT | Mod: TC

## 2021-03-23 PROCEDURE — 76705 ECHO EXAM OF ABDOMEN: CPT | Mod: 26,59,, | Performed by: RADIOLOGY

## 2021-03-23 PROCEDURE — 76700 US ABDOMEN COMPLETE: ICD-10-PCS | Mod: 26,,, | Performed by: RADIOLOGY

## 2021-03-23 PROCEDURE — 76700 US EXAM ABDOM COMPLETE: CPT | Mod: 26,,, | Performed by: RADIOLOGY

## 2021-03-23 PROCEDURE — 76705 US SOFT TISSUE, ABDOMEN: ICD-10-PCS | Mod: 26,59,, | Performed by: RADIOLOGY

## 2021-03-24 ENCOUNTER — OFFICE VISIT (OUTPATIENT)
Dept: CARDIOLOGY | Facility: CLINIC | Age: 46
End: 2021-03-24
Payer: MEDICAID

## 2021-03-24 ENCOUNTER — LAB VISIT (OUTPATIENT)
Dept: LAB | Facility: HOSPITAL | Age: 46
End: 2021-03-24
Attending: FAMILY MEDICINE
Payer: MEDICAID

## 2021-03-24 VITALS
SYSTOLIC BLOOD PRESSURE: 140 MMHG | HEART RATE: 79 BPM | OXYGEN SATURATION: 98 % | WEIGHT: 197.31 LBS | BODY MASS INDEX: 31.71 KG/M2 | RESPIRATION RATE: 16 BRPM | HEIGHT: 66 IN | DIASTOLIC BLOOD PRESSURE: 70 MMHG

## 2021-03-24 DIAGNOSIS — J45.20 MILD INTERMITTENT ASTHMA WITHOUT COMPLICATION: Chronic | ICD-10-CM

## 2021-03-24 DIAGNOSIS — Z01.818 PRE-OP TESTING: ICD-10-CM

## 2021-03-24 DIAGNOSIS — J45.20 MILD INTERMITTENT ASTHMA WITHOUT COMPLICATION: ICD-10-CM

## 2021-03-24 DIAGNOSIS — I10 ESSENTIAL HYPERTENSION: ICD-10-CM

## 2021-03-24 DIAGNOSIS — R00.2 HEART PALPITATIONS: ICD-10-CM

## 2021-03-24 DIAGNOSIS — I15.2 HYPERTENSION ASSOCIATED WITH DIABETES: Chronic | ICD-10-CM

## 2021-03-24 DIAGNOSIS — F17.200 TOBACCO USE DISORDER: ICD-10-CM

## 2021-03-24 DIAGNOSIS — Q21.12 PFO (PATENT FORAMEN OVALE): ICD-10-CM

## 2021-03-24 DIAGNOSIS — E78.5 HYPERLIPIDEMIA ASSOCIATED WITH TYPE 2 DIABETES MELLITUS: Chronic | ICD-10-CM

## 2021-03-24 DIAGNOSIS — Z01.810 PREOP CARDIOVASCULAR EXAM: Primary | ICD-10-CM

## 2021-03-24 DIAGNOSIS — G47.30 SLEEP APNEA, UNSPECIFIED TYPE: ICD-10-CM

## 2021-03-24 DIAGNOSIS — E11.59 HYPERTENSION ASSOCIATED WITH DIABETES: Chronic | ICD-10-CM

## 2021-03-24 DIAGNOSIS — E11.69 HYPERLIPIDEMIA ASSOCIATED WITH TYPE 2 DIABETES MELLITUS: Chronic | ICD-10-CM

## 2021-03-24 PROBLEM — D21.9 FIBROID: Status: ACTIVE | Noted: 2021-03-19

## 2021-03-24 LAB
ANION GAP SERPL CALC-SCNC: 4 MMOL/L (ref 8–16)
BUN SERPL-MCNC: 7 MG/DL (ref 6–20)
CALCIUM SERPL-MCNC: 8.4 MG/DL (ref 8.7–10.5)
CHLORIDE SERPL-SCNC: 108 MMOL/L (ref 95–110)
CO2 SERPL-SCNC: 28 MMOL/L (ref 23–29)
CREAT SERPL-MCNC: 0.6 MG/DL (ref 0.5–1.4)
EST. GFR  (AFRICAN AMERICAN): >60 ML/MIN/1.73 M^2
EST. GFR  (NON AFRICAN AMERICAN): >60 ML/MIN/1.73 M^2
GLUCOSE SERPL-MCNC: 90 MG/DL (ref 70–110)
POTASSIUM SERPL-SCNC: 4 MMOL/L (ref 3.5–5.1)
PTH-INTACT SERPL-MCNC: 51 PG/ML (ref 9–77)
SODIUM SERPL-SCNC: 140 MMOL/L (ref 136–145)

## 2021-03-24 PROCEDURE — 99999 PR PBB SHADOW E&M-EST. PATIENT-LVL V: ICD-10-PCS | Mod: PBBFAC,,, | Performed by: INTERNAL MEDICINE

## 2021-03-24 PROCEDURE — 80048 BASIC METABOLIC PNL TOTAL CA: CPT | Performed by: ANESTHESIOLOGY

## 2021-03-24 PROCEDURE — 83970 ASSAY OF PARATHORMONE: CPT | Performed by: ANESTHESIOLOGY

## 2021-03-24 PROCEDURE — 99215 OFFICE O/P EST HI 40 MIN: CPT | Mod: PBBFAC | Performed by: INTERNAL MEDICINE

## 2021-03-24 PROCEDURE — 99214 OFFICE O/P EST MOD 30 MIN: CPT | Mod: S$PBB,,, | Performed by: INTERNAL MEDICINE

## 2021-03-24 PROCEDURE — 36415 COLL VENOUS BLD VENIPUNCTURE: CPT | Performed by: ANESTHESIOLOGY

## 2021-03-24 PROCEDURE — 99999 PR PBB SHADOW E&M-EST. PATIENT-LVL V: CPT | Mod: PBBFAC,,, | Performed by: INTERNAL MEDICINE

## 2021-03-24 PROCEDURE — 99214 PR OFFICE/OUTPT VISIT, EST, LEVL IV, 30-39 MIN: ICD-10-PCS | Mod: S$PBB,,, | Performed by: INTERNAL MEDICINE

## 2021-03-24 RX ORDER — ALBUTEROL SULFATE 90 UG/1
2 AEROSOL, METERED RESPIRATORY (INHALATION) EVERY 6 HOURS PRN
Qty: 18 G | Refills: 0 | Status: ON HOLD | OUTPATIENT
Start: 2021-03-24 | End: 2021-03-25

## 2021-03-25 ENCOUNTER — PATIENT MESSAGE (OUTPATIENT)
Dept: INTERNAL MEDICINE | Facility: CLINIC | Age: 46
End: 2021-03-25

## 2021-03-25 ENCOUNTER — HOSPITAL ENCOUNTER (OUTPATIENT)
Facility: HOSPITAL | Age: 46
Discharge: HOME | End: 2021-03-26
Attending: OTOLARYNGOLOGY | Admitting: OTOLARYNGOLOGY
Payer: MEDICAID

## 2021-03-25 ENCOUNTER — ANESTHESIA (OUTPATIENT)
Dept: SURGERY | Facility: HOSPITAL | Age: 46
End: 2021-03-25
Payer: MEDICAID

## 2021-03-25 DIAGNOSIS — E78.5 HYPERLIPIDEMIA ASSOCIATED WITH TYPE 2 DIABETES MELLITUS: Chronic | ICD-10-CM

## 2021-03-25 DIAGNOSIS — E11.69 HYPERLIPIDEMIA ASSOCIATED WITH TYPE 2 DIABETES MELLITUS: Chronic | ICD-10-CM

## 2021-03-25 DIAGNOSIS — E05.00 GRAVES DISEASE: Primary | ICD-10-CM

## 2021-03-25 LAB
ABO + RH BLD: NORMAL
B-HCG UR QL: NEGATIVE
BLD GP AB SCN CELLS X3 SERPL QL: NORMAL
CA-I BLDV-SCNC: 1.01 MMOL/L (ref 1.06–1.42)
CTP QC/QA: YES
POCT GLUCOSE: 130 MG/DL (ref 70–110)
POCT GLUCOSE: 94 MG/DL (ref 70–110)
PTH-INTACT SERPL-MCNC: 91 PG/ML (ref 9–77)

## 2021-03-25 PROCEDURE — 63600175 PHARM REV CODE 636 W HCPCS: Performed by: STUDENT IN AN ORGANIZED HEALTH CARE EDUCATION/TRAINING PROGRAM

## 2021-03-25 PROCEDURE — 71000016 HC POSTOP RECOV ADDL HR: Performed by: OTOLARYNGOLOGY

## 2021-03-25 PROCEDURE — 25000242 PHARM REV CODE 250 ALT 637 W/ HCPCS: Performed by: NURSE ANESTHETIST, CERTIFIED REGISTERED

## 2021-03-25 PROCEDURE — D9220A PRA ANESTHESIA: Mod: CRNA,,, | Performed by: NURSE ANESTHETIST, CERTIFIED REGISTERED

## 2021-03-25 PROCEDURE — 36415 COLL VENOUS BLD VENIPUNCTURE: CPT | Performed by: STUDENT IN AN ORGANIZED HEALTH CARE EDUCATION/TRAINING PROGRAM

## 2021-03-25 PROCEDURE — 60240 REMOVAL OF THYROID: CPT | Mod: ,,, | Performed by: OTOLARYNGOLOGY

## 2021-03-25 PROCEDURE — 27201423 OPTIME MED/SURG SUP & DEVICES STERILE SUPPLY: Performed by: OTOLARYNGOLOGY

## 2021-03-25 PROCEDURE — 88307 PR  SURG PATH,LEVEL V: ICD-10-PCS | Mod: 26,,, | Performed by: STUDENT IN AN ORGANIZED HEALTH CARE EDUCATION/TRAINING PROGRAM

## 2021-03-25 PROCEDURE — 60240 PR THYROIDECTOMY: ICD-10-PCS | Mod: ,,, | Performed by: OTOLARYNGOLOGY

## 2021-03-25 PROCEDURE — 81025 URINE PREGNANCY TEST: CPT | Performed by: OTOLARYNGOLOGY

## 2021-03-25 PROCEDURE — 63600175 PHARM REV CODE 636 W HCPCS: Performed by: NURSE ANESTHETIST, CERTIFIED REGISTERED

## 2021-03-25 PROCEDURE — 25000003 PHARM REV CODE 250: Performed by: OTOLARYNGOLOGY

## 2021-03-25 PROCEDURE — 71000015 HC POSTOP RECOV 1ST HR: Performed by: OTOLARYNGOLOGY

## 2021-03-25 PROCEDURE — 82330 ASSAY OF CALCIUM: CPT | Mod: 91 | Performed by: STUDENT IN AN ORGANIZED HEALTH CARE EDUCATION/TRAINING PROGRAM

## 2021-03-25 PROCEDURE — 88307 TISSUE EXAM BY PATHOLOGIST: CPT | Performed by: STUDENT IN AN ORGANIZED HEALTH CARE EDUCATION/TRAINING PROGRAM

## 2021-03-25 PROCEDURE — 00320 ANES ALL PX NECK NOS 1YR/>: CPT | Performed by: OTOLARYNGOLOGY

## 2021-03-25 PROCEDURE — 63600175 PHARM REV CODE 636 W HCPCS: Performed by: ANESTHESIOLOGY

## 2021-03-25 PROCEDURE — 25000003 PHARM REV CODE 250: Performed by: STUDENT IN AN ORGANIZED HEALTH CARE EDUCATION/TRAINING PROGRAM

## 2021-03-25 PROCEDURE — 82962 GLUCOSE BLOOD TEST: CPT | Performed by: OTOLARYNGOLOGY

## 2021-03-25 PROCEDURE — 71000033 HC RECOVERY, INTIAL HOUR: Performed by: OTOLARYNGOLOGY

## 2021-03-25 PROCEDURE — 37000009 HC ANESTHESIA EA ADD 15 MINS: Performed by: OTOLARYNGOLOGY

## 2021-03-25 PROCEDURE — C1729 CATH, DRAINAGE: HCPCS | Performed by: OTOLARYNGOLOGY

## 2021-03-25 PROCEDURE — 37000008 HC ANESTHESIA 1ST 15 MINUTES: Performed by: OTOLARYNGOLOGY

## 2021-03-25 PROCEDURE — D9220A PRA ANESTHESIA: ICD-10-PCS | Mod: CRNA,,, | Performed by: NURSE ANESTHETIST, CERTIFIED REGISTERED

## 2021-03-25 PROCEDURE — 83970 ASSAY OF PARATHORMONE: CPT | Performed by: STUDENT IN AN ORGANIZED HEALTH CARE EDUCATION/TRAINING PROGRAM

## 2021-03-25 PROCEDURE — 36620 INSERTION CATHETER ARTERY: CPT | Mod: 59,,, | Performed by: ANESTHESIOLOGY

## 2021-03-25 PROCEDURE — D9220A PRA ANESTHESIA: ICD-10-PCS | Mod: ANES,,, | Performed by: ANESTHESIOLOGY

## 2021-03-25 PROCEDURE — D9220A PRA ANESTHESIA: Mod: ANES,,, | Performed by: ANESTHESIOLOGY

## 2021-03-25 PROCEDURE — 86900 BLOOD TYPING SEROLOGIC ABO: CPT | Performed by: STUDENT IN AN ORGANIZED HEALTH CARE EDUCATION/TRAINING PROGRAM

## 2021-03-25 PROCEDURE — 25000003 PHARM REV CODE 250: Performed by: NURSE ANESTHETIST, CERTIFIED REGISTERED

## 2021-03-25 PROCEDURE — 88307 TISSUE EXAM BY PATHOLOGIST: CPT | Mod: 26,,, | Performed by: STUDENT IN AN ORGANIZED HEALTH CARE EDUCATION/TRAINING PROGRAM

## 2021-03-25 PROCEDURE — 36620 PR INSERT CATH,ART,PERCUT,SHORTTERM: ICD-10-PCS | Mod: 59,,, | Performed by: ANESTHESIOLOGY

## 2021-03-25 PROCEDURE — 36000706: Performed by: OTOLARYNGOLOGY

## 2021-03-25 PROCEDURE — 36000707: Performed by: OTOLARYNGOLOGY

## 2021-03-25 PROCEDURE — 27201037 HC PRESSURE MONITORING SET UP

## 2021-03-25 RX ORDER — DEXMEDETOMIDINE HYDROCHLORIDE 100 UG/ML
INJECTION, SOLUTION INTRAVENOUS
Status: DISCONTINUED | OUTPATIENT
Start: 2021-03-25 | End: 2021-03-25

## 2021-03-25 RX ORDER — MIDAZOLAM HYDROCHLORIDE 1 MG/ML
INJECTION INTRAMUSCULAR; INTRAVENOUS
Status: DISCONTINUED | OUTPATIENT
Start: 2021-03-25 | End: 2021-03-25

## 2021-03-25 RX ORDER — DROPERIDOL 2.5 MG/ML
0.62 INJECTION, SOLUTION INTRAMUSCULAR; INTRAVENOUS
Status: DISCONTINUED | OUTPATIENT
Start: 2021-03-25 | End: 2021-03-25

## 2021-03-25 RX ORDER — HYDROCODONE BITARTRATE AND ACETAMINOPHEN 10; 325 MG/1; MG/1
1 TABLET ORAL EVERY 6 HOURS PRN
Status: DISCONTINUED | OUTPATIENT
Start: 2021-03-25 | End: 2021-03-26 | Stop reason: HOSPADM

## 2021-03-25 RX ORDER — ONDANSETRON 2 MG/ML
INJECTION INTRAMUSCULAR; INTRAVENOUS
Status: DISCONTINUED | OUTPATIENT
Start: 2021-03-25 | End: 2021-03-25

## 2021-03-25 RX ORDER — LOSARTAN POTASSIUM 25 MG/1
25 TABLET ORAL DAILY
Status: DISCONTINUED | OUTPATIENT
Start: 2021-03-26 | End: 2021-03-26 | Stop reason: HOSPADM

## 2021-03-25 RX ORDER — METOPROLOL SUCCINATE 50 MG/1
50 TABLET, EXTENDED RELEASE ORAL DAILY
Status: DISCONTINUED | OUTPATIENT
Start: 2021-03-26 | End: 2021-03-26 | Stop reason: HOSPADM

## 2021-03-25 RX ORDER — FAMOTIDINE 10 MG/ML
INJECTION INTRAVENOUS
Status: DISCONTINUED | OUTPATIENT
Start: 2021-03-25 | End: 2021-03-25

## 2021-03-25 RX ORDER — ONDANSETRON 2 MG/ML
4 INJECTION INTRAMUSCULAR; INTRAVENOUS EVERY 12 HOURS PRN
Status: DISCONTINUED | OUTPATIENT
Start: 2021-03-25 | End: 2021-03-26 | Stop reason: HOSPADM

## 2021-03-25 RX ORDER — AMLODIPINE BESYLATE 10 MG/1
10 TABLET ORAL DAILY
Status: DISCONTINUED | OUTPATIENT
Start: 2021-03-26 | End: 2021-03-26 | Stop reason: HOSPADM

## 2021-03-25 RX ORDER — SIMVASTATIN 10 MG/1
10 TABLET, FILM COATED ORAL NIGHTLY
Status: DISCONTINUED | OUTPATIENT
Start: 2021-03-25 | End: 2021-03-26 | Stop reason: HOSPADM

## 2021-03-25 RX ORDER — SUCCINYLCHOLINE CHLORIDE 20 MG/ML
INJECTION INTRAMUSCULAR; INTRAVENOUS
Status: DISCONTINUED | OUTPATIENT
Start: 2021-03-25 | End: 2021-03-25

## 2021-03-25 RX ORDER — PANTOPRAZOLE SODIUM 20 MG/1
20 TABLET, DELAYED RELEASE ORAL DAILY
Status: DISCONTINUED | OUTPATIENT
Start: 2021-03-26 | End: 2021-03-26 | Stop reason: HOSPADM

## 2021-03-25 RX ORDER — ACETAMINOPHEN 10 MG/ML
INJECTION, SOLUTION INTRAVENOUS
Status: DISCONTINUED | OUTPATIENT
Start: 2021-03-25 | End: 2021-03-25

## 2021-03-25 RX ORDER — LIDOCAINE HYDROCHLORIDE 20 MG/ML
INJECTION INTRAVENOUS
Status: DISCONTINUED | OUTPATIENT
Start: 2021-03-25 | End: 2021-03-25

## 2021-03-25 RX ORDER — ALBUTEROL SULFATE 90 UG/1
2 AEROSOL, METERED RESPIRATORY (INHALATION) EVERY 6 HOURS PRN
Status: DISCONTINUED | OUTPATIENT
Start: 2021-03-25 | End: 2021-03-26 | Stop reason: HOSPADM

## 2021-03-25 RX ORDER — DIPHENHYDRAMINE HCL 25 MG
25 CAPSULE ORAL EVERY 6 HOURS PRN
Status: DISCONTINUED | OUTPATIENT
Start: 2021-03-25 | End: 2021-03-26 | Stop reason: HOSPADM

## 2021-03-25 RX ORDER — FENTANYL CITRATE 50 UG/ML
INJECTION, SOLUTION INTRAMUSCULAR; INTRAVENOUS
Status: DISCONTINUED | OUTPATIENT
Start: 2021-03-25 | End: 2021-03-25

## 2021-03-25 RX ORDER — CLINDAMYCIN HYDROCHLORIDE 150 MG/1
300 CAPSULE ORAL EVERY 12 HOURS
Status: DISCONTINUED | OUTPATIENT
Start: 2021-03-25 | End: 2021-03-26 | Stop reason: HOSPADM

## 2021-03-25 RX ORDER — LIDOCAINE HYDROCHLORIDE AND EPINEPHRINE 10; 10 MG/ML; UG/ML
INJECTION, SOLUTION INFILTRATION; PERINEURAL
Status: DISCONTINUED | OUTPATIENT
Start: 2021-03-25 | End: 2021-03-25 | Stop reason: HOSPADM

## 2021-03-25 RX ORDER — ROCURONIUM BROMIDE 10 MG/ML
INJECTION, SOLUTION INTRAVENOUS
Status: DISCONTINUED | OUTPATIENT
Start: 2021-03-25 | End: 2021-03-25

## 2021-03-25 RX ORDER — TRAMADOL HYDROCHLORIDE 50 MG/1
50 TABLET ORAL EVERY 6 HOURS PRN
Status: DISCONTINUED | OUTPATIENT
Start: 2021-03-25 | End: 2021-03-26 | Stop reason: HOSPADM

## 2021-03-25 RX ORDER — DEXAMETHASONE SODIUM PHOSPHATE 4 MG/ML
INJECTION, SOLUTION INTRA-ARTICULAR; INTRALESIONAL; INTRAMUSCULAR; INTRAVENOUS; SOFT TISSUE
Status: DISCONTINUED | OUTPATIENT
Start: 2021-03-25 | End: 2021-03-25

## 2021-03-25 RX ORDER — CITALOPRAM 20 MG/1
20 TABLET, FILM COATED ORAL DAILY
Status: DISCONTINUED | OUTPATIENT
Start: 2021-03-26 | End: 2021-03-26 | Stop reason: HOSPADM

## 2021-03-25 RX ORDER — ALBUTEROL SULFATE 90 UG/1
AEROSOL, METERED RESPIRATORY (INHALATION)
Status: DISCONTINUED | OUTPATIENT
Start: 2021-03-25 | End: 2021-03-25

## 2021-03-25 RX ORDER — KETAMINE HCL IN 0.9 % NACL 50 MG/5 ML
SYRINGE (ML) INTRAVENOUS
Status: DISCONTINUED | OUTPATIENT
Start: 2021-03-25 | End: 2021-03-25

## 2021-03-25 RX ORDER — IPRATROPIUM BROMIDE 42 UG/1
2 SPRAY, METERED NASAL 3 TIMES DAILY
Status: DISCONTINUED | OUTPATIENT
Start: 2021-03-25 | End: 2021-03-26 | Stop reason: HOSPADM

## 2021-03-25 RX ORDER — HYDROCODONE BITARTRATE AND ACETAMINOPHEN 5; 325 MG/1; MG/1
1 TABLET ORAL EVERY 6 HOURS PRN
Status: DISCONTINUED | OUTPATIENT
Start: 2021-03-25 | End: 2021-03-26 | Stop reason: HOSPADM

## 2021-03-25 RX ORDER — SODIUM CHLORIDE 0.9 % (FLUSH) 0.9 %
2 SYRINGE (ML) INJECTION
Status: DISCONTINUED | OUTPATIENT
Start: 2021-03-25 | End: 2021-03-25

## 2021-03-25 RX ORDER — CLINDAMYCIN PHOSPHATE 900 MG/50ML
INJECTION, SOLUTION INTRAVENOUS
Status: DISCONTINUED | OUTPATIENT
Start: 2021-03-25 | End: 2021-03-25

## 2021-03-25 RX ORDER — HYDROMORPHONE HYDROCHLORIDE 1 MG/ML
0.4 INJECTION, SOLUTION INTRAMUSCULAR; INTRAVENOUS; SUBCUTANEOUS EVERY 5 MIN PRN
Status: DISCONTINUED | OUTPATIENT
Start: 2021-03-25 | End: 2021-03-25

## 2021-03-25 RX ORDER — SODIUM CHLORIDE 9 MG/ML
INJECTION, SOLUTION INTRAVENOUS CONTINUOUS PRN
Status: DISCONTINUED | OUTPATIENT
Start: 2021-03-25 | End: 2021-03-25

## 2021-03-25 RX ORDER — CALCIUM CARBONATE 200(500)MG
1000 TABLET,CHEWABLE ORAL 3 TIMES DAILY
Status: DISCONTINUED | OUTPATIENT
Start: 2021-03-25 | End: 2021-03-26 | Stop reason: HOSPADM

## 2021-03-25 RX ORDER — ENOXAPARIN SODIUM 100 MG/ML
40 INJECTION SUBCUTANEOUS EVERY 24 HOURS
Status: DISCONTINUED | OUTPATIENT
Start: 2021-03-25 | End: 2021-03-26 | Stop reason: HOSPADM

## 2021-03-25 RX ORDER — TALC
6 POWDER (GRAM) TOPICAL NIGHTLY PRN
Status: DISCONTINUED | OUTPATIENT
Start: 2021-03-25 | End: 2021-03-26 | Stop reason: HOSPADM

## 2021-03-25 RX ORDER — PROPOFOL 10 MG/ML
VIAL (ML) INTRAVENOUS
Status: DISCONTINUED | OUTPATIENT
Start: 2021-03-25 | End: 2021-03-25

## 2021-03-25 RX ADMIN — HYDROMORPHONE HYDROCHLORIDE 0.4 MG: 1 INJECTION, SOLUTION INTRAMUSCULAR; INTRAVENOUS; SUBCUTANEOUS at 04:03

## 2021-03-25 RX ADMIN — DEXMEDETOMIDINE HYDROCHLORIDE 4 MCG: 100 INJECTION, SOLUTION, CONCENTRATE INTRAVENOUS at 11:03

## 2021-03-25 RX ADMIN — ACETAMINOPHEN 1000 MG: 10 INJECTION, SOLUTION INTRAVENOUS at 11:03

## 2021-03-25 RX ADMIN — ONDANSETRON 4 MG: 2 INJECTION, SOLUTION INTRAMUSCULAR; INTRAVENOUS at 11:03

## 2021-03-25 RX ADMIN — FENTANYL CITRATE 50 MCG: 50 INJECTION, SOLUTION INTRAMUSCULAR; INTRAVENOUS at 10:03

## 2021-03-25 RX ADMIN — CALCIUM CARBONATE (ANTACID) CHEW TAB 500 MG 1000 MG: 500 CHEW TAB at 09:03

## 2021-03-25 RX ADMIN — FENTANYL CITRATE 25 MCG: 50 INJECTION, SOLUTION INTRAMUSCULAR; INTRAVENOUS at 02:03

## 2021-03-25 RX ADMIN — IPRATROPIUM BROMIDE 2 SPRAY: 42 SPRAY NASAL at 11:03

## 2021-03-25 RX ADMIN — FENTANYL CITRATE 50 MCG: 50 INJECTION, SOLUTION INTRAMUSCULAR; INTRAVENOUS at 11:03

## 2021-03-25 RX ADMIN — ONDANSETRON 4 MG: 2 INJECTION INTRAMUSCULAR; INTRAVENOUS at 04:03

## 2021-03-25 RX ADMIN — SODIUM CHLORIDE: 9 INJECTION, SOLUTION INTRAVENOUS at 10:03

## 2021-03-25 RX ADMIN — SODIUM CHLORIDE, SODIUM GLUCONATE, SODIUM ACETATE, POTASSIUM CHLORIDE, MAGNESIUM CHLORIDE, SODIUM PHOSPHATE, DIBASIC, AND POTASSIUM PHOSPHATE: .53; .5; .37; .037; .03; .012; .00082 INJECTION, SOLUTION INTRAVENOUS at 10:03

## 2021-03-25 RX ADMIN — REMIFENTANIL HYDROCHLORIDE 0.2 MCG/KG/MIN: 1 INJECTION, POWDER, LYOPHILIZED, FOR SOLUTION INTRAVENOUS at 12:03

## 2021-03-25 RX ADMIN — Medication 10 MG: at 12:03

## 2021-03-25 RX ADMIN — DIPHENHYDRAMINE HYDROCHLORIDE 25 MG: 25 CAPSULE ORAL at 06:03

## 2021-03-25 RX ADMIN — ONDANSETRON 4 MG: 2 INJECTION, SOLUTION INTRAMUSCULAR; INTRAVENOUS at 02:03

## 2021-03-25 RX ADMIN — CLINDAMYCIN PHOSPHATE 900 MG: 18 INJECTION, SOLUTION INTRAVENOUS at 11:03

## 2021-03-25 RX ADMIN — FAMOTIDINE 20 MG: 10 INJECTION, SOLUTION INTRAVENOUS at 10:03

## 2021-03-25 RX ADMIN — Medication 20 MG: at 11:03

## 2021-03-25 RX ADMIN — LIDOCAINE HYDROCHLORIDE 80 MG: 20 INJECTION, SOLUTION INTRAVENOUS at 10:03

## 2021-03-25 RX ADMIN — PROPOFOL 150 MG: 10 INJECTION, EMULSION INTRAVENOUS at 10:03

## 2021-03-25 RX ADMIN — ENOXAPARIN SODIUM 40 MG: 40 INJECTION SUBCUTANEOUS at 05:03

## 2021-03-25 RX ADMIN — ALBUTEROL SULFATE 2 PUFF: 90 AEROSOL, METERED RESPIRATORY (INHALATION) at 10:03

## 2021-03-25 RX ADMIN — FENTANYL CITRATE 100 MCG: 50 INJECTION, SOLUTION INTRAMUSCULAR; INTRAVENOUS at 10:03

## 2021-03-25 RX ADMIN — SIMVASTATIN 10 MG: 10 TABLET, FILM COATED ORAL at 09:03

## 2021-03-25 RX ADMIN — DEXMEDETOMIDINE HYDROCHLORIDE 8 MCG: 100 INJECTION, SOLUTION, CONCENTRATE INTRAVENOUS at 11:03

## 2021-03-25 RX ADMIN — Medication 20 MG: at 01:03

## 2021-03-25 RX ADMIN — MIDAZOLAM HYDROCHLORIDE 2 MG: 1 INJECTION, SOLUTION INTRAMUSCULAR; INTRAVENOUS at 10:03

## 2021-03-25 RX ADMIN — DEXMEDETOMIDINE HYDROCHLORIDE 8 MCG: 100 INJECTION, SOLUTION, CONCENTRATE INTRAVENOUS at 12:03

## 2021-03-25 RX ADMIN — HYDROCODONE BITARTRATE AND ACETAMINOPHEN 1 TABLET: 10; 325 TABLET ORAL at 06:03

## 2021-03-25 RX ADMIN — DEXAMETHASONE SODIUM PHOSPHATE 12 MG: 4 INJECTION, SOLUTION INTRAMUSCULAR; INTRAVENOUS at 11:03

## 2021-03-25 RX ADMIN — SODIUM CHLORIDE 0.4 MCG/KG/MIN: 9 INJECTION, SOLUTION INTRAVENOUS at 12:03

## 2021-03-25 RX ADMIN — SUCCINYLCHOLINE CHLORIDE 160 MG: 20 INJECTION, SOLUTION INTRAMUSCULAR; INTRAVENOUS at 10:03

## 2021-03-25 RX ADMIN — CLINDAMYCIN HYDROCHLORIDE 300 MG: 150 CAPSULE ORAL at 09:03

## 2021-03-25 RX ADMIN — ROCURONIUM BROMIDE 5 MG: 10 INJECTION, SOLUTION INTRAVENOUS at 10:03

## 2021-03-26 ENCOUNTER — TELEPHONE (OUTPATIENT)
Dept: OTOLARYNGOLOGY | Facility: CLINIC | Age: 46
End: 2021-03-26

## 2021-03-26 ENCOUNTER — HOSPITAL ENCOUNTER (EMERGENCY)
Facility: HOSPITAL | Age: 46
Discharge: HOME | End: 2021-03-26
Attending: EMERGENCY MEDICINE
Payer: MEDICAID

## 2021-03-26 VITALS
WEIGHT: 192 LBS | HEART RATE: 102 BPM | BODY MASS INDEX: 30.86 KG/M2 | DIASTOLIC BLOOD PRESSURE: 75 MMHG | HEIGHT: 66 IN | SYSTOLIC BLOOD PRESSURE: 150 MMHG | RESPIRATION RATE: 18 BRPM | OXYGEN SATURATION: 94 % | TEMPERATURE: 99 F

## 2021-03-26 VITALS
WEIGHT: 196 LBS | HEART RATE: 87 BPM | DIASTOLIC BLOOD PRESSURE: 69 MMHG | SYSTOLIC BLOOD PRESSURE: 145 MMHG | OXYGEN SATURATION: 98 % | HEIGHT: 66 IN | RESPIRATION RATE: 25 BRPM | BODY MASS INDEX: 31.5 KG/M2 | TEMPERATURE: 99 F

## 2021-03-26 DIAGNOSIS — G89.18 POST-OP PAIN: Primary | ICD-10-CM

## 2021-03-26 LAB
ALBUMIN SERPL BCP-MCNC: 3.6 G/DL (ref 3.5–5.2)
ALP SERPL-CCNC: 86 U/L (ref 55–135)
ALT SERPL W/O P-5'-P-CCNC: 24 U/L (ref 10–44)
ANION GAP SERPL CALC-SCNC: 9 MMOL/L (ref 8–16)
AST SERPL-CCNC: 26 U/L (ref 10–40)
BASOPHILS # BLD AUTO: 0.01 K/UL (ref 0–0.2)
BASOPHILS # BLD AUTO: 0.01 K/UL (ref 0–0.2)
BASOPHILS NFR BLD: 0.1 % (ref 0–1.9)
BASOPHILS NFR BLD: 0.2 % (ref 0–1.9)
BILIRUB SERPL-MCNC: 0.3 MG/DL (ref 0.1–1)
BNP SERPL-MCNC: 153 PG/ML (ref 0–99)
BUN SERPL-MCNC: 10 MG/DL (ref 6–20)
CA-I BLDV-SCNC: 1.17 MMOL/L (ref 1.06–1.42)
CALCIUM SERPL-MCNC: 8.7 MG/DL (ref 8.7–10.5)
CHLORIDE SERPL-SCNC: 104 MMOL/L (ref 95–110)
CO2 SERPL-SCNC: 23 MMOL/L (ref 23–29)
CREAT SERPL-MCNC: 0.6 MG/DL (ref 0.5–1.4)
DIFFERENTIAL METHOD: ABNORMAL
DIFFERENTIAL METHOD: ABNORMAL
EOSINOPHIL # BLD AUTO: 0 K/UL (ref 0–0.5)
EOSINOPHIL # BLD AUTO: 0 K/UL (ref 0–0.5)
EOSINOPHIL NFR BLD: 0 % (ref 0–8)
EOSINOPHIL NFR BLD: 0.1 % (ref 0–8)
ERYTHROCYTE [DISTWIDTH] IN BLOOD BY AUTOMATED COUNT: 15.9 % (ref 11.5–14.5)
ERYTHROCYTE [DISTWIDTH] IN BLOOD BY AUTOMATED COUNT: 16 % (ref 11.5–14.5)
EST. GFR  (AFRICAN AMERICAN): >60 ML/MIN/1.73 M^2
EST. GFR  (NON AFRICAN AMERICAN): >60 ML/MIN/1.73 M^2
GLUCOSE SERPL-MCNC: 99 MG/DL (ref 70–110)
HCT VFR BLD AUTO: 34.4 % (ref 37–48.5)
HCT VFR BLD AUTO: 36.2 % (ref 37–48.5)
HGB BLD-MCNC: 11.1 G/DL (ref 12–16)
HGB BLD-MCNC: 11.3 G/DL (ref 12–16)
IMM GRANULOCYTES # BLD AUTO: 0.02 K/UL (ref 0–0.04)
IMM GRANULOCYTES # BLD AUTO: 0.03 K/UL (ref 0–0.04)
IMM GRANULOCYTES NFR BLD AUTO: 0.2 % (ref 0–0.5)
IMM GRANULOCYTES NFR BLD AUTO: 0.5 % (ref 0–0.5)
LYMPHOCYTES # BLD AUTO: 0.9 K/UL (ref 1–4.8)
LYMPHOCYTES # BLD AUTO: 2.1 K/UL (ref 1–4.8)
LYMPHOCYTES NFR BLD: 14.2 % (ref 18–48)
LYMPHOCYTES NFR BLD: 21.5 % (ref 18–48)
MCH RBC QN AUTO: 23.8 PG (ref 27–31)
MCH RBC QN AUTO: 24.2 PG (ref 27–31)
MCHC RBC AUTO-ENTMCNC: 31.2 G/DL (ref 32–36)
MCHC RBC AUTO-ENTMCNC: 32.3 G/DL (ref 32–36)
MCV RBC AUTO: 75 FL (ref 82–98)
MCV RBC AUTO: 76 FL (ref 82–98)
MONOCYTES # BLD AUTO: 0.3 K/UL (ref 0.3–1)
MONOCYTES # BLD AUTO: 0.8 K/UL (ref 0.3–1)
MONOCYTES NFR BLD: 5.2 % (ref 4–15)
MONOCYTES NFR BLD: 8.4 % (ref 4–15)
NEUTROPHILS # BLD AUTO: 5.3 K/UL (ref 1.8–7.7)
NEUTROPHILS # BLD AUTO: 6.6 K/UL (ref 1.8–7.7)
NEUTROPHILS NFR BLD: 69.7 % (ref 38–73)
NEUTROPHILS NFR BLD: 79.9 % (ref 38–73)
NRBC BLD-RTO: 0 /100 WBC
NRBC BLD-RTO: 0 /100 WBC
PLATELET # BLD AUTO: 221 K/UL (ref 150–350)
PLATELET # BLD AUTO: 224 K/UL (ref 150–350)
PMV BLD AUTO: 11.2 FL (ref 9.2–12.9)
PMV BLD AUTO: 12.1 FL (ref 9.2–12.9)
POTASSIUM SERPL-SCNC: 3.7 MMOL/L (ref 3.5–5.1)
PROT SERPL-MCNC: 7 G/DL (ref 6–8.4)
RBC # BLD AUTO: 4.59 M/UL (ref 4–5.4)
RBC # BLD AUTO: 4.74 M/UL (ref 4–5.4)
SODIUM SERPL-SCNC: 136 MMOL/L (ref 136–145)
T4 FREE SERPL-MCNC: 1.21 NG/DL (ref 0.71–1.51)
TROPONIN I SERPL DL<=0.01 NG/ML-MCNC: <0.006 NG/ML (ref 0–0.03)
TSH SERPL DL<=0.005 MIU/L-ACNC: <0.01 UIU/ML (ref 0.4–4)
WBC # BLD AUTO: 6.6 K/UL (ref 3.9–12.7)
WBC # BLD AUTO: 9.52 K/UL (ref 3.9–12.7)

## 2021-03-26 PROCEDURE — 84439 ASSAY OF FREE THYROXINE: CPT | Performed by: EMERGENCY MEDICINE

## 2021-03-26 PROCEDURE — 85025 COMPLETE CBC W/AUTO DIFF WBC: CPT | Performed by: STUDENT IN AN ORGANIZED HEALTH CARE EDUCATION/TRAINING PROGRAM

## 2021-03-26 PROCEDURE — 80053 COMPREHEN METABOLIC PANEL: CPT | Performed by: EMERGENCY MEDICINE

## 2021-03-26 PROCEDURE — 85025 COMPLETE CBC W/AUTO DIFF WBC: CPT | Mod: 91 | Performed by: EMERGENCY MEDICINE

## 2021-03-26 PROCEDURE — 99284 EMERGENCY DEPT VISIT MOD MDM: CPT | Mod: 25

## 2021-03-26 PROCEDURE — 84443 ASSAY THYROID STIM HORMONE: CPT | Performed by: EMERGENCY MEDICINE

## 2021-03-26 PROCEDURE — 83880 ASSAY OF NATRIURETIC PEPTIDE: CPT | Performed by: EMERGENCY MEDICINE

## 2021-03-26 PROCEDURE — 63600175 PHARM REV CODE 636 W HCPCS: Performed by: EMERGENCY MEDICINE

## 2021-03-26 PROCEDURE — 25000003 PHARM REV CODE 250: Performed by: STUDENT IN AN ORGANIZED HEALTH CARE EDUCATION/TRAINING PROGRAM

## 2021-03-26 PROCEDURE — 84484 ASSAY OF TROPONIN QUANT: CPT | Performed by: EMERGENCY MEDICINE

## 2021-03-26 PROCEDURE — 36415 COLL VENOUS BLD VENIPUNCTURE: CPT | Performed by: STUDENT IN AN ORGANIZED HEALTH CARE EDUCATION/TRAINING PROGRAM

## 2021-03-26 PROCEDURE — 96374 THER/PROPH/DIAG INJ IV PUSH: CPT

## 2021-03-26 RX ORDER — CLINDAMYCIN HYDROCHLORIDE 300 MG/1
300 CAPSULE ORAL EVERY 6 HOURS
Qty: 12 CAPSULE | Refills: 0 | Status: SHIPPED | OUTPATIENT
Start: 2021-03-26 | End: 2021-03-29

## 2021-03-26 RX ORDER — ASPIRIN 81 MG/1
81 TABLET ORAL DAILY
Qty: 90 TABLET | Refills: 3 | Status: SHIPPED | OUTPATIENT
Start: 2021-03-29 | End: 2022-10-11 | Stop reason: SDUPTHER

## 2021-03-26 RX ORDER — FENTANYL CITRATE 50 UG/ML
50 INJECTION, SOLUTION INTRAMUSCULAR; INTRAVENOUS
Status: COMPLETED | OUTPATIENT
Start: 2021-03-26 | End: 2021-03-26

## 2021-03-26 RX ORDER — HYDROCODONE BITARTRATE AND ACETAMINOPHEN 5; 325 MG/1; MG/1
1 TABLET ORAL EVERY 6 HOURS PRN
Qty: 30 TABLET | Refills: 0 | Status: SHIPPED | OUTPATIENT
Start: 2021-03-26 | End: 2021-06-09

## 2021-03-26 RX ORDER — ONDANSETRON 8 MG/1
8 TABLET, ORALLY DISINTEGRATING ORAL EVERY 8 HOURS PRN
Qty: 15 TABLET | Refills: 0 | Status: SHIPPED | OUTPATIENT
Start: 2021-03-26 | End: 2021-06-09

## 2021-03-26 RX ORDER — LEVOTHYROXINE SODIUM 125 UG/1
125 TABLET ORAL
Qty: 30 TABLET | Refills: 11 | Status: SHIPPED | OUTPATIENT
Start: 2021-03-26 | End: 2021-04-16 | Stop reason: SDUPTHER

## 2021-03-26 RX ADMIN — LOSARTAN POTASSIUM 25 MG: 25 TABLET, FILM COATED ORAL at 08:03

## 2021-03-26 RX ADMIN — HYDROCODONE BITARTRATE AND ACETAMINOPHEN 1 TABLET: 10; 325 TABLET ORAL at 12:03

## 2021-03-26 RX ADMIN — CLINDAMYCIN HYDROCHLORIDE 300 MG: 150 CAPSULE ORAL at 08:03

## 2021-03-26 RX ADMIN — IPRATROPIUM BROMIDE 2 SPRAY: 42 SPRAY NASAL at 09:03

## 2021-03-26 RX ADMIN — LEVOTHYROXINE SODIUM 137 MCG: 25 TABLET ORAL at 06:03

## 2021-03-26 RX ADMIN — METOPROLOL SUCCINATE 50 MG: 50 TABLET, EXTENDED RELEASE ORAL at 08:03

## 2021-03-26 RX ADMIN — FENTANYL CITRATE 50 MCG: 50 INJECTION, SOLUTION INTRAMUSCULAR; INTRAVENOUS at 08:03

## 2021-03-26 RX ADMIN — CITALOPRAM HYDROBROMIDE 20 MG: 20 TABLET ORAL at 08:03

## 2021-03-26 RX ADMIN — PANTOPRAZOLE SODIUM 20 MG: 20 TABLET, DELAYED RELEASE ORAL at 08:03

## 2021-03-26 RX ADMIN — CALCIUM CARBONATE (ANTACID) CHEW TAB 500 MG 1000 MG: 500 CHEW TAB at 08:03

## 2021-03-26 RX ADMIN — HYDROCODONE BITARTRATE AND ACETAMINOPHEN 1 TABLET: 10; 325 TABLET ORAL at 06:03

## 2021-03-26 RX ADMIN — AMLODIPINE BESYLATE 10 MG: 10 TABLET ORAL at 08:03

## 2021-03-27 ENCOUNTER — HOSPITAL ENCOUNTER (EMERGENCY)
Facility: HOSPITAL | Age: 46
Discharge: HOME | End: 2021-03-27
Attending: EMERGENCY MEDICINE
Payer: MEDICAID

## 2021-03-27 VITALS
BODY MASS INDEX: 32.92 KG/M2 | HEART RATE: 79 BPM | SYSTOLIC BLOOD PRESSURE: 159 MMHG | TEMPERATURE: 98 F | RESPIRATION RATE: 18 BRPM | WEIGHT: 204.81 LBS | OXYGEN SATURATION: 97 % | DIASTOLIC BLOOD PRESSURE: 56 MMHG | HEIGHT: 66 IN

## 2021-03-27 DIAGNOSIS — G89.18 POST-OP PAIN: Primary | ICD-10-CM

## 2021-03-27 PROCEDURE — 25000003 PHARM REV CODE 250: Performed by: EMERGENCY MEDICINE

## 2021-03-27 PROCEDURE — 63600175 PHARM REV CODE 636 W HCPCS: Performed by: EMERGENCY MEDICINE

## 2021-03-27 PROCEDURE — 99284 EMERGENCY DEPT VISIT MOD MDM: CPT | Mod: 25

## 2021-03-27 PROCEDURE — 96372 THER/PROPH/DIAG INJ SC/IM: CPT

## 2021-03-27 RX ORDER — ACETAMINOPHEN AND CODEINE PHOSPHATE 120; 12 MG/5ML; MG/5ML
10 SOLUTION ORAL
Status: COMPLETED | OUTPATIENT
Start: 2021-03-27 | End: 2021-03-27

## 2021-03-27 RX ORDER — LORAZEPAM 2 MG/ML
2 INJECTION INTRAMUSCULAR
Status: COMPLETED | OUTPATIENT
Start: 2021-03-27 | End: 2021-03-27

## 2021-03-27 RX ORDER — KETOROLAC TROMETHAMINE 30 MG/ML
30 INJECTION, SOLUTION INTRAMUSCULAR; INTRAVENOUS
Status: DISCONTINUED | OUTPATIENT
Start: 2021-03-27 | End: 2021-03-27

## 2021-03-27 RX ADMIN — ACETAMINOPHEN AND CODEINE PHOSPHATE 10 ML: 120; 12 SOLUTION ORAL at 07:03

## 2021-03-27 RX ADMIN — LORAZEPAM 2 MG: 2 INJECTION INTRAMUSCULAR; INTRAVENOUS at 05:03

## 2021-03-29 ENCOUNTER — OFFICE VISIT (OUTPATIENT)
Dept: OTOLARYNGOLOGY | Facility: CLINIC | Age: 46
End: 2021-03-29
Payer: MEDICAID

## 2021-03-29 ENCOUNTER — OFFICE VISIT (OUTPATIENT)
Dept: OPHTHALMOLOGY | Facility: CLINIC | Age: 46
End: 2021-03-29
Payer: MEDICAID

## 2021-03-29 VITALS
DIASTOLIC BLOOD PRESSURE: 86 MMHG | HEART RATE: 76 BPM | SYSTOLIC BLOOD PRESSURE: 152 MMHG | BODY MASS INDEX: 30.58 KG/M2 | HEIGHT: 66 IN | WEIGHT: 190.25 LBS | TEMPERATURE: 99 F

## 2021-03-29 DIAGNOSIS — H52.13 MYOPIA OF BOTH EYES: ICD-10-CM

## 2021-03-29 DIAGNOSIS — E11.9 DIABETES MELLITUS WITHOUT COMPLICATION: Primary | ICD-10-CM

## 2021-03-29 DIAGNOSIS — E05.00 GRAVES DISEASE: ICD-10-CM

## 2021-03-29 DIAGNOSIS — E89.0 H/O THYROIDECTOMY: Primary | ICD-10-CM

## 2021-03-29 DIAGNOSIS — E05.90 HYPERTHYROIDISM: Chronic | ICD-10-CM

## 2021-03-29 PROCEDURE — 92015 DETERMINE REFRACTIVE STATE: CPT | Mod: ,,, | Performed by: OPTOMETRIST

## 2021-03-29 PROCEDURE — 99213 OFFICE O/P EST LOW 20 MIN: CPT | Mod: PBBFAC | Performed by: OPTOMETRIST

## 2021-03-29 PROCEDURE — 99213 OFFICE O/P EST LOW 20 MIN: CPT | Mod: PBBFAC,27 | Performed by: PHYSICIAN ASSISTANT

## 2021-03-29 PROCEDURE — 92015 PR REFRACTION: ICD-10-PCS | Mod: ,,, | Performed by: OPTOMETRIST

## 2021-03-29 PROCEDURE — 92004 PR EYE EXAM, NEW PATIENT,COMPREHESV: ICD-10-PCS | Mod: S$PBB,,, | Performed by: OPTOMETRIST

## 2021-03-29 PROCEDURE — 99999 PR PBB SHADOW E&M-EST. PATIENT-LVL III: ICD-10-PCS | Mod: PBBFAC,,, | Performed by: PHYSICIAN ASSISTANT

## 2021-03-29 PROCEDURE — 92004 COMPRE OPH EXAM NEW PT 1/>: CPT | Mod: S$PBB,,, | Performed by: OPTOMETRIST

## 2021-03-29 PROCEDURE — 99024 PR POST-OP FOLLOW-UP VISIT: ICD-10-PCS | Mod: ,,, | Performed by: PHYSICIAN ASSISTANT

## 2021-03-29 PROCEDURE — 99999 PR PBB SHADOW E&M-EST. PATIENT-LVL III: CPT | Mod: PBBFAC,,, | Performed by: OPTOMETRIST

## 2021-03-29 PROCEDURE — 99999 PR PBB SHADOW E&M-EST. PATIENT-LVL III: CPT | Mod: PBBFAC,,, | Performed by: PHYSICIAN ASSISTANT

## 2021-03-29 PROCEDURE — 99999 PR PBB SHADOW E&M-EST. PATIENT-LVL III: ICD-10-PCS | Mod: PBBFAC,,, | Performed by: OPTOMETRIST

## 2021-03-29 PROCEDURE — 99024 POSTOP FOLLOW-UP VISIT: CPT | Mod: ,,, | Performed by: PHYSICIAN ASSISTANT

## 2021-03-30 LAB
FINAL PATHOLOGIC DIAGNOSIS: NORMAL
GROSS: NORMAL
Lab: NORMAL

## 2021-04-16 ENCOUNTER — PATIENT OUTREACH (OUTPATIENT)
Dept: ADMINISTRATIVE | Facility: HOSPITAL | Age: 46
End: 2021-04-16

## 2021-04-16 ENCOUNTER — OFFICE VISIT (OUTPATIENT)
Dept: OTOLARYNGOLOGY | Facility: CLINIC | Age: 46
End: 2021-04-16
Payer: MEDICAID

## 2021-04-16 VITALS
HEART RATE: 62 BPM | DIASTOLIC BLOOD PRESSURE: 82 MMHG | BODY MASS INDEX: 32.91 KG/M2 | WEIGHT: 203.94 LBS | SYSTOLIC BLOOD PRESSURE: 153 MMHG

## 2021-04-16 DIAGNOSIS — E05.00 GRAVES DISEASE: Primary | ICD-10-CM

## 2021-04-16 DIAGNOSIS — M54.2 NECK PAIN: ICD-10-CM

## 2021-04-16 DIAGNOSIS — M54.9 BACK PAIN, UNSPECIFIED BACK LOCATION, UNSPECIFIED BACK PAIN LATERALITY, UNSPECIFIED CHRONICITY: ICD-10-CM

## 2021-04-16 DIAGNOSIS — M79.7 FIBROMYALGIA: ICD-10-CM

## 2021-04-16 PROCEDURE — 99999 PR PBB SHADOW E&M-EST. PATIENT-LVL IV: ICD-10-PCS | Mod: PBBFAC,,, | Performed by: OTOLARYNGOLOGY

## 2021-04-16 PROCEDURE — 99024 POSTOP FOLLOW-UP VISIT: CPT | Mod: S$PBB,,, | Performed by: OTOLARYNGOLOGY

## 2021-04-16 PROCEDURE — 99024 PR POST-OP FOLLOW-UP VISIT: ICD-10-PCS | Mod: S$PBB,,, | Performed by: OTOLARYNGOLOGY

## 2021-04-16 PROCEDURE — 99214 OFFICE O/P EST MOD 30 MIN: CPT | Mod: PBBFAC | Performed by: OTOLARYNGOLOGY

## 2021-04-16 PROCEDURE — 99999 PR PBB SHADOW E&M-EST. PATIENT-LVL IV: CPT | Mod: PBBFAC,,, | Performed by: OTOLARYNGOLOGY

## 2021-04-16 RX ORDER — LEVOTHYROXINE SODIUM 125 UG/1
125 TABLET ORAL
Qty: 30 TABLET | Refills: 11 | Status: SHIPPED | OUTPATIENT
Start: 2021-04-16 | End: 2022-08-05 | Stop reason: SDUPTHER

## 2021-04-16 RX ORDER — LIDOCAINE 560 MG/1
1 PATCH PERCUTANEOUS; TOPICAL; TRANSDERMAL DAILY
Qty: 30 PATCH | Refills: 1 | Status: SHIPPED | OUTPATIENT
Start: 2021-04-16 | End: 2021-06-09

## 2021-04-19 ENCOUNTER — CLINICAL SUPPORT (OUTPATIENT)
Dept: REHABILITATION | Facility: HOSPITAL | Age: 46
End: 2021-04-19
Payer: MEDICAID

## 2021-04-19 DIAGNOSIS — M54.2 NECK PAIN: Primary | ICD-10-CM

## 2021-04-19 DIAGNOSIS — R29.898 TIGHTNESS OF NECK: ICD-10-CM

## 2021-04-19 DIAGNOSIS — M79.7 FIBROMYALGIA: ICD-10-CM

## 2021-04-19 DIAGNOSIS — R29.898 DECREASED ROM OF NECK: ICD-10-CM

## 2021-04-19 PROCEDURE — 97161 PT EVAL LOW COMPLEX 20 MIN: CPT

## 2021-04-19 PROCEDURE — 97140 MANUAL THERAPY 1/> REGIONS: CPT

## 2021-04-21 ENCOUNTER — TELEPHONE (OUTPATIENT)
Dept: INTERNAL MEDICINE | Facility: CLINIC | Age: 46
End: 2021-04-21

## 2021-04-21 ENCOUNTER — TELEPHONE (OUTPATIENT)
Dept: OTOLARYNGOLOGY | Facility: CLINIC | Age: 46
End: 2021-04-21

## 2021-04-28 ENCOUNTER — CLINICAL SUPPORT (OUTPATIENT)
Dept: REHABILITATION | Facility: HOSPITAL | Age: 46
End: 2021-04-28
Payer: MEDICAID

## 2021-04-28 DIAGNOSIS — R29.898 TIGHTNESS OF NECK: ICD-10-CM

## 2021-04-28 DIAGNOSIS — R29.898 DECREASED ROM OF NECK: ICD-10-CM

## 2021-04-28 DIAGNOSIS — M54.2 NECK PAIN: Primary | ICD-10-CM

## 2021-04-28 PROCEDURE — 97140 MANUAL THERAPY 1/> REGIONS: CPT

## 2021-04-28 PROCEDURE — 97110 THERAPEUTIC EXERCISES: CPT

## 2021-05-03 ENCOUNTER — DOCUMENTATION ONLY (OUTPATIENT)
Dept: REHABILITATION | Facility: HOSPITAL | Age: 46
End: 2021-05-03

## 2021-05-03 ENCOUNTER — CLINICAL SUPPORT (OUTPATIENT)
Dept: REHABILITATION | Facility: HOSPITAL | Age: 46
End: 2021-05-03
Payer: MEDICAID

## 2021-05-03 DIAGNOSIS — R29.898 DECREASED ROM OF NECK: ICD-10-CM

## 2021-05-03 DIAGNOSIS — R29.898 TIGHTNESS OF NECK: ICD-10-CM

## 2021-05-03 DIAGNOSIS — M54.2 NECK PAIN: ICD-10-CM

## 2021-05-03 PROCEDURE — 97110 THERAPEUTIC EXERCISES: CPT | Mod: CQ

## 2021-05-03 PROCEDURE — 97140 MANUAL THERAPY 1/> REGIONS: CPT | Mod: CQ

## 2021-05-06 ENCOUNTER — TELEPHONE (OUTPATIENT)
Dept: REHABILITATION | Facility: HOSPITAL | Age: 46
End: 2021-05-06

## 2021-05-12 ENCOUNTER — CLINICAL SUPPORT (OUTPATIENT)
Dept: REHABILITATION | Facility: HOSPITAL | Age: 46
End: 2021-05-12
Payer: MEDICAID

## 2021-05-12 DIAGNOSIS — R29.898 TIGHTNESS OF NECK: ICD-10-CM

## 2021-05-12 DIAGNOSIS — M54.2 NECK PAIN: Primary | ICD-10-CM

## 2021-05-12 DIAGNOSIS — R29.898 DECREASED ROM OF NECK: ICD-10-CM

## 2021-05-12 PROCEDURE — 97140 MANUAL THERAPY 1/> REGIONS: CPT

## 2021-05-12 PROCEDURE — 97110 THERAPEUTIC EXERCISES: CPT

## 2021-05-14 ENCOUNTER — DOCUMENTATION ONLY (OUTPATIENT)
Dept: PULMONOLOGY | Facility: CLINIC | Age: 46
End: 2021-05-14

## 2021-05-14 ENCOUNTER — PATIENT MESSAGE (OUTPATIENT)
Dept: PULMONOLOGY | Facility: CLINIC | Age: 46
End: 2021-05-14

## 2021-05-17 ENCOUNTER — TELEPHONE (OUTPATIENT)
Dept: INTERNAL MEDICINE | Facility: CLINIC | Age: 46
End: 2021-05-17

## 2021-05-18 ENCOUNTER — DOCUMENTATION ONLY (OUTPATIENT)
Dept: PULMONOLOGY | Facility: CLINIC | Age: 46
End: 2021-05-18

## 2021-05-19 ENCOUNTER — PATIENT MESSAGE (OUTPATIENT)
Dept: PULMONOLOGY | Facility: CLINIC | Age: 46
End: 2021-05-19

## 2021-05-19 ENCOUNTER — CLINICAL SUPPORT (OUTPATIENT)
Dept: REHABILITATION | Facility: HOSPITAL | Age: 46
End: 2021-05-19
Payer: MEDICAID

## 2021-05-19 DIAGNOSIS — R29.898 TIGHTNESS OF NECK: ICD-10-CM

## 2021-05-19 DIAGNOSIS — R29.898 DECREASED ROM OF NECK: ICD-10-CM

## 2021-05-19 DIAGNOSIS — M54.2 NECK PAIN: ICD-10-CM

## 2021-05-19 PROCEDURE — 97140 MANUAL THERAPY 1/> REGIONS: CPT

## 2021-05-19 PROCEDURE — 97110 THERAPEUTIC EXERCISES: CPT

## 2021-06-02 ENCOUNTER — OFFICE VISIT (OUTPATIENT)
Dept: CARDIOLOGY | Facility: CLINIC | Age: 46
End: 2021-06-02
Payer: MEDICAID

## 2021-06-02 VITALS
WEIGHT: 209.69 LBS | RESPIRATION RATE: 16 BRPM | DIASTOLIC BLOOD PRESSURE: 88 MMHG | HEART RATE: 54 BPM | BODY MASS INDEX: 33.7 KG/M2 | OXYGEN SATURATION: 99 % | HEIGHT: 66 IN | SYSTOLIC BLOOD PRESSURE: 122 MMHG

## 2021-06-02 DIAGNOSIS — E11.59 HYPERTENSION ASSOCIATED WITH DIABETES: Chronic | ICD-10-CM

## 2021-06-02 DIAGNOSIS — E11.69 HYPERLIPIDEMIA ASSOCIATED WITH TYPE 2 DIABETES MELLITUS: Chronic | ICD-10-CM

## 2021-06-02 DIAGNOSIS — I10 ESSENTIAL HYPERTENSION: Primary | ICD-10-CM

## 2021-06-02 DIAGNOSIS — E05.00 GRAVES DISEASE: ICD-10-CM

## 2021-06-02 DIAGNOSIS — I15.2 HYPERTENSION ASSOCIATED WITH DIABETES: Chronic | ICD-10-CM

## 2021-06-02 DIAGNOSIS — F17.200 TOBACCO USE DISORDER: ICD-10-CM

## 2021-06-02 DIAGNOSIS — K21.9 GASTROESOPHAGEAL REFLUX DISEASE, UNSPECIFIED WHETHER ESOPHAGITIS PRESENT: ICD-10-CM

## 2021-06-02 DIAGNOSIS — J45.20 MILD INTERMITTENT ASTHMA WITHOUT COMPLICATION: Chronic | ICD-10-CM

## 2021-06-02 DIAGNOSIS — E78.5 HYPERLIPIDEMIA ASSOCIATED WITH TYPE 2 DIABETES MELLITUS: Chronic | ICD-10-CM

## 2021-06-02 DIAGNOSIS — E05.90 HYPERTHYROIDISM: Chronic | ICD-10-CM

## 2021-06-02 DIAGNOSIS — R00.2 HEART PALPITATIONS: ICD-10-CM

## 2021-06-02 DIAGNOSIS — G47.30 SLEEP APNEA, UNSPECIFIED TYPE: ICD-10-CM

## 2021-06-02 DIAGNOSIS — Q21.12 PFO (PATENT FORAMEN OVALE): ICD-10-CM

## 2021-06-02 PROCEDURE — 99214 PR OFFICE/OUTPT VISIT, EST, LEVL IV, 30-39 MIN: ICD-10-PCS | Mod: S$PBB,,, | Performed by: INTERNAL MEDICINE

## 2021-06-02 PROCEDURE — 99214 OFFICE O/P EST MOD 30 MIN: CPT | Mod: PBBFAC | Performed by: INTERNAL MEDICINE

## 2021-06-02 PROCEDURE — 99214 OFFICE O/P EST MOD 30 MIN: CPT | Mod: S$PBB,,, | Performed by: INTERNAL MEDICINE

## 2021-06-02 PROCEDURE — 99999 PR PBB SHADOW E&M-EST. PATIENT-LVL IV: CPT | Mod: PBBFAC,,, | Performed by: INTERNAL MEDICINE

## 2021-06-02 PROCEDURE — 99999 PR PBB SHADOW E&M-EST. PATIENT-LVL IV: ICD-10-PCS | Mod: PBBFAC,,, | Performed by: INTERNAL MEDICINE

## 2021-06-02 RX ORDER — METOPROLOL SUCCINATE 50 MG/1
50 TABLET, EXTENDED RELEASE ORAL DAILY
Qty: 90 TABLET | Refills: 1 | Status: SHIPPED | OUTPATIENT
Start: 2021-06-02 | End: 2022-10-11 | Stop reason: SDUPTHER

## 2021-06-09 ENCOUNTER — LAB VISIT (OUTPATIENT)
Dept: LAB | Facility: HOSPITAL | Age: 46
End: 2021-06-09
Attending: FAMILY MEDICINE
Payer: MEDICAID

## 2021-06-09 ENCOUNTER — OFFICE VISIT (OUTPATIENT)
Dept: INTERNAL MEDICINE | Facility: CLINIC | Age: 46
End: 2021-06-09
Payer: MEDICAID

## 2021-06-09 ENCOUNTER — PATIENT MESSAGE (OUTPATIENT)
Dept: ENDOCRINOLOGY | Facility: CLINIC | Age: 46
End: 2021-06-09

## 2021-06-09 VITALS
WEIGHT: 203.69 LBS | DIASTOLIC BLOOD PRESSURE: 74 MMHG | HEART RATE: 78 BPM | SYSTOLIC BLOOD PRESSURE: 130 MMHG | HEIGHT: 66 IN | BODY MASS INDEX: 32.73 KG/M2 | TEMPERATURE: 98 F | OXYGEN SATURATION: 97 %

## 2021-06-09 DIAGNOSIS — R79.89 LOW SERUM CALCIUM: ICD-10-CM

## 2021-06-09 DIAGNOSIS — M79.7 FIBROMYALGIA: Chronic | ICD-10-CM

## 2021-06-09 DIAGNOSIS — F41.9 ANXIETY: ICD-10-CM

## 2021-06-09 DIAGNOSIS — I10 ESSENTIAL HYPERTENSION: ICD-10-CM

## 2021-06-09 DIAGNOSIS — K21.9 GASTROESOPHAGEAL REFLUX DISEASE, UNSPECIFIED WHETHER ESOPHAGITIS PRESENT: ICD-10-CM

## 2021-06-09 DIAGNOSIS — Z01.419 ENCOUNTER FOR GYNECOLOGICAL EXAMINATION: ICD-10-CM

## 2021-06-09 DIAGNOSIS — E78.2 MIXED HYPERLIPIDEMIA: ICD-10-CM

## 2021-06-09 DIAGNOSIS — E89.0 POSTOPERATIVE HYPOTHYROIDISM: Primary | ICD-10-CM

## 2021-06-09 DIAGNOSIS — R73.03 PREDIABETES: Primary | ICD-10-CM

## 2021-06-09 LAB — CA-I BLDV-SCNC: 1.26 MMOL/L (ref 1.06–1.42)

## 2021-06-09 PROCEDURE — 82330 ASSAY OF CALCIUM: CPT | Performed by: FAMILY MEDICINE

## 2021-06-09 PROCEDURE — 99214 OFFICE O/P EST MOD 30 MIN: CPT | Mod: PBBFAC | Performed by: FAMILY MEDICINE

## 2021-06-09 PROCEDURE — 99215 PR OFFICE/OUTPT VISIT, EST, LEVL V, 40-54 MIN: ICD-10-PCS | Mod: S$PBB,,, | Performed by: FAMILY MEDICINE

## 2021-06-09 PROCEDURE — 99999 PR PBB SHADOW E&M-EST. PATIENT-LVL IV: ICD-10-PCS | Mod: PBBFAC,,, | Performed by: FAMILY MEDICINE

## 2021-06-09 PROCEDURE — 36415 COLL VENOUS BLD VENIPUNCTURE: CPT | Performed by: FAMILY MEDICINE

## 2021-06-09 PROCEDURE — 99215 OFFICE O/P EST HI 40 MIN: CPT | Mod: S$PBB,,, | Performed by: FAMILY MEDICINE

## 2021-06-09 PROCEDURE — 99999 PR PBB SHADOW E&M-EST. PATIENT-LVL IV: CPT | Mod: PBBFAC,,, | Performed by: FAMILY MEDICINE

## 2021-06-09 PROCEDURE — 80061 LIPID PANEL: CPT | Performed by: FAMILY MEDICINE

## 2021-06-09 RX ORDER — PANTOPRAZOLE SODIUM 20 MG/1
20 TABLET, DELAYED RELEASE ORAL DAILY
Qty: 90 TABLET | Refills: 3 | Status: SHIPPED | OUTPATIENT
Start: 2021-06-09 | End: 2022-09-21

## 2021-06-09 RX ORDER — METFORMIN HYDROCHLORIDE 1000 MG/1
1000 TABLET ORAL
Start: 2021-06-09 | End: 2022-08-05 | Stop reason: SDUPTHER

## 2021-06-09 RX ORDER — CITALOPRAM 20 MG/1
20 TABLET, FILM COATED ORAL DAILY
Qty: 90 TABLET | Refills: 0 | Status: SHIPPED | OUTPATIENT
Start: 2021-06-09 | End: 2021-08-23 | Stop reason: SDUPTHER

## 2021-06-09 RX ORDER — TRAZODONE HYDROCHLORIDE 50 MG/1
50 TABLET ORAL NIGHTLY
Qty: 90 TABLET | Refills: 0 | Status: SHIPPED | OUTPATIENT
Start: 2021-06-09 | End: 2021-07-20

## 2021-06-10 ENCOUNTER — OFFICE VISIT (OUTPATIENT)
Dept: OBSTETRICS AND GYNECOLOGY | Facility: CLINIC | Age: 46
End: 2021-06-10
Payer: MEDICAID

## 2021-06-10 VITALS — DIASTOLIC BLOOD PRESSURE: 82 MMHG | SYSTOLIC BLOOD PRESSURE: 160 MMHG | WEIGHT: 201.5 LBS | BODY MASS INDEX: 32.52 KG/M2

## 2021-06-10 DIAGNOSIS — D25.9 UTERINE LEIOMYOMA, UNSPECIFIED LOCATION: ICD-10-CM

## 2021-06-10 DIAGNOSIS — Z12.4 ENCOUNTER FOR SCREENING FOR CERVICAL CANCER: ICD-10-CM

## 2021-06-10 DIAGNOSIS — Z01.419 WELL WOMAN EXAM WITH ROUTINE GYNECOLOGICAL EXAM: Primary | ICD-10-CM

## 2021-06-10 LAB
CHOLEST SERPL-MCNC: 222 MG/DL (ref 120–199)
CHOLEST/HDLC SERPL: 3.5 {RATIO} (ref 2–5)
HDLC SERPL-MCNC: 63 MG/DL (ref 40–75)
HDLC SERPL: 28.4 % (ref 20–50)
LDLC SERPL CALC-MCNC: 139.8 MG/DL (ref 63–159)
NONHDLC SERPL-MCNC: 159 MG/DL
TRIGL SERPL-MCNC: 96 MG/DL (ref 30–150)

## 2021-06-10 PROCEDURE — 99999 PR PBB SHADOW E&M-EST. PATIENT-LVL IV: ICD-10-PCS | Mod: PBBFAC,,, | Performed by: NURSE PRACTITIONER

## 2021-06-10 PROCEDURE — 88175 CYTOPATH C/V AUTO FLUID REDO: CPT | Performed by: NURSE PRACTITIONER

## 2021-06-10 PROCEDURE — 99214 OFFICE O/P EST MOD 30 MIN: CPT | Mod: PBBFAC | Performed by: NURSE PRACTITIONER

## 2021-06-10 PROCEDURE — 99386 PREV VISIT NEW AGE 40-64: CPT | Mod: S$PBB,,, | Performed by: NURSE PRACTITIONER

## 2021-06-10 PROCEDURE — 99999 PR PBB SHADOW E&M-EST. PATIENT-LVL IV: CPT | Mod: PBBFAC,,, | Performed by: NURSE PRACTITIONER

## 2021-06-10 PROCEDURE — 87624 HPV HI-RISK TYP POOLED RSLT: CPT | Performed by: NURSE PRACTITIONER

## 2021-06-10 PROCEDURE — 99386 PR PREVENTIVE VISIT,NEW,40-64: ICD-10-PCS | Mod: S$PBB,,, | Performed by: NURSE PRACTITIONER

## 2021-06-14 ENCOUNTER — LAB VISIT (OUTPATIENT)
Dept: LAB | Facility: HOSPITAL | Age: 46
End: 2021-06-14
Attending: HOSPITALIST
Payer: MEDICAID

## 2021-06-14 DIAGNOSIS — E05.90 HYPERTHYROIDISM: Chronic | ICD-10-CM

## 2021-06-14 DIAGNOSIS — E89.0 POSTOPERATIVE HYPOTHYROIDISM: ICD-10-CM

## 2021-06-14 PROCEDURE — 84439 ASSAY OF FREE THYROXINE: CPT | Performed by: HOSPITALIST

## 2021-06-14 PROCEDURE — 36415 COLL VENOUS BLD VENIPUNCTURE: CPT | Performed by: HOSPITALIST

## 2021-06-14 PROCEDURE — 80069 RENAL FUNCTION PANEL: CPT | Performed by: HOSPITALIST

## 2021-06-14 PROCEDURE — 84443 ASSAY THYROID STIM HORMONE: CPT | Performed by: HOSPITALIST

## 2021-06-15 ENCOUNTER — OFFICE VISIT (OUTPATIENT)
Dept: ENDOCRINOLOGY | Facility: CLINIC | Age: 46
End: 2021-06-15
Payer: MEDICAID

## 2021-06-15 DIAGNOSIS — E05.00 GRAVES DISEASE: ICD-10-CM

## 2021-06-15 DIAGNOSIS — E89.0 POSTOPERATIVE HYPOTHYROIDISM: Primary | ICD-10-CM

## 2021-06-15 DIAGNOSIS — R73.03 PREDIABETES: ICD-10-CM

## 2021-06-15 DIAGNOSIS — E11.9 TYPE 2 DIABETES MELLITUS WITHOUT COMPLICATION, WITHOUT LONG-TERM CURRENT USE OF INSULIN: Chronic | ICD-10-CM

## 2021-06-15 DIAGNOSIS — E89.0 S/P COMPLETE THYROIDECTOMY: ICD-10-CM

## 2021-06-15 PROBLEM — E05.90 HYPERTHYROIDISM: Chronic | Status: RESOLVED | Noted: 2017-02-06 | Resolved: 2021-06-15

## 2021-06-15 LAB
ALBUMIN SERPL BCP-MCNC: 3.9 G/DL (ref 3.5–5.2)
ANION GAP SERPL CALC-SCNC: 10 MMOL/L (ref 8–16)
BUN SERPL-MCNC: 9 MG/DL (ref 6–20)
CALCIUM SERPL-MCNC: 8.7 MG/DL (ref 8.7–10.5)
CHLORIDE SERPL-SCNC: 106 MMOL/L (ref 95–110)
CO2 SERPL-SCNC: 25 MMOL/L (ref 23–29)
CREAT SERPL-MCNC: 0.9 MG/DL (ref 0.5–1.4)
EST. GFR  (AFRICAN AMERICAN): >60 ML/MIN/1.73 M^2
EST. GFR  (NON AFRICAN AMERICAN): >60 ML/MIN/1.73 M^2
GLUCOSE SERPL-MCNC: 70 MG/DL (ref 70–110)
PHOSPHATE SERPL-MCNC: 3.5 MG/DL (ref 2.7–4.5)
POTASSIUM SERPL-SCNC: 4.2 MMOL/L (ref 3.5–5.1)
SODIUM SERPL-SCNC: 141 MMOL/L (ref 136–145)
T4 FREE SERPL-MCNC: 1.03 NG/DL (ref 0.71–1.51)
T4 FREE SERPL-MCNC: 1.03 NG/DL (ref 0.71–1.51)
TSH SERPL DL<=0.005 MIU/L-ACNC: <0.01 UIU/ML (ref 0.4–4)
TSH SERPL DL<=0.005 MIU/L-ACNC: <0.01 UIU/ML (ref 0.4–4)

## 2021-06-15 PROCEDURE — 99214 PR OFFICE/OUTPT VISIT, EST, LEVL IV, 30-39 MIN: ICD-10-PCS | Mod: 95,,, | Performed by: HOSPITALIST

## 2021-06-15 PROCEDURE — 99214 OFFICE O/P EST MOD 30 MIN: CPT | Mod: 95,,, | Performed by: HOSPITALIST

## 2021-06-16 ENCOUNTER — PATIENT MESSAGE (OUTPATIENT)
Dept: ENDOCRINOLOGY | Facility: CLINIC | Age: 46
End: 2021-06-16

## 2021-06-18 ENCOUNTER — PROCEDURE VISIT (OUTPATIENT)
Dept: SLEEP MEDICINE | Facility: CLINIC | Age: 46
End: 2021-06-18
Payer: MEDICAID

## 2021-06-18 DIAGNOSIS — G47.33 OSA (OBSTRUCTIVE SLEEP APNEA): Primary | ICD-10-CM

## 2021-06-18 PROCEDURE — 95806 SLEEP STUDY UNATT&RESP EFFT: CPT | Mod: PBBFAC | Performed by: INTERNAL MEDICINE

## 2021-06-19 PROCEDURE — 95806 PR SLEEP STUDY, UNATTENDED, SIMUL RECORD HR/O2 SAT/RESP FLOW/RESP EFFT: ICD-10-PCS | Mod: 26,S$PBB,, | Performed by: INTERNAL MEDICINE

## 2021-06-19 PROCEDURE — 95806 SLEEP STUDY UNATT&RESP EFFT: CPT | Mod: 26,S$PBB,, | Performed by: INTERNAL MEDICINE

## 2021-06-20 LAB
CLINICAL INFO: NORMAL
CYTO CVX: NORMAL
CYTOLOGIST CVX/VAG CYTO: NORMAL
CYTOLOGY CMNT CVX/VAG CYTO-IMP: NORMAL
CYTOLOGY PAP THIN PREP EXPLANATION: NORMAL
DATE OF PREVIOUS PAP: NORMAL
DATE PREVIOUS BX: NO
HPV I/H RISK 4 DNA CVX QL NAA+PROBE: NOT DETECTED
LMP START DATE: NORMAL
SPECIMEN SOURCE CVX/VAG CYTO: NORMAL
STAT OF ADQ CVX/VAG CYTO-IMP: NORMAL

## 2021-06-28 PROBLEM — G89.18 POST-OP PAIN: Status: RESOLVED | Noted: 2021-03-26 | Resolved: 2021-06-28

## 2021-06-29 ENCOUNTER — LAB VISIT (OUTPATIENT)
Dept: LAB | Facility: HOSPITAL | Age: 46
End: 2021-06-29
Attending: OBSTETRICS & GYNECOLOGY
Payer: MEDICAID

## 2021-06-29 ENCOUNTER — OFFICE VISIT (OUTPATIENT)
Dept: OBSTETRICS AND GYNECOLOGY | Facility: CLINIC | Age: 46
End: 2021-06-29
Payer: MEDICAID

## 2021-06-29 VITALS
DIASTOLIC BLOOD PRESSURE: 88 MMHG | WEIGHT: 200.19 LBS | BODY MASS INDEX: 32.17 KG/M2 | HEIGHT: 66 IN | SYSTOLIC BLOOD PRESSURE: 130 MMHG

## 2021-06-29 DIAGNOSIS — D25.9 UTERINE LEIOMYOMA, UNSPECIFIED LOCATION: ICD-10-CM

## 2021-06-29 DIAGNOSIS — D25.9 UTERINE LEIOMYOMA, UNSPECIFIED LOCATION: Primary | ICD-10-CM

## 2021-06-29 PROCEDURE — 83520 IMMUNOASSAY QUANT NOS NONAB: CPT | Performed by: OBSTETRICS & GYNECOLOGY

## 2021-06-29 PROCEDURE — 36415 COLL VENOUS BLD VENIPUNCTURE: CPT | Performed by: OBSTETRICS & GYNECOLOGY

## 2021-06-29 PROCEDURE — 99214 OFFICE O/P EST MOD 30 MIN: CPT | Mod: PBBFAC | Performed by: OBSTETRICS & GYNECOLOGY

## 2021-06-29 PROCEDURE — 83001 ASSAY OF GONADOTROPIN (FSH): CPT | Performed by: OBSTETRICS & GYNECOLOGY

## 2021-06-29 PROCEDURE — 99213 OFFICE O/P EST LOW 20 MIN: CPT | Mod: S$PBB,,, | Performed by: OBSTETRICS & GYNECOLOGY

## 2021-06-29 PROCEDURE — 99213 PR OFFICE/OUTPT VISIT, EST, LEVL III, 20-29 MIN: ICD-10-PCS | Mod: S$PBB,,, | Performed by: OBSTETRICS & GYNECOLOGY

## 2021-06-29 PROCEDURE — 99999 PR PBB SHADOW E&M-EST. PATIENT-LVL IV: CPT | Mod: PBBFAC,,, | Performed by: OBSTETRICS & GYNECOLOGY

## 2021-06-29 PROCEDURE — 99999 PR PBB SHADOW E&M-EST. PATIENT-LVL IV: ICD-10-PCS | Mod: PBBFAC,,, | Performed by: OBSTETRICS & GYNECOLOGY

## 2021-06-30 LAB — FSH SERPL-ACNC: 30.1 MIU/ML

## 2021-07-01 LAB — MIS SERPL-MCNC: <0.03 NG/ML

## 2021-07-20 ENCOUNTER — OFFICE VISIT (OUTPATIENT)
Dept: INTERNAL MEDICINE | Facility: CLINIC | Age: 46
End: 2021-07-20
Payer: MEDICAID

## 2021-07-20 VITALS
BODY MASS INDEX: 31.99 KG/M2 | WEIGHT: 199.06 LBS | SYSTOLIC BLOOD PRESSURE: 130 MMHG | HEART RATE: 70 BPM | TEMPERATURE: 97 F | DIASTOLIC BLOOD PRESSURE: 88 MMHG | OXYGEN SATURATION: 98 % | HEIGHT: 66 IN

## 2021-07-20 DIAGNOSIS — Z88.9 MULTIPLE ALLERGIES: ICD-10-CM

## 2021-07-20 DIAGNOSIS — E78.2 MIXED HYPERLIPIDEMIA: ICD-10-CM

## 2021-07-20 DIAGNOSIS — F41.9 ANXIETY: Primary | ICD-10-CM

## 2021-07-20 PROCEDURE — 99214 OFFICE O/P EST MOD 30 MIN: CPT | Mod: S$PBB,,, | Performed by: FAMILY MEDICINE

## 2021-07-20 PROCEDURE — 99999 PR PBB SHADOW E&M-EST. PATIENT-LVL IV: ICD-10-PCS | Mod: PBBFAC,,, | Performed by: FAMILY MEDICINE

## 2021-07-20 PROCEDURE — 99214 PR OFFICE/OUTPT VISIT, EST, LEVL IV, 30-39 MIN: ICD-10-PCS | Mod: S$PBB,,, | Performed by: FAMILY MEDICINE

## 2021-07-20 PROCEDURE — 99214 OFFICE O/P EST MOD 30 MIN: CPT | Mod: PBBFAC | Performed by: FAMILY MEDICINE

## 2021-07-20 PROCEDURE — 99999 PR PBB SHADOW E&M-EST. PATIENT-LVL IV: CPT | Mod: PBBFAC,,, | Performed by: FAMILY MEDICINE

## 2021-07-20 RX ORDER — SIMVASTATIN 20 MG/1
20 TABLET, FILM COATED ORAL NIGHTLY
Qty: 90 TABLET | Refills: 3 | Status: SHIPPED | OUTPATIENT
Start: 2021-07-20 | End: 2021-11-24 | Stop reason: SDUPTHER

## 2021-07-20 RX ORDER — EPINEPHRINE 0.3 MG/.3ML
INJECTION SUBCUTANEOUS
Qty: 2 EACH | Refills: 0 | Status: SHIPPED | OUTPATIENT
Start: 2021-07-20 | End: 2024-03-07

## 2021-07-20 RX ORDER — QUETIAPINE FUMARATE 25 MG/1
25 TABLET, FILM COATED ORAL NIGHTLY
Qty: 30 TABLET | Refills: 0 | Status: SHIPPED | OUTPATIENT
Start: 2021-07-20 | End: 2021-08-23

## 2021-08-04 ENCOUNTER — TELEPHONE (OUTPATIENT)
Dept: INTERNAL MEDICINE | Facility: CLINIC | Age: 46
End: 2021-08-04

## 2021-08-12 ENCOUNTER — PATIENT MESSAGE (OUTPATIENT)
Dept: ADMINISTRATIVE | Facility: OTHER | Age: 46
End: 2021-08-12

## 2021-08-23 ENCOUNTER — OFFICE VISIT (OUTPATIENT)
Dept: INTERNAL MEDICINE | Facility: CLINIC | Age: 46
End: 2021-08-23
Payer: MEDICAID

## 2021-08-23 VITALS
HEIGHT: 66 IN | DIASTOLIC BLOOD PRESSURE: 82 MMHG | BODY MASS INDEX: 32.53 KG/M2 | OXYGEN SATURATION: 98 % | TEMPERATURE: 99 F | WEIGHT: 202.38 LBS | HEART RATE: 80 BPM | SYSTOLIC BLOOD PRESSURE: 130 MMHG

## 2021-08-23 DIAGNOSIS — E78.2 MIXED HYPERLIPIDEMIA: ICD-10-CM

## 2021-08-23 DIAGNOSIS — R73.03 PREDIABETES: ICD-10-CM

## 2021-08-23 DIAGNOSIS — F41.9 ANXIETY: Primary | ICD-10-CM

## 2021-08-23 DIAGNOSIS — M79.7 FIBROMYALGIA: Chronic | ICD-10-CM

## 2021-08-23 PROCEDURE — 99214 PR OFFICE/OUTPT VISIT, EST, LEVL IV, 30-39 MIN: ICD-10-PCS | Mod: S$PBB,,, | Performed by: FAMILY MEDICINE

## 2021-08-23 PROCEDURE — 99214 OFFICE O/P EST MOD 30 MIN: CPT | Mod: S$PBB,,, | Performed by: FAMILY MEDICINE

## 2021-08-23 PROCEDURE — 99999 PR PBB SHADOW E&M-EST. PATIENT-LVL IV: CPT | Mod: PBBFAC,,, | Performed by: FAMILY MEDICINE

## 2021-08-23 PROCEDURE — 99214 OFFICE O/P EST MOD 30 MIN: CPT | Mod: PBBFAC | Performed by: FAMILY MEDICINE

## 2021-08-23 PROCEDURE — 99999 PR PBB SHADOW E&M-EST. PATIENT-LVL IV: ICD-10-PCS | Mod: PBBFAC,,, | Performed by: FAMILY MEDICINE

## 2021-08-23 RX ORDER — CITALOPRAM 20 MG/1
20 TABLET, FILM COATED ORAL DAILY
Qty: 90 TABLET | Refills: 3 | Status: SHIPPED | OUTPATIENT
Start: 2021-08-23 | End: 2022-08-05 | Stop reason: SDUPTHER

## 2021-09-17 DIAGNOSIS — J45.20 MILD INTERMITTENT ASTHMA WITHOUT COMPLICATION: ICD-10-CM

## 2021-09-17 RX ORDER — ALBUTEROL SULFATE 90 UG/1
AEROSOL, METERED RESPIRATORY (INHALATION)
Qty: 18 G | Refills: 0 | Status: SHIPPED | OUTPATIENT
Start: 2021-09-17 | End: 2022-07-14

## 2021-10-29 ENCOUNTER — TELEPHONE (OUTPATIENT)
Dept: INTERNAL MEDICINE | Facility: CLINIC | Age: 46
End: 2021-10-29
Payer: MEDICAID

## 2021-10-29 RX ORDER — QUETIAPINE FUMARATE 25 MG/1
TABLET, FILM COATED ORAL
COMMUNITY
Start: 2021-09-07 | End: 2022-06-21

## 2021-11-01 DIAGNOSIS — Z12.31 ENCOUNTER FOR SCREENING MAMMOGRAM FOR MALIGNANT NEOPLASM OF BREAST: Primary | ICD-10-CM

## 2021-11-07 ENCOUNTER — PATIENT MESSAGE (OUTPATIENT)
Dept: OTHER | Facility: OTHER | Age: 46
End: 2021-11-07
Payer: MEDICAID

## 2021-11-11 ENCOUNTER — HOSPITAL ENCOUNTER (EMERGENCY)
Facility: HOSPITAL | Age: 46
Discharge: HOME | End: 2021-11-11
Attending: EMERGENCY MEDICINE
Payer: MEDICAID

## 2021-11-11 ENCOUNTER — HOSPITAL ENCOUNTER (OUTPATIENT)
Dept: RADIOLOGY | Facility: HOSPITAL | Age: 46
Discharge: HOME | End: 2021-11-11
Attending: FAMILY MEDICINE
Payer: MEDICAID

## 2021-11-11 VITALS
RESPIRATION RATE: 20 BRPM | SYSTOLIC BLOOD PRESSURE: 157 MMHG | BODY MASS INDEX: 31.8 KG/M2 | HEART RATE: 83 BPM | OXYGEN SATURATION: 98 % | WEIGHT: 197 LBS | DIASTOLIC BLOOD PRESSURE: 83 MMHG | TEMPERATURE: 99 F

## 2021-11-11 DIAGNOSIS — Z12.31 ENCOUNTER FOR SCREENING MAMMOGRAM FOR MALIGNANT NEOPLASM OF BREAST: ICD-10-CM

## 2021-11-11 DIAGNOSIS — R10.12 LEFT UPPER QUADRANT ABDOMINAL PAIN: Primary | ICD-10-CM

## 2021-11-11 PROCEDURE — 77067 MAMMO DIGITAL SCREENING BILAT WITH TOMO: ICD-10-PCS | Mod: 26,,, | Performed by: RADIOLOGY

## 2021-11-11 PROCEDURE — 77067 SCR MAMMO BI INCL CAD: CPT | Mod: 26,,, | Performed by: RADIOLOGY

## 2021-11-11 PROCEDURE — 99281 EMR DPT VST MAYX REQ PHY/QHP: CPT

## 2021-11-11 PROCEDURE — 77063 BREAST TOMOSYNTHESIS BI: CPT | Mod: 26,,, | Performed by: RADIOLOGY

## 2021-11-11 PROCEDURE — 77063 MAMMO DIGITAL SCREENING BILAT WITH TOMO: ICD-10-PCS | Mod: 26,,, | Performed by: RADIOLOGY

## 2021-11-11 PROCEDURE — 77067 SCR MAMMO BI INCL CAD: CPT | Mod: TC

## 2021-11-23 ENCOUNTER — LAB VISIT (OUTPATIENT)
Dept: LAB | Facility: HOSPITAL | Age: 46
End: 2021-11-23
Attending: FAMILY MEDICINE
Payer: MEDICAID

## 2021-11-23 DIAGNOSIS — R73.03 PREDIABETES: ICD-10-CM

## 2021-11-23 DIAGNOSIS — E78.2 MIXED HYPERLIPIDEMIA: ICD-10-CM

## 2021-11-23 LAB
ALBUMIN SERPL BCP-MCNC: 3.7 G/DL (ref 3.5–5.2)
ALP SERPL-CCNC: 59 U/L (ref 55–135)
ALT SERPL W/O P-5'-P-CCNC: 7 U/L (ref 10–44)
ANION GAP SERPL CALC-SCNC: 8 MMOL/L (ref 8–16)
AST SERPL-CCNC: 15 U/L (ref 10–40)
BILIRUB SERPL-MCNC: 0.2 MG/DL (ref 0.1–1)
BUN SERPL-MCNC: 7 MG/DL (ref 6–20)
CALCIUM SERPL-MCNC: 9 MG/DL (ref 8.7–10.5)
CHLORIDE SERPL-SCNC: 107 MMOL/L (ref 95–110)
CHOLEST SERPL-MCNC: 201 MG/DL (ref 120–199)
CHOLEST/HDLC SERPL: 3.3 {RATIO} (ref 2–5)
CO2 SERPL-SCNC: 25 MMOL/L (ref 23–29)
CREAT SERPL-MCNC: 0.9 MG/DL (ref 0.5–1.4)
EST. GFR  (AFRICAN AMERICAN): >60 ML/MIN/1.73 M^2
EST. GFR  (NON AFRICAN AMERICAN): >60 ML/MIN/1.73 M^2
ESTIMATED AVG GLUCOSE: 103 MG/DL (ref 68–131)
GLUCOSE SERPL-MCNC: 81 MG/DL (ref 70–110)
HBA1C MFR BLD: 5.2 % (ref 4–5.6)
HDLC SERPL-MCNC: 61 MG/DL (ref 40–75)
HDLC SERPL: 30.3 % (ref 20–50)
LDLC SERPL CALC-MCNC: 121.2 MG/DL (ref 63–159)
NONHDLC SERPL-MCNC: 140 MG/DL
POTASSIUM SERPL-SCNC: 4.1 MMOL/L (ref 3.5–5.1)
PROT SERPL-MCNC: 7 G/DL (ref 6–8.4)
SODIUM SERPL-SCNC: 140 MMOL/L (ref 136–145)
TRIGL SERPL-MCNC: 94 MG/DL (ref 30–150)

## 2021-11-23 PROCEDURE — 80053 COMPREHEN METABOLIC PANEL: CPT | Performed by: FAMILY MEDICINE

## 2021-11-23 PROCEDURE — 36415 COLL VENOUS BLD VENIPUNCTURE: CPT | Performed by: FAMILY MEDICINE

## 2021-11-23 PROCEDURE — 83036 HEMOGLOBIN GLYCOSYLATED A1C: CPT | Performed by: FAMILY MEDICINE

## 2021-11-23 PROCEDURE — 80061 LIPID PANEL: CPT | Performed by: FAMILY MEDICINE

## 2021-11-24 DIAGNOSIS — E78.2 MIXED HYPERLIPIDEMIA: ICD-10-CM

## 2021-11-24 RX ORDER — SIMVASTATIN 40 MG/1
40 TABLET, FILM COATED ORAL NIGHTLY
Qty: 90 TABLET | Refills: 3 | Status: SHIPPED | OUTPATIENT
Start: 2021-11-24 | End: 2022-08-05 | Stop reason: SDUPTHER

## 2021-11-30 ENCOUNTER — HOSPITAL ENCOUNTER (OUTPATIENT)
Dept: RADIOLOGY | Facility: HOSPITAL | Age: 46
Discharge: HOME | End: 2021-11-30
Attending: OBSTETRICS & GYNECOLOGY
Payer: MEDICAID

## 2021-11-30 DIAGNOSIS — D25.9 UTERINE LEIOMYOMA, UNSPECIFIED LOCATION: ICD-10-CM

## 2021-11-30 PROCEDURE — 76830 US PELVIS COMP WITH TRANSVAG NON-OB (XPD): ICD-10-PCS | Mod: 26,,, | Performed by: RADIOLOGY

## 2021-11-30 PROCEDURE — 76830 TRANSVAGINAL US NON-OB: CPT | Mod: 26,,, | Performed by: RADIOLOGY

## 2021-11-30 PROCEDURE — 76856 US EXAM PELVIC COMPLETE: CPT | Mod: 26,,, | Performed by: RADIOLOGY

## 2021-11-30 PROCEDURE — 76856 US PELVIS COMP WITH TRANSVAG NON-OB (XPD): ICD-10-PCS | Mod: 26,,, | Performed by: RADIOLOGY

## 2021-11-30 PROCEDURE — 76856 US EXAM PELVIC COMPLETE: CPT | Mod: TC

## 2021-12-02 ENCOUNTER — PATIENT OUTREACH (OUTPATIENT)
Dept: ADMINISTRATIVE | Facility: OTHER | Age: 46
End: 2021-12-02
Payer: MEDICAID

## 2021-12-03 ENCOUNTER — OFFICE VISIT (OUTPATIENT)
Dept: OBSTETRICS AND GYNECOLOGY | Facility: CLINIC | Age: 46
End: 2021-12-03
Payer: MEDICAID

## 2021-12-03 VITALS
HEIGHT: 66 IN | SYSTOLIC BLOOD PRESSURE: 130 MMHG | BODY MASS INDEX: 30.9 KG/M2 | DIASTOLIC BLOOD PRESSURE: 70 MMHG | WEIGHT: 192.25 LBS

## 2021-12-03 DIAGNOSIS — D25.9 UTERINE LEIOMYOMA, UNSPECIFIED LOCATION: Primary | ICD-10-CM

## 2021-12-03 PROCEDURE — 99214 OFFICE O/P EST MOD 30 MIN: CPT | Mod: PBBFAC | Performed by: OBSTETRICS & GYNECOLOGY

## 2021-12-03 PROCEDURE — 4010F PR ACE/ARB THEARPY RXD/TAKEN: ICD-10-PCS | Mod: CPTII,,, | Performed by: OBSTETRICS & GYNECOLOGY

## 2021-12-03 PROCEDURE — 99212 PR OFFICE/OUTPT VISIT, EST, LEVL II, 10-19 MIN: ICD-10-PCS | Mod: S$PBB,,, | Performed by: OBSTETRICS & GYNECOLOGY

## 2021-12-03 PROCEDURE — 99999 PR PBB SHADOW E&M-EST. PATIENT-LVL IV: ICD-10-PCS | Mod: PBBFAC,,, | Performed by: OBSTETRICS & GYNECOLOGY

## 2021-12-03 PROCEDURE — 4010F ACE/ARB THERAPY RXD/TAKEN: CPT | Mod: CPTII,,, | Performed by: OBSTETRICS & GYNECOLOGY

## 2021-12-03 PROCEDURE — 99999 PR PBB SHADOW E&M-EST. PATIENT-LVL IV: CPT | Mod: PBBFAC,,, | Performed by: OBSTETRICS & GYNECOLOGY

## 2021-12-03 PROCEDURE — 99212 OFFICE O/P EST SF 10 MIN: CPT | Mod: S$PBB,,, | Performed by: OBSTETRICS & GYNECOLOGY

## 2021-12-03 RX ORDER — ACETAMINOPHEN AND CODEINE PHOSPHATE 120; 12 MG/5ML; MG/5ML
1 SOLUTION ORAL DAILY
Qty: 28 TABLET | Refills: 6 | Status: SHIPPED | OUTPATIENT
Start: 2021-12-03 | End: 2022-07-08

## 2021-12-08 ENCOUNTER — PATIENT MESSAGE (OUTPATIENT)
Dept: ADMINISTRATIVE | Facility: OTHER | Age: 46
End: 2021-12-08
Payer: MEDICAID

## 2021-12-08 ENCOUNTER — PATIENT MESSAGE (OUTPATIENT)
Dept: ADMINISTRATIVE | Facility: CLINIC | Age: 46
End: 2021-12-08
Payer: MEDICAID

## 2021-12-08 ENCOUNTER — TELEPHONE (OUTPATIENT)
Dept: INTERNAL MEDICINE | Facility: CLINIC | Age: 46
End: 2021-12-08
Payer: MEDICAID

## 2021-12-08 ENCOUNTER — PATIENT MESSAGE (OUTPATIENT)
Dept: INFECTIOUS DISEASES | Facility: HOSPITAL | Age: 46
End: 2021-12-08
Payer: MEDICAID

## 2021-12-08 ENCOUNTER — HOSPITAL ENCOUNTER (EMERGENCY)
Facility: HOSPITAL | Age: 46
Discharge: HOME | End: 2021-12-08
Attending: EMERGENCY MEDICINE
Payer: MEDICAID

## 2021-12-08 ENCOUNTER — NURSE TRIAGE (OUTPATIENT)
Dept: ADMINISTRATIVE | Facility: CLINIC | Age: 46
End: 2021-12-08
Payer: MEDICAID

## 2021-12-08 VITALS
OXYGEN SATURATION: 99 % | HEART RATE: 73 BPM | TEMPERATURE: 98 F | HEIGHT: 66 IN | WEIGHT: 191 LBS | RESPIRATION RATE: 18 BRPM | DIASTOLIC BLOOD PRESSURE: 90 MMHG | BODY MASS INDEX: 30.7 KG/M2 | SYSTOLIC BLOOD PRESSURE: 151 MMHG

## 2021-12-08 DIAGNOSIS — U07.1 COVID-19 VIRUS INFECTION: Primary | ICD-10-CM

## 2021-12-08 DIAGNOSIS — R05.9 COUGH: ICD-10-CM

## 2021-12-08 LAB
CTP QC/QA: YES
CTP QC/QA: YES
POC MOLECULAR INFLUENZA A AGN: NEGATIVE
POC MOLECULAR INFLUENZA B AGN: NEGATIVE
SARS-COV-2 RDRP RESP QL NAA+PROBE: POSITIVE

## 2021-12-08 PROCEDURE — 99282 EMERGENCY DEPT VISIT SF MDM: CPT | Mod: 25

## 2021-12-08 PROCEDURE — U0002 COVID-19 LAB TEST NON-CDC: HCPCS | Performed by: NURSE PRACTITIONER

## 2021-12-09 ENCOUNTER — TELEPHONE (OUTPATIENT)
Dept: INTERNAL MEDICINE | Facility: CLINIC | Age: 46
End: 2021-12-09
Payer: MEDICAID

## 2021-12-09 ENCOUNTER — PATIENT MESSAGE (OUTPATIENT)
Dept: ADMINISTRATIVE | Facility: CLINIC | Age: 46
End: 2021-12-09
Payer: MEDICAID

## 2021-12-09 ENCOUNTER — PATIENT MESSAGE (OUTPATIENT)
Dept: ADMINISTRATIVE | Facility: OTHER | Age: 46
End: 2021-12-09
Payer: MEDICAID

## 2021-12-09 ENCOUNTER — NURSE TRIAGE (OUTPATIENT)
Dept: ADMINISTRATIVE | Facility: CLINIC | Age: 46
End: 2021-12-09
Payer: MEDICAID

## 2021-12-10 ENCOUNTER — NURSE TRIAGE (OUTPATIENT)
Dept: ADMINISTRATIVE | Facility: CLINIC | Age: 46
End: 2021-12-10
Payer: MEDICAID

## 2021-12-10 ENCOUNTER — PATIENT MESSAGE (OUTPATIENT)
Dept: ADMINISTRATIVE | Facility: CLINIC | Age: 46
End: 2021-12-10
Payer: MEDICAID

## 2021-12-10 ENCOUNTER — PATIENT MESSAGE (OUTPATIENT)
Dept: ADMINISTRATIVE | Facility: OTHER | Age: 46
End: 2021-12-10
Payer: MEDICAID

## 2021-12-11 ENCOUNTER — NURSE TRIAGE (OUTPATIENT)
Dept: ADMINISTRATIVE | Facility: CLINIC | Age: 46
End: 2021-12-11
Payer: MEDICAID

## 2021-12-11 ENCOUNTER — PATIENT MESSAGE (OUTPATIENT)
Dept: ADMINISTRATIVE | Facility: OTHER | Age: 46
End: 2021-12-11
Payer: MEDICAID

## 2021-12-11 ENCOUNTER — PATIENT MESSAGE (OUTPATIENT)
Dept: ADMINISTRATIVE | Facility: CLINIC | Age: 46
End: 2021-12-11
Payer: MEDICAID

## 2021-12-12 ENCOUNTER — PATIENT MESSAGE (OUTPATIENT)
Dept: ADMINISTRATIVE | Facility: CLINIC | Age: 46
End: 2021-12-12
Payer: MEDICAID

## 2021-12-12 ENCOUNTER — PATIENT MESSAGE (OUTPATIENT)
Dept: ADMINISTRATIVE | Facility: OTHER | Age: 46
End: 2021-12-12
Payer: MEDICAID

## 2021-12-12 ENCOUNTER — NURSE TRIAGE (OUTPATIENT)
Dept: ADMINISTRATIVE | Facility: CLINIC | Age: 46
End: 2021-12-12
Payer: MEDICAID

## 2021-12-13 ENCOUNTER — PATIENT MESSAGE (OUTPATIENT)
Dept: ADMINISTRATIVE | Facility: OTHER | Age: 46
End: 2021-12-13
Payer: MEDICAID

## 2021-12-13 ENCOUNTER — PATIENT MESSAGE (OUTPATIENT)
Dept: OTHER | Facility: OTHER | Age: 46
End: 2021-12-13
Payer: MEDICAID

## 2021-12-14 ENCOUNTER — PATIENT MESSAGE (OUTPATIENT)
Dept: ADMINISTRATIVE | Facility: OTHER | Age: 46
End: 2021-12-14
Payer: MEDICAID

## 2021-12-14 ENCOUNTER — NURSE TRIAGE (OUTPATIENT)
Dept: ADMINISTRATIVE | Facility: CLINIC | Age: 46
End: 2021-12-14
Payer: MEDICAID

## 2021-12-14 ENCOUNTER — PATIENT MESSAGE (OUTPATIENT)
Dept: ADMINISTRATIVE | Facility: CLINIC | Age: 46
End: 2021-12-14
Payer: MEDICAID

## 2021-12-15 ENCOUNTER — PATIENT MESSAGE (OUTPATIENT)
Dept: ADMINISTRATIVE | Facility: CLINIC | Age: 46
End: 2021-12-15
Payer: MEDICAID

## 2021-12-15 ENCOUNTER — PATIENT MESSAGE (OUTPATIENT)
Dept: ADMINISTRATIVE | Facility: OTHER | Age: 46
End: 2021-12-15
Payer: MEDICAID

## 2021-12-15 ENCOUNTER — NURSE TRIAGE (OUTPATIENT)
Dept: ADMINISTRATIVE | Facility: CLINIC | Age: 46
End: 2021-12-15
Payer: MEDICAID

## 2021-12-16 ENCOUNTER — PATIENT MESSAGE (OUTPATIENT)
Dept: ADMINISTRATIVE | Facility: CLINIC | Age: 46
End: 2021-12-16
Payer: MEDICAID

## 2021-12-16 ENCOUNTER — NURSE TRIAGE (OUTPATIENT)
Dept: ADMINISTRATIVE | Facility: CLINIC | Age: 46
End: 2021-12-16
Payer: MEDICAID

## 2021-12-16 ENCOUNTER — PATIENT MESSAGE (OUTPATIENT)
Dept: ADMINISTRATIVE | Facility: OTHER | Age: 46
End: 2021-12-16
Payer: MEDICAID

## 2021-12-17 ENCOUNTER — PATIENT MESSAGE (OUTPATIENT)
Dept: ADMINISTRATIVE | Facility: OTHER | Age: 46
End: 2021-12-17
Payer: MEDICAID

## 2021-12-17 ENCOUNTER — NURSE TRIAGE (OUTPATIENT)
Dept: ADMINISTRATIVE | Facility: CLINIC | Age: 46
End: 2021-12-17
Payer: MEDICAID

## 2021-12-17 ENCOUNTER — PATIENT MESSAGE (OUTPATIENT)
Dept: ADMINISTRATIVE | Facility: CLINIC | Age: 46
End: 2021-12-17
Payer: MEDICAID

## 2021-12-18 ENCOUNTER — NURSE TRIAGE (OUTPATIENT)
Dept: ADMINISTRATIVE | Facility: CLINIC | Age: 46
End: 2021-12-18
Payer: MEDICAID

## 2021-12-18 ENCOUNTER — PATIENT MESSAGE (OUTPATIENT)
Dept: ADMINISTRATIVE | Facility: CLINIC | Age: 46
End: 2021-12-18
Payer: MEDICAID

## 2021-12-19 ENCOUNTER — PATIENT MESSAGE (OUTPATIENT)
Dept: ADMINISTRATIVE | Facility: CLINIC | Age: 46
End: 2021-12-19
Payer: MEDICAID

## 2021-12-19 ENCOUNTER — PATIENT MESSAGE (OUTPATIENT)
Dept: ADMINISTRATIVE | Facility: OTHER | Age: 46
End: 2021-12-19
Payer: MEDICAID

## 2021-12-19 ENCOUNTER — NURSE TRIAGE (OUTPATIENT)
Dept: ADMINISTRATIVE | Facility: CLINIC | Age: 46
End: 2021-12-19
Payer: MEDICAID

## 2021-12-20 ENCOUNTER — PATIENT MESSAGE (OUTPATIENT)
Dept: ADMINISTRATIVE | Facility: OTHER | Age: 46
End: 2021-12-20
Payer: MEDICAID

## 2021-12-20 ENCOUNTER — PATIENT MESSAGE (OUTPATIENT)
Dept: ADMINISTRATIVE | Facility: CLINIC | Age: 46
End: 2021-12-20
Payer: MEDICAID

## 2021-12-21 ENCOUNTER — PATIENT MESSAGE (OUTPATIENT)
Dept: ADMINISTRATIVE | Facility: CLINIC | Age: 46
End: 2021-12-21
Payer: MEDICAID

## 2021-12-21 ENCOUNTER — PATIENT MESSAGE (OUTPATIENT)
Dept: ADMINISTRATIVE | Facility: OTHER | Age: 46
End: 2021-12-21
Payer: MEDICAID

## 2021-12-21 ENCOUNTER — NURSE TRIAGE (OUTPATIENT)
Dept: ADMINISTRATIVE | Facility: CLINIC | Age: 46
End: 2021-12-21
Payer: MEDICAID

## 2021-12-28 ENCOUNTER — PES CALL (OUTPATIENT)
Dept: ADMINISTRATIVE | Facility: CLINIC | Age: 46
End: 2021-12-28
Payer: MEDICAID

## 2022-01-06 ENCOUNTER — PATIENT MESSAGE (OUTPATIENT)
Dept: OTHER | Facility: OTHER | Age: 47
End: 2022-01-06
Payer: MEDICAID

## 2022-01-20 ENCOUNTER — PATIENT OUTREACH (OUTPATIENT)
Dept: ADMINISTRATIVE | Facility: OTHER | Age: 47
End: 2022-01-20
Payer: MEDICAID

## 2022-01-21 NOTE — PROGRESS NOTES
Health Maintenance Due   Topic Date Due    COVID-19 Vaccine (1) Never done    Colorectal Cancer Screening  Never done    Diabetes Urine Screening  07/06/2021    Foot Exam  07/06/2021    Influenza Vaccine (1) 09/01/2021     Updates were requested from care everywhere.  Chart was reviewed for overdue Proactive [facility] Encounters ([facility]) topics (CRS, Breast Cancer Screening, Eye exam)  Health Maintenance has been updated.  LINKS immunization registry triggered.  Immunizations were reconciled.

## 2022-01-24 ENCOUNTER — TELEPHONE (OUTPATIENT)
Dept: OTOLARYNGOLOGY | Facility: CLINIC | Age: 47
End: 2022-01-24
Payer: MEDICAID

## 2022-02-14 ENCOUNTER — PATIENT MESSAGE (OUTPATIENT)
Dept: PHYSICAL MEDICINE AND REHAB | Facility: CLINIC | Age: 47
End: 2022-02-14
Payer: MEDICAID

## 2022-02-27 ENCOUNTER — PATIENT MESSAGE (OUTPATIENT)
Dept: OTHER | Facility: OTHER | Age: 47
End: 2022-02-27
Payer: MEDICAID

## 2022-03-13 ENCOUNTER — TELEPHONE (OUTPATIENT)
Dept: OBSTETRICS AND GYNECOLOGY | Facility: HOSPITAL | Age: 47
End: 2022-03-13

## 2022-03-13 NOTE — TELEPHONE ENCOUNTER
Pt called    Missed work today; plans to return on Tuesday--needs note    Also c/o heavy irreg bleeding--started micronor in middle of menstrual cycle rather than Sunday start    Also c/o feeling the fibroids are causing her to have bowel issues    Needs surgery consult appt

## 2022-03-14 ENCOUNTER — TELEPHONE (OUTPATIENT)
Dept: OBSTETRICS AND GYNECOLOGY | Facility: CLINIC | Age: 47
End: 2022-03-14
Payer: MEDICAID

## 2022-03-14 NOTE — LETTER
March 14, 2022      [facility]  [address] [place] [id number]  Phone: [phone]  Fax: [phone]       Patient: Shelly Kam   YOB: 1975    To Whom It May Concern:    Yashira Kam may return to work on March 15, 2022 with no restrictions. If you have any questions or concerns, or if I can be of further assistance, please do not hesitate to contact me.    Sincerely,        Jody Elizabeth MD/Mayela Clark LPN

## 2022-03-14 NOTE — TELEPHONE ENCOUNTER
----- Message from Gwendolyn Valentin sent at 3/14/2022  7:26 AM CDT -----  Please call pt @ [phone] regarding information pertaining to nurse on call visit from the visit, pt states doctor was suppose to put paper on chart for pt job for missing work per nurse on call.    
----- Message from Gwendolyn Valentin sent at 3/14/2022  7:26 AM CDT -----  Please call pt @ [phone] regarding information pertaining to nurse on call visit from the visit, pt states doctor was suppose to put paper on chart for pt job for missing work per nurse on call.    
Called patient.  Letter done and in chart.  Patient will call back with fax number if we need to fax to her employer.  Surgery consult appointment scheduled.  
no

## 2022-03-14 NOTE — LETTER
March 14, 2022      [facility]  [id number] [place] [id number]  Phone: [phone]  Fax: [phone]       Patient: Shelly Kam   YOB: 1975  Date of Visit: 03/13/2022    To Whom It May Concern:    Yashira Kam  was at [facility] on 03/13/2022. The patient may return to [facility] on 03/15/2022 without restrictions. If you have any questions or concerns, or if I can be of further assistance, please do not hesitate to contact me.    Sincerely,        Jody Elizabeth MD/Mayela Clark LPN

## 2022-03-18 ENCOUNTER — PATIENT MESSAGE (OUTPATIENT)
Dept: ADMINISTRATIVE | Facility: HOSPITAL | Age: 47
End: 2022-03-18
Payer: MEDICAID

## 2022-03-28 ENCOUNTER — PATIENT OUTREACH (OUTPATIENT)
Dept: ADMINISTRATIVE | Facility: OTHER | Age: 47
End: 2022-03-28
Payer: MEDICAID

## 2022-03-28 DIAGNOSIS — Z12.11 SCREENING FOR COLON CANCER: Primary | ICD-10-CM

## 2022-03-28 DIAGNOSIS — Z12.11 ENCOUNTER FOR FIT (FECAL IMMUNOCHEMICAL TEST) SCREENING: Primary | ICD-10-CM

## 2022-04-05 DIAGNOSIS — E11.69 HYPERLIPIDEMIA ASSOCIATED WITH TYPE 2 DIABETES MELLITUS: Chronic | ICD-10-CM

## 2022-04-05 DIAGNOSIS — E78.5 HYPERLIPIDEMIA ASSOCIATED WITH TYPE 2 DIABETES MELLITUS: Chronic | ICD-10-CM

## 2022-04-05 RX ORDER — ASPIRIN 81 MG/1
81 TABLET ORAL DAILY
Qty: 90 TABLET | Refills: 3 | Status: CANCELLED | OUTPATIENT
Start: 2022-04-05 | End: 2023-04-05

## 2022-04-11 DIAGNOSIS — I15.2 HYPERTENSION ASSOCIATED WITH DIABETES: Chronic | ICD-10-CM

## 2022-04-11 DIAGNOSIS — E11.59 HYPERTENSION ASSOCIATED WITH DIABETES: Chronic | ICD-10-CM

## 2022-04-11 LAB — NONINV COLON CA DNA+OCC BLD SCRN STL QL: NORMAL

## 2022-04-11 NOTE — TELEPHONE ENCOUNTER
Care Due:                  Date            Visit Type   Department     Provider  --------------------------------------------------------------------------------                                EP -                              PRIMARY      [facility] INTER[facility]  Last Visit: 08-      CARE ([facility])   MEDICINE       Heraclio Farrell  Next Visit: None Scheduled  None         None Found                                                            Last  Test          Frequency    Reason                     Performed    Due Date  --------------------------------------------------------------------------------    HBA1C.......  6 months...  metFORMIN................  11- 05-    Powered by [employer]. Reference number: [id number].   4/11/2022 6:47:53 PM CDT

## 2022-04-12 RX ORDER — LOSARTAN POTASSIUM 25 MG/1
TABLET ORAL
Qty: 90 TABLET | Refills: 0 | Status: SHIPPED | OUTPATIENT
Start: 2022-04-12 | End: 2022-08-05 | Stop reason: SDUPTHER

## 2022-04-12 RX ORDER — AMLODIPINE BESYLATE 10 MG/1
TABLET ORAL
Qty: 90 TABLET | Refills: 0 | OUTPATIENT
Start: 2022-04-12

## 2022-04-12 NOTE — TELEPHONE ENCOUNTER
This Rx Request does not qualify for assessment with the [facility]   Please review protocol details and the Care Due Message for extra clinical information    Reasons Rx Request may be deferred:  1. Labs/Vitals overdue  2. Labs/Vitals abnormal  3. Patient has been seen in the ED/Hospital since the last PCP visit  4. Medication is non-delegated  5. Medication associated diagnosis code is outside of scope  6. Provider is a non-participating provider  7. Visit criteria not met (Patient needs to be seen at least every 15 months by PCP)    Note composed:11:02 PM 04/11/2022

## 2022-04-22 DIAGNOSIS — E11.69 HYPERLIPIDEMIA ASSOCIATED WITH TYPE 2 DIABETES MELLITUS: Chronic | ICD-10-CM

## 2022-04-22 DIAGNOSIS — E78.5 HYPERLIPIDEMIA ASSOCIATED WITH TYPE 2 DIABETES MELLITUS: Chronic | ICD-10-CM

## 2022-04-22 RX ORDER — ASPIRIN 81 MG/1
81 TABLET ORAL DAILY
Qty: 90 TABLET | Refills: 3 | Status: CANCELLED | OUTPATIENT
Start: 2022-04-05 | End: 2023-04-05

## 2022-04-26 ENCOUNTER — PATIENT MESSAGE (OUTPATIENT)
Dept: ADMINISTRATIVE | Facility: HOSPITAL | Age: 47
End: 2022-04-26
Payer: MEDICAID

## 2022-04-29 ENCOUNTER — PATIENT MESSAGE (OUTPATIENT)
Dept: PULMONOLOGY | Facility: CLINIC | Age: 47
End: 2022-04-29
Payer: MEDICAID

## 2022-04-29 LAB — NONINV COLON CA DNA+OCC BLD SCRN STL QL: NEGATIVE

## 2022-06-21 ENCOUNTER — HOSPITAL ENCOUNTER (EMERGENCY)
Facility: HOSPITAL | Age: 47
Discharge: HOME | End: 2022-06-21
Attending: EMERGENCY MEDICINE
Payer: MEDICAID

## 2022-06-21 ENCOUNTER — TELEPHONE (OUTPATIENT)
Dept: INTERNAL MEDICINE | Facility: CLINIC | Age: 47
End: 2022-06-21
Payer: MEDICAID

## 2022-06-21 VITALS
HEART RATE: 87 BPM | SYSTOLIC BLOOD PRESSURE: 113 MMHG | WEIGHT: 185.88 LBS | BODY MASS INDEX: 29.87 KG/M2 | HEIGHT: 66 IN | DIASTOLIC BLOOD PRESSURE: 54 MMHG | OXYGEN SATURATION: 99 % | RESPIRATION RATE: 14 BRPM | TEMPERATURE: 99 F

## 2022-06-21 DIAGNOSIS — U07.1 COVID-19: Primary | ICD-10-CM

## 2022-06-21 LAB
CTP QC/QA: YES
CTP QC/QA: YES
GROUP A STREP, MOLECULAR: NEGATIVE
POC MOLECULAR INFLUENZA A AGN: NEGATIVE
POC MOLECULAR INFLUENZA B AGN: NEGATIVE
SARS-COV-2 RDRP RESP QL NAA+PROBE: POSITIVE

## 2022-06-21 PROCEDURE — U0002 COVID-19 LAB TEST NON-CDC: HCPCS | Performed by: NURSE PRACTITIONER

## 2022-06-21 PROCEDURE — 99283 EMERGENCY DEPT VISIT LOW MDM: CPT

## 2022-06-21 PROCEDURE — 25000003 PHARM REV CODE 250: Performed by: NURSE PRACTITIONER

## 2022-06-21 PROCEDURE — 87502 INFLUENZA DNA AMP PROBE: CPT

## 2022-06-21 PROCEDURE — 87651 STREP A DNA AMP PROBE: CPT | Performed by: NURSE PRACTITIONER

## 2022-06-21 RX ORDER — ACETAMINOPHEN 325 MG/1
650 TABLET ORAL
Status: COMPLETED | OUTPATIENT
Start: 2022-06-21 | End: 2022-06-21

## 2022-06-21 RX ADMIN — ACETAMINOPHEN 650 MG: 325 TABLET ORAL at 02:06

## 2022-06-21 NOTE — TELEPHONE ENCOUNTER
----- Message from Manju Aragon sent at 6/21/2022 10:44 AM CDT -----  Regarding: covid  Contact: patient  Patient is requesting something for her joint pain from covid be called in. Please call her back at [phone]

## 2022-06-21 NOTE — Clinical Note
"Shelly "Cale Kam was seen and treated in our emergency department on 6/21/2022.     COVID-19 is present in our communities across the state. There is limited testing for COVID at this time, so not all patients can be tested. In this situation, your employee meets the following criteria:    Shelly Kam has met the criteria for COVID-19 testing and has a POSITIVE result. She can return to work once they are asymptomatic for 24 hours without the use of fever reducing medications AND at least five days from the first positive result. A mask is recommended for 5 days post quarantine.     If you have any questions or concerns, or if I can be of further assistance, please do not hesitate to contact me.    Sincerely,             Hayden Mejia NP"

## 2022-06-21 NOTE — ED PROVIDER NOTES
HISTORY     Chief Complaint   Patient presents with    COVID-19 Concerns     Pt CO sore throat, gen body aches, fever since early this am. ? Covid exposure         Review of patient's allergies indicates:   Allergen Reactions    Pcn [penicillins] Anaphylaxis     Throat , tongue swelling     Banana      Itchy mouth    Lisinopril Other (See Comments)     Lip swelling    Melon      Itchy mouth    Morphine Other (See Comments)     Unknown    Tree nuts      Oral itching        HPI   The history is provided by the patient. No  was used.        Pt reports being exposed to Covid-19. Pt reports the following symptoms: body aches, sore throat, fever. Pt denies any of the following: CP, SOB, NVD, abdominal pain or any other symptoms at this time.     PCP: Heraclio Farrell MD     Past Medical History:  Past Medical History:   Diagnosis Date    Allergy     Anemia     Asthma     Depression     Diabetes mellitus     Diabetes mellitus, type 2     Fibromyalgia     GERD (gastroesophageal reflux disease)     Hyperlipidemia     Hypertension     Hyperthyroidism     Panic attack     Prozac in past        Past Surgical History:  Past Surgical History:   Procedure Laterality Date    BRONCHOSCOPY      HERNIA REPAIR      THYROIDECTOMY Bilateral 3/25/2021    Procedure: THYROIDECTOMY;  Surgeon: Alon Barton MD;  Location: [address];  Service: ENT;  Laterality: Bilateral;  NIMS monitor    TONSILLECTOMY      TRACHEOSTOMY      TRACHEOSTOMY TUBE PLACEMENT          Family History:  Family History   Problem Relation Age of Onset    Cancer Paternal Aunt         Social History:  Social History     Tobacco Use    Smoking status: Former Smoker     Packs/day: 0.25     Types: Cigarettes     Quit date: 3/1/2011     Years since quittin.3    Smokeless tobacco: Never Used    Tobacco comment: quit 2021    Substance and Sexual Activity    Alcohol use: Yes     Comment: once a year   "   Drug use: Yes     Types: Marijuana    Sexual activity: Yes     Partners: Male         ROS   Review of Systems   Constitutional: Positive for fever.   HENT: Positive for sore throat.    Respiratory: Negative for shortness of breath.    Cardiovascular: Negative for chest pain.   Gastrointestinal: Negative for nausea.   Genitourinary: Negative for dysuria.   Musculoskeletal: Positive for myalgias. Negative for back pain.   Skin: Negative for rash.   Neurological: Negative for weakness.   Hematological: Does not bruise/bleed easily.       PHYSICAL EXAM     Initial Vitals [06/21/22 0016]   BP Pulse Resp Temp SpO2   (!) 113/54 87 14 99.1 °F (37.3 °C) 99 %      MAP       --           Physical Exam    HENT:   Mouth/Throat: No oropharyngeal exudate.            Nursing Notes and Vital Signs Reviewed.  Constitutional: Patient is in no acute distress.   Pulmonary/Chest: No respiratory distress.   Musculoskeletal: Moves all extremities.   Neurological:  Alert, awake, and appropriate.  Normal speech.    Psychiatric: Normal affect. Good eye contact. Appropriate in content.      ED COURSE   Procedures  ED ONGOING VITALS:  Vitals:    06/21/22 0016   BP: (!) 113/54   Pulse: 87   Resp: 14   Temp: 99.1 °F (37.3 °C)   TempSrc: Oral   SpO2: 99%   Weight: 84.3 kg (185 lb 13.6 oz)   Height: 5' 6" (1.676 m)         ABNORMAL LAB VALUES:  Labs Reviewed   SARS-COV-2 RDRP GENE - Abnormal; Notable for the following components:       Result Value    POC Rapid COVID Positive (*)     All other components within normal limits    Narrative:     This test utilizes isothermal nucleic acid amplification   technology to detect the SARS-CoV-2 RdRp nucleic acid segment.   The analytical sensitivity (limit of detection) is 125 genome   equivalents/mL.   A POSITIVE result implies infection with the SARS-CoV-2 virus;   the patient is presumed to be contagious.     A NEGATIVE result means that SARS-CoV-2 nucleic acids are not   present above the limit of " detection. A NEGATIVE result should be   treated as presumptive. It does not rule out the possibility of   COVID-19 and should not be the sole basis for treatment decisions.   If COVID-19 is strongly suspected based on clinical and exposure   history, re-testing using an alternate molecular assay should be   considered.   This test is only for use under the Food and Drug   Administration s Emergency Use Authorization (EUA).   Commercial kits are provided by [employer].   Performance characteristics of the EUA have been independently   verified by [facility] Department of   Pathology and Laboratory Medicine.   _________________________________________________________________   The authorized Fact Sheet for Healthcare Providers and the authorized Fact   Sheet for Patients of the ID NOW COVID-19 are available on the FDA   website:     https://www.fda.gov/media/534037/download  https://www.fda.gov/media/491261/download          GROUP A STREP, MOLECULAR   POCT INFLUENZA A/B MOLECULAR         ALL LAB VALUES:  Results for orders placed or performed during the hospital encounter of 06/21/22   Group A Strep, Molecular    Specimen: Throat   Result Value Ref Range    Group A Strep, Molecular Negative Negative   POCT COVID-19 Rapid Screening   Result Value Ref Range    POC Rapid COVID Positive (A) Negative     Acceptable Yes    POCT Influenza A/B Molecular   Result Value Ref Range    POC Molecular Influenza A Ag Negative Negative, Not Reported    POC Molecular Influenza B Ag Negative Negative, Not Reported     Acceptable Yes            RADIOLOGY STUDIES:  Imaging Results    None                   The above vital signs and test results have been reviewed by the emergency provider.     ED Medications:  Medications   acetaminophen tablet 650 mg (has no administration in time range)       Current Discharge Medication List      START taking these medications    Details    nirmatrelvir-ritonavir 150 mg x 2- 100 mg copackaged tablets (EUA) Take 3 tablets by mouth 2 (two) times daily for 5 days. Each dose contains 2 nirmatrelvir (pink tablets) and 1 ritonavir (white tablet). Take all 3 tablets together  Qty: 30 tablet, Refills: 0           Discharge Medications:  New Prescriptions    NIRMATRELVIR-RITONAVIR 150 MG X 2- 100 MG COPACKAGED TABLETS (EUA)    Take 3 tablets by mouth 2 (two) times daily for 5 days. Each dose contains 2 nirmatrelvir (pink tablets) and 1 ritonavir (white tablet). Take all 3 tablets together       Follow-up Information     Heraclio Farrell MD.    Specialty: Family Medicine  Why: As needed  Contact information:  [address]  [place] [address] [phone]             [facility] - Emergency Dept..    Specialty: Emergency Medicine  Why: If symptoms worsen  Contact information:  [address] [facility] [address] [phone]                      2:28 AM    I discussed with patient and/or family/caretaker that evaluation in the ED does not suggest any emergent or life threatening medical conditions requiring immediate intervention beyond what was provided in the ED, and I believe patient is safe for discharge. Regardless, an unremarkable evaluation in the ED does not preclude the development or presence of a serious or life threatening condition. As such, patient was instructed to return immediately for any worsening or change in current symptoms.        MEDICAL DECISION MAKING                 CLINICAL IMPRESSION       ICD-10-CM ICD-9-CM   1. COVID-19  U07.1 079.89       Disposition:   Disposition: Discharged  Condition: Stable         Hayden Mejia NP  06/21/22 0239

## 2022-06-21 NOTE — FIRST PROVIDER EVALUATION
"Medical screening exam completed.  I have conducted a focused provider triage encounter, findings are as follows:    Brief history of present illness:  Patient reports sore throat, body aches, fever    Vitals:    06/21/22 0016   BP: (!) 113/54   BP Location: Right arm   Patient Position: Sitting   Pulse: 87   Resp: 14   Temp: 99.1 °F (37.3 °C)   TempSrc: Oral   SpO2: 99%   Weight: 84.3 kg (185 lb 13.6 oz)   Height: 5' 6" (1.676 m)       Pertinent physical exam:  Patient appears uncomfortable    Brief workup plan:  Covid, flu, strep    Preliminary workup initiated; this workup will be continued and followed by the physician or advanced practice provider that is assigned to the patient when roomed.  "

## 2022-06-22 ENCOUNTER — PATIENT MESSAGE (OUTPATIENT)
Dept: RESEARCH | Facility: HOSPITAL | Age: 47
End: 2022-06-22
Payer: MEDICAID

## 2022-06-23 ENCOUNTER — PATIENT OUTREACH (OUTPATIENT)
Dept: EMERGENCY MEDICINE | Facility: HOSPITAL | Age: 47
End: 2022-06-23
Payer: MEDICAID

## 2022-06-23 ENCOUNTER — HOSPITAL ENCOUNTER (EMERGENCY)
Facility: HOSPITAL | Age: 47
Discharge: HOME | End: 2022-06-23
Attending: EMERGENCY MEDICINE
Payer: MEDICAID

## 2022-06-23 VITALS
SYSTOLIC BLOOD PRESSURE: 112 MMHG | OXYGEN SATURATION: 99 % | TEMPERATURE: 99 F | HEART RATE: 64 BPM | BODY MASS INDEX: 29.61 KG/M2 | DIASTOLIC BLOOD PRESSURE: 66 MMHG | RESPIRATION RATE: 16 BRPM | WEIGHT: 183.44 LBS

## 2022-06-23 DIAGNOSIS — R10.2 PELVIC PAIN: Primary | ICD-10-CM

## 2022-06-23 DIAGNOSIS — N93.8 DYSFUNCTIONAL UTERINE BLEEDING: ICD-10-CM

## 2022-06-23 DIAGNOSIS — D25.9 UTERINE LEIOMYOMA, UNSPECIFIED LOCATION: ICD-10-CM

## 2022-06-23 DIAGNOSIS — J45.20 MILD INTERMITTENT ASTHMA WITHOUT COMPLICATION: Primary | ICD-10-CM

## 2022-06-23 DIAGNOSIS — D50.9 MICROCYTIC ANEMIA: ICD-10-CM

## 2022-06-23 LAB
ABO + RH BLD: NORMAL
ALBUMIN SERPL BCP-MCNC: 3.6 G/DL (ref 3.5–5.2)
ALP SERPL-CCNC: 50 U/L (ref 55–135)
ALT SERPL W/O P-5'-P-CCNC: 10 U/L (ref 10–44)
ANION GAP SERPL CALC-SCNC: 9 MMOL/L (ref 8–16)
AST SERPL-CCNC: 26 U/L (ref 10–40)
B-HCG UR QL: NEGATIVE
BASOPHILS # BLD AUTO: 0.02 K/UL (ref 0–0.2)
BASOPHILS NFR BLD: 0.6 % (ref 0–1.9)
BILIRUB SERPL-MCNC: 0.2 MG/DL (ref 0.1–1)
BILIRUB UR QL STRIP: NEGATIVE
BLD GP AB SCN CELLS X3 SERPL QL: NORMAL
BLD PROD TYP BPU: NORMAL
BLOOD UNIT EXPIRATION DATE: NORMAL
BLOOD UNIT TYPE CODE: 5100
BLOOD UNIT TYPE: NORMAL
BUN SERPL-MCNC: 7 MG/DL (ref 6–20)
CALCIUM SERPL-MCNC: 8.1 MG/DL (ref 8.7–10.5)
CHLORIDE SERPL-SCNC: 104 MMOL/L (ref 95–110)
CLARITY UR: CLEAR
CO2 SERPL-SCNC: 25 MMOL/L (ref 23–29)
CODING SYSTEM: NORMAL
COLOR UR: YELLOW
CREAT SERPL-MCNC: 0.8 MG/DL (ref 0.5–1.4)
DIFFERENTIAL METHOD: ABNORMAL
DISPENSE STATUS: NORMAL
EOSINOPHIL # BLD AUTO: 0 K/UL (ref 0–0.5)
EOSINOPHIL NFR BLD: 0.6 % (ref 0–8)
ERYTHROCYTE [DISTWIDTH] IN BLOOD BY AUTOMATED COUNT: 18.1 % (ref 11.5–14.5)
EST. GFR  (AFRICAN AMERICAN): >60 ML/MIN/1.73 M^2
EST. GFR  (NON AFRICAN AMERICAN): >60 ML/MIN/1.73 M^2
FOLATE SERPL-MCNC: 8.3 NG/ML (ref 4–24)
GLUCOSE SERPL-MCNC: 100 MG/DL (ref 70–110)
GLUCOSE UR QL STRIP: NEGATIVE
HCT VFR BLD AUTO: 26.5 % (ref 37–48.5)
HGB BLD-MCNC: 7.4 G/DL (ref 12–16)
HGB UR QL STRIP: ABNORMAL
IMM GRANULOCYTES # BLD AUTO: 0.01 K/UL (ref 0–0.04)
IMM GRANULOCYTES NFR BLD AUTO: 0.3 % (ref 0–0.5)
IRON SERPL-MCNC: 11 UG/DL (ref 30–160)
KETONES UR QL STRIP: NEGATIVE
LEUKOCYTE ESTERASE UR QL STRIP: NEGATIVE
LIPASE SERPL-CCNC: 25 U/L (ref 4–60)
LYMPHOCYTES # BLD AUTO: 1.6 K/UL (ref 1–4.8)
LYMPHOCYTES NFR BLD: 49.7 % (ref 18–48)
MCH RBC QN AUTO: 19 PG (ref 27–31)
MCHC RBC AUTO-ENTMCNC: 27.9 G/DL (ref 32–36)
MCV RBC AUTO: 68 FL (ref 82–98)
MICROSCOPIC COMMENT: ABNORMAL
MONOCYTES # BLD AUTO: 0.4 K/UL (ref 0.3–1)
MONOCYTES NFR BLD: 12.7 % (ref 4–15)
NEUTROPHILS # BLD AUTO: 1.2 K/UL (ref 1.8–7.7)
NEUTROPHILS NFR BLD: 36.1 % (ref 38–73)
NITRITE UR QL STRIP: NEGATIVE
NRBC BLD-RTO: 0 /100 WBC
NUM UNITS TRANS PACKED RBC: NORMAL
PH UR STRIP: 7 [PH] (ref 5–8)
PLATELET # BLD AUTO: 170 K/UL (ref 150–450)
PMV BLD AUTO: 9.8 FL (ref 9.2–12.9)
POTASSIUM SERPL-SCNC: 3.9 MMOL/L (ref 3.5–5.1)
PROT SERPL-MCNC: 6.5 G/DL (ref 6–8.4)
PROT UR QL STRIP: ABNORMAL
RBC # BLD AUTO: 3.9 M/UL (ref 4–5.4)
RBC #/AREA URNS HPF: 46 /HPF (ref 0–4)
RETICS/RBC NFR AUTO: 0.4 % (ref 0.5–2.5)
SATURATED IRON: 2 % (ref 20–50)
SODIUM SERPL-SCNC: 138 MMOL/L (ref 136–145)
SP GR UR STRIP: 1.02 (ref 1–1.03)
TOTAL IRON BINDING CAPACITY: 517 UG/DL (ref 250–450)
TRANSFERRIN SERPL-MCNC: 349 MG/DL (ref 200–375)
URN SPEC COLLECT METH UR: ABNORMAL
UROBILINOGEN UR STRIP-ACNC: NEGATIVE EU/DL
VIT B12 SERPL-MCNC: 279 PG/ML (ref 210–950)
WBC # BLD AUTO: 3.22 K/UL (ref 3.9–12.7)

## 2022-06-23 PROCEDURE — 82746 ASSAY OF FOLIC ACID SERUM: CPT | Performed by: EMERGENCY MEDICINE

## 2022-06-23 PROCEDURE — 99291 CRITICAL CARE FIRST HOUR: CPT | Mod: 25

## 2022-06-23 PROCEDURE — 36430 TRANSFUSION BLD/BLD COMPNT: CPT

## 2022-06-23 PROCEDURE — 82607 VITAMIN B-12: CPT | Performed by: EMERGENCY MEDICINE

## 2022-06-23 PROCEDURE — 83690 ASSAY OF LIPASE: CPT | Performed by: EMERGENCY MEDICINE

## 2022-06-23 PROCEDURE — 96376 TX/PRO/DX INJ SAME DRUG ADON: CPT | Mod: 59

## 2022-06-23 PROCEDURE — 84466 ASSAY OF TRANSFERRIN: CPT | Performed by: EMERGENCY MEDICINE

## 2022-06-23 PROCEDURE — 80053 COMPREHEN METABOLIC PANEL: CPT | Performed by: NURSE PRACTITIONER

## 2022-06-23 PROCEDURE — 81000 URINALYSIS NONAUTO W/SCOPE: CPT | Performed by: NURSE PRACTITIONER

## 2022-06-23 PROCEDURE — 86850 RBC ANTIBODY SCREEN: CPT | Performed by: EMERGENCY MEDICINE

## 2022-06-23 PROCEDURE — 63600175 PHARM REV CODE 636 W HCPCS: Performed by: EMERGENCY MEDICINE

## 2022-06-23 PROCEDURE — P9016 RBC LEUKOCYTES REDUCED: HCPCS | Performed by: EMERGENCY MEDICINE

## 2022-06-23 PROCEDURE — 81025 URINE PREGNANCY TEST: CPT | Performed by: EMERGENCY MEDICINE

## 2022-06-23 PROCEDURE — 96375 TX/PRO/DX INJ NEW DRUG ADDON: CPT | Mod: 59

## 2022-06-23 PROCEDURE — 85025 COMPLETE CBC W/AUTO DIFF WBC: CPT | Performed by: NURSE PRACTITIONER

## 2022-06-23 PROCEDURE — 96374 THER/PROPH/DIAG INJ IV PUSH: CPT | Mod: 59

## 2022-06-23 PROCEDURE — 86920 COMPATIBILITY TEST SPIN: CPT | Performed by: EMERGENCY MEDICINE

## 2022-06-23 PROCEDURE — 85045 AUTOMATED RETICULOCYTE COUNT: CPT | Performed by: EMERGENCY MEDICINE

## 2022-06-23 RX ORDER — MEDROXYPROGESTERONE ACETATE 10 MG/1
10 TABLET ORAL DAILY
Qty: 14 TABLET | Refills: 0 | Status: SHIPPED | OUTPATIENT
Start: 2022-06-23 | End: 2022-07-08 | Stop reason: SDUPTHER

## 2022-06-23 RX ORDER — FENTANYL CITRATE 50 UG/ML
50 INJECTION, SOLUTION INTRAMUSCULAR; INTRAVENOUS
Status: COMPLETED | OUTPATIENT
Start: 2022-06-23 | End: 2022-06-23

## 2022-06-23 RX ORDER — ONDANSETRON 2 MG/ML
4 INJECTION INTRAMUSCULAR; INTRAVENOUS
Status: COMPLETED | OUTPATIENT
Start: 2022-06-23 | End: 2022-06-23

## 2022-06-23 RX ORDER — HYDROCODONE BITARTRATE AND ACETAMINOPHEN 10; 325 MG/1; MG/1
1 TABLET ORAL EVERY 6 HOURS PRN
Qty: 9 TABLET | Refills: 0 | Status: SHIPPED | OUTPATIENT
Start: 2022-06-23 | End: 2022-07-08

## 2022-06-23 RX ORDER — FERROUS GLUCONATE 324(38)MG
324 TABLET ORAL
Qty: 30 TABLET | Refills: 3 | Status: SHIPPED | OUTPATIENT
Start: 2022-06-23 | End: 2022-08-05 | Stop reason: SDUPTHER

## 2022-06-23 RX ORDER — HYDROCODONE BITARTRATE AND ACETAMINOPHEN 500; 5 MG/1; MG/1
TABLET ORAL
Status: DISCONTINUED | OUTPATIENT
Start: 2022-06-23 | End: 2022-06-23 | Stop reason: HOSPADM

## 2022-06-23 RX ORDER — ONDANSETRON 4 MG/1
4 TABLET, FILM COATED ORAL EVERY 6 HOURS PRN
Qty: 12 TABLET | Refills: 0 | Status: SHIPPED | OUTPATIENT
Start: 2022-06-23 | End: 2022-10-11

## 2022-06-23 RX ADMIN — FENTANYL CITRATE 50 MCG: 50 INJECTION INTRAMUSCULAR; INTRAVENOUS at 05:06

## 2022-06-23 RX ADMIN — ONDANSETRON 4 MG: 2 INJECTION INTRAMUSCULAR; INTRAVENOUS at 02:06

## 2022-06-23 RX ADMIN — FENTANYL CITRATE 50 MCG: 50 INJECTION INTRAMUSCULAR; INTRAVENOUS at 02:06

## 2022-06-23 NOTE — DISCHARGE INSTRUCTIONS
Fibroids are causing her abdominal pain her dysfunctional uterine bleeding.  Use Norco for pain and Zofran for nausea.  He has been transfused a unit of blood.  Take Provera as prescribed for 14 days.  When the stops you should have stopped bleeding.  Please follow-up with Dr. Hong at next available.  Return as needed for any worsening symptoms, problems, questions or concerns.  Take an iron tablet daily as prescribed.  This will turn her stool dark.  Do not be alarmed.

## 2022-06-23 NOTE — ED PROVIDER NOTES
Encounter Date: 6/23/2022    SCRIBE #1 NOTE: I, Truongchencho Corrales, am scribing for, and in the presence of, Dajuan Bedoya Jr., MD. Other sections scribed: ED Discussion.       History     Chief Complaint   Patient presents with    Pelvic Pain     Patient presents with pelvic pain and vaginal bleeding that began one day ago. Reports history of uterine fibroids.      HPI   47-year-old  female with known history of uterine fibroids as well as a COVID diagnosis 2 days ago where upon she started Paxlovid presents complaining of heavy menstrual bleeding with lower abdominal pain in the midline to the right side.  There is no nausea vomiting.  She did have fever that broke earlier today but is also COVID positive.  She notes some right-sided flank pain.  She notes that she has heavier than usual vaginal bleeding.  She believes that her menstrual cycle.  Two days ago and restarted when she got home.  She is not passing clots.  She denies any dysuria frequency or hematuria.  No history of stones.    Review of patient's allergies indicates:   Allergen Reactions    Pcn [penicillins] Anaphylaxis     Throat , tongue swelling     Banana      Itchy mouth    Lisinopril Other (See Comments)     Lip swelling    Melon      Itchy mouth    Morphine Other (See Comments)     Unknown    Tree nuts      Oral itching     Past Medical History:   Diagnosis Date    Allergy     Anemia     Asthma     Depression     Diabetes mellitus     Diabetes mellitus, type 2     Fibromyalgia     GERD (gastroesophageal reflux disease)     Hyperlipidemia     Hypertension     Hyperthyroidism     Panic attack     Prozac in past     Past Surgical History:   Procedure Laterality Date    BRONCHOSCOPY      HERNIA REPAIR      THYROIDECTOMY Bilateral 3/25/2021    Procedure: THYROIDECTOMY;  Surgeon: Alon Barton MD;  Location: [facility] OR [address] [place] [address];  Service: ENT;  Laterality: Bilateral;  NIMS monitor    TONSILLECTOMY      TRACHEOSTOMY       TRACHEOSTOMY TUBE PLACEMENT       Family History   Problem Relation Age of Onset    Cancer Paternal Aunt      Social History     Tobacco Use    Smoking status: Former Smoker     Packs/day: 0.25     Types: Cigarettes     Quit date: 3/1/2011     Years since quittin.3    Smokeless tobacco: Never Used    Tobacco comment: quit 2021    Substance Use Topics    Alcohol use: Yes     Comment: once a year     Drug use: Yes     Types: Marijuana     Review of Systems   Constitutional: Positive for chills and fever.   HENT: Positive for rhinorrhea and sore throat.    Respiratory: Positive for cough. Negative for shortness of breath.    Cardiovascular: Negative for chest pain and palpitations.   Gastrointestinal: Positive for abdominal pain. Negative for diarrhea, nausea and vomiting.   Genitourinary: Positive for menstrual problem, pelvic pain and vaginal bleeding. Negative for dysuria, frequency, hematuria and vaginal discharge.       Physical Exam   Nursing Notes and Vital Signs Reviewed.  Constitutional: Patient is in moderate  distress. Well-developed and well-nourished.  Head: Atraumatic. Normocephalic.  Eyes:  EOM intact.  No scleral icterus.  ENT: Mucous membranes are moist.  Nares clear   Neck:  Full ROM. No JVD.  Cardiovascular: Regular rate. Regular rhythm No murmurs, rubs, or gallops. Distal pulses are 2+ and symmetric  Pulmonary/Chest: No respiratory distress. Clear to auscultation bilaterally. No wheezing or rales.  Equal chest wall rise bilaterally  Abdominal: Soft and non-distended.  there is lower abdominal tenderness in the midline worse in the right lower quadrant at McBurney's point.  No rebound.  voluntary guarding.    Genitourinary:  Mild right CVA tenderness.  CVA tenderness.  suprapubic tenderness present.  Pelvic exam performed.  There is uterine tenderness on exam.  No palpable mass.  There is blood in the vault with several black clots.  Musculoskeletal: Moves all extremities. No  obvious deformities.  5 x 5 strength in all extremities   Skin: Warm and dry.  Neurological:  Alert, awake, and appropriate.  Normal speech.  No acute focal neurological deficits are appreciated.  Two through 12 intact bilaterally.  Psychiatric: Normal affect. Good eye contact. Appropriate in content.    Initial Vitals [06/23/22 0142]   BP Pulse Resp Temp SpO2   (!) 155/73 70 18 98.8 °F (37.1 °C) 100 %      MAP       --         Physical Exam    ED Course   Critical Care    Date/Time: 6/23/2022 4:40 AM  Performed by: Dajuan Bedoya Jr., MD  Authorized by: Dajuan Bedoya Jr., MD   Direct patient critical care time: 18 minutes  Additional history critical care time: 5 minutes  Ordering / reviewing critical care time: 3 minutes  Documentation critical care time: 9 minutes  Consulting other physicians critical care time: 6 minutes  Consult with family critical care time: 4 minutes  Other critical care time: 0 minutes  Total critical care time (exclusive of procedural time) : 45 minutes  Critical care time was exclusive of separately billable procedures and treating other patients and teaching time.  Critical care was necessary to treat or prevent imminent or life-threatening deterioration of the following conditions: anemia requiring blood transfusion.  Critical care was time spent personally by me on the following activities: blood draw for specimens, development of treatment plan with patient or surrogate, discussions with consultants, interpretation of cardiac output measurements, evaluation of patient's response to treatment, examination of patient, obtaining history from patient or surrogate, ordering and performing treatments and interventions, ordering and review of laboratory studies, ordering and review of radiographic studies, pulse oximetry, re-evaluation of patient's condition and review of old charts.        Labs Reviewed   CBC W/ AUTO DIFFERENTIAL - Abnormal; Notable for the following components:        Result Value    WBC 3.22 (*)     RBC 3.90 (*)     Hemoglobin 7.4 (*)     Hematocrit 26.5 (*)     MCV 68 (*)     MCH 19.0 (*)     MCHC 27.9 (*)     RDW 18.1 (*)     Gran # (ANC) 1.2 (*)     Gran % 36.1 (*)     Lymph % 49.7 (*)     All other components within normal limits   COMPREHENSIVE METABOLIC PANEL - Abnormal; Notable for the following components:    Calcium 8.1 (*)     Alkaline Phosphatase 50 (*)     All other components within normal limits   URINALYSIS, REFLEX TO URINE CULTURE - Abnormal; Notable for the following components:    Protein, UA Trace (*)     Occult Blood UA 2+ (*)     All other components within normal limits    Narrative:     Specimen Source->Urine   URINALYSIS MICROSCOPIC - Abnormal; Notable for the following components:    RBC, UA 46 (*)     All other components within normal limits    Narrative:     Specimen Source->Urine   LIPASE   PREGNANCY TEST, URINE RAPID    Narrative:     Specimen Source->Urine   VITAMIN B12   IRON AND TIBC   RETICULOCYTES   FOLATE   TYPE & SCREEN   PREPARE RBC SOFT          Imaging Results          CT Renal Stone Study ABD Pelvis WO (In process)  Result time 06/23/22 02:31:41            Type of Interpretation: Outside Written Report.  Radiology Procedure Done: Renal Stone CT.  Interpretation: Markedly enlarged uterus likely infiltrated with multiple large fibroids. No evidence of hydronephrosis or ureteral calculus.           Medications   0.9%  NaCl infusion (for blood administration) (has no administration in time range)   fentaNYL 50 mcg/mL injection 50 mcg (has no administration in time range)   ondansetron injection 4 mg (4 mg Intravenous Given 6/23/22 0210)   fentaNYL 50 mcg/mL injection 50 mcg (50 mcg Intravenous Given 6/23/22 0208)   ED Discussion:   4:38 AM: Discussed pt's case with Dr. Woodward (OBGYN) who recommends transfuse in ED and discharge for follow-up.  Start Provera 10 mg p.o. daily times 14 days in the interim.      4:51 AM\  Patient is stable  nontoxic.  She has a microcytic anemia.  She notes that she has been a bit anemic since having dysfunctional uterine bleeding from fibroids.  She has microcytic anemia today and iron studies and ordered.  In consultation with Ob the patient we transfusing her blood for close outpatient follow-up.  She is established with Dr. Hong.  Will start Provera at their request.  Will also start iron to the microcytic nature of her anemia and treat her pain with Norco and Zofran.  I discussed all findings with the patient as well as plan of care.  She verbalized understanding room with all instructions seems reliable.  She is safe for discharge my opinion.      Medical Decision Making:   Clinical Tests:   Lab Tests: Ordered and Reviewed  Radiological Study: Ordered and Reviewed          Scribe Attestation:   Scribe #1: I performed the above scribed service and the documentation accurately describes the services I performed. I attest to the accuracy of the note.    Attending Attestation:           Physician Attestation for Scribe:  Physician Attestation Statement for Scribe #1: I, Dajuan Bedoya Jr., MD, reviewed documentation, as scribed by Truong Corrales in my presence, and it is both accurate and complete.                      Clinical Impression:   Final diagnoses:  [R10.2] Pelvic pain (Primary)  [N93.8] Dysfunctional uterine bleeding  [D50.9] Microcytic anemia  [D25.9] Uterine leiomyoma, unspecified location          ED Disposition Condition    Discharge Stable        ED Prescriptions     Medication Sig Dispense Start Date End Date Auth. Provider    ferrous gluconate (FERGON) 324 MG tablet Take 1 tablet (324 mg total) by mouth daily with breakfast. 30 tablet 6/23/2022  Dajuan Bedoya Jr., MD    medroxyPROGESTERone (PROVERA) 10 MG tablet Take 1 tablet (10 mg total) by mouth once daily. for 14 days 14 tablet 6/23/2022 7/7/2022 Dajuan Bedoya Jr., MD    HYDROcodone-acetaminophen (NORCO)  mg per tablet Take 1 tablet by  mouth every 6 (six) hours as needed. 9 tablet 6/23/2022  Dajuan Bedoya Jr., MD    ondansetron (ZOFRAN) 4 MG tablet Take 1 tablet (4 mg total) by mouth every 6 (six) hours as needed for Nausea. 12 tablet 6/23/2022  Dajuan Bedyoa Jr., MD        Follow-up Information     Follow up With Specialties Details Why Contact Info    Heraclio Farrell MD Family Medicine   [address] [facility] [address]  [phone]      Talha Santa MD Obstetrics and Gynecology   [address] [facility] [address]  [phone]             Dajuan Bedoya Jr., MD  06/23/22 0455

## 2022-06-25 ENCOUNTER — HOSPITAL ENCOUNTER (EMERGENCY)
Facility: HOSPITAL | Age: 47
Discharge: HOME | End: 2022-06-26
Attending: FAMILY MEDICINE
Payer: MEDICAID

## 2022-06-25 ENCOUNTER — NURSE TRIAGE (OUTPATIENT)
Dept: ADMINISTRATIVE | Facility: CLINIC | Age: 47
End: 2022-06-25
Payer: MEDICAID

## 2022-06-25 DIAGNOSIS — N93.8 DYSFUNCTIONAL UTERINE BLEEDING: Primary | ICD-10-CM

## 2022-06-25 LAB
BASOPHILS # BLD AUTO: 0.01 K/UL (ref 0–0.2)
BASOPHILS NFR BLD: 0.2 % (ref 0–1.9)
DIFFERENTIAL METHOD: ABNORMAL
EOSINOPHIL # BLD AUTO: 0 K/UL (ref 0–0.5)
EOSINOPHIL NFR BLD: 0.6 % (ref 0–8)
ERYTHROCYTE [DISTWIDTH] IN BLOOD BY AUTOMATED COUNT: 18.6 % (ref 11.5–14.5)
HCT VFR BLD AUTO: 26.8 % (ref 37–48.5)
HGB BLD-MCNC: 7.7 G/DL (ref 12–16)
IMM GRANULOCYTES # BLD AUTO: 0.01 K/UL (ref 0–0.04)
IMM GRANULOCYTES NFR BLD AUTO: 0.2 % (ref 0–0.5)
LYMPHOCYTES # BLD AUTO: 2 K/UL (ref 1–4.8)
LYMPHOCYTES NFR BLD: 36.3 % (ref 18–48)
MCH RBC QN AUTO: 19.8 PG (ref 27–31)
MCHC RBC AUTO-ENTMCNC: 28.7 G/DL (ref 32–36)
MCV RBC AUTO: 69 FL (ref 82–98)
MONOCYTES # BLD AUTO: 0.3 K/UL (ref 0.3–1)
MONOCYTES NFR BLD: 5.2 % (ref 4–15)
NEUTROPHILS # BLD AUTO: 3.1 K/UL (ref 1.8–7.7)
NEUTROPHILS NFR BLD: 57.5 % (ref 38–73)
NRBC BLD-RTO: 0 /100 WBC
PLATELET # BLD AUTO: 164 K/UL (ref 150–450)
PMV BLD AUTO: 10 FL (ref 9.2–12.9)
RBC # BLD AUTO: 3.88 M/UL (ref 4–5.4)
WBC # BLD AUTO: 5.37 K/UL (ref 3.9–12.7)

## 2022-06-25 PROCEDURE — 80053 COMPREHEN METABOLIC PANEL: CPT | Performed by: NURSE PRACTITIONER

## 2022-06-25 PROCEDURE — 99283 EMERGENCY DEPT VISIT LOW MDM: CPT

## 2022-06-25 PROCEDURE — 85025 COMPLETE CBC W/AUTO DIFF WBC: CPT | Performed by: NURSE PRACTITIONER

## 2022-06-25 NOTE — TELEPHONE ENCOUNTER
"  Reason for Disposition   Sounds like a life-threatening emergency to the triager    Additional Information   Negative: [1] Vomiting AND [2] contains red blood or black ("coffee ground") material  (Exception: few red streaks in vomit that only happened once)   Negative: Passed out (i.e., lost consciousness, collapsed and was not responding)   Negative: Difficult to awaken or acting confused  (e.g., disoriented, slurred speech)   Negative: Shock suspected (e.g., cold/pale/clammy skin, too weak to stand, low BP, rapid pulse)    Answer Assessment - Initial Assessment Questions  1. APPEARANCE of BLOOD: "What color is it?" "Is it passed separately, on the surface of the stool, or mixed in with the stool?"      Pt states stomach flips and passing clots. Dizzy and weak.   2. AMOUNT: "How much blood was passed?"      Clots   3. FREQUENCY: "How many times has blood been passed with the stools?"        Unclear   4. ONSET: "When was the blood first seen in the stools?" (Days or weeks)      Unclear   5. DIARRHEA: "Is there also some diarrhea?" If so, ask: "How many diarrhea stools were passed in past 24 hours?"      Unclear   6. CONSTIPATION: "Do you have constipation?" If so, "How bad is it?"     Unclear   7. RECURRENT SYMPTOMS: "Have you had blood in your stools before?" If so, ask: "When was the last time?" and "What happened that time?"      Sounds like sx are ongoing as she is just out of hospital   8. BLOOD THINNERS: "Do you take any blood thinners?" (e.g., Coumadin/warfarin, Pradaxa/dabigatran, aspirin)     Asa. Bleeding was doing better and now with all her pills she feels bleeding is worse. Pt taking 2 pills at a time and working thru 18 pills and now done to covid pills. Still weak despite blood transfusion   9. OTHER SYMPTOMS: "Do you have any other symptoms?"  (e.g., abdominal pain, vomiting, dizziness, fever)     abd pain, delirious, dizzy.    Protocols used: ST RECTAL BLEEDING-A-  LM at 106pm at " [phone]  pt called re not feeling well, very weak and still pinpointing feeling in abd , bleeding heavily from rectum. seen on 6/21. dxd with covid, seen again on 6/23 in ED. had transfusion.pt states she is tired of pills pt states she is taking 18 pills. Pt admits to feeling dizzy and delirious. Wants meds to chewable or suspension. rec 911 now. pt refusing.pt states she was just discharged 6/23 and they will treat her crazy for coming back in. can she bring back hydrocodone?. can she get cough syrup? and another med for nausea. Pt states she will go to ED. Pt states she used up her medicaid visits as medicaid only gives so ED many visits per day. Reiterate above about feeling weak and delirious. Pt states the noises from her stomach sounds like sharks and whales in ocean after taking covid meds.

## 2022-06-26 VITALS
SYSTOLIC BLOOD PRESSURE: 146 MMHG | RESPIRATION RATE: 21 BRPM | TEMPERATURE: 98 F | DIASTOLIC BLOOD PRESSURE: 77 MMHG | WEIGHT: 177.69 LBS | BODY MASS INDEX: 28.68 KG/M2 | HEART RATE: 61 BPM | OXYGEN SATURATION: 100 %

## 2022-06-26 LAB
ALBUMIN SERPL BCP-MCNC: 3.8 G/DL (ref 3.5–5.2)
ALP SERPL-CCNC: 46 U/L (ref 55–135)
ALT SERPL W/O P-5'-P-CCNC: 8 U/L (ref 10–44)
ANION GAP SERPL CALC-SCNC: 12 MMOL/L (ref 8–16)
AST SERPL-CCNC: 19 U/L (ref 10–40)
BILIRUB SERPL-MCNC: 0.2 MG/DL (ref 0.1–1)
BUN SERPL-MCNC: 8 MG/DL (ref 6–20)
CALCIUM SERPL-MCNC: 8.4 MG/DL (ref 8.7–10.5)
CHLORIDE SERPL-SCNC: 106 MMOL/L (ref 95–110)
CO2 SERPL-SCNC: 22 MMOL/L (ref 23–29)
CREAT SERPL-MCNC: 0.8 MG/DL (ref 0.5–1.4)
EST. GFR  (AFRICAN AMERICAN): >60 ML/MIN/1.73 M^2
EST. GFR  (NON AFRICAN AMERICAN): >60 ML/MIN/1.73 M^2
GLUCOSE SERPL-MCNC: 90 MG/DL (ref 70–110)
POTASSIUM SERPL-SCNC: 3.7 MMOL/L (ref 3.5–5.1)
PROT SERPL-MCNC: 7.1 G/DL (ref 6–8.4)
SODIUM SERPL-SCNC: 140 MMOL/L (ref 136–145)

## 2022-06-26 RX ORDER — MEDROXYPROGESTERONE ACETATE 5 MG/1
10 TABLET ORAL DAILY
Status: DISCONTINUED | OUTPATIENT
Start: 2022-06-26 | End: 2022-06-26 | Stop reason: HOSPADM

## 2022-06-26 NOTE — ED PROVIDER NOTES
SCRIBE #1 NOTE: I, Jay Veras, am scribing for, and in the presence of, Elsy Thomas MD. I have scribed the entire note.       History     Chief Complaint   Patient presents with    Vaginal Bleeding     Onset yesterday. COVID positive. 2-3 blood clots per pad. 3-4x per hour changing pads.      Review of patient's allergies indicates:   Allergen Reactions    Pcn [penicillins] Anaphylaxis     Throat , tongue swelling     Banana      Itchy mouth    Lisinopril Other (See Comments)     Lip swelling    Melon      Itchy mouth    Morphine Other (See Comments)     Unknown    Tree nuts      Oral itching         History of Present Illness     HPI    6/25/2022, 10:51 PM  History obtained from the patient      History of Present Illness: Shelly Kam is a 47 y.o. female patient with a PMHx of anemia, depression, DM, GERD, HLD, HTN, hyperthyroidism, panic attack who presents to the Emergency Department for evaluation of vaginal bleeding which onset gradually yesterday PTA. Pt is COVID positive and reports 2-3 blood clots per pad, changing out 3-4x pads per hour. Symptoms are constant and moderate in severity. No mitigating or exacerbating factors reported. No associated sxs reported. Patient denies any n/v/d, fever, chills, and all other sxs at this time. No prior Tx reported. No further complaints or concerns at this time.       Arrival mode: Personal vehicle    PCP: Heraclio Farrell MD        Past Medical History:  Past Medical History:   Diagnosis Date    Allergy     Anemia     Asthma     Depression     Diabetes mellitus     Diabetes mellitus, type 2     Fibromyalgia     GERD (gastroesophageal reflux disease)     Hyperlipidemia     Hypertension     Hyperthyroidism     Panic attack     Prozac in past       Past Surgical History:  Past Surgical History:   Procedure Laterality Date    BRONCHOSCOPY      HERNIA REPAIR      THYROIDECTOMY Bilateral 3/25/2021    Procedure: THYROIDECTOMY;   Surgeon: Alon Barton MD;  Location: [facility] OR [address] [place] [address];  Service: ENT;  Laterality: Bilateral;  NIMS monitor    TONSILLECTOMY      TRACHEOSTOMY      TRACHEOSTOMY TUBE PLACEMENT           Family History:  Family History   Problem Relation Age of Onset    Cancer Paternal Aunt        Social History:  Social History     Tobacco Use    Smoking status: Former Smoker     Packs/day: 0.25     Types: Cigarettes     Quit date: 3/1/2011     Years since quittin.3    Smokeless tobacco: Never Used    Tobacco comment: quit 2021    Substance and Sexual Activity    Alcohol use: Yes     Comment: once a year     Drug use: Yes     Types: Marijuana    Sexual activity: Yes     Partners: Male        Review of Systems     Review of Systems   Constitutional: Negative for chills and fever.   HENT: Negative for sore throat.    Respiratory: Negative for shortness of breath.    Cardiovascular: Negative for chest pain.   Gastrointestinal: Negative for diarrhea, nausea and vomiting.   Genitourinary: Positive for vaginal bleeding. Negative for dysuria.   Musculoskeletal: Negative for back pain.   Skin: Negative for rash.   Neurological: Negative for weakness.   Hematological: Does not bruise/bleed easily.   All other systems reviewed and are negative.     Physical Exam     Initial Vitals [22]   BP Pulse Resp Temp SpO2   131/73 91 20 98 °F (36.7 °C) 99 %      MAP       --          Physical Exam  Nursing Notes and Vital Signs Reviewed.  Constitutional: Patient is in no acute distress. Well-developed and well-nourished.  Head: Atraumatic. Normocephalic.  Eyes: PERRL. EOM intact. Conjunctivae are not pale. No scleral icterus.  ENT: Mucous membranes are moist. Oropharynx is clear and symmetric.    Neck: Supple. Full ROM. No lymphadenopathy.  Cardiovascular: Regular rate. Regular rhythm. No murmurs, rubs, or gallops. Distal pulses are 2+ and symmetric.  Pulmonary/Chest: No respiratory distress. Clear to auscultation  bilaterally. No wheezing or rales.  Abdominal: Soft and non-distended.  Diffuse abdominal tenderness.  No rebound, guarding, or rigidity. Good bowel sounds.  Genitourinary: No CVA tenderness  Musculoskeletal: Moves all extremities. No obvious deformities. No edema. No calf tenderness.  Skin: Warm and dry.  Neurological:  Alert, awake, and appropriate.  Normal speech.  No acute focal neurological deficits are appreciated.  Psychiatric: Anxious. Good eye contact. Appropriate in content.     ED Course   Procedures  ED Vital Signs:  Vitals:    06/25/22 2142 06/25/22 2338 06/26/22 0000 06/26/22 0030   BP: 131/73 132/79 106/69 (!) 146/77   Pulse: 91 70 65 61   Resp: 20 (!) 21 19 (!) 21   Temp: 98 °F (36.7 °C)      TempSrc: Oral      SpO2: 99% 100% 100% 100%   Weight: 80.6 kg (177 lb 11.1 oz)       06/26/22 0045   BP: (!) 146/77   Pulse: 61   Resp: (!) 21   Temp: 98 °F (36.7 °C)   TempSrc:    SpO2: 100%   Weight:        Abnormal Lab Results:  Labs Reviewed   CBC W/ AUTO DIFFERENTIAL - Abnormal; Notable for the following components:       Result Value    RBC 3.88 (*)     Hemoglobin 7.7 (*)     Hematocrit 26.8 (*)     MCV 69 (*)     MCH 19.8 (*)     MCHC 28.7 (*)     RDW 18.6 (*)     All other components within normal limits   COMPREHENSIVE METABOLIC PANEL - Abnormal; Notable for the following components:    CO2 22 (*)     Calcium 8.4 (*)     Alkaline Phosphatase 46 (*)     ALT 8 (*)     All other components within normal limits        All Lab Results:  Results for orders placed or performed during the hospital encounter of 06/25/22   CBC auto differential   Result Value Ref Range    WBC 5.37 3.90 - 12.70 K/uL    RBC 3.88 (L) 4.00 - 5.40 M/uL    Hemoglobin 7.7 (L) 12.0 - 16.0 g/dL    Hematocrit 26.8 (L) 37.0 - 48.5 %    MCV 69 (L) 82 - 98 fL    MCH 19.8 (L) 27.0 - 31.0 pg    MCHC 28.7 (L) 32.0 - 36.0 g/dL    RDW 18.6 (H) 11.5 - 14.5 %    Platelets 164 150 - 450 K/uL    MPV 10.0 9.2 - 12.9 fL    Immature Granulocytes 0.2 0.0  - 0.5 %    Gran # (ANC) 3.1 1.8 - 7.7 K/uL    Immature Grans (Abs) 0.01 0.00 - 0.04 K/uL    Lymph # 2.0 1.0 - 4.8 K/uL    Mono # 0.3 0.3 - 1.0 K/uL    Eos # 0.0 0.0 - 0.5 K/uL    Baso # 0.01 0.00 - 0.20 K/uL    nRBC 0 0 /100 WBC    Gran % 57.5 38.0 - 73.0 %    Lymph % 36.3 18.0 - 48.0 %    Mono % 5.2 4.0 - 15.0 %    Eosinophil % 0.6 0.0 - 8.0 %    Basophil % 0.2 0.0 - 1.9 %    Differential Method Automated    Comprehensive metabolic panel   Result Value Ref Range    Sodium 140 136 - 145 mmol/L    Potassium 3.7 3.5 - 5.1 mmol/L    Chloride 106 95 - 110 mmol/L    CO2 22 (L) 23 - 29 mmol/L    Glucose 90 70 - 110 mg/dL    BUN 8 6 - 20 mg/dL    Creatinine 0.8 0.5 - 1.4 mg/dL    Calcium 8.4 (L) 8.7 - 10.5 mg/dL    Total Protein 7.1 6.0 - 8.4 g/dL    Albumin 3.8 3.5 - 5.2 g/dL    Total Bilirubin 0.2 0.1 - 1.0 mg/dL    Alkaline Phosphatase 46 (L) 55 - 135 U/L    AST 19 10 - 40 U/L    ALT 8 (L) 10 - 44 U/L    Anion Gap 12 8 - 16 mmol/L    eGFR if African American >60 >60 mL/min/1.73 m^2    eGFR if non African American >60 >60 mL/min/1.73 m^2         Imaging Results:  Imaging Results    None                     The Emergency Provider reviewed the vital signs and test results, which are outlined above.     ED Discussion       12:39 AM: Reassessed pt at this time. Discussed with pt all pertinent ED information and results. Discussed pt dx and plan of tx. Gave pt all f/u and return to the ED instructions. All questions and concerns were addressed at this time. Pt expresses understanding of information and instructions, and is comfortable with plan to discharge. Pt is stable for discharge.    I discussed with patient and/or family/caretaker that evaluation in the ED does not suggest any emergent or life threatening medical conditions requiring immediate intervention beyond what was provided in the ED, and I believe patient is safe for discharge.  Regardless, an unremarkable evaluation in the ED does not preclude the development  or presence of a serious of life threatening condition. As such, patient was instructed to return immediately for any worsening or change in current symptoms.         Medical Decision Making:   Clinical Tests:   Lab Tests: Ordered and Reviewed           ED Medication(s):  Medications - No data to display    Discharge Medication List as of 6/26/2022 12:36 AM           Follow-up Information     Schedule an appointment as soon as possible for a visit  with Heraclio Farrell MD.    Specialty: Family Medicine  Contact information:  [id number]  [place] [address]  [phone]                             Scribe Attestation:   Scribe #1: I performed the above scribed service and the documentation accurately describes the services I performed. I attest to the accuracy of the note.     Attending:   Physician Attestation Statement for Scribe #1: I, Elsy Thomas MD, personally performed the services described in this documentation, as scribed by Jay Veras, in my presence, and it is both accurate and complete.           Clinical Impression       ICD-10-CM ICD-9-CM   1. Dysfunctional uterine bleeding  N93.8 626.8       Disposition:   Disposition: Discharged  Condition: Stable         Elsy Thomas MD  06/26/22 1614

## 2022-06-26 NOTE — FIRST PROVIDER EVALUATION
Medical screening exam completed.  I have conducted a focused provider triage encounter, findings are as follows:    Brief history of present illness:  Patient presents with continued vaginal bleeding.  States her bleeding is getting worse than it was a couple of days ago when she was in the ER.    Vitals:    06/25/22 2142   BP: 131/73   BP Location: Left arm   Patient Position: Sitting   Pulse: 91   Resp: 20   Temp: 98 °F (36.7 °C)   TempSrc: Oral   SpO2: 99%   Weight: 80.6 kg (177 lb 11.1 oz)       Pertinent physical exam:      Brief workup plan:      Preliminary workup initiated; this workup will be continued and followed by the physician or advanced practice provider that is assigned to the patient when roomed.

## 2022-06-28 ENCOUNTER — TELEPHONE (OUTPATIENT)
Dept: OBSTETRICS AND GYNECOLOGY | Facility: CLINIC | Age: 47
End: 2022-06-28
Payer: MEDICAID

## 2022-06-28 NOTE — TELEPHONE ENCOUNTER
Incoming call from patient. Patient is hysterical. Patient states she is very upset with the care [facility]. Patient states she has been leaving messages for Dr. Santa but have not received any answer. Assisted patient with scheduling the soonest available appointment with Dr. Santa on July 5. patient verbalized understanding. Offered patient to be seen by nurse practitioner for a appointment tomorrow. Patient denied appointment and states she would just like to feel better. Informed patient that she should have someone take her to the ER for evaluation. Patient states she is not going back to the ER and hung up the phone.

## 2022-06-29 ENCOUNTER — NURSE TRIAGE (OUTPATIENT)
Dept: ADMINISTRATIVE | Facility: CLINIC | Age: 47
End: 2022-06-29
Payer: MEDICAID

## 2022-06-29 ENCOUNTER — TELEPHONE (OUTPATIENT)
Dept: INTERNAL MEDICINE | Facility: CLINIC | Age: 47
End: 2022-06-29
Payer: MEDICAID

## 2022-06-29 NOTE — TELEPHONE ENCOUNTER
----- Message from Groverlaina Bernstein sent at 6/29/2022 10:28 AM CDT -----  Contact: [phone]  Type:  Sooner Apoointment Request    Caller is requesting a sooner appointment.  Caller declined first available appointment listed below.  Caller will not accept being placed on the waitlist and is requesting a message be sent to doctor.  Name of Caller: nadia  When is the first available appointment?n/a  Symptoms: positive Covid test check up  Would the patient rather a call back or a response via [facility]? Call back   Best Call Back Number:[phone]  Additional Information: pt stated that she needs documentation on when to return back to work after testing positive for Covid.

## 2022-06-29 NOTE — TELEPHONE ENCOUNTER
Pt was diagnosed with covid on 6/21/2022. Pt stated she has been to er several times and had to have blood transfusions. Pt is still having pain at the bottom of back and abdomen. Pt stated she is still taking Lortabs due to the pain from fibroids. Pt stated she is taking medicine to stop the bleeding. And she is no longer bleeding. Pt stated her job is requesting that she returns work. Pt is asking for light duty when she returns to work because she cant lift on anyone. Pt is not sure if she needs to go to work because she is still in pain. Pt is also requesting a note or information for when she can return to work. Please call and advise pt [phone].     Reason for Disposition   [1] Follow-up call from patient regarding patient's clinical status AND [2] information urgent    Protocols used: PCP CALL - NO TRIAGE-A-       Statement Selected

## 2022-06-29 NOTE — TELEPHONE ENCOUNTER
Called patient and advised until she has appointment in our department for evaluation of her issues, we cannot supply an excuse to her job.  Patient verbalized understanding.

## 2022-07-05 ENCOUNTER — TELEPHONE (OUTPATIENT)
Dept: OBSTETRICS AND GYNECOLOGY | Facility: CLINIC | Age: 47
End: 2022-07-05
Payer: MEDICAID

## 2022-07-05 NOTE — TELEPHONE ENCOUNTER
Spoke to patient she stated that she has already been rescheduled for Friday 07/08/22 at 9:45am the [place] location.

## 2022-07-05 NOTE — TELEPHONE ENCOUNTER
----- Message from Betzy Lewis sent at 7/5/2022 11:35 AM CDT -----  Contact: Shelly Bravo called in to say that she is still waiting on a call back regards to rescheduling her missed appointment. Please call her at [phone].    Thanks  Td

## 2022-07-07 ENCOUNTER — PATIENT MESSAGE (OUTPATIENT)
Dept: EMERGENCY MEDICINE | Facility: HOSPITAL | Age: 47
End: 2022-07-07
Payer: MEDICAID

## 2022-07-07 ENCOUNTER — PATIENT OUTREACH (OUTPATIENT)
Dept: EMERGENCY MEDICINE | Facility: HOSPITAL | Age: 47
End: 2022-07-07
Payer: MEDICAID

## 2022-07-08 ENCOUNTER — OFFICE VISIT (OUTPATIENT)
Dept: OBSTETRICS AND GYNECOLOGY | Facility: CLINIC | Age: 47
End: 2022-07-08
Payer: MEDICAID

## 2022-07-08 DIAGNOSIS — D25.9 UTERINE LEIOMYOMA, UNSPECIFIED LOCATION: Primary | ICD-10-CM

## 2022-07-08 PROCEDURE — 99999 PR PBB SHADOW E&M-EST. PATIENT-LVL III: ICD-10-PCS | Mod: PBBFAC,,, | Performed by: OBSTETRICS & GYNECOLOGY

## 2022-07-08 PROCEDURE — 99213 OFFICE O/P EST LOW 20 MIN: CPT | Mod: PBBFAC | Performed by: OBSTETRICS & GYNECOLOGY

## 2022-07-08 PROCEDURE — 99213 PR OFFICE/OUTPT VISIT, EST, LEVL III, 20-29 MIN: ICD-10-PCS | Mod: S$PBB,,, | Performed by: OBSTETRICS & GYNECOLOGY

## 2022-07-08 PROCEDURE — 99999 PR PBB SHADOW E&M-EST. PATIENT-LVL III: CPT | Mod: PBBFAC,,, | Performed by: OBSTETRICS & GYNECOLOGY

## 2022-07-08 PROCEDURE — 1160F RVW MEDS BY RX/DR IN RCRD: CPT | Mod: CPTII,,, | Performed by: OBSTETRICS & GYNECOLOGY

## 2022-07-08 PROCEDURE — 99213 OFFICE O/P EST LOW 20 MIN: CPT | Mod: S$PBB,,, | Performed by: OBSTETRICS & GYNECOLOGY

## 2022-07-08 PROCEDURE — 1160F PR REVIEW ALL MEDS BY PRESCRIBER/CLIN PHARMACIST DOCUMENTED: ICD-10-PCS | Mod: CPTII,,, | Performed by: OBSTETRICS & GYNECOLOGY

## 2022-07-08 PROCEDURE — 1159F PR MEDICATION LIST DOCUMENTED IN MEDICAL RECORD: ICD-10-PCS | Mod: CPTII,,, | Performed by: OBSTETRICS & GYNECOLOGY

## 2022-07-08 PROCEDURE — 1159F MED LIST DOCD IN RCRD: CPT | Mod: CPTII,,, | Performed by: OBSTETRICS & GYNECOLOGY

## 2022-07-08 PROCEDURE — 4010F PR ACE/ARB THEARPY RXD/TAKEN: ICD-10-PCS | Mod: CPTII,,, | Performed by: OBSTETRICS & GYNECOLOGY

## 2022-07-08 PROCEDURE — 3072F PR LOW RISK FOR RETINOPATHY: ICD-10-PCS | Mod: CPTII,,, | Performed by: OBSTETRICS & GYNECOLOGY

## 2022-07-08 PROCEDURE — 4010F ACE/ARB THERAPY RXD/TAKEN: CPT | Mod: CPTII,,, | Performed by: OBSTETRICS & GYNECOLOGY

## 2022-07-08 PROCEDURE — 3072F LOW RISK FOR RETINOPATHY: CPT | Mod: CPTII,,, | Performed by: OBSTETRICS & GYNECOLOGY

## 2022-07-08 RX ORDER — MEDROXYPROGESTERONE ACETATE 10 MG/1
10 TABLET ORAL DAILY
Qty: 30 TABLET | Refills: 1 | Status: ON HOLD | OUTPATIENT
Start: 2022-07-08 | End: 2022-09-13 | Stop reason: HOSPADM

## 2022-07-08 NOTE — PROGRESS NOTES
Subjective:       Patient ID: Shelly Kam is a 47 y.o. female.    Chief Complaint:  Follow-up (Pelvic pain)      History of Present Illness  HPI  Pt is here for follow up of fibroids.  Pt reports that she did not feel any better on Micronor (in fact, bleeding worsened), so pt stopped use.  Pt reports recent ER visit due to worsening of bleeding, leading to a blood transfusion and worsening lower back pains.  Pt was given Provera and bleeding has decreased.  Pt is now interested in moving forward with hysterectomy.    GYN & OB History  No LMP recorded.   Date of Last Pap: No result found    OB History    Para Term  AB Living   0 0 0 0 0 0   SAB IAB Ectopic Multiple Live Births   0 0 0 0 0       Review of Systems  Review of Systems   Constitutional: Positive for fatigue. Negative for activity change, appetite change, chills, fever and unexpected weight change.   Respiratory: Negative for shortness of breath.    Cardiovascular: Negative for chest pain, palpitations and leg swelling.   Gastrointestinal: Positive for abdominal pain, bloating and constipation. Negative for blood in stool, diarrhea, nausea and vomiting.   Genitourinary: Positive for dysmenorrhea, menorrhagia, menstrual problem, pelvic pain and vaginal bleeding. Negative for dyspareunia, dysuria, flank pain, frequency, genital sores, hematuria, urgency, vaginal discharge, vaginal pain, urinary incontinence, postcoital bleeding, vaginal dryness and vaginal odor.   Musculoskeletal: Positive for back pain.   Neurological: Negative for syncope and headaches.           Objective:    Physical Exam:   Constitutional: She is oriented to person, place, and time. She appears well-developed and well-nourished. No distress.                           Neurological: She is alert and oriented to person, place, and time.     Psychiatric: She has a normal mood and affect. Her behavior is normal. Thought content normal.          Assessment:        1.  Uterine leiomyoma, unspecified location             Plan:      Uterine leiomyoma, unspecified location  -     medroxyPROGESTERone (PROVERA) 10 MG tablet; Take 1 tablet (10 mg total) by mouth once daily.  Dispense: 30 tablet; Refill: 1  -     Pt was counseled on fibroids, including common signs and symptoms.  Symptoms are highly disruptive and have failed trials of conservative therapy/contributed to anemia requiring blood transfusion.  Pt is done with her fertility and has no desire for future childbearing.  Treatment options were reviewed with pt, including medical vs fibroid embolization vs myomectomy vs hysterectomy.  Pt voiced understanding and desires to proceed with total hysterectomy (including removal of uterus, cervix, fallopian tubes).  Pt desires to proceed with ovarian preservation, but is OK with ovarian removal if deemed necessary.  Surgery details, indications, risks, and benefits were reviewed with pt.  Will have Shania contact pt to schedule RALH/Bilateral Salpingectomy.  Pt will need pre-operative clearance from her PCP.    **Total time spent: 30 min. 50 % or more was spent on counseling about diagnosis, treatment options, and coordination of care.

## 2022-07-13 ENCOUNTER — TELEPHONE (OUTPATIENT)
Dept: OBSTETRICS AND GYNECOLOGY | Facility: CLINIC | Age: 47
End: 2022-07-13
Payer: MEDICAID

## 2022-07-13 ENCOUNTER — PATIENT MESSAGE (OUTPATIENT)
Dept: OBSTETRICS AND GYNECOLOGY | Facility: CLINIC | Age: 47
End: 2022-07-13
Payer: MEDICAID

## 2022-07-13 NOTE — TELEPHONE ENCOUNTER
----- Message from Fernanda Lewis sent at 7/13/2022 12:54 PM CDT -----  Pt is returning nurse call. Pt can be reached at [phone] (uhix)

## 2022-07-20 DIAGNOSIS — D25.9 UTERINE LEIOMYOMA, UNSPECIFIED LOCATION: Primary | ICD-10-CM

## 2022-07-20 DIAGNOSIS — Z01.818 PRE-OP TESTING: ICD-10-CM

## 2022-08-05 ENCOUNTER — OFFICE VISIT (OUTPATIENT)
Dept: PRIMARY CARE CLINIC | Facility: CLINIC | Age: 47
End: 2022-08-05
Payer: MEDICAID

## 2022-08-05 ENCOUNTER — LAB VISIT (OUTPATIENT)
Dept: LAB | Facility: HOSPITAL | Age: 47
End: 2022-08-05
Attending: NURSE PRACTITIONER
Payer: MEDICAID

## 2022-08-05 VITALS
HEART RATE: 74 BPM | BODY MASS INDEX: 28.61 KG/M2 | HEIGHT: 66 IN | DIASTOLIC BLOOD PRESSURE: 74 MMHG | WEIGHT: 178 LBS | OXYGEN SATURATION: 100 % | TEMPERATURE: 98 F | SYSTOLIC BLOOD PRESSURE: 132 MMHG

## 2022-08-05 DIAGNOSIS — E89.0 POSTOPERATIVE HYPOTHYROIDISM: ICD-10-CM

## 2022-08-05 DIAGNOSIS — E11.9 TYPE 2 DIABETES MELLITUS WITHOUT RETINOPATHY: ICD-10-CM

## 2022-08-05 DIAGNOSIS — D64.9 ANEMIA, UNSPECIFIED TYPE: ICD-10-CM

## 2022-08-05 DIAGNOSIS — E11.59 HYPERTENSION ASSOCIATED WITH DIABETES: ICD-10-CM

## 2022-08-05 DIAGNOSIS — F32.A DEPRESSION, UNSPECIFIED DEPRESSION TYPE: ICD-10-CM

## 2022-08-05 DIAGNOSIS — D21.9 FIBROID: ICD-10-CM

## 2022-08-05 DIAGNOSIS — Z01.818 PRE-OP TESTING: ICD-10-CM

## 2022-08-05 DIAGNOSIS — I15.2 HYPERTENSION ASSOCIATED WITH DIABETES: ICD-10-CM

## 2022-08-05 DIAGNOSIS — Z11.4 SCREENING FOR HIV (HUMAN IMMUNODEFICIENCY VIRUS): ICD-10-CM

## 2022-08-05 DIAGNOSIS — F41.9 ANXIETY: ICD-10-CM

## 2022-08-05 DIAGNOSIS — Z11.59 ENCOUNTER FOR HEPATITIS C SCREENING TEST FOR LOW RISK PATIENT: ICD-10-CM

## 2022-08-05 DIAGNOSIS — M79.7 FIBROMYALGIA: Chronic | ICD-10-CM

## 2022-08-05 DIAGNOSIS — J45.20 MILD INTERMITTENT ASTHMA WITHOUT COMPLICATION: ICD-10-CM

## 2022-08-05 DIAGNOSIS — E78.5 HYPERLIPIDEMIA ASSOCIATED WITH TYPE 2 DIABETES MELLITUS: Primary | ICD-10-CM

## 2022-08-05 DIAGNOSIS — E11.69 HYPERLIPIDEMIA ASSOCIATED WITH TYPE 2 DIABETES MELLITUS: Primary | ICD-10-CM

## 2022-08-05 DIAGNOSIS — N93.8 DYSFUNCTIONAL UTERINE BLEEDING: ICD-10-CM

## 2022-08-05 DIAGNOSIS — E78.2 MIXED HYPERLIPIDEMIA: ICD-10-CM

## 2022-08-05 DIAGNOSIS — R06.2 WHEEZING: ICD-10-CM

## 2022-08-05 LAB
ANION GAP SERPL CALC-SCNC: 9 MMOL/L (ref 8–16)
BASOPHILS # BLD AUTO: 0.07 K/UL (ref 0–0.2)
BASOPHILS NFR BLD: 1.2 % (ref 0–1.9)
BUN SERPL-MCNC: 6 MG/DL (ref 6–20)
CALCIUM SERPL-MCNC: 9.2 MG/DL (ref 8.7–10.5)
CHLORIDE SERPL-SCNC: 108 MMOL/L (ref 95–110)
CO2 SERPL-SCNC: 24 MMOL/L (ref 23–29)
CREAT SERPL-MCNC: 0.8 MG/DL (ref 0.5–1.4)
DIFFERENTIAL METHOD: ABNORMAL
EOSINOPHIL # BLD AUTO: 0.2 K/UL (ref 0–0.5)
EOSINOPHIL NFR BLD: 4.3 % (ref 0–8)
ERYTHROCYTE [DISTWIDTH] IN BLOOD BY AUTOMATED COUNT: 22 % (ref 11.5–14.5)
EST. GFR  (NO RACE VARIABLE): >60 ML/MIN/1.73 M^2
GLUCOSE SERPL-MCNC: 84 MG/DL (ref 70–110)
HCT VFR BLD AUTO: 31 % (ref 37–48.5)
HGB BLD-MCNC: 8.5 G/DL (ref 12–16)
IMM GRANULOCYTES # BLD AUTO: 0.02 K/UL (ref 0–0.04)
IMM GRANULOCYTES NFR BLD AUTO: 0.4 % (ref 0–0.5)
LYMPHOCYTES # BLD AUTO: 1.5 K/UL (ref 1–4.8)
LYMPHOCYTES NFR BLD: 26.3 % (ref 18–48)
MCH RBC QN AUTO: 20.9 PG (ref 27–31)
MCHC RBC AUTO-ENTMCNC: 27.4 G/DL (ref 32–36)
MCV RBC AUTO: 76 FL (ref 82–98)
MONOCYTES # BLD AUTO: 0.4 K/UL (ref 0.3–1)
MONOCYTES NFR BLD: 7.6 % (ref 4–15)
NEUTROPHILS # BLD AUTO: 3.4 K/UL (ref 1.8–7.7)
NEUTROPHILS NFR BLD: 60.2 % (ref 38–73)
NRBC BLD-RTO: 0 /100 WBC
PLATELET # BLD AUTO: 272 K/UL (ref 150–450)
PMV BLD AUTO: ABNORMAL FL (ref 9.2–12.9)
POTASSIUM SERPL-SCNC: 3.9 MMOL/L (ref 3.5–5.1)
RBC # BLD AUTO: 4.06 M/UL (ref 4–5.4)
SODIUM SERPL-SCNC: 141 MMOL/L (ref 136–145)
WBC # BLD AUTO: 5.63 K/UL (ref 3.9–12.7)

## 2022-08-05 PROCEDURE — 99999 PR PBB SHADOW E&M-EST. PATIENT-LVL V: CPT | Mod: PBBFAC,,, | Performed by: NURSE PRACTITIONER

## 2022-08-05 PROCEDURE — 3078F PR MOST RECENT DIASTOLIC BLOOD PRESSURE < 80 MM HG: ICD-10-PCS | Mod: CPTII,,, | Performed by: NURSE PRACTITIONER

## 2022-08-05 PROCEDURE — 3008F PR BODY MASS INDEX (BMI) DOCUMENTED: ICD-10-PCS | Mod: CPTII,,, | Performed by: NURSE PRACTITIONER

## 2022-08-05 PROCEDURE — 99214 PR OFFICE/OUTPT VISIT, EST, LEVL IV, 30-39 MIN: ICD-10-PCS | Mod: S$PBB,,, | Performed by: NURSE PRACTITIONER

## 2022-08-05 PROCEDURE — 3008F BODY MASS INDEX DOCD: CPT | Mod: CPTII,,, | Performed by: NURSE PRACTITIONER

## 2022-08-05 PROCEDURE — 3078F DIAST BP <80 MM HG: CPT | Mod: CPTII,,, | Performed by: NURSE PRACTITIONER

## 2022-08-05 PROCEDURE — 80048 BASIC METABOLIC PNL TOTAL CA: CPT | Performed by: PHYSICIAN ASSISTANT

## 2022-08-05 PROCEDURE — 3072F LOW RISK FOR RETINOPATHY: CPT | Mod: CPTII,,, | Performed by: NURSE PRACTITIONER

## 2022-08-05 PROCEDURE — 36415 COLL VENOUS BLD VENIPUNCTURE: CPT | Mod: PN | Performed by: PHYSICIAN ASSISTANT

## 2022-08-05 PROCEDURE — 3075F PR MOST RECENT SYSTOLIC BLOOD PRESS GE 130-139MM HG: ICD-10-PCS | Mod: CPTII,,, | Performed by: NURSE PRACTITIONER

## 2022-08-05 PROCEDURE — 99215 OFFICE O/P EST HI 40 MIN: CPT | Mod: PBBFAC,PN | Performed by: NURSE PRACTITIONER

## 2022-08-05 PROCEDURE — 99999 PR PBB SHADOW E&M-EST. PATIENT-LVL V: ICD-10-PCS | Mod: PBBFAC,,, | Performed by: NURSE PRACTITIONER

## 2022-08-05 PROCEDURE — 3075F SYST BP GE 130 - 139MM HG: CPT | Mod: CPTII,,, | Performed by: NURSE PRACTITIONER

## 2022-08-05 PROCEDURE — 4010F ACE/ARB THERAPY RXD/TAKEN: CPT | Mod: CPTII,,, | Performed by: NURSE PRACTITIONER

## 2022-08-05 PROCEDURE — 85025 COMPLETE CBC W/AUTO DIFF WBC: CPT | Performed by: PHYSICIAN ASSISTANT

## 2022-08-05 PROCEDURE — 1159F MED LIST DOCD IN RCRD: CPT | Mod: CPTII,,, | Performed by: NURSE PRACTITIONER

## 2022-08-05 PROCEDURE — 1159F PR MEDICATION LIST DOCUMENTED IN MEDICAL RECORD: ICD-10-PCS | Mod: CPTII,,, | Performed by: NURSE PRACTITIONER

## 2022-08-05 PROCEDURE — 4010F PR ACE/ARB THEARPY RXD/TAKEN: ICD-10-PCS | Mod: CPTII,,, | Performed by: NURSE PRACTITIONER

## 2022-08-05 PROCEDURE — 99214 OFFICE O/P EST MOD 30 MIN: CPT | Mod: S$PBB,,, | Performed by: NURSE PRACTITIONER

## 2022-08-05 PROCEDURE — 3072F PR LOW RISK FOR RETINOPATHY: ICD-10-PCS | Mod: CPTII,,, | Performed by: NURSE PRACTITIONER

## 2022-08-05 RX ORDER — CITALOPRAM 20 MG/1
20 TABLET, FILM COATED ORAL DAILY
Qty: 30 TABLET | Refills: 3 | Status: SHIPPED | OUTPATIENT
Start: 2022-08-05 | End: 2022-10-11 | Stop reason: SDUPTHER

## 2022-08-05 RX ORDER — ALBUTEROL SULFATE 90 UG/1
2 AEROSOL, METERED RESPIRATORY (INHALATION) EVERY 6 HOURS PRN
Qty: 18 G | Refills: 3 | Status: SHIPPED | OUTPATIENT
Start: 2022-08-05 | End: 2023-10-17 | Stop reason: SDUPTHER

## 2022-08-05 RX ORDER — AMLODIPINE BESYLATE 10 MG/1
10 TABLET ORAL DAILY
Qty: 30 TABLET | Refills: 3 | Status: SHIPPED | OUTPATIENT
Start: 2022-08-05 | End: 2022-10-11 | Stop reason: SDUPTHER

## 2022-08-05 RX ORDER — METFORMIN HYDROCHLORIDE 1000 MG/1
1000 TABLET ORAL 2 TIMES DAILY WITH MEALS
Qty: 60 TABLET | Refills: 3
Start: 2022-08-05 | End: 2022-09-07 | Stop reason: SDUPTHER

## 2022-08-05 RX ORDER — LEVOTHYROXINE SODIUM 125 UG/1
125 TABLET ORAL
Qty: 30 TABLET | Refills: 3 | Status: ON HOLD | OUTPATIENT
Start: 2022-08-05 | End: 2022-09-13 | Stop reason: SDUPTHER

## 2022-08-05 RX ORDER — SIMVASTATIN 40 MG/1
40 TABLET, FILM COATED ORAL NIGHTLY
Qty: 30 TABLET | Refills: 3 | Status: SHIPPED | OUTPATIENT
Start: 2022-08-05 | End: 2022-10-11 | Stop reason: SDUPTHER

## 2022-08-05 RX ORDER — DICLOFENAC SODIUM 75 MG/1
75 TABLET, DELAYED RELEASE ORAL 2 TIMES DAILY
Qty: 60 TABLET | Refills: 0 | Status: SHIPPED | OUTPATIENT
Start: 2022-08-05 | End: 2022-09-04

## 2022-08-05 RX ORDER — FERROUS GLUCONATE 324(38)MG
324 TABLET ORAL
Qty: 90 TABLET | Refills: 3 | Status: SHIPPED | OUTPATIENT
Start: 2022-08-05 | End: 2022-09-04

## 2022-08-05 RX ORDER — LOSARTAN POTASSIUM 25 MG/1
25 TABLET ORAL DAILY
Qty: 30 TABLET | Refills: 3 | Status: SHIPPED | OUTPATIENT
Start: 2022-08-05 | End: 2022-10-11 | Stop reason: SDUPTHER

## 2022-08-05 NOTE — PROGRESS NOTES
Subjective:       Patient ID: Shelly Kam is a 47 y.o. female.    Chief Complaint: Establish Care (Hospital f/u appt )      History of Present Illness:   Shelly Kam 47 y.o. female presents today with for management of HTN, DM II, HLD, GERD, Hypothyroidism and was seen in ER on 22 for dysfunctional uterine bleeding. Will put in consult for GYN. Denies any other problems or concerns at this time.     Past Medical History:   Diagnosis Date    Allergy     Anemia     Asthma     Depression     Diabetes mellitus     Diabetes mellitus, type 2     Fibromyalgia     GERD (gastroesophageal reflux disease)     Hyperlipidemia     Hypertension     Hyperthyroidism     Panic attack     Prozac in past     Family History   Problem Relation Age of Onset    Cancer Paternal Aunt      Social History     Socioeconomic History    Marital status:    Tobacco Use    Smoking status: Current Every Day Smoker     Packs/day: 0.25     Types: Cigarettes     Last attempt to quit: 3/1/2011     Years since quittin.4    Smokeless tobacco: Never Used    Tobacco comment: quit 2021    Substance and Sexual Activity    Alcohol use: Yes     Comment: once a year     Drug use: Yes     Types: Marijuana    Sexual activity: Yes     Partners: Male   Social History Narrative    Wears a seatbelt.     Outpatient Encounter Medications as of 2022   Medication Sig Dispense Refill    EPINEPHrine (EPIPEN) 0.3 mg/0.3 mL AtIn INJECT CONTENTS OF 1 PEN AS NEEDED FOR ALLERGIC REACTION 2 each 0    fluticasone propionate (FLONASE) 50 mcg/actuation nasal spray 2 sprays (100 mcg total) by Each Nostril route once daily. 16 g 11    ipratropium (ATROVENT) 0.03 % nasal spray 2 sprays by Nasal route 3 (three) times daily. 20 mL 5    medroxyPROGESTERone (PROVERA) 10 MG tablet Take 1 tablet (10 mg total) by mouth once daily. 30 tablet 1    pulse oximeter (PULSE OXIMETER) device Use twice daily at 8 AM and 3 PM and record  the value in [facility] as directed. 1 each 0    [DISCONTINUED] albuterol (PROVENTIL/VENTOLIN HFA) 90 mcg/actuation inhaler Inhale 2 puffs into the lungs every 6 (six) hours as needed for Wheezing or Shortness of Breath. 18 g 0    [DISCONTINUED] amLODIPine (NORVASC) 10 MG tablet Take 1 tablet by mouth once daily 90 tablet 0    [DISCONTINUED] citalopram (CELEXA) 20 MG tablet Take 1 tablet (20 mg total) by mouth once daily. 90 tablet 3    [DISCONTINUED] ferrous gluconate (FERGON) 324 MG tablet Take 1 tablet (324 mg total) by mouth daily with breakfast. 30 tablet 3    [DISCONTINUED] losartan (COZAAR) 25 MG tablet Take 1 tablet by mouth once daily 90 tablet 0    [DISCONTINUED] metFORMIN (GLUCOPHAGE) 1000 MG tablet Take 1 tablet (1,000 mg total) by mouth daily with breakfast.      [DISCONTINUED] simvastatin (ZOCOR) 40 MG tablet Take 1 tablet (40 mg total) by mouth every evening. 90 tablet 3    albuterol (PROVENTIL/VENTOLIN HFA) 90 mcg/actuation inhaler Inhale 2 puffs into the lungs every 6 (six) hours as needed for Wheezing or Shortness of Breath. 18 g 3    amLODIPine (NORVASC) 10 MG tablet Take 1 tablet (10 mg total) by mouth once daily. 30 tablet 3    aspirin (ECOTRIN) 81 MG EC tablet Take 1 tablet (81 mg total) by mouth once daily. 90 tablet 3    citalopram (CELEXA) 20 MG tablet Take 1 tablet (20 mg total) by mouth once daily. 30 tablet 3    diclofenac (VOLTAREN) 75 MG EC tablet Take 1 tablet (75 mg total) by mouth 2 (two) times daily. 60 tablet 0    ferrous gluconate (FERGON) 324 MG tablet Take 1 tablet (324 mg total) by mouth 3 (three) times daily with meals. 90 tablet 3    levothyroxine (SYNTHROID) 125 MCG tablet Take 1 tablet (125 mcg total) by mouth before breakfast. 30 tablet 3    losartan (COZAAR) 25 MG tablet Take 1 tablet (25 mg total) by mouth once daily. 30 tablet 3    metFORMIN (GLUCOPHAGE) 1000 MG tablet Take 1 tablet (1,000 mg total) by mouth 2 (two) times daily with meals. 60 tablet 3     metoprolol succinate (TOPROL-XL) 50 MG 24 hr tablet Take 1 tablet (50 mg total) by mouth once daily. QD 90 tablet 1    ondansetron (ZOFRAN) 4 MG tablet Take 1 tablet (4 mg total) by mouth every 6 (six) hours as needed for Nausea. (Patient not taking: Reported on 8/5/2022) 12 tablet 0    pantoprazole (PROTONIX) 20 MG tablet Take 1 tablet (20 mg total) by mouth once daily. 90 tablet 3    simvastatin (ZOCOR) 40 MG tablet Take 1 tablet (40 mg total) by mouth every evening. 30 tablet 3    [DISCONTINUED] levothyroxine (SYNTHROID) 125 MCG tablet Take 1 tablet (125 mcg total) by mouth before breakfast. 30 tablet 11     No facility-administered encounter medications on file as of 8/5/2022.       Review of Systems   Constitutional: Negative for activity change, appetite change, fatigue and fever.   HENT: Negative for congestion, ear discharge, ear pain, mouth sores, nosebleeds, sore throat and tinnitus.    Eyes: Negative for discharge, redness and itching.   Respiratory: Negative for apnea, cough and shortness of breath.    Cardiovascular: Negative for chest pain and leg swelling.   Gastrointestinal: Negative for abdominal distention, abdominal pain, blood in stool and constipation.   Endocrine: Negative for polydipsia, polyphagia and polyuria.   Genitourinary: Positive for menstrual problem. Negative for difficulty urinating, flank pain, frequency and hematuria.   Musculoskeletal: Negative for back pain, gait problem, neck pain and neck stiffness.   Skin: Negative for rash and wound.   Allergic/Immunologic: Negative for environmental allergies, food allergies and immunocompromised state.   Neurological: Negative for dizziness, seizures, syncope, numbness and headaches.   Hematological: Negative for adenopathy. Does not bruise/bleed easily.   Psychiatric/Behavioral: Negative for agitation, confusion, hallucinations, self-injury and suicidal ideas. The patient is nervous/anxious.        Objective:      /74 (BP  "Location: Right arm, Patient Position: Sitting, BP Method: Medium (Manual))   Pulse 74   Temp 98.2 °F (36.8 °C) (Temporal)   Ht 5' 6" (1.676 m)   Wt 80.7 kg (178 lb)   SpO2 100%   BMI 28.73 kg/m²   Physical Exam  Constitutional:       Appearance: She is well-developed.   HENT:      Head: Normocephalic.      Nose: Nose normal.   Eyes:      Pupils: Pupils are equal, round, and reactive to light.   Cardiovascular:      Rate and Rhythm: Normal rate.      Heart sounds: Normal heart sounds.   Pulmonary:      Effort: Pulmonary effort is normal.      Breath sounds: Normal breath sounds.   Abdominal:      General: Bowel sounds are normal.      Palpations: Abdomen is soft.   Musculoskeletal:         General: Normal range of motion.      Cervical back: Normal range of motion.   Skin:     General: Skin is warm and dry.   Neurological:      Mental Status: She is alert and oriented to person, place, and time.   Psychiatric:         Behavior: Behavior normal.           Assessment:       1. Hyperlipidemia associated with type 2 diabetes mellitus    2. Hypertension associated with diabetes    3. Type 2 diabetes mellitus without retinopathy    4. Mixed hyperlipidemia    5. Postoperative hypothyroidism    6. Fibroid    7. Dysfunctional uterine bleeding    8. Mild intermittent asthma without complication    9. Anxiety    10. Fibromyalgia    11. Screening for HIV (human immunodeficiency virus)    12. Encounter for hepatitis C screening test for low risk patient        Plan:   Shelly was seen today for establish care.    Diagnoses and all orders for this visit:    Hyperlipidemia associated with type 2 diabetes mellitus    Hypertension associated with diabetes  -     amLODIPine (NORVASC) 10 MG tablet; Take 1 tablet (10 mg total) by mouth once daily.  -     losartan (COZAAR) 25 MG tablet; Take 1 tablet (25 mg total) by mouth once daily.  -     CBC Auto Differential; Future  -     Comprehensive Metabolic Panel; Future    Type 2 " diabetes mellitus without retinopathy  -     metFORMIN (GLUCOPHAGE) 1000 MG tablet; Take 1 tablet (1,000 mg total) by mouth 2 (two) times daily with meals.  -     Hemoglobin A1C; Future  -     TSH; Future  -     T4, Free; Future    Mixed hyperlipidemia  -     simvastatin (ZOCOR) 40 MG tablet; Take 1 tablet (40 mg total) by mouth every evening.  -     Lipid Panel; Future    Postoperative hypothyroidism    Fibroid    Dysfunctional uterine bleeding  -     Ambulatory referral/consult to Gynecology; Future    Mild intermittent asthma without complication  -     albuterol (PROVENTIL/VENTOLIN HFA) 90 mcg/actuation inhaler; Inhale 2 puffs into the lungs every 6 (six) hours as needed for Wheezing or Shortness of Breath.    Anxiety  -     citalopram (CELEXA) 20 MG tablet; Take 1 tablet (20 mg total) by mouth once daily.    Fibromyalgia  -     citalopram (CELEXA) 20 MG tablet; Take 1 tablet (20 mg total) by mouth once daily.    Screening for HIV (human immunodeficiency virus)  -     HIV 1/2 Ag/Ab (4th Gen); Future    Encounter for hepatitis C screening test for low risk patient  -     Hepatitis C Antibody; Future    Other orders  -     ferrous gluconate (FERGON) 324 MG tablet; Take 1 tablet (324 mg total) by mouth 3 (three) times daily with meals.  -     levothyroxine (SYNTHROID) 125 MCG tablet; Take 1 tablet (125 mcg total) by mouth before breakfast.  -     diclofenac (VOLTAREN) 75 MG EC tablet; Take 1 tablet (75 mg total) by mouth 2 (two) times daily.             [facility]   [address] [place] [address] [address]  [place] [address]  Office [phone]  Fax [phone]

## 2022-08-08 ENCOUNTER — PATIENT OUTREACH (OUTPATIENT)
Dept: ADMINISTRATIVE | Facility: OTHER | Age: 47
End: 2022-08-08
Payer: MEDICAID

## 2022-08-08 NOTE — PROGRESS NOTES
CHW - Initial Contact    This Community Health Worker completed the Social Determinant of Health questionnaire with [id number] via telephone today.    Pt identified barriers of most importance are: Please see SDOH icon.  Referrals to community agencies completed with patient consent outside of [facility] include: SNAP  Referrals were put through [facility] -Yes. [facility]   Support and Services: [facility] and [facility]  Other information discussed the patient needs help with. The patient shared no other information at this time.  Follow up required: Yes  Follow-up Outreach - Due: 8/22/2022

## 2022-08-21 ENCOUNTER — PATIENT MESSAGE (OUTPATIENT)
Dept: ADMINISTRATIVE | Facility: HOSPITAL | Age: 47
End: 2022-08-21
Payer: MEDICAID

## 2022-08-23 ENCOUNTER — PATIENT OUTREACH (OUTPATIENT)
Dept: ADMINISTRATIVE | Facility: OTHER | Age: 47
End: 2022-08-23
Payer: MEDICAID

## 2022-08-23 NOTE — PROGRESS NOTES
CHW - Follow Up    This Community Health Worker completed a follow up visit with [phone] via telephone today.  Pt reported: She was approved for SNAP and she did get a call about housing however I will call her on Friday 8/26/2022 to giver her more number for housing.  Community Health Worker provided: patient with a follow up to help with housing.  Follow up required: Yes  Follow-up Outreach - Due: 8/26/2022

## 2022-08-29 ENCOUNTER — PATIENT OUTREACH (OUTPATIENT)
Dept: ADMINISTRATIVE | Facility: OTHER | Age: 47
End: 2022-08-29
Payer: MEDICAID

## 2022-08-29 NOTE — PROGRESS NOTES
CHW - Follow Up    This Community Health Worker completed a follow up visit with patient via telephone today.  Pt reported: She was approved for SNAP however she still need to send them more paperwork.  Community Health Worker provided: Pt with rental/housing info [phone],[facility] [phone], [address] [phone], [phone].  Follow up required: Yes  Follow-up Outreach - Due: 9/12/2022

## 2022-08-30 ENCOUNTER — TELEPHONE (OUTPATIENT)
Dept: OBSTETRICS AND GYNECOLOGY | Facility: CLINIC | Age: 47
End: 2022-08-30
Payer: MEDICAID

## 2022-08-30 NOTE — PRE ADMISSION SCREENING
Pre op instructions reviewed with patient per phone on 8/30: Spoke about pre op process and surgery instructions, verbalized understanding.    Surgery is scheduled on 9/12/22. Please arrive at 6:00am. We will call you the afternoon prior to surgery to confirm arrival time, as it is subject to change due to cancellations & emergencies.    Please report to the [facility] (1st Floor) at [facility] located off of [facility] (2nd building on the left, in front of the [facility]),address: [address] [place] [address] [place]. [address]    Your Covid Test appointment is scheduled on 9/12/22, [facility].      INSTRUCTIONS IMPORTANT!!!  Do Not Eat, Drink, or Smoke after 12 midnight! NO WATER after midnight! OK to brush teeth, no gum, candy or mints!    Take only these medicines with a small swallow of water-morning of surgery:  None    Continue to hold your Diclofenac prior to surgery.    Take you last dose of Metformin on Saturday, 9/10/22.    We will reach to you soon with instructions on holding your Aspirin prior to surgery.    ____  NO Acrylic/fake nails or nail polish worn day of surgery (specifically hand/arm & foot surgeries).  ____  NO powder, lotions, deodorants, oils or creams on body.  ____  Please Remove All jewelry & piercings prior to surgery.  ____  Please Remove Dentures, Hearing Aids & Contact Lens prior to the start of surgery.  ____  Please bring photo ID and insurance information to hospital (Leave Valuables at Home).  ____  If going home the same day, arrange for a ride home. You will not be able to drive 24 hrs if Anesthesia was used.   ____  Females (ages 11-60) may need to give a urine sample the morning of surgery; please see Pre op Nurse prior to voiding.  ____  Wear clean, loose fitting clothing. Allow for dressings, bandages.  ____  Stop all Aspirin products, Ibuprofen, Advil, Motrin & Aleve at least 5-7 days before surgery, unless otherwise instructed by your doctor, or the nurse.   ____  Blood  Thinners are stopped based on your Provider's recommendation; Call Surgeon's Office to inquire when to stop/hold.  ____  Stop taking any Fish Oil supplements or Vitamins at least 5 days prior to surgery, unless instructed otherwise by your Doctor.            Diabetic Patients: If you take diabetic medication, do NOT take morning of surgery unless instructed by             Doctor. Metformin to be stopped 24 hrs prior to surgery time. DO NOT take long-acting insulin the evening before surgery. Blood sugars will be checked in pre-op morning of.    Bathing Instructions:    -Do not shave your face or body the day before or the day of surgery.              -Do not shave abdominal area prior to surgery   -Shower & Rinse your body as usual with anti-bacterial Soap (Dial)              -Rinse your body thoroughly.     [facility] Visitor/Ride Policy:  Only 2 adults allowed (over the age of 18) to accompany you to the Hospital. You Must have a ride home from a responsible adult that you know and trust. Medical Transport, Uber or Lyft can only be used if patient has a responsible adult to accompany them during ride home.    Post-Op Instructions: You will receive Post-op/Discharge instructions by your Discharge Nurse prior to going home. Please call your Surgeon's office with any post-surgery questions/concerns.    *Call [facility] with surgery instruction questions @ [phone]-Mckenzie or [phone] (Mon-Fri 8 am to 4 pm)    *If you are running late or have questions the morning of surgery, please call the Surgery Dept @ [phone]  *Insurance/ Financial Questions, please call [phone].        Walk in

## 2022-08-30 NOTE — TELEPHONE ENCOUNTER
----- Message from Mckenzie Nunes RN sent at 8/30/2022  2:20 PM CDT -----  Regarding: ASA  Hello, this pt needs instructions on when to stop her ASA prior to sx. Thank  you

## 2022-08-31 ENCOUNTER — TELEPHONE (OUTPATIENT)
Dept: OBSTETRICS AND GYNECOLOGY | Facility: CLINIC | Age: 47
End: 2022-08-31
Payer: MEDICAID

## 2022-08-31 NOTE — TELEPHONE ENCOUNTER
MD Mayela Caruso, LPN  Caller: Unspecified (Yesterday,  2:37 PM)  She needs to stop aspirin 2 weeks before her surgery.           Previous Messages  Patient Calls  (Newest Message First)   Talha Santa MD  You 13 hours ago (5:43 PM)     Called patient and advised per JV to stop aspirin now.  Patient verbalized understanding.

## 2022-09-02 ENCOUNTER — TELEPHONE (OUTPATIENT)
Dept: OTOLARYNGOLOGY | Facility: CLINIC | Age: 47
End: 2022-09-02
Payer: MEDICAID

## 2022-09-02 ENCOUNTER — TELEPHONE (OUTPATIENT)
Dept: INTERNAL MEDICINE | Facility: CLINIC | Age: 47
End: 2022-09-02
Payer: MEDICAID

## 2022-09-02 NOTE — TELEPHONE ENCOUNTER
Spoke with pt; pt was calling to get mammo and pre op schedule; pt stated her left breast has been giving her discomfort; pt has surgery scheduled for 9/12; Informed pt PCP is out of office and offered next available provider; Pt states she doesn't mind seeing another PCP; pt prefer 9/7 or 9/8 (anytime); Informed pt would give her a callback with details; pt verbalized understanding /LD

## 2022-09-02 NOTE — TELEPHONE ENCOUNTER
----- Message from Reta Bolden sent at 9/2/2022 12:17 PM CDT -----  Contact: [phone]  Pt states she had a thyroidectomy done here at [facility] and no one has followed up with her since sx. She is now having swelling of the glands and is concerned. She had medicaid and is requesting a call re scheduling as quickly as possible. Please advise .

## 2022-09-02 NOTE — TELEPHONE ENCOUNTER
----- Message from Reta Bolden sent at 9/2/2022 12:23 PM CDT -----  Contact: [phone]  Pt is requesting orders be entered for a diagnostic mammo on her left side. She has been havin again and discomfort. Also, pt has medicaid and needs a pre op appt prior to her hysterectomy schedule on 9/12/22. I did check with other providers but nothing was available. Can you please call her to discuss?

## 2022-09-07 ENCOUNTER — OFFICE VISIT (OUTPATIENT)
Dept: INTERNAL MEDICINE | Facility: CLINIC | Age: 47
End: 2022-09-07
Payer: MEDICAID

## 2022-09-07 VITALS
HEART RATE: 62 BPM | BODY MASS INDEX: 28.98 KG/M2 | DIASTOLIC BLOOD PRESSURE: 72 MMHG | SYSTOLIC BLOOD PRESSURE: 130 MMHG | WEIGHT: 180.31 LBS | OXYGEN SATURATION: 100 % | TEMPERATURE: 98 F | HEIGHT: 66 IN

## 2022-09-07 DIAGNOSIS — E11.9 TYPE 2 DIABETES MELLITUS WITHOUT RETINOPATHY: ICD-10-CM

## 2022-09-07 DIAGNOSIS — F17.200 NEEDS SMOKING CESSATION EDUCATION: ICD-10-CM

## 2022-09-07 DIAGNOSIS — I10 ESSENTIAL HYPERTENSION: ICD-10-CM

## 2022-09-07 DIAGNOSIS — Z23 NEEDS FLU SHOT: ICD-10-CM

## 2022-09-07 DIAGNOSIS — E11.9 TYPE 2 DIABETES MELLITUS WITHOUT COMPLICATION, WITHOUT LONG-TERM CURRENT USE OF INSULIN: Chronic | ICD-10-CM

## 2022-09-07 DIAGNOSIS — Z01.818 PRE-OPERATIVE GENERAL PHYSICAL EXAMINATION: Primary | ICD-10-CM

## 2022-09-07 PROCEDURE — 3078F PR MOST RECENT DIASTOLIC BLOOD PRESSURE < 80 MM HG: ICD-10-PCS | Mod: CPTII,,, | Performed by: NURSE PRACTITIONER

## 2022-09-07 PROCEDURE — 1160F PR REVIEW ALL MEDS BY PRESCRIBER/CLIN PHARMACIST DOCUMENTED: ICD-10-PCS | Mod: CPTII,,, | Performed by: NURSE PRACTITIONER

## 2022-09-07 PROCEDURE — 3072F PR LOW RISK FOR RETINOPATHY: ICD-10-PCS | Mod: CPTII,,, | Performed by: NURSE PRACTITIONER

## 2022-09-07 PROCEDURE — 1159F MED LIST DOCD IN RCRD: CPT | Mod: CPTII,,, | Performed by: NURSE PRACTITIONER

## 2022-09-07 PROCEDURE — 3075F PR MOST RECENT SYSTOLIC BLOOD PRESS GE 130-139MM HG: ICD-10-PCS | Mod: CPTII,,, | Performed by: NURSE PRACTITIONER

## 2022-09-07 PROCEDURE — 99214 OFFICE O/P EST MOD 30 MIN: CPT | Mod: PBBFAC | Performed by: NURSE PRACTITIONER

## 2022-09-07 PROCEDURE — 99999 PR PBB SHADOW E&M-EST. PATIENT-LVL IV: CPT | Mod: PBBFAC,,, | Performed by: NURSE PRACTITIONER

## 2022-09-07 PROCEDURE — 3075F SYST BP GE 130 - 139MM HG: CPT | Mod: CPTII,,, | Performed by: NURSE PRACTITIONER

## 2022-09-07 PROCEDURE — 3008F PR BODY MASS INDEX (BMI) DOCUMENTED: ICD-10-PCS | Mod: CPTII,,, | Performed by: NURSE PRACTITIONER

## 2022-09-07 PROCEDURE — 3008F BODY MASS INDEX DOCD: CPT | Mod: CPTII,,, | Performed by: NURSE PRACTITIONER

## 2022-09-07 PROCEDURE — 1159F PR MEDICATION LIST DOCUMENTED IN MEDICAL RECORD: ICD-10-PCS | Mod: CPTII,,, | Performed by: NURSE PRACTITIONER

## 2022-09-07 PROCEDURE — 99215 OFFICE O/P EST HI 40 MIN: CPT | Mod: S$PBB,,, | Performed by: NURSE PRACTITIONER

## 2022-09-07 PROCEDURE — 4010F ACE/ARB THERAPY RXD/TAKEN: CPT | Mod: CPTII,,, | Performed by: NURSE PRACTITIONER

## 2022-09-07 PROCEDURE — 99999 PR PBB SHADOW E&M-EST. PATIENT-LVL IV: ICD-10-PCS | Mod: PBBFAC,,, | Performed by: NURSE PRACTITIONER

## 2022-09-07 PROCEDURE — 3072F LOW RISK FOR RETINOPATHY: CPT | Mod: CPTII,,, | Performed by: NURSE PRACTITIONER

## 2022-09-07 PROCEDURE — 1160F RVW MEDS BY RX/DR IN RCRD: CPT | Mod: CPTII,,, | Performed by: NURSE PRACTITIONER

## 2022-09-07 PROCEDURE — 90686 IIV4 VACC NO PRSV 0.5 ML IM: CPT | Mod: PBBFAC

## 2022-09-07 PROCEDURE — 99215 PR OFFICE/OUTPT VISIT, EST, LEVL V, 40-54 MIN: ICD-10-PCS | Mod: S$PBB,,, | Performed by: NURSE PRACTITIONER

## 2022-09-07 PROCEDURE — 4010F PR ACE/ARB THEARPY RXD/TAKEN: ICD-10-PCS | Mod: CPTII,,, | Performed by: NURSE PRACTITIONER

## 2022-09-07 PROCEDURE — 3078F DIAST BP <80 MM HG: CPT | Mod: CPTII,,, | Performed by: NURSE PRACTITIONER

## 2022-09-07 RX ORDER — METFORMIN HYDROCHLORIDE 1000 MG/1
1000 TABLET ORAL 2 TIMES DAILY WITH MEALS
Qty: 90 TABLET | Refills: 3
Start: 2022-09-07 | End: 2022-10-11

## 2022-09-07 NOTE — PROGRESS NOTES
Subjective:      Shelly Kam is a 47 y.o. female who presents to the office today for a preoperative consultation at the request of surgeon Dr. Talha Santa MD who plans on performing Robotic Hysterectomy with Salpingo-Oophorectomy  on September 12, 2022. This consultation is requested for the specific conditions prompting preoperative evaluation (i.e. because of potential affect on operative risk): See problem List. Planned anesthesia: general. The patient has the following known anesthesia issues:  None . Patients bleeding risk: use of Ca-channel blockers (see med list) and ASA . Patient does not have objections to receiving blood products if needed.    The following portions of the patient's history were reviewed and updated as appropriate: She  has a past medical history of Allergy, Anemia, Asthma, Depression, Diabetes mellitus, Diabetes mellitus, type 2, Encounter for blood transfusion, Fibromyalgia, GERD (gastroesophageal reflux disease), Hyperlipidemia, Hypertension, Hyperthyroidism, and Panic attack.  She does not have any pertinent problems on file.  She  has a past surgical history that includes Tonsillectomy; Bronchoscopy; Tracheostomy tube placement; Hernia repair; Tracheostomy; and Thyroidectomy (Bilateral, 3/25/2021).  Her family history includes Cancer in her paternal aunt.  She  reports that she has been smoking cigarettes. She has been smoking an average of .25 packs per day. She has never used smokeless tobacco. She reports current alcohol use. She reports current drug use. Drug: Marijuana.  She has a current medication list which includes the following prescription(s): albuterol, epinephrine, fluticasone propionate, ipratropium, levothyroxine, ondansetron, pulse oximeter, amlodipine, aspirin, citalopram, losartan, medroxyprogesterone, metformin, metoprolol succinate, pantoprazole, and simvastatin.  Current Outpatient Medications on File Prior to Visit   Medication Sig Dispense Refill     albuterol (PROVENTIL/VENTOLIN HFA) 90 mcg/actuation inhaler Inhale 2 puffs into the lungs every 6 (six) hours as needed for Wheezing or Shortness of Breath. 18 g 3    EPINEPHrine (EPIPEN) 0.3 mg/0.3 mL AtIn INJECT CONTENTS OF 1 PEN AS NEEDED FOR ALLERGIC REACTION 2 each 0    fluticasone propionate (FLONASE) 50 mcg/actuation nasal spray 2 sprays (100 mcg total) by Each Nostril route once daily. 16 g 11    ipratropium (ATROVENT) 0.03 % nasal spray 2 sprays by Nasal route 3 (three) times daily. 20 mL 5    levothyroxine (SYNTHROID) 125 MCG tablet Take 1 tablet (125 mcg total) by mouth before breakfast. (Patient taking differently: Take 125 mcg by mouth nightly.) 30 tablet 3    ondansetron (ZOFRAN) 4 MG tablet Take 1 tablet (4 mg total) by mouth every 6 (six) hours as needed for Nausea. 12 tablet 0    pulse oximeter (PULSE OXIMETER) device Use twice daily at 8 AM and 3 PM and [facility]. 1 each 0    amLODIPine (NORVASC) 10 MG tablet Take 1 tablet (10 mg total) by mouth once daily. (Patient taking differently: Take 10 mg by mouth nightly.) 30 tablet 3    aspirin (ECOTRIN) 81 MG EC tablet Take 1 tablet (81 mg total) by mouth once daily. 90 tablet 3    citalopram (CELEXA) 20 MG tablet Take 1 tablet (20 mg total) by mouth once daily. (Patient taking differently: Take 20 mg by mouth nightly.) 30 tablet 3    losartan (COZAAR) 25 MG tablet Take 1 tablet (25 mg total) by mouth once daily. 30 tablet 3    medroxyPROGESTERone (PROVERA) 10 MG tablet Take 1 tablet (10 mg total) by mouth once daily. 30 tablet 1    metoprolol succinate (TOPROL-XL) 50 MG 24 hr tablet Take 1 tablet (50 mg total) by mouth once daily. QD (Patient taking differently: Take 50 mg by mouth nightly. QD) 90 tablet 1    pantoprazole (PROTONIX) 20 MG tablet Take 1 tablet (20 mg total) by mouth once daily. 90 tablet 3    simvastatin (ZOCOR) 40 MG tablet Take 1 tablet (40 mg total) by mouth every evening. 30 tablet 3    [DISCONTINUED]  "metFORMIN (GLUCOPHAGE) 1000 MG tablet Take 1 tablet (1,000 mg total) by mouth 2 (two) times daily with meals. (Patient taking differently: Take 1,000 mg by mouth daily with breakfast.) 60 tablet 3     No current facility-administered medications on file prior to visit.     She is allergic to pcn [penicillins], banana, lisinopril, melon, morphine, and tree nuts..    Review of Systems  Constitutional: negative  Eyes: negative  Ears, nose, mouth, throat, and face: negative  Respiratory: negative  Cardiovascular: negative  Gastrointestinal: negative  Genitourinary:negative, Scheduled Hysterectomy  Integument/breast: negative  Hematologic/lymphatic: negative  Musculoskeletal:negative  Neurological: negative  Behavioral/Psych: negative  Endocrine: negative  Allergic/Immunologic: See allergy list       Objective:        /72 (BP Location: Right arm, Patient Position: Sitting, BP Method: Large (Manual))   Pulse 62   Temp 97.8 °F (36.6 °C) (Tympanic)   Ht 5' 6" (1.676 m)   Wt 81.8 kg (180 lb 5.4 oz)   LMP 08/28/2022   SpO2 100%   BMI 29.11 kg/m²     General Appearance:    Alert, cooperative, no distress, appears stated age   Head:    Normocephalic, without obvious abnormality, atraumatic   Eyes:    PERRL, conjunctiva/corneas clear, EOM's intact, fundi     benign, both eyes   Ears:    Normal TM's and external ear canals, both ears   Nose:   Nares normal, septum midline, mucosa normal, no drainage    or sinus tenderness   Throat:   Lips, mucosa, and tongue normal; teeth and gums normal   Neck:   Supple, symmetrical, trachea midline, no adenopathy;     thyroid:  no enlargement/tenderness/nodules; no carotid    bruit or JVD   Back:     Symmetric, no curvature, ROM normal, no CVA tenderness   Lungs:     Clear to auscultation bilaterally, respirations unlabored   Chest Wall:    No tenderness or deformity    Heart:    Regular rate and rhythm, S1 and S2 normal, no murmur, rub   or gallop   Breast Exam:    No tenderness, " masses, or nipple abnormality   Abdomen:     Soft, non-tender, bowel sounds active all four quadrants,      no organomegaly           Extremities:   Extremities normal, atraumatic, no cyanosis or edema   Pulses:   2+ and symmetric all extremities   Skin:   Skin color, texture, turgor normal, no rashes or lesions   Lymph nodes:   Cervical, supraclavicular, and axillary nodes normal   Neurologic:   CNII-XII intact, normal strength, sensation and reflexes     throughout         Cardiographics  ECG scheduled with Cardology    Imaging  Not done    Lab Review   -See Chart      Assessment:        47 y.o. female with planned surgery as above.    Known risk factors for perioperative complications: Anemia  Diabetes mellitus    Difficulty with intubation is not anticipated.    Cardiac Risk Estimation: The 10-year ASCVD risk score (Lizbeth BRAND, et al., 2019) is: 5.1%    Values used to calculate the score:      Age: 47 years      Sex: Female      Is Non- : Yes      Diabetic: Yes      Tobacco smoker: Yes      Systolic Blood Pressure: 130 mmHg      Is BP treated: No      HDL Cholesterol: 61 mg/dL      Total Cholesterol: 201 mg/dL    Current medications which may produce withdrawal symptoms if withheld perioperatively: None     Plan:      1. Preoperative workup as follows: CBC, CMP, U/A, A1C, APTT and Pt-INR. Scheduled Cardiology appt with EKG today.  2. Change in medication regimen before surgery: Discussed with surgery team as reflected in chart and confirmed by patient  3. Patient will be optimized for surgery pending labs

## 2022-09-08 ENCOUNTER — OFFICE VISIT (OUTPATIENT)
Dept: OTOLARYNGOLOGY | Facility: CLINIC | Age: 47
End: 2022-09-08
Payer: MEDICAID

## 2022-09-08 ENCOUNTER — OFFICE VISIT (OUTPATIENT)
Dept: OBSTETRICS AND GYNECOLOGY | Facility: CLINIC | Age: 47
End: 2022-09-08
Payer: MEDICAID

## 2022-09-08 ENCOUNTER — HOSPITAL ENCOUNTER (OUTPATIENT)
Dept: CARDIOLOGY | Facility: HOSPITAL | Age: 47
Discharge: HOME | End: 2022-09-08
Payer: MEDICAID

## 2022-09-08 VITALS — HEIGHT: 66 IN | WEIGHT: 180.56 LBS | BODY MASS INDEX: 29.02 KG/M2

## 2022-09-08 VITALS — SYSTOLIC BLOOD PRESSURE: 122 MMHG | HEART RATE: 61 BPM | TEMPERATURE: 98 F | DIASTOLIC BLOOD PRESSURE: 76 MMHG

## 2022-09-08 DIAGNOSIS — N93.8 DYSFUNCTIONAL UTERINE BLEEDING: ICD-10-CM

## 2022-09-08 DIAGNOSIS — R13.10 DYSPHAGIA, UNSPECIFIED TYPE: Primary | ICD-10-CM

## 2022-09-08 DIAGNOSIS — D25.9 UTERINE LEIOMYOMA, UNSPECIFIED LOCATION: Primary | ICD-10-CM

## 2022-09-08 DIAGNOSIS — Z01.818 PRE-OP TESTING: ICD-10-CM

## 2022-09-08 PROCEDURE — 1159F MED LIST DOCD IN RCRD: CPT | Mod: CPTII,,, | Performed by: OTOLARYNGOLOGY

## 2022-09-08 PROCEDURE — 3072F PR LOW RISK FOR RETINOPATHY: ICD-10-PCS | Mod: CPTII,,, | Performed by: OTOLARYNGOLOGY

## 2022-09-08 PROCEDURE — 99214 PR OFFICE/OUTPT VISIT, EST, LEVL IV, 30-39 MIN: ICD-10-PCS | Mod: 25,S$PBB,, | Performed by: OTOLARYNGOLOGY

## 2022-09-08 PROCEDURE — 1159F PR MEDICATION LIST DOCUMENTED IN MEDICAL RECORD: ICD-10-PCS | Mod: CPTII,,, | Performed by: OTOLARYNGOLOGY

## 2022-09-08 PROCEDURE — 1159F MED LIST DOCD IN RCRD: CPT | Mod: CPTII,,, | Performed by: OBSTETRICS & GYNECOLOGY

## 2022-09-08 PROCEDURE — 3044F PR MOST RECENT HEMOGLOBIN A1C LEVEL <7.0%: ICD-10-PCS | Mod: CPTII,,, | Performed by: OTOLARYNGOLOGY

## 2022-09-08 PROCEDURE — 93010 EKG 12-LEAD: ICD-10-PCS | Mod: ,,, | Performed by: INTERNAL MEDICINE

## 2022-09-08 PROCEDURE — 99214 OFFICE O/P EST MOD 30 MIN: CPT | Mod: 25,S$PBB,, | Performed by: OTOLARYNGOLOGY

## 2022-09-08 PROCEDURE — 4010F ACE/ARB THERAPY RXD/TAKEN: CPT | Mod: CPTII,,, | Performed by: OBSTETRICS & GYNECOLOGY

## 2022-09-08 PROCEDURE — 99999 PR PBB SHADOW E&M-EST. PATIENT-LVL III: CPT | Mod: PBBFAC,,, | Performed by: OBSTETRICS & GYNECOLOGY

## 2022-09-08 PROCEDURE — 3008F BODY MASS INDEX DOCD: CPT | Mod: CPTII,,, | Performed by: OBSTETRICS & GYNECOLOGY

## 2022-09-08 PROCEDURE — 3072F LOW RISK FOR RETINOPATHY: CPT | Mod: CPTII,,, | Performed by: OTOLARYNGOLOGY

## 2022-09-08 PROCEDURE — 99999 PR PBB SHADOW E&M-EST. PATIENT-LVL III: ICD-10-PCS | Mod: PBBFAC,,, | Performed by: OTOLARYNGOLOGY

## 2022-09-08 PROCEDURE — 3044F PR MOST RECENT HEMOGLOBIN A1C LEVEL <7.0%: ICD-10-PCS | Mod: CPTII,,, | Performed by: OBSTETRICS & GYNECOLOGY

## 2022-09-08 PROCEDURE — 3078F PR MOST RECENT DIASTOLIC BLOOD PRESSURE < 80 MM HG: ICD-10-PCS | Mod: CPTII,,, | Performed by: OTOLARYNGOLOGY

## 2022-09-08 PROCEDURE — 31575 PR LARYNGOSCOPY, FLEXIBLE; DIAGNOSTIC: ICD-10-PCS | Mod: S$PBB,,, | Performed by: OTOLARYNGOLOGY

## 2022-09-08 PROCEDURE — 3078F DIAST BP <80 MM HG: CPT | Mod: CPTII,,, | Performed by: OTOLARYNGOLOGY

## 2022-09-08 PROCEDURE — 93005 ELECTROCARDIOGRAM TRACING: CPT | Mod: 59

## 2022-09-08 PROCEDURE — 31575 DIAGNOSTIC LARYNGOSCOPY: CPT | Mod: S$PBB,,, | Performed by: OTOLARYNGOLOGY

## 2022-09-08 PROCEDURE — 99499 NO LOS: ICD-10-PCS | Mod: S$PBB,,, | Performed by: OBSTETRICS & GYNECOLOGY

## 2022-09-08 PROCEDURE — 99999 PR PBB SHADOW E&M-EST. PATIENT-LVL III: CPT | Mod: PBBFAC,,, | Performed by: OTOLARYNGOLOGY

## 2022-09-08 PROCEDURE — 3074F SYST BP LT 130 MM HG: CPT | Mod: CPTII,,, | Performed by: OTOLARYNGOLOGY

## 2022-09-08 PROCEDURE — 3074F PR MOST RECENT SYSTOLIC BLOOD PRESSURE < 130 MM HG: ICD-10-PCS | Mod: CPTII,,, | Performed by: OTOLARYNGOLOGY

## 2022-09-08 PROCEDURE — 3072F LOW RISK FOR RETINOPATHY: CPT | Mod: CPTII,,, | Performed by: OBSTETRICS & GYNECOLOGY

## 2022-09-08 PROCEDURE — 99213 OFFICE O/P EST LOW 20 MIN: CPT | Mod: PBBFAC,27,25 | Performed by: OTOLARYNGOLOGY

## 2022-09-08 PROCEDURE — 4010F PR ACE/ARB THEARPY RXD/TAKEN: ICD-10-PCS | Mod: CPTII,,, | Performed by: OBSTETRICS & GYNECOLOGY

## 2022-09-08 PROCEDURE — 3008F PR BODY MASS INDEX (BMI) DOCUMENTED: ICD-10-PCS | Mod: CPTII,,, | Performed by: OBSTETRICS & GYNECOLOGY

## 2022-09-08 PROCEDURE — 3072F PR LOW RISK FOR RETINOPATHY: ICD-10-PCS | Mod: CPTII,,, | Performed by: OBSTETRICS & GYNECOLOGY

## 2022-09-08 PROCEDURE — 3044F HG A1C LEVEL LT 7.0%: CPT | Mod: CPTII,,, | Performed by: OBSTETRICS & GYNECOLOGY

## 2022-09-08 PROCEDURE — 4010F ACE/ARB THERAPY RXD/TAKEN: CPT | Mod: CPTII,,, | Performed by: OTOLARYNGOLOGY

## 2022-09-08 PROCEDURE — 1159F PR MEDICATION LIST DOCUMENTED IN MEDICAL RECORD: ICD-10-PCS | Mod: CPTII,,, | Performed by: OBSTETRICS & GYNECOLOGY

## 2022-09-08 PROCEDURE — 31575 DIAGNOSTIC LARYNGOSCOPY: CPT | Mod: PBBFAC | Performed by: OTOLARYNGOLOGY

## 2022-09-08 PROCEDURE — 4010F PR ACE/ARB THEARPY RXD/TAKEN: ICD-10-PCS | Mod: CPTII,,, | Performed by: OTOLARYNGOLOGY

## 2022-09-08 PROCEDURE — 93010 ELECTROCARDIOGRAM REPORT: CPT | Mod: ,,, | Performed by: INTERNAL MEDICINE

## 2022-09-08 PROCEDURE — 99499 UNLISTED E&M SERVICE: CPT | Mod: S$PBB,,, | Performed by: OBSTETRICS & GYNECOLOGY

## 2022-09-08 PROCEDURE — 3044F HG A1C LEVEL LT 7.0%: CPT | Mod: CPTII,,, | Performed by: OTOLARYNGOLOGY

## 2022-09-08 PROCEDURE — 99213 OFFICE O/P EST LOW 20 MIN: CPT | Mod: PBBFAC,25 | Performed by: OBSTETRICS & GYNECOLOGY

## 2022-09-08 PROCEDURE — 99999 PR PBB SHADOW E&M-EST. PATIENT-LVL III: ICD-10-PCS | Mod: PBBFAC,,, | Performed by: OBSTETRICS & GYNECOLOGY

## 2022-09-08 RX ORDER — MUPIROCIN 20 MG/G
OINTMENT TOPICAL
Status: CANCELLED | OUTPATIENT
Start: 2022-09-08

## 2022-09-08 RX ORDER — SODIUM CHLORIDE 9 MG/ML
INJECTION, SOLUTION INTRAVENOUS CONTINUOUS
Status: CANCELLED | OUTPATIENT
Start: 2022-09-08

## 2022-09-08 NOTE — H&P
[facility]  Obstetrics & Gynecology  History & Physical    Patient Name: Shelly Kam  MRN: [id number]  Admission Date: (Not on file)  Primary Care Provider: Heraclio Farrell MD    Subjective:     Chief Complaint/Reason for Admission: surgery    History of Present Illness:   46 yo G0 with history of symptomatic fibroids who is here for scheduled hysterectomy.  Pt reports no new complaints and is ready for surgery.    Current Outpatient Medications on File Prior to Visit   Medication Sig    albuterol (PROVENTIL/VENTOLIN HFA) 90 mcg/actuation inhaler Inhale 2 puffs into the lungs every 6 (six) hours as needed for Wheezing or Shortness of Breath.    EPINEPHrine (EPIPEN) 0.3 mg/0.3 mL AtIn INJECT CONTENTS OF 1 PEN AS NEEDED FOR ALLERGIC REACTION    fluticasone propionate (FLONASE) 50 mcg/actuation nasal spray 2 sprays (100 mcg total) by Each Nostril route once daily.    ipratropium (ATROVENT) 0.03 % nasal spray 2 sprays by Nasal route 3 (three) times daily.    levothyroxine (SYNTHROID) 125 MCG tablet Take 1 tablet (125 mcg total) by mouth before breakfast. (Patient taking differently: Take 125 mcg by mouth nightly.)    metFORMIN (GLUCOPHAGE) 1000 MG tablet Take 1 tablet (1,000 mg total) by mouth 2 (two) times daily with meals.    ondansetron (ZOFRAN) 4 MG tablet Take 1 tablet (4 mg total) by mouth every 6 (six) hours as needed for Nausea.    pulse oximeter (PULSE OXIMETER) device Use twice daily at 8 AM and 3 PM and record the value in MyChart as directed.    amLODIPine (NORVASC) 10 MG tablet Take 1 tablet (10 mg total) by mouth once daily. (Patient taking differently: Take 10 mg by mouth nightly.)    aspirin (ECOTRIN) 81 MG EC tablet Take 1 tablet (81 mg total) by mouth once daily.    citalopram (CELEXA) 20 MG tablet Take 1 tablet (20 mg total) by mouth once daily. (Patient taking differently: Take 20 mg by mouth nightly.)    losartan (COZAAR) 25 MG tablet Take 1 tablet (25 mg total) by mouth once daily.     medroxyPROGESTERone (PROVERA) 10 MG tablet Take 1 tablet (10 mg total) by mouth once daily.    metoprolol succinate (TOPROL-XL) 50 MG 24 hr tablet Take 1 tablet (50 mg total) by mouth once daily. QD (Patient taking differently: Take 50 mg by mouth nightly. QD)    pantoprazole (PROTONIX) 20 MG tablet Take 1 tablet (20 mg total) by mouth once daily.    simvastatin (ZOCOR) 40 MG tablet Take 1 tablet (40 mg total) by mouth every evening.     No current facility-administered medications on file prior to visit.       Review of patient's allergies indicates:   Allergen Reactions    Pcn [penicillins] Anaphylaxis     Throat , tongue swelling     Banana      Itchy mouth    Lisinopril Other (See Comments)     Lip swelling    Melon      Itchy mouth    Morphine Other (See Comments)     Unknown    Tree nuts      Oral itching       Past Medical History:   Diagnosis Date    Allergy     Anemia     Asthma     Depression     Diabetes mellitus     Diabetes mellitus, type 2     Encounter for blood transfusion     Fibromyalgia     GERD (gastroesophageal reflux disease)     Hyperlipidemia     Hypertension     Hyperthyroidism     Panic attack     Prozac in past     OB History    Para Term  AB Living   0 0 0 0 0 0   SAB IAB Ectopic Multiple Live Births   0 0 0 0 0     Past Surgical History:   Procedure Laterality Date    BRONCHOSCOPY      HERNIA REPAIR      THYROIDECTOMY Bilateral 3/25/2021    Procedure: THYROIDECTOMY;  Surgeon: lAon Barton MD;  Location: [facility] OR [address] [place] [address];  Service: ENT;  Laterality: Bilateral;  NIMS monitor    TONSILLECTOMY      TRACHEOSTOMY      TRACHEOSTOMY TUBE PLACEMENT       Family History       Problem Relation (Age of Onset)    Cancer Paternal Aunt          Tobacco Use    Smoking status: Every Day     Packs/day: 0.25     Types: Cigarettes     Last attempt to quit: 3/1/2011     Years since quittin.5    Smokeless tobacco: Never    Tobacco comments:     quit 2021 ; hold midnight prior to  sx   Substance and Sexual Activity    Alcohol use: Yes     Comment: once a year; hold 72 hrs prior to sx    Drug use: Yes     Types: Marijuana    Sexual activity: Yes     Partners: Male     Review of Systems   Constitutional:  Positive for fatigue. Negative for activity change, appetite change, chills, fever and unexpected weight change.   Respiratory:  Negative for shortness of breath.    Cardiovascular:  Negative for chest pain, palpitations and leg swelling.   Gastrointestinal:  Positive for abdominal pain, bloating and constipation. Negative for blood in stool, diarrhea, nausea and vomiting.   Genitourinary:  Positive for dysmenorrhea, menorrhagia, menstrual problem, pelvic pain and vaginal bleeding. Negative for dyspareunia, dysuria, flank pain, frequency, genital sores, hematuria, urgency, vaginal discharge, vaginal pain, urinary incontinence, postcoital bleeding, vaginal dryness and vaginal odor.   Musculoskeletal:  Positive for back pain.   Neurological:  Negative for syncope and headaches.   Objective:     Vital Signs (Most Recent):    Vital Signs (24h Range):  [unfilled]     Weight: 81.9 kg (180 lb 8.9 oz)  Body mass index is 29.14 kg/m².  Patient's last menstrual period was 08/28/2022.    Physical Exam:   Constitutional: She is oriented to person, place, and time. She appears well-developed and well-nourished. No distress.    HENT:   Head: Normocephalic and atraumatic.    Eyes: Pupils are equal, round, and reactive to light. EOM are normal.     Cardiovascular:  Normal rate, regular rhythm and normal heart sounds.             Pulmonary/Chest: Effort normal and breath sounds normal.        Abdominal: Soft. Bowel sounds are normal. She exhibits no distension. There is no abdominal tenderness.             Musculoskeletal: Normal range of motion and moves all extremeties. No tenderness or edema.       Neurological: She is alert and oriented to person, place, and time.    Skin: Skin is warm and dry.     Psychiatric: She has a normal mood and affect. Her behavior is normal. Thought content normal.     Laboratory:  I have personallly reviewed all pertinent lab results from the last 24 hours.    Diagnostic Results:  Labs: Reviewed  US: Reviewed  Pap reviewed    Assessment/Plan:     There are no hospital problems to display for this patient.    Uterine leiomyoma, unspecified location  -     Procedure risks, benefits, and alternatives were discussed.  Pt voiced understanding and expressed consent for RALH/Bilateral Salpingectomy.  Pt desires ovarian preservation but is OK with ovarian removal if deemed necessary.  Hospital forms were reviewed with pt and signed.  Pre-operative instructions were given.  -     Pt is awaiting pre-op clearance.      Talha Santa MD  Obstetrics & Gynecology  [facility]

## 2022-09-08 NOTE — PROGRESS NOTES
Pt is here for pre-operative visit.  Pt reports no complaints.  Ready for surgery.    ROS Negative     Physical Exam:  See H+P    A/P:  Uterine leiomyoma, unspecified location  -     Procedure risks, benefits, and alternatives were discussed.  Pt voiced understanding and expressed consent for RALH/Bilateral Salpingectomy.  Pt desires ovarian preservation but is OK with ovarian removal if deemed necessary.  Hospital forms were reviewed with pt and signed.  Pre-operative instructions were given.  -     Pt is awaiting pre-op clearance.

## 2022-09-08 NOTE — H&P (VIEW-ONLY)
Additional Notes: Opened by error [facility]  Obstetrics & Gynecology  History & Physical    Patient Name: Shelly Kam  MRN: [id number]  Admission Date: (Not on file)  Primary Care Provider: Heraclio Farrell MD    Subjective:     Chief Complaint/Reason for Admission: surgery    History of Present Illness:   48 yo G0 with history of symptomatic fibroids who is here for scheduled hysterectomy.  Pt reports no new complaints and is ready for surgery.    Current Outpatient Medications on File Prior to Visit   Medication Sig    albuterol (PROVENTIL/VENTOLIN HFA) 90 mcg/actuation inhaler Inhale 2 puffs into the lungs every 6 (six) hours as needed for Wheezing or Shortness of Breath.    EPINEPHrine (EPIPEN) 0.3 mg/0.3 mL AtIn INJECT CONTENTS OF 1 PEN AS NEEDED FOR ALLERGIC REACTION    fluticasone propionate (FLONASE) 50 mcg/actuation nasal spray 2 sprays (100 mcg total) by Each Nostril route once daily.    ipratropium (ATROVENT) 0.03 % nasal spray 2 sprays by Nasal route 3 (three) times daily.    levothyroxine (SYNTHROID) 125 MCG tablet Take 1 tablet (125 mcg total) by mouth before breakfast. (Patient taking differently: Take 125 mcg by mouth nightly.)    metFORMIN (GLUCOPHAGE) 1000 MG tablet Take 1 tablet (1,000 mg total) by mouth 2 (two) times daily with meals.    ondansetron (ZOFRAN) 4 MG tablet Take 1 tablet (4 mg total) by mouth every 6 (six) hours as needed for Nausea.    pulse oximeter (PULSE OXIMETER) device Use twice daily at 8 AM and 3 PM and record the value in MyChart as directed.    amLODIPine (NORVASC) 10 MG tablet Take 1 tablet (10 mg total) by mouth once daily. (Patient taking differently: Take 10 mg by mouth nightly.)    aspirin (ECOTRIN) 81 MG EC tablet Take 1 tablet (81 mg total) by mouth once daily.    citalopram (CELEXA) 20 MG tablet Take 1 tablet (20 mg total) by mouth once daily. (Patient taking differently: Take 20 mg by mouth nightly.)    losartan (COZAAR) 25 MG tablet Take 1 tablet (25 mg total) by mouth once daily.     Render Risk Assessment In Note?: yes medroxyPROGESTERone (PROVERA) 10 MG tablet Take 1 tablet (10 mg total) by mouth once daily.    metoprolol succinate (TOPROL-XL) 50 MG 24 hr tablet Take 1 tablet (50 mg total) by mouth once daily. QD (Patient taking differently: Take 50 mg by mouth nightly. QD)    pantoprazole (PROTONIX) 20 MG tablet Take 1 tablet (20 mg total) by mouth once daily.    simvastatin (ZOCOR) 40 MG tablet Take 1 tablet (40 mg total) by mouth every evening.     No current facility-administered medications on file prior to visit.       Review of patient's allergies indicates:   Allergen Reactions    Pcn [penicillins] Anaphylaxis     Throat , tongue swelling     Banana      Itchy mouth    Lisinopril Other (See Comments)     Lip swelling    Melon      Itchy mouth    Morphine Other (See Comments)     Unknown    Tree nuts      Oral itching       Past Medical History:   Diagnosis Date    Allergy     Anemia     Asthma     Depression     Diabetes mellitus     Diabetes mellitus, type 2     Encounter for blood transfusion     Fibromyalgia     GERD (gastroesophageal reflux disease)     Hyperlipidemia     Hypertension     Hyperthyroidism     Panic attack     Prozac in past     OB History    Para Term  AB Living   0 0 0 0 0 0   SAB IAB Ectopic Multiple Live Births   0 0 0 0 0     Past Surgical History:   Procedure Laterality Date    BRONCHOSCOPY      HERNIA REPAIR      THYROIDECTOMY Bilateral 3/25/2021    Procedure: THYROIDECTOMY;  Surgeon: Alon Barton MD;  Location: [facility] OR [address] [place] [address];  Service: ENT;  Laterality: Bilateral;  NIMS monitor    TONSILLECTOMY      TRACHEOSTOMY      TRACHEOSTOMY TUBE PLACEMENT       Family History       Problem Relation (Age of Onset)    Cancer Paternal Aunt          Tobacco Use    Smoking status: Every Day     Packs/day: 0.25     Types: Cigarettes     Last attempt to quit: 3/1/2011     Years since quittin.5    Smokeless tobacco: Never    Tobacco comments:     quit 2021 ; hold midnight prior to  Detail Level: Detailed sx   Substance and Sexual Activity    Alcohol use: Yes     Comment: once a year; hold 72 hrs prior to sx    Drug use: Yes     Types: Marijuana    Sexual activity: Yes     Partners: Male     Review of Systems   Constitutional:  Positive for fatigue. Negative for activity change, appetite change, chills, fever and unexpected weight change.   Respiratory:  Negative for shortness of breath.    Cardiovascular:  Negative for chest pain, palpitations and leg swelling.   Gastrointestinal:  Positive for abdominal pain, bloating and constipation. Negative for blood in stool, diarrhea, nausea and vomiting.   Genitourinary:  Positive for dysmenorrhea, menorrhagia, menstrual problem, pelvic pain and vaginal bleeding. Negative for dyspareunia, dysuria, flank pain, frequency, genital sores, hematuria, urgency, vaginal discharge, vaginal pain, urinary incontinence, postcoital bleeding, vaginal dryness and vaginal odor.   Musculoskeletal:  Positive for back pain.   Neurological:  Negative for syncope and headaches.   Objective:     Vital Signs (Most Recent):    Vital Signs (24h Range):  [unfilled]     Weight: 81.9 kg (180 lb 8.9 oz)  Body mass index is 29.14 kg/m².  Patient's last menstrual period was 08/28/2022.    Physical Exam:   Constitutional: She is oriented to person, place, and time. She appears well-developed and well-nourished. No distress.    HENT:   Head: Normocephalic and atraumatic.    Eyes: Pupils are equal, round, and reactive to light. EOM are normal.     Cardiovascular:  Normal rate, regular rhythm and normal heart sounds.             Pulmonary/Chest: Effort normal and breath sounds normal.        Abdominal: Soft. Bowel sounds are normal. She exhibits no distension. There is no abdominal tenderness.             Musculoskeletal: Normal range of motion and moves all extremeties. No tenderness or edema.       Neurological: She is alert and oriented to person, place, and time.    Skin: Skin is warm and dry.     Psychiatric: She has a normal mood and affect. Her behavior is normal. Thought content normal.     Laboratory:  I have personallly reviewed all pertinent lab results from the last 24 hours.    Diagnostic Results:  Labs: Reviewed  US: Reviewed  Pap reviewed    Assessment/Plan:     There are no hospital problems to display for this patient.    Uterine leiomyoma, unspecified location  -     Procedure risks, benefits, and alternatives were discussed.  Pt voiced understanding and expressed consent for RALH/Bilateral Salpingectomy.  Pt desires ovarian preservation but is OK with ovarian removal if deemed necessary.  Hospital forms were reviewed with pt and signed.  Pre-operative instructions were given.  -     Pt is awaiting pre-op clearance.      Talha Santa MD  Obstetrics & Gynecology  [facility]

## 2022-09-09 ENCOUNTER — TELEPHONE (OUTPATIENT)
Dept: PREADMISSION TESTING | Facility: HOSPITAL | Age: 47
End: 2022-09-09
Payer: MEDICAID

## 2022-09-09 NOTE — TELEPHONE ENCOUNTER
----- Message from Farooq Garcia NP sent at 9/9/2022 12:55 PM CDT -----  Regarding: RE: Surgery clearance  I apologize. I as I have done quite a few Pre-Op clearances this week.  Mrs. Kam is cleared for surgery. Thanks.    Farooq Garcia NP     ----- Message -----  From: Talha Santa MD  Sent: 9/9/2022  11:28 AM CDT  To: Farooq Garcia NP, Mckenzie Nunes RN, #  Subject: FW: Surgery clearance                            I am requesting clarification as to why she cannot be cleared medically, as it appears that she has already completed all of the required evaluations.  Please recall that medical clearance should not involve any financial considerations.      ----- Message -----  From: Raf Elizabeth LPN  Sent: 9/9/2022  10:25 AM CDT  To: Talha Santa MD, Mely Webb PA-C  Subject: FW: Surgery clearance                            Please advise message below     Jigna  ----- Message -----  From: Mckenzie Nunes RN  Sent: 9/9/2022   9:36 AM CDT  To: Kiet Palencia Staff  Subject: FW: Surgery clearance                            Please note pt is NOT cleared for sx. Please advise.  I am leaving shortly, please update Adrienne Oneill @[phone] or Merari Quan @[phone]  . Thank you    ----- Message -----  From: Farooq Garcia NP  Sent: 9/9/2022   9:24 AM CDT  To: Mckenzie Nunes RN  Subject: RE: Surgery clearance                            She is not cleared for surgery at this time. She presented with no documentation at pre-op visit. When our office followed up with her regarding documentation she explained surgery will be put on hold until she can meet with a  to discuss how surgery will be paid for. Not sure of any other details.    Farooq Garcia NP     ----- Message -----  From: Mckenzie Nunes RN  Sent: 9/9/2022   8:37 AM CDT  To: Farooq Garcia NP  Subject: Surgery clearance                                Good morning, this pt saw you 9/07 for surgery clearance. Sx is scheduled 9/12. Is this pt  cleared for surgery? I apologize if I just missed your note. Please advise. Thank you

## 2022-09-09 NOTE — TELEPHONE ENCOUNTER
----- Message from Farooq Garcia NP sent at 9/9/2022  9:21 AM CDT -----  Regarding: RE: Surgery clearance  She is not cleared for surgery at this time. She presented with no documentation at pre-op visit. When our office followed up with her regarding documentation she explained surgery will be put on hold until she can meet with a  to discuss how surgery will be paid for. Not sure of any other details.    Farooq Garcia NP     ----- Message -----  From: Mckenzie Nunes RN  Sent: 9/9/2022   8:37 AM CDT  To: Farooq Garcia NP  Subject: Surgery clearance                                Good morning, this pt saw you 9/07 for surgery clearance. Sx is scheduled 9/12. Is this pt cleared for surgery? I apologize if I just missed your note. Please advise. Thank you

## 2022-09-09 NOTE — TELEPHONE ENCOUNTER
Called and spoke with Pt about the following:     Please arrive to [facility] ([facility] main lobby at 6:00am on 9/12/22 for your scheduled procedure. NOTHING to eat or drink after midnight unless instructed otherwise by your Surgeon. Take only the medications instructed by your Pre Admit Nurse with small sip of water.

## 2022-09-11 ENCOUNTER — ANESTHESIA EVENT (OUTPATIENT)
Dept: SURGERY | Facility: HOSPITAL | Age: 47
DRG: 743 | End: 2022-09-11
Payer: MEDICAID

## 2022-09-11 NOTE — ANESTHESIA PREPROCEDURE EVALUATION
09/11/2022  Shelly Kam is a 47 y.o., female.    Pre-op Assessment    I have reviewed the Patient Summary Reports.    I have reviewed the Nursing Notes. I have reviewed the NPO Status.   I have reviewed the Medications.     Review of Systems  Anesthesia Hx:  No problems with previous Anesthesia   Denies Personal Hx of Anesthesia complications.   Social:  Smoker  Tobacco Use:    Cardiovascular:   Hypertension, well controlled Denies MI.  Denies CAD.    Denies CABG/stent.  Denies Dysrhythmias.  hyperlipidemia ECG has been reviewed. ASD with shunting on bubble study  ECHO  ? The left ventricle is normal in size with normal systolic function. The estimated ejection fraction is 60%.  ? Normal left ventricular diastolic function.  ? Normal right ventricular systolic function.  ? The estimated PA systolic pressure is 29 mmHg.  ? Normal central venous pressure (3 mmHg).  ? Bubble study: shunt across atrial septum noted.    Denies Coronary Artery Disease.  Hypertension , Pt in Pre-HTN range, systolic 130 - 139 or diastolic 85 - 89, Well Controlled on Rx    Pulmonary:   Denies COPD. Asthma mild and asymptomatic  Denies Shortness of breath.  Denies Recent URI. Sleep Apnea  Asthma:  last episode was > 1 year ago. Emergency visits this year is none.  Inhaler use is maintenance inhaler daily and rescue inhaler PRN. Current breathing status is optimal, free of wheezing.  Obstructive Sleep Apnea (BALTA).   Renal/:   Denies Chronic Renal Disease.   Denies Kidney Function/Disease    Hepatic/GI:   GERD, poorly controlled Denies Liver Disease.  Esophageal / Stomach Disorders Gerd Controlled by chronic antireflux medication.  Denies Liver Disease    Musculoskeletal:   Fibromyalgia   Neurological:   Denies TIA. Denies CVA. Denies Seizures.  Pain Syndrome  Pain Etiology/Diagnosis, Fibromayalgia  Denies Seizure Disorder   Denies CVA - Cerebrovasular Accident  Denies TIA - Transient Ischemic Attack    Endocrine:   Diabetes, well controlled, type 2 Hypothyroidism Denies Hyperthyroidism.  Diabetes, Type 2 Diabetes , controlled by oral hypoglycemics. , most recent HgA1c value was 5.4 on 3/20/21.  Thyroid Disease, Graves Disease, Hyperthyroidism  Metabolic Disorders, Hyperlipoproteinemia, controlled on medication, hypercholesterolemia      Physical Exam  General:  Obesity      Airway/Jaw/Neck:  Airway Findings: Mouth Opening: Normal   Tongue: Normal   Pre-Existing Airway Tube(s): Tracheostomy tube General Airway Assessment: Adult  Mallampati: III  TM Distance: 4 - 6 cm       Dental:  Dental Findings: In tact      Chest/Lungs:  Chest/Lungs Findings: Clear to auscultation, Normal Respiratory Rate      Heart/Vascular:  Heart Findings: Rate: Normal  Rhythm: Regular Rhythm  Sounds: Normal  Heart murmur: negative       Mental Status:  Mental Status Findings:  Cooperative, Alert and Oriented         Anesthesia Plan  Type of Anesthesia, risks & benefits discussed:  Anesthesia Type:  general    Patient's Preference:   Plan Factors:          Intra-op Monitoring Plan: standard ASA monitors  Intra-op Monitoring Plan Comments:   Post Op Pain Control Plan: per primary service following discharge from PACU, IV/PO Opioids PRN and multimodal analgesia  Post Op Pain Control Plan Comments:     Induction:   IV  Beta Blocker:  Patient is on a Beta-Blocker and has received one dose within the past 24 hours (No further documentation required).       Informed Consent: Informed consent signed with the Patient and all parties understand the risks and agree with anesthesia plan.  All questions answered.  Anesthesia consent signed with patient.  ASA Score: 2     Day of Surgery Review of History & Physical:              Ready For Surgery From Anesthesia Perspective.           Physical Exam  General: Obesity    Airway:  Mallampati: III   Mouth Opening: Normal  TM  Distance: 4 - 6 cm  Tongue: Normal  Pre-Existing Airway: Tracheostomy tube    Dental:  In tact    Chest/Lungs:  Clear to auscultation, Normal Respiratory Rate    Heart:  Rate: Normal  Rhythm: Regular Rhythm  Sounds: Normal          Anesthesia Plan  Type of Anesthesia, risks & benefits discussed:    Anesthesia Type: general  Intra-op Monitoring Plan: standard ASA monitors  Post Op Pain Control Plan: per primary service following discharge from PACU, IV/PO Opioids PRN and multimodal analgesia  Induction:  IV  Informed Consent: Informed consent signed with the Patient and all parties understand the risks and agree with anesthesia plan.  All questions answered.   ASA Score: 2    Ready For Surgery From Anesthesia Perspective.       .

## 2022-09-12 ENCOUNTER — ANESTHESIA (OUTPATIENT)
Dept: SURGERY | Facility: HOSPITAL | Age: 47
DRG: 743 | End: 2022-09-12
Payer: MEDICAID

## 2022-09-12 ENCOUNTER — HOSPITAL ENCOUNTER (OUTPATIENT)
Facility: HOSPITAL | Age: 47
Discharge: HOME | DRG: 743 | End: 2022-09-13
Attending: OBSTETRICS & GYNECOLOGY | Admitting: OBSTETRICS & GYNECOLOGY
Payer: MEDICAID

## 2022-09-12 DIAGNOSIS — D25.9 UTERINE LEIOMYOMA, UNSPECIFIED LOCATION: ICD-10-CM

## 2022-09-12 DIAGNOSIS — Z86.14 HISTORY OF MRSA INFECTION: Primary | ICD-10-CM

## 2022-09-12 DIAGNOSIS — Z90.710 STATUS POST TOTAL ABDOMINAL HYSTERECTOMY: ICD-10-CM

## 2022-09-12 LAB
ABO + RH BLD: NORMAL
B-HCG UR QL: NEGATIVE
BASOPHILS # BLD AUTO: 0.02 K/UL (ref 0–0.2)
BASOPHILS NFR BLD: 0.2 % (ref 0–1.9)
BLD GP AB SCN CELLS X3 SERPL QL: NORMAL
CTP QC/QA: YES
CTP QC/QA: YES
DIFFERENTIAL METHOD: ABNORMAL
EOSINOPHIL # BLD AUTO: 0 K/UL (ref 0–0.5)
EOSINOPHIL NFR BLD: 0 % (ref 0–8)
ERYTHROCYTE [DISTWIDTH] IN BLOOD BY AUTOMATED COUNT: 21.2 % (ref 11.5–14.5)
HCT VFR BLD AUTO: 31 % (ref 37–48.5)
HGB BLD-MCNC: 9.1 G/DL (ref 12–16)
IMM GRANULOCYTES # BLD AUTO: 0.04 K/UL (ref 0–0.04)
IMM GRANULOCYTES NFR BLD AUTO: 0.3 % (ref 0–0.5)
LYMPHOCYTES # BLD AUTO: 0.5 K/UL (ref 1–4.8)
LYMPHOCYTES NFR BLD: 3.7 % (ref 18–48)
MCH RBC QN AUTO: 22.5 PG (ref 27–31)
MCHC RBC AUTO-ENTMCNC: 29.4 G/DL (ref 32–36)
MCV RBC AUTO: 77 FL (ref 82–98)
MONOCYTES # BLD AUTO: 0.3 K/UL (ref 0.3–1)
MONOCYTES NFR BLD: 2.5 % (ref 4–15)
NEUTROPHILS # BLD AUTO: 12 K/UL (ref 1.8–7.7)
NEUTROPHILS NFR BLD: 93.3 % (ref 38–73)
NRBC BLD-RTO: 0 /100 WBC
PLATELET # BLD AUTO: 216 K/UL (ref 150–450)
PMV BLD AUTO: 10.5 FL (ref 9.2–12.9)
POCT GLUCOSE: 102 MG/DL (ref 70–110)
POCT GLUCOSE: 155 MG/DL (ref 70–110)
POCT GLUCOSE: 85 MG/DL (ref 70–110)
RBC # BLD AUTO: 4.05 M/UL (ref 4–5.4)
SARS-COV-2 AG RESP QL IA.RAPID: NEGATIVE
WBC # BLD AUTO: 12.86 K/UL (ref 3.9–12.7)

## 2022-09-12 PROCEDURE — 63600175 PHARM REV CODE 636 W HCPCS: Performed by: OBSTETRICS & GYNECOLOGY

## 2022-09-12 PROCEDURE — 71000039 HC RECOVERY, EACH ADD'L HOUR: Performed by: OBSTETRICS & GYNECOLOGY

## 2022-09-12 PROCEDURE — 63600175 PHARM REV CODE 636 W HCPCS: Performed by: ANESTHESIOLOGY

## 2022-09-12 PROCEDURE — 25000003 PHARM REV CODE 250: Performed by: NURSE ANESTHETIST, CERTIFIED REGISTERED

## 2022-09-12 PROCEDURE — 58150 TOTAL HYSTERECTOMY: CPT | Mod: ,,, | Performed by: OBSTETRICS & GYNECOLOGY

## 2022-09-12 PROCEDURE — 25000003 PHARM REV CODE 250: Performed by: OBSTETRICS & GYNECOLOGY

## 2022-09-12 PROCEDURE — 86901 BLOOD TYPING SEROLOGIC RH(D): CPT | Performed by: OBSTETRICS & GYNECOLOGY

## 2022-09-12 PROCEDURE — 11000001 HC ACUTE MED/SURG PRIVATE ROOM

## 2022-09-12 PROCEDURE — 27201423 OPTIME MED/SURG SUP & DEVICES STERILE SUPPLY: Performed by: OBSTETRICS & GYNECOLOGY

## 2022-09-12 PROCEDURE — 36000712 HC OR TIME LEV V 1ST 15 MIN: Performed by: OBSTETRICS & GYNECOLOGY

## 2022-09-12 PROCEDURE — 71000033 HC RECOVERY, INTIAL HOUR: Performed by: OBSTETRICS & GYNECOLOGY

## 2022-09-12 PROCEDURE — 36000713 HC OR TIME LEV V EA ADD 15 MIN: Performed by: OBSTETRICS & GYNECOLOGY

## 2022-09-12 PROCEDURE — 63600175 PHARM REV CODE 636 W HCPCS: Performed by: NURSE ANESTHETIST, CERTIFIED REGISTERED

## 2022-09-12 PROCEDURE — 00944 ANES VAG PX VAG HYSTERECTOMY: CPT | Performed by: OBSTETRICS & GYNECOLOGY

## 2022-09-12 PROCEDURE — 58150 PR TOTAL ABDOM HYSTERECTOMY: ICD-10-PCS | Mod: ,,, | Performed by: OBSTETRICS & GYNECOLOGY

## 2022-09-12 PROCEDURE — 58150 PR TOTAL ABDOM HYSTERECTOMY: ICD-10-PCS | Mod: AS,,, | Performed by: PHYSICIAN ASSISTANT

## 2022-09-12 PROCEDURE — 37000009 HC ANESTHESIA EA ADD 15 MINS: Performed by: OBSTETRICS & GYNECOLOGY

## 2022-09-12 PROCEDURE — G0378 HOSPITAL OBSERVATION PER HR: HCPCS

## 2022-09-12 PROCEDURE — 88307 PR  SURG PATH,LEVEL V: ICD-10-PCS | Mod: 26,,, | Performed by: PATHOLOGY

## 2022-09-12 PROCEDURE — 25000003 PHARM REV CODE 250: Performed by: ANESTHESIOLOGY

## 2022-09-12 PROCEDURE — 37000008 HC ANESTHESIA 1ST 15 MINUTES: Performed by: OBSTETRICS & GYNECOLOGY

## 2022-09-12 PROCEDURE — 99900035 HC TECH TIME PER 15 MIN (STAT)

## 2022-09-12 PROCEDURE — C2628 CATHETER, OCCLUSION: HCPCS | Performed by: OBSTETRICS & GYNECOLOGY

## 2022-09-12 PROCEDURE — 36415 COLL VENOUS BLD VENIPUNCTURE: CPT | Performed by: OBSTETRICS & GYNECOLOGY

## 2022-09-12 PROCEDURE — 85025 COMPLETE CBC W/AUTO DIFF WBC: CPT | Performed by: OBSTETRICS & GYNECOLOGY

## 2022-09-12 PROCEDURE — 88307 TISSUE EXAM BY PATHOLOGIST: CPT | Mod: 26,,, | Performed by: PATHOLOGY

## 2022-09-12 PROCEDURE — 58150 TOTAL HYSTERECTOMY: CPT | Mod: AS,,, | Performed by: PHYSICIAN ASSISTANT

## 2022-09-12 PROCEDURE — 25000242 PHARM REV CODE 250 ALT 637 W/ HCPCS: Performed by: NURSE ANESTHETIST, CERTIFIED REGISTERED

## 2022-09-12 PROCEDURE — 81025 URINE PREGNANCY TEST: CPT | Performed by: OBSTETRICS & GYNECOLOGY

## 2022-09-12 PROCEDURE — 88307 TISSUE EXAM BY PATHOLOGIST: CPT | Performed by: PATHOLOGY

## 2022-09-12 RX ORDER — CLINDAMYCIN PHOSPHATE 900 MG/50ML
900 INJECTION, SOLUTION INTRAVENOUS
Status: COMPLETED | OUTPATIENT
Start: 2022-09-12 | End: 2022-09-12

## 2022-09-12 RX ORDER — OXYCODONE AND ACETAMINOPHEN 5; 325 MG/1; MG/1
1 TABLET ORAL
Status: DISCONTINUED | OUTPATIENT
Start: 2022-09-12 | End: 2022-09-12 | Stop reason: HOSPADM

## 2022-09-12 RX ORDER — LEVOTHYROXINE SODIUM 125 UG/1
125 TABLET ORAL NIGHTLY
Status: DISCONTINUED | OUTPATIENT
Start: 2022-09-12 | End: 2022-09-13 | Stop reason: HOSPADM

## 2022-09-12 RX ORDER — KETOROLAC TROMETHAMINE 30 MG/ML
30 INJECTION, SOLUTION INTRAMUSCULAR; INTRAVENOUS
Status: DISCONTINUED | OUTPATIENT
Start: 2022-09-12 | End: 2022-09-13 | Stop reason: HOSPADM

## 2022-09-12 RX ORDER — PANTOPRAZOLE SODIUM 40 MG/1
40 TABLET, DELAYED RELEASE ORAL DAILY
Status: DISCONTINUED | OUTPATIENT
Start: 2022-09-12 | End: 2022-09-13 | Stop reason: HOSPADM

## 2022-09-12 RX ORDER — MIDAZOLAM HYDROCHLORIDE 1 MG/ML
INJECTION, SOLUTION INTRAMUSCULAR; INTRAVENOUS
Status: DISCONTINUED | OUTPATIENT
Start: 2022-09-12 | End: 2022-09-12

## 2022-09-12 RX ORDER — DOCUSATE SODIUM 100 MG/1
100 CAPSULE, LIQUID FILLED ORAL 2 TIMES DAILY
Status: DISCONTINUED | OUTPATIENT
Start: 2022-09-12 | End: 2022-09-13 | Stop reason: HOSPADM

## 2022-09-12 RX ORDER — DIPHENHYDRAMINE HCL 25 MG
25 CAPSULE ORAL EVERY 4 HOURS PRN
Status: DISCONTINUED | OUTPATIENT
Start: 2022-09-12 | End: 2022-09-13 | Stop reason: HOSPADM

## 2022-09-12 RX ORDER — ONDANSETRON 2 MG/ML
4 INJECTION INTRAMUSCULAR; INTRAVENOUS DAILY PRN
Status: DISCONTINUED | OUTPATIENT
Start: 2022-09-12 | End: 2022-09-12 | Stop reason: HOSPADM

## 2022-09-12 RX ORDER — CIPROFLOXACIN 2 MG/ML
400 INJECTION, SOLUTION INTRAVENOUS
Status: COMPLETED | OUTPATIENT
Start: 2022-09-12 | End: 2022-09-12

## 2022-09-12 RX ORDER — AMLODIPINE BESYLATE 10 MG/1
10 TABLET ORAL NIGHTLY
Status: DISCONTINUED | OUTPATIENT
Start: 2022-09-12 | End: 2022-09-13 | Stop reason: HOSPADM

## 2022-09-12 RX ORDER — BISACODYL 10 MG
10 SUPPOSITORY, RECTAL RECTAL DAILY PRN
Status: DISCONTINUED | OUTPATIENT
Start: 2022-09-12 | End: 2022-09-13 | Stop reason: HOSPADM

## 2022-09-12 RX ORDER — SODIUM CHLORIDE 9 MG/ML
INJECTION, SOLUTION INTRAVENOUS CONTINUOUS
Status: DISCONTINUED | OUTPATIENT
Start: 2022-09-12 | End: 2022-09-12

## 2022-09-12 RX ORDER — LIDOCAINE HYDROCHLORIDE 10 MG/ML
INJECTION, SOLUTION EPIDURAL; INFILTRATION; INTRACAUDAL; PERINEURAL
Status: DISCONTINUED | OUTPATIENT
Start: 2022-09-12 | End: 2022-09-12

## 2022-09-12 RX ORDER — PROCHLORPERAZINE EDISYLATE 5 MG/ML
5 INJECTION INTRAMUSCULAR; INTRAVENOUS EVERY 6 HOURS PRN
Status: DISCONTINUED | OUTPATIENT
Start: 2022-09-12 | End: 2022-09-13 | Stop reason: HOSPADM

## 2022-09-12 RX ORDER — MEPERIDINE HYDROCHLORIDE 25 MG/ML
12.5 INJECTION INTRAMUSCULAR; INTRAVENOUS; SUBCUTANEOUS ONCE AS NEEDED
Status: COMPLETED | OUTPATIENT
Start: 2022-09-12 | End: 2022-09-12

## 2022-09-12 RX ORDER — DEXAMETHASONE SODIUM PHOSPHATE 4 MG/ML
INJECTION, SOLUTION INTRA-ARTICULAR; INTRALESIONAL; INTRAMUSCULAR; INTRAVENOUS; SOFT TISSUE
Status: DISCONTINUED | OUTPATIENT
Start: 2022-09-12 | End: 2022-09-12

## 2022-09-12 RX ORDER — CHLORHEXIDINE GLUCONATE ORAL RINSE 1.2 MG/ML
10 SOLUTION DENTAL 2 TIMES DAILY
Status: DISCONTINUED | OUTPATIENT
Start: 2022-09-12 | End: 2022-09-13 | Stop reason: HOSPADM

## 2022-09-12 RX ORDER — OXYCODONE AND ACETAMINOPHEN 10; 325 MG/1; MG/1
1 TABLET ORAL EVERY 4 HOURS PRN
Status: DISCONTINUED | OUTPATIENT
Start: 2022-09-12 | End: 2022-09-13 | Stop reason: HOSPADM

## 2022-09-12 RX ORDER — LOSARTAN POTASSIUM 25 MG/1
25 TABLET ORAL DAILY
Status: DISCONTINUED | OUTPATIENT
Start: 2022-09-12 | End: 2022-09-13 | Stop reason: HOSPADM

## 2022-09-12 RX ORDER — FENTANYL CITRATE 50 UG/ML
INJECTION, SOLUTION INTRAMUSCULAR; INTRAVENOUS
Status: DISCONTINUED | OUTPATIENT
Start: 2022-09-12 | End: 2022-09-12

## 2022-09-12 RX ORDER — ONDANSETRON 2 MG/ML
INJECTION INTRAMUSCULAR; INTRAVENOUS
Status: DISCONTINUED | OUTPATIENT
Start: 2022-09-12 | End: 2022-09-12

## 2022-09-12 RX ORDER — IBUPROFEN 600 MG/1
600 TABLET ORAL EVERY 6 HOURS
Status: DISCONTINUED | OUTPATIENT
Start: 2022-09-13 | End: 2022-09-13 | Stop reason: HOSPADM

## 2022-09-12 RX ORDER — ALBUTEROL SULFATE 0.83 MG/ML
2.5 SOLUTION RESPIRATORY (INHALATION) EVERY 6 HOURS PRN
Status: DISCONTINUED | OUTPATIENT
Start: 2022-09-12 | End: 2022-09-13 | Stop reason: HOSPADM

## 2022-09-12 RX ORDER — ACETAMINOPHEN 10 MG/ML
1000 INJECTION, SOLUTION INTRAVENOUS ONCE
Status: COMPLETED | OUTPATIENT
Start: 2022-09-12 | End: 2022-09-12

## 2022-09-12 RX ORDER — HYDROMORPHONE HYDROCHLORIDE 2 MG/ML
1 INJECTION, SOLUTION INTRAMUSCULAR; INTRAVENOUS; SUBCUTANEOUS EVERY 6 HOURS PRN
Status: DISCONTINUED | OUTPATIENT
Start: 2022-09-12 | End: 2022-09-13 | Stop reason: HOSPADM

## 2022-09-12 RX ORDER — METOPROLOL SUCCINATE 50 MG/1
50 TABLET, EXTENDED RELEASE ORAL NIGHTLY
Status: DISCONTINUED | OUTPATIENT
Start: 2022-09-12 | End: 2022-09-13 | Stop reason: HOSPADM

## 2022-09-12 RX ORDER — PROPOFOL 10 MG/ML
VIAL (ML) INTRAVENOUS
Status: DISCONTINUED | OUTPATIENT
Start: 2022-09-12 | End: 2022-09-12

## 2022-09-12 RX ORDER — SODIUM CHLORIDE, SODIUM LACTATE, POTASSIUM CHLORIDE, CALCIUM CHLORIDE 600; 310; 30; 20 MG/100ML; MG/100ML; MG/100ML; MG/100ML
INJECTION, SOLUTION INTRAVENOUS CONTINUOUS
Status: DISCONTINUED | OUTPATIENT
Start: 2022-09-12 | End: 2022-09-13 | Stop reason: HOSPADM

## 2022-09-12 RX ORDER — CITALOPRAM 20 MG/1
20 TABLET, FILM COATED ORAL NIGHTLY
Status: DISCONTINUED | OUTPATIENT
Start: 2022-09-12 | End: 2022-09-13 | Stop reason: HOSPADM

## 2022-09-12 RX ORDER — FLUTICASONE PROPIONATE 50 MCG
2 SPRAY, SUSPENSION (ML) NASAL DAILY
Status: DISCONTINUED | OUTPATIENT
Start: 2022-09-12 | End: 2022-09-13 | Stop reason: HOSPADM

## 2022-09-12 RX ORDER — ROCURONIUM BROMIDE 10 MG/ML
INJECTION, SOLUTION INTRAVENOUS
Status: DISCONTINUED | OUTPATIENT
Start: 2022-09-12 | End: 2022-09-12

## 2022-09-12 RX ORDER — HYDROMORPHONE HYDROCHLORIDE 2 MG/ML
0.2 INJECTION, SOLUTION INTRAMUSCULAR; INTRAVENOUS; SUBCUTANEOUS EVERY 5 MIN PRN
Status: COMPLETED | OUTPATIENT
Start: 2022-09-12 | End: 2022-09-12

## 2022-09-12 RX ORDER — ALBUTEROL SULFATE 90 UG/1
2 AEROSOL, METERED RESPIRATORY (INHALATION) EVERY 6 HOURS PRN
Status: DISCONTINUED | OUTPATIENT
Start: 2022-09-12 | End: 2022-09-12 | Stop reason: CLARIF

## 2022-09-12 RX ORDER — DIPHENHYDRAMINE HYDROCHLORIDE 50 MG/ML
25 INJECTION INTRAMUSCULAR; INTRAVENOUS EVERY 4 HOURS PRN
Status: DISCONTINUED | OUTPATIENT
Start: 2022-09-12 | End: 2022-09-13 | Stop reason: HOSPADM

## 2022-09-12 RX ORDER — KETOROLAC TROMETHAMINE 30 MG/ML
15 INJECTION, SOLUTION INTRAMUSCULAR; INTRAVENOUS EVERY 8 HOURS PRN
Status: DISCONTINUED | OUTPATIENT
Start: 2022-09-12 | End: 2022-09-12 | Stop reason: HOSPADM

## 2022-09-12 RX ORDER — ONDANSETRON 8 MG/1
8 TABLET, ORALLY DISINTEGRATING ORAL EVERY 8 HOURS PRN
Status: DISCONTINUED | OUTPATIENT
Start: 2022-09-12 | End: 2022-09-13 | Stop reason: HOSPADM

## 2022-09-12 RX ORDER — OXYCODONE AND ACETAMINOPHEN 5; 325 MG/1; MG/1
1 TABLET ORAL EVERY 4 HOURS PRN
Status: DISCONTINUED | OUTPATIENT
Start: 2022-09-12 | End: 2022-09-13 | Stop reason: HOSPADM

## 2022-09-12 RX ORDER — MUPIROCIN 20 MG/G
OINTMENT TOPICAL
Status: DISCONTINUED | OUTPATIENT
Start: 2022-09-12 | End: 2022-09-12 | Stop reason: HOSPADM

## 2022-09-12 RX ORDER — ALBUTEROL SULFATE 90 UG/1
AEROSOL, METERED RESPIRATORY (INHALATION)
Status: DISCONTINUED | OUTPATIENT
Start: 2022-09-12 | End: 2022-09-12

## 2022-09-12 RX ADMIN — HYDROMORPHONE HYDROCHLORIDE 0.2 MG: 2 INJECTION INTRAMUSCULAR; INTRAVENOUS; SUBCUTANEOUS at 11:09

## 2022-09-12 RX ADMIN — CIPROFLOXACIN 400 MG: 2 INJECTION, SOLUTION INTRAVENOUS at 08:09

## 2022-09-12 RX ADMIN — PROPOFOL 100 MG: 10 INJECTION, EMULSION INTRAVENOUS at 08:09

## 2022-09-12 RX ADMIN — LEVOTHYROXINE SODIUM 125 MCG: 125 TABLET ORAL at 08:09

## 2022-09-12 RX ADMIN — ROCURONIUM BROMIDE 10 MG: 10 INJECTION, SOLUTION INTRAVENOUS at 10:09

## 2022-09-12 RX ADMIN — LIDOCAINE HYDROCHLORIDE 50 MG: 10 INJECTION, SOLUTION EPIDURAL; INFILTRATION; INTRACAUDAL; PERINEURAL at 08:09

## 2022-09-12 RX ADMIN — DOCUSATE SODIUM 100 MG: 100 CAPSULE, LIQUID FILLED ORAL at 08:09

## 2022-09-12 RX ADMIN — KETOROLAC TROMETHAMINE 15 MG: 30 INJECTION, SOLUTION INTRAMUSCULAR; INTRAVENOUS at 11:09

## 2022-09-12 RX ADMIN — KETOROLAC TROMETHAMINE 30 MG: 30 INJECTION, SOLUTION INTRAMUSCULAR; INTRAVENOUS at 08:09

## 2022-09-12 RX ADMIN — SUGAMMADEX 200 MG: 100 INJECTION, SOLUTION INTRAVENOUS at 10:09

## 2022-09-12 RX ADMIN — PROMETHAZINE HYDROCHLORIDE 12.5 MG: 25 INJECTION INTRAMUSCULAR; INTRAVENOUS at 02:09

## 2022-09-12 RX ADMIN — DEXAMETHASONE SODIUM PHOSPHATE 8 MG: 4 INJECTION, SOLUTION INTRAMUSCULAR; INTRAVENOUS at 09:09

## 2022-09-12 RX ADMIN — ALBUTEROL SULFATE 5 PUFF: 90 AEROSOL, METERED RESPIRATORY (INHALATION) at 08:09

## 2022-09-12 RX ADMIN — ROCURONIUM BROMIDE 50 MG: 10 INJECTION, SOLUTION INTRAVENOUS at 08:09

## 2022-09-12 RX ADMIN — ACETAMINOPHEN 1000 MG: 10 INJECTION INTRAVENOUS at 02:09

## 2022-09-12 RX ADMIN — CHLORHEXIDINE GLUCONATE 0.12% ORAL RINSE 10 ML: 1.2 LIQUID ORAL at 08:09

## 2022-09-12 RX ADMIN — AMLODIPINE BESYLATE 10 MG: 10 TABLET ORAL at 08:09

## 2022-09-12 RX ADMIN — FENTANYL CITRATE 100 MCG: 50 INJECTION, SOLUTION INTRAMUSCULAR; INTRAVENOUS at 09:09

## 2022-09-12 RX ADMIN — METOPROLOL SUCCINATE 50 MG: 50 TABLET, FILM COATED, EXTENDED RELEASE ORAL at 08:09

## 2022-09-12 RX ADMIN — MIDAZOLAM 2 MG: 1 INJECTION INTRAMUSCULAR; INTRAVENOUS at 07:09

## 2022-09-12 RX ADMIN — CLINDAMYCIN PHOSPHATE 900 MG: 18 INJECTION, SOLUTION INTRAVENOUS at 08:09

## 2022-09-12 RX ADMIN — CITALOPRAM HYDROBROMIDE 20 MG: 20 TABLET ORAL at 08:09

## 2022-09-12 RX ADMIN — HYDROMORPHONE HYDROCHLORIDE 0.2 MG: 2 INJECTION INTRAMUSCULAR; INTRAVENOUS; SUBCUTANEOUS at 01:09

## 2022-09-12 RX ADMIN — ROCURONIUM BROMIDE 20 MG: 10 INJECTION, SOLUTION INTRAVENOUS at 08:09

## 2022-09-12 RX ADMIN — SODIUM CHLORIDE, SODIUM LACTATE, POTASSIUM CHLORIDE, AND CALCIUM CHLORIDE: .6; .31; .03; .02 INJECTION, SOLUTION INTRAVENOUS at 07:09

## 2022-09-12 RX ADMIN — HYDROMORPHONE HYDROCHLORIDE 1 MG: 2 INJECTION INTRAMUSCULAR; INTRAVENOUS; SUBCUTANEOUS at 08:09

## 2022-09-12 RX ADMIN — SODIUM CHLORIDE, SODIUM LACTATE, POTASSIUM CHLORIDE, AND CALCIUM CHLORIDE: .6; .31; .03; .02 INJECTION, SOLUTION INTRAVENOUS at 03:09

## 2022-09-12 RX ADMIN — SODIUM CHLORIDE: 9 INJECTION, SOLUTION INTRAVENOUS at 10:09

## 2022-09-12 RX ADMIN — OXYCODONE HYDROCHLORIDE AND ACETAMINOPHEN 1 TABLET: 5; 325 TABLET ORAL at 05:09

## 2022-09-12 RX ADMIN — FENTANYL CITRATE 100 MCG: 50 INJECTION, SOLUTION INTRAMUSCULAR; INTRAVENOUS at 07:09

## 2022-09-12 RX ADMIN — MEPERIDINE HYDROCHLORIDE 12.5 MG: 25 INJECTION INTRAMUSCULAR; INTRAVENOUS; SUBCUTANEOUS at 11:09

## 2022-09-12 RX ADMIN — ONDANSETRON 4 MG: 2 INJECTION, SOLUTION INTRAMUSCULAR; INTRAVENOUS at 09:09

## 2022-09-12 RX ADMIN — VANCOMYCIN HYDROCHLORIDE 1250 MG: 1.25 INJECTION, POWDER, LYOPHILIZED, FOR SOLUTION INTRAVENOUS at 03:09

## 2022-09-12 RX ADMIN — PROPOFOL 50 MG: 10 INJECTION, EMULSION INTRAVENOUS at 10:09

## 2022-09-12 NOTE — SUBJECTIVE & OBJECTIVE
Interval History:   Pt is still in recovery awaiting transport to bed.  Reports she is doing OK.  Pains are controlled with medications.  Is not yet tolerating PO diet.  Denies nausea/vomiting.  Has not passed flatus or BM yet.  [facility] is in place.  No vaginal bleeding.      Scheduled Meds:   vancomycin (VANCOCIN) IVPB  15 mg/kg Intravenous Q12H     Continuous Infusions:   sodium chloride 0.9%       PRN Meds:ketorolac, mupirocin, ondansetron, oxyCODONE-acetaminophen    Review of patient's allergies indicates:   Allergen Reactions    Pcn [penicillins] Anaphylaxis     Throat , tongue swelling     Banana      Itchy mouth    Lisinopril Other (See Comments)     Lip swelling    Melon      Itchy mouth    Morphine Other (See Comments)     Unknown    Tree nuts      Oral itching       Objective:     Vital Signs (Most Recent):  Temp: 98.4 °F (36.9 °C) (09/12/22 1046)  Pulse: 70 (09/12/22 1300)  Resp: (!) 21 (09/12/22 1352)  BP: (!) 159/76 (09/12/22 1300)  SpO2: 99 % (09/12/22 1300)   Vital Signs (24h Range):  Temp:  [98.4 °F (36.9 °C)] 98.4 °F (36.9 °C)  Pulse:  [66-94] 70  Resp:  [11-24] 21  SpO2:  [92 %-100 %] 99 %  BP: (125-199)/(73-99) 159/76     Weight: 83.2 kg (183 lb 6.8 oz)  Body mass index is 29.61 kg/m².  Patient's last menstrual period was 09/12/2022.    I&O (Last 24H):    Intake/Output Summary (Last 24 hours) at 9/12/2022 1446  Last data filed at 9/12/2022 1016  Gross per 24 hour   Intake 1200 ml   Output 650 ml   Net 550 ml         Laboratory:  I have personallly reviewed all pertinent lab results from the last 24 hours.    Diagnostic Results:  Labs: Reviewed    Physical Exam:   Constitutional: She is oriented to person, place, and time. She appears well-developed and well-nourished. No distress.       Cardiovascular:  Normal rate, regular rhythm and normal heart sounds.             Pulmonary/Chest: Effort normal and breath sounds normal.        Abdominal: Soft. Bowel sounds are normal. She exhibits abdominal  incision (dressing in place). She exhibits no distension and no mass. There is abdominal tenderness (appropriate). There is no rebound and no guarding. No hernia.     Genitourinary: No bleeding in the vagina. Uterus is not tender.           Musculoskeletal: Normal range of motion and moves all extremeties.       Neurological: She is alert and oriented to person, place, and time.    Skin: Skin is warm and dry.    Psychiatric: She has a normal mood and affect. Her behavior is normal. Thought content normal.

## 2022-09-12 NOTE — OP NOTE
OPERATIVE NOTE    DATE:  09/12/2022    PRE-PROCEDURE COUNSELING  Patient counseled on the risks, benefits, and alternatives to procedure.  Please see preoperative consents.   SCDs were applied and working prior to anesthesia induction.                                                                                         PREOPERATIVE DIAGNOSES: Symptomatic Uterine Fibroids                                                                               POSTOPERATIVE DIAGNOSES:  same                                                                                PROCEDURE:    Diagnostic Laparoscopy  Total Abdominal Hysterectomy  Bilateral Salpingectomy                                                                                SURGEON:  Talha Santa M.D.                                                                                               ASSISTANT:  ZACH Ngo (presence necessary for procedure)                                                     FINDINGS: 20 week uterus with numerous fibroids including a large pedunculated RUQ fibroid; normal appearing tubes and ovaries; filmy pelvic adhesions and violin string liver adhesions indicating prior PID    SPECIMEN: Uterus, Cervix, Tubes     EBL:  350 mL    IMPLANTS:  None                                                                               PROCEDURE IN DETAIL:    After discussion of the risks, benefits and alternatives, the patient understood the potential risks and complications and signed consents were reviewed. Patient was given preoperative sequential compression devices and antibiotics and was taken to the Operating Room where the general endotracheal anesthesia was administered and found to be adequate.  She was prepped and draped in routine fashion. The BRIANNE manipulator was applied in routine fashion.  Attention was turned to the abdomen where the anterior abdominal wall was grasped with towel clamps and elevated.  Veress needle  was introduced through the umbilicus and abdomen was insufflated with CO2 gas to 15 mmHg.  An 8 mm incision was made LUQ and a 8 mm trocar was placed into the abdomen. Survey of the abdomen revealed the above findings.  Visualization of the pelvis and right lateral uterus was not possible due to the position and shape of the larger fibroids.  Thus, a decision was made to convert to an open procedure.  Instruments were removed and abdomen deflated.  LUQ wound was approximated with 4-0 Monocryl in a subcuticular fashion and a layer of Dermabond was placed for reinforcement.  BRIANNE was then removed from the uterus and all vaginal instruments removed as well.  Pt was repositioned for abdominal approach.      She was then re-prepped and draped in the standard fashion.  A Pfannenstiel skin incision was made with a scalpel, 2 cm above the symphysis pubis.  This incision was then carried down to the underlying layer of fascia with the Bovie.  The fascia was then incised in the midline and the fascial incision was extended laterally.  The rectus muscles were then  at the midline.  The peritoneum was readily identified, grasped, tented up, and entered sharply with the Metzenbaum scissors.  Intra-abdominal entry was confirmed by directly visualizing the abdominal contents.  Peritoneal incision was then extended using the Bovie, taking care to avoid the bladder and bowel.    An Herminio retractor was then placed without difficulty, taking care to avoid excessive traction.  Bowel was packed using moistened laparotomy sponges to maximize visualization of the pelvic organs and structures.  After proper retractor placement was confirmed, a tenaculum was placed on the uterine fundus to provide a means to manipulate the uterus.  General anatomic survey revealed the above findings.  Both ureters were readily identified and their course noted.      Both round ligaments were then identified, clamped, transected, and suture-ligated  using 0-Vicryl.  The peritoneum was then incised laterally to expose the retroperitoneum and the vesicouterine peritoneum was incised medially to create the bladder flap.  Once the bladder flap was completed, the bladder was then gently dissected off the uterus and cervix, using a combination of blunt and sharp dissection, taking care to avoid injuring the bladder and ureters.  Once the bladder had been dissected past the level of the cervix, attention was then returned to the uterus.  The ureters were again identified bilaterally and their course noted.  A fenestration was then made in the posterior leaf of the broad ligaments bilaterally and the uteroovarian ligaments isolated, clamped, cauterized and transected with the Ligasure instrument.  The uterine vessels were then skeletonized, isolated, and transected with the Ligasure.  The same technique was then used on the cardinal ligaments bilaterally until the uterosacral ligaments were reached.  The uterus was then amputated from the cervix to assist in visualizing the pelvis.  The uterosacral ligaments were similarly isolated, clamped, and transected, however, these pedicles were suture-ligated in a Narayan transfixion fashion using 0-Vicryl.  Once vaginal entry was confirmed, the Arya scissors were used to amputate the cervix from the vagina.  The uterus was then removed and sent to pathology for evaluation.  At all steps, great care was taken to avoid injuring the bladder, ureter, and other pelvic and abdomenal structures.    The vaginal cuff was then reapproximated using interrupted 0-Vicryl sutures.  Once the cuff was closed, the pelvis and abdomen was copiously irrigated with warm normal saline.  Adequate hemostasis was noted from all pedicles.  Ovaries were again visualized and appeared normal.  Both ureters were again visualized and spontaneous peristalsis was noted bilaterally.       After hemostasis was again confirmed from all pedicles, all  instruments and laparotomy sponges were removed from the abdomen.  The rectus muscles were loosely approximated at the midline using 0-Vicryl.  After sub-fascial hemostasis was confirmed, the fascia was approximated using 0-Vicryl.  The subcutaneous layer was then irrigated with normal saline and hemostasis in this layer was confirmed.  A running sub-cuticular closure was then performed using 4-0 Monocryl suture. An abdominal silver dressing followed by pressure dressing were then applied.    Lap, sharps, and instrument counts were correct x 2.       The patient tolerated the procedure well and was sent to the recovery room in stable condition.

## 2022-09-12 NOTE — PLAN OF CARE
Problem: Adult Inpatient Plan of Care  Goal: Plan of Care Review  9/12/2022 1740 by Desi Payton RN  Outcome: Ongoing, Progressing  9/12/2022 1740 by Desi Payton RN  Outcome: Ongoing, Progressing  Goal: Patient-Specific Goal (Individualized)  9/12/2022 1740 by Desi Payton RN  Outcome: Ongoing, Progressing  9/12/2022 1740 by Desi Payton RN  Outcome: Ongoing, Progressing  Goal: Absence of Hospital-Acquired Illness or Injury  Outcome: Ongoing, Progressing  Goal: Optimal Comfort and Wellbeing  9/12/2022 1740 by Desi Payton RN  Outcome: Ongoing, Progressing  9/12/2022 1740 by Desi Payton RN  Outcome: Ongoing, Progressing  Goal: Readiness for Transition of Care  9/12/2022 1740 by Desi Payton RN  Outcome: Ongoing, Progressing  9/12/2022 1740 by Desi Payton RN  Outcome: Ongoing, Progressing     Problem: Skin Injury Risk Increased  Goal: Skin Health and Integrity  Outcome: Ongoing, Progressing     Problem: Pain Acute  Goal: Acceptable Pain Control and Functional Ability  Outcome: Ongoing, Progressing   Pain control, vitals maintained, Iv fluids continued, IV antx continued, pt encouraged to TCDB as tolerated.    "Subjective:       Patient ID: Karly Sandoval is a 89 y.o. female.    Chief Complaint: No chief complaint on file.    Karly Sandoval is an 89 yo pt w/osteoporosis and RA who was here for routine f/u but presents with acute lower back pain. Pt is normally ambulatory but today pt is in a wheelchair 2/2 pain. Pt reports that this morning she developed lower back pain (R>L) w/o radiation and increased urinary frequency.  Pt says she has had one episode of involuntary leakage today but denies dysuria. Of note, pt was recently admitted to the hospital in [place] w/CHARLENE (likely pre-renal in origin) and gastroenteritis.    In terms of her RA, pt does not report any recent flares. She also denies recent fevers/chills or weakness.          Review of Systems   Respiratory: Positive for shortness of breath.    Genitourinary: Positive for frequency.   Musculoskeletal: Positive for back pain.   Hematological: Bruises/bleeds easily.   All other systems reviewed and are negative.        Objective:   BP (!) 165/63   Pulse 60   Ht 5' 1.2" (1.554 m)   Wt 58.1 kg (128 lb)   LMP  (LMP Unknown)   BMI 24.03 kg/m²      Physical Exam   Nursing note and vitals reviewed.  Constitutional: She appears distressed.   HENT:   Head: Normocephalic and atraumatic.   Cardiovascular: Normal rate and regular rhythm.    Pulmonary/Chest: Effort normal and breath sounds normal.   Abdominal: Soft. Bowel sounds are normal.       Right Side Rheumatological Exam     Examination finds the shoulder, elbow, wrist, 1st PIP, 1st MCP, 2nd PIP, 2nd MCP, 3rd PIP, 3rd MCP, 4th PIP, 4th MCP, 5th PIP and 5th MCP normal.    The patient is tender to palpation of the knee    Knee Exam   Tenderness Location: medial joint line and pes anerinus    Left Side Rheumatological Exam     Examination finds the shoulder, elbow, wrist, 1st PIP, 1st MCP, 2nd PIP, 2nd MCP, 3rd PIP, 3rd MCP, 4th PIP, 4th MCP, 5th PIP and 5th MCP normal.      Genitourinary:   Genitourinary " Comments: CVA tenderness on the R side   Neurological: She has normal reflexes. GCS score is 15.   Pt seems confused but is AAOX3. She appears uncomfortable.   Skin: Skin is warm.     Ecchymoses present on dorsum and wrist R hand   Psychiatric: Mood, affect and judgment normal.   Pt has some impaired short-term memory   Musculoskeletal:   No TTP along lumbar or thoracic paraspinals or along thoracic or lumbar spinous processes           Assessment:    RA stable  Acute lower back pain 2/2 w/ddx of UTI, bladder obstruction, osteoporotic fracture      Plan:   1. Pt advised to go to the ER to be evaluated for acute back pain and increased urinary frequency and possible incontinence  2. Pt advised to DC MTX until renal function improves (BUN 34/Cr 1.5/eGFR 31 on 10/11/18)  3. If renal function does not improve, will consider Leflunomide 20 mg as an alternative medication  4. Will repeat ESR, CRP and routine labs at next visit    Pt may return to clinic at next available appointment following resolution of current state    Liv Salazar MD  PGY-1 [employer] PM[facility]

## 2022-09-12 NOTE — ANESTHESIA POSTPROCEDURE EVALUATION
Anesthesia Post Evaluation    Patient: Shelly Kam    Procedure(s) Performed: Procedure(s) (LRB):  XI ROBOTIC HYSTERECTOMY,WITH SALPINGECTOMY (N/A)  HYSTERECTOMY, TOTAL, ABDOMINAL (N/A)  SALPINGECTOMY (Bilateral)  LAPAROSCOPY, DIAGNOSTIC (N/A)    Final Anesthesia Type: general      Patient location during evaluation: PACU  Patient participation: Yes- Able to Participate  Level of consciousness: awake  Post-procedure vital signs: reviewed and stable  Pain management: adequate  Airway patency: patent    PONV status at discharge: No PONV  Anesthetic complications: no      Cardiovascular status: hemodynamically stable  Respiratory status: unassisted  Hydration status: euvolemic  Follow-up not needed.          Vitals Value Taken Time   /80 09/12/22 1344   Temp 36.9 °C (98.4 °F) 09/12/22 1046   Pulse 66 09/12/22 1346   Resp 16 09/12/22 1346   SpO2 99 % 09/12/22 1346   Vitals shown include unvalidated device data.      No case tracking events are documented in the log.      Pain/Denise Score: Pain Rating Prior to Med Admin: 6 (9/12/2022  1:10 PM)  Denise Score: 9 (9/12/2022  1:00 PM)

## 2022-09-12 NOTE — TRANSFER OF CARE
"Anesthesia Transfer of Care Note    Patient: Shelly Kam    Procedure(s) Performed: Procedure(s) (LRB):  XI ROBOTIC HYSTERECTOMY,WITH SALPINGECTOMY (N/A)  HYSTERECTOMY, TOTAL, ABDOMINAL (N/A)  SALPINGECTOMY (Bilateral)  LAPAROSCOPY, DIAGNOSTIC (N/A)    Patient location: PACU    Anesthesia Type: general    Transport from OR: Transported from OR on room air with adequate spontaneous ventilation    Post pain: adequate analgesia    Post assessment: no apparent anesthetic complications    Post vital signs: stable    Level of consciousness: sedated    Nausea/Vomiting: no nausea/vomiting    Complications: none    Transfer of care protocol was followed      Last vitals:   Visit Vitals  BP (!) 185/73 (BP Location: Right arm, Patient Position: Sitting)   Pulse 84   Temp 36.9 °C (98.4 °F) (Temporal)   Resp 18   Ht 5' 6" (1.676 m)   Wt 83.2 kg (183 lb 6.8 oz)   LMP 09/12/2022   SpO2 100%   Breastfeeding No   BMI 29.61 kg/m²     "

## 2022-09-12 NOTE — ASSESSMENT & PLAN NOTE
CHG wipes daily while in house and Nozin.  Also will proceed with Vancomycin x 24 hrs for prophylaxis.

## 2022-09-12 NOTE — ASSESSMENT & PLAN NOTE
POD # 0 s/p Sx LSC/IVAN/Bilateral Salpingectomy  Pt is doing well.  Pt counseled on intra-operative events and findings.  Proceed with routine post-operative care.  Pt counseled on management plan and discharge goals.

## 2022-09-12 NOTE — INTERVAL H&P NOTE
Problem: Pain:  Goal: Pain level will decrease  Pain level will decrease   Outcome: Ongoing  Change in paid medication orders. Patient reports relief from dilaudid orders. Goal: Control of acute pain  Control of acute pain   Outcome: Ongoing  See above doc.      Problem: Falls - Risk of  Goal: Absence of falls  Outcome: Met This Shift The patient has been examined and the H&P has been reviewed:    I concur with the findings and no changes have occurred since H&P was written.    Surgery risks, benefits and alternative options discussed and understood by patient/family.          There are no hospital problems to display for this patient.

## 2022-09-12 NOTE — HOSPITAL COURSE
Pt was admitted for scheduled hysterectomy.  Dx LSC/IVAN/Bilateral Salpingectomy was performed without complications.

## 2022-09-12 NOTE — NURSING TRANSFER
Nursing Transfer Note      9/12/2022     Reason patient is being transferred: post op     Transfer From: PACU    Transfer via bed    Transported by RN    Medicines sent: N/A    Any special needs or follow-up needed: N/A    Chart send with patient: Yes    Notified: MOTHER @ BEDSIDE    Upon arrival to floor: patient oriented to room, call bell in reach, and bed in lowest position, vitals maintained, sx gauze intact, clean , and dry.

## 2022-09-12 NOTE — OR NURSING
"Patient not tolerating pain well; in total was given 2mg dilaudid, 15mg toradol, and 12.5mg demerol.  Sleeping/snoring between doses; at one point slept for nearly 3 hours.  Each time she woke up she would beg for help, cry and bang on the bed rails.  Refused to consider that her pain, at one point, must have improved a bit.  New orders obtained from anesthesiologist to aid in pain control, waiting for pharmacy to acknowledge new orders, therefore unable to pull the medication.  Attempted to explain to patient, patient became angry and stated/screamed that I never intended to give her any additional medication.  Stopped crying to stare at me for several moments.  Each time I attempted to explain about the pharmacy, patient would get angry and yell, cry and bang on bed.  Medication became available and was administered.  Patient sleeping peacefully as we went upstairs to her room.  Patient transferred to bed without assist (refused assist) and began crying again.  Mother in room, attempted to soothe her and several times told me "she can't handle pain".  "

## 2022-09-12 NOTE — ANESTHESIA PROCEDURE NOTES
Intubation    Date/Time: 9/12/2022 8:10 AM  Performed by: Jovana Webber CRNA  Authorized by: Jovana Webber CRNA     Intubation:     Induction:  Intravenous    Intubated:  Postinduction    Attempts:  1    Attempted By:  CRNA    Method of Intubation:  Direct    Blade:  Agnieszka 3    Laryngeal View Grade: Grade I - full view of cords      Difficult Airway Encountered?: No      Complications:  None    Airway Device:  Oral endotracheal tube    Airway Device Size:  7.5    Style/Cuff Inflation:  Cuffed (inflated to minimal occlusive pressure)    Tube secured:  21    Secured at:  The lips    Placement Verified By:  Capnometry and Revisualization with laryngoscopy    Complicating Factors:  None    Findings Post-Intubation:  BS equal bilateral and atraumatic/condition of teeth unchanged

## 2022-09-12 NOTE — HPI
48 yo G0 with history of symptomatic fibroids who is here for scheduled hysterectomy.  Pt reports no new complaints and is ready for surgery.

## 2022-09-12 NOTE — PROGRESS NOTES
[facility])  Obstetrics & Gynecology  Progress Note    Patient Name: Shelly Kam  MRN: [id number]  Admission Date: 9/12/2022  Primary Care Provider: Heraclio Farrell MD  Principal Problem: Status post total abdominal hysterectomy    Subjective:     HPI:  48 yo G0 with history of symptomatic fibroids who is here for scheduled hysterectomy.  Pt reports no new complaints and is ready for surgery.      Interval History:   Pt is still in recovery awaiting transport to bed.  Reports she is doing OK.  Pains are controlled with medications.  Is not yet tolerating PO diet.  Denies nausea/vomiting.  Has not passed flatus or BM yet.  Lunsford is in place.  No vaginal bleeding.      Scheduled Meds:   vancomycin (VANCOCIN) IVPB  15 mg/kg Intravenous Q12H     Continuous Infusions:   sodium chloride 0.9%       PRN Meds:ketorolac, mupirocin, ondansetron, oxyCODONE-acetaminophen    Review of patient's allergies indicates:   Allergen Reactions    Pcn [penicillins] Anaphylaxis     Throat , tongue swelling     Banana      Itchy mouth    Lisinopril Other (See Comments)     Lip swelling    Melon      Itchy mouth    Morphine Other (See Comments)     Unknown    Tree nuts      Oral itching       Objective:     Vital Signs (Most Recent):  Temp: 98.4 °F (36.9 °C) (09/12/22 1046)  Pulse: 70 (09/12/22 1300)  Resp: (!) 21 (09/12/22 1352)  BP: (!) 159/76 (09/12/22 1300)  SpO2: 99 % (09/12/22 1300)   Vital Signs (24h Range):  Temp:  [98.4 °F (36.9 °C)] 98.4 °F (36.9 °C)  Pulse:  [66-94] 70  Resp:  [11-24] 21  SpO2:  [92 %-100 %] 99 %  BP: (125-199)/(73-99) 159/76     Weight: 83.2 kg (183 lb 6.8 oz)  Body mass index is 29.61 kg/m².  Patient's last menstrual period was 09/12/2022.    I&O (Last 24H):    Intake/Output Summary (Last 24 hours) at 9/12/2022 1446  Last data filed at 9/12/2022 1016  Gross per 24 hour   Intake 1200 ml   Output 650 ml   Net 550 ml         Laboratory:  I have personallly reviewed all pertinent  lab results from the last 24 hours.    Diagnostic Results:  Labs: Reviewed    Physical Exam:   Constitutional: She is oriented to person, place, and time. She appears well-developed and well-nourished. No distress.       Cardiovascular:  Normal rate, regular rhythm and normal heart sounds.             Pulmonary/Chest: Effort normal and breath sounds normal.        Abdominal: Soft. Bowel sounds are normal. She exhibits abdominal incision (dressing in place). She exhibits no distension and no mass. There is abdominal tenderness (appropriate). There is no rebound and no guarding. No hernia.     Genitourinary: No bleeding in the vagina. Uterus is not tender.           Musculoskeletal: Normal range of motion and moves all extremeties.       Neurological: She is alert and oriented to person, place, and time.    Skin: Skin is warm and dry.    Psychiatric: She has a normal mood and affect. Her behavior is normal. Thought content normal.       Assessment/Plan:     * Status post total abdominal hysterectomy  POD # 0 s/p Sx LSC/IVAN/Bilateral Salpingectomy  Pt is doing well.  Pt counseled on intra-operative events and findings.  Proceed with routine post-operative care.  Pt counseled on management plan and discharge goals.      History of MRSA infection  CHG wipes daily while in house and Nozin.  Also will proceed with Vancomycin x 24 hrs for prophylaxis.        Talha Santa MD  Obstetrics & Gynecology  [facility] ([facility])

## 2022-09-13 ENCOUNTER — PATIENT OUTREACH (OUTPATIENT)
Dept: ADMINISTRATIVE | Facility: OTHER | Age: 47
End: 2022-09-13
Payer: MEDICAID

## 2022-09-13 VITALS
BODY MASS INDEX: 27.32 KG/M2 | WEIGHT: 170 LBS | OXYGEN SATURATION: 100 % | HEIGHT: 66 IN | HEART RATE: 58 BPM | DIASTOLIC BLOOD PRESSURE: 70 MMHG | TEMPERATURE: 98 F | SYSTOLIC BLOOD PRESSURE: 137 MMHG | RESPIRATION RATE: 18 BRPM

## 2022-09-13 PROCEDURE — 63600175 PHARM REV CODE 636 W HCPCS: Performed by: OBSTETRICS & GYNECOLOGY

## 2022-09-13 PROCEDURE — 94761 N-INVAS EAR/PLS OXIMETRY MLT: CPT

## 2022-09-13 PROCEDURE — 99900035 HC TECH TIME PER 15 MIN (STAT)

## 2022-09-13 PROCEDURE — 25000003 PHARM REV CODE 250: Performed by: OBSTETRICS & GYNECOLOGY

## 2022-09-13 PROCEDURE — G0378 HOSPITAL OBSERVATION PER HR: HCPCS

## 2022-09-13 RX ORDER — TRIPROLIDINE/PSEUDOEPHEDRINE 2.5MG-60MG
800 TABLET ORAL EVERY 6 HOURS PRN
Qty: 473 ML | Refills: 0 | OUTPATIENT
Start: 2022-09-13 | End: 2023-08-08

## 2022-09-13 RX ORDER — ADHESIVE BANDAGE
30 BANDAGE TOPICAL DAILY PRN
Qty: 354 ML | Refills: 0 | Status: SHIPPED | OUTPATIENT
Start: 2022-09-13 | End: 2022-10-11

## 2022-09-13 RX ORDER — LEVOTHYROXINE SODIUM 125 UG/1
125 TABLET ORAL NIGHTLY
Start: 2022-09-13 | End: 2022-10-07 | Stop reason: SDUPTHER

## 2022-09-13 RX ORDER — OXYCODONE AND ACETAMINOPHEN 5; 325 MG/1; MG/1
1 TABLET ORAL EVERY 4 HOURS PRN
Qty: 15 TABLET | Refills: 0 | Status: SHIPPED | OUTPATIENT
Start: 2022-09-13 | End: 2022-09-15

## 2022-09-13 RX ORDER — DOCUSATE SODIUM 50 MG/5ML
100 LIQUID ORAL 2 TIMES DAILY
Qty: 473 ML | Refills: 0 | Status: SHIPPED | OUTPATIENT
Start: 2022-09-13 | End: 2022-10-13

## 2022-09-13 RX ADMIN — VANCOMYCIN HYDROCHLORIDE 1250 MG: 1.25 INJECTION, POWDER, LYOPHILIZED, FOR SOLUTION INTRAVENOUS at 03:09

## 2022-09-13 RX ADMIN — CHLORHEXIDINE GLUCONATE 0.12% ORAL RINSE 10 ML: 1.2 LIQUID ORAL at 08:09

## 2022-09-13 RX ADMIN — KETOROLAC TROMETHAMINE 30 MG: 30 INJECTION, SOLUTION INTRAMUSCULAR; INTRAVENOUS at 03:09

## 2022-09-13 RX ADMIN — HYDROMORPHONE HYDROCHLORIDE 1 MG: 2 INJECTION INTRAMUSCULAR; INTRAVENOUS; SUBCUTANEOUS at 04:09

## 2022-09-13 RX ADMIN — OXYCODONE AND ACETAMINOPHEN 1 TABLET: 10; 325 TABLET ORAL at 03:09

## 2022-09-13 RX ADMIN — DOCUSATE SODIUM 100 MG: 100 CAPSULE, LIQUID FILLED ORAL at 08:09

## 2022-09-13 RX ADMIN — OXYCODONE HYDROCHLORIDE AND ACETAMINOPHEN 1 TABLET: 5; 325 TABLET ORAL at 10:09

## 2022-09-13 RX ADMIN — LOSARTAN POTASSIUM 25 MG: 25 TABLET, FILM COATED ORAL at 08:09

## 2022-09-13 RX ADMIN — SODIUM CHLORIDE, SODIUM LACTATE, POTASSIUM CHLORIDE, AND CALCIUM CHLORIDE: .6; .31; .03; .02 INJECTION, SOLUTION INTRAVENOUS at 01:09

## 2022-09-13 RX ADMIN — PANTOPRAZOLE SODIUM 40 MG: 40 TABLET, DELAYED RELEASE ORAL at 08:09

## 2022-09-13 NOTE — DISCHARGE SUMMARY
[facility]  Obstetrics & Gynecology  Discharge Summary    Patient Name: Shelly Kam  MRN: [id number]  Admission Date: 9/12/2022  Hospital Length of Stay: 1 days  Discharge Date and Time:  09/13/2022 8:40 AM  Attending Physician: Talha Santa MD   Discharging Provider: Mely Webb PA-C  Primary Care Provider: Heraclio Farrell MD    HPI:  48 yo G0 with history of symptomatic fibroids who is here for scheduled hysterectomy.  Pt reports no new complaints and is ready for surgery.      Hospital Course:  Pt was admitted for scheduled hysterectomy.  Dx LSC/IVAN/Bilateral Salpingectomy was performed without complications.        Goals of Care Treatment Preferences:  Code Status: Full Code      Procedure(s) (LRB):  XI ROBOTIC HYSTERECTOMY,WITH SALPINGECTOMY (N/A)  HYSTERECTOMY, TOTAL, ABDOMINAL (N/A)  SALPINGECTOMY (Bilateral)  LAPAROSCOPY, DIAGNOSTIC (N/A)         Significant Diagnostic Studies: Labs: All labs within the past 24 hours have been reviewed      Pending Diagnostic Studies:     Procedure Component Value Units Date/Time    Specimen to Pathology, Surgery Gynecology and Obstetrics [[id number]] Collected: 09/12/22 1018    Order Status: Sent Lab Status: In process Updated: 09/12/22 1242    Specimen: Tissue         Final Active Diagnoses:    Diagnosis Date Noted POA    PRINCIPAL PROBLEM:  Status post total abdominal hysterectomy [Z90.710] 09/12/2022 No    Postoperative hypothyroidism [E89.0] 06/15/2021 Yes     Chronic    History of MRSA infection [Z86.14] 03/19/2021 Yes    Sleep apnea [G47.30] 03/19/2021 Yes    Essential hypertension [I10] 02/02/2021 Yes    Tobacco use disorder [F17.200] 02/02/2021 Yes    Mild intermittent asthma without complication [J45.20] 02/06/2017 Yes     Chronic    Type 2 diabetes mellitus without complication, without long-term current use of insulin [E11.9] 03/31/2016 Yes     Chronic    Anxiety [F41.9] 02/15/2015 Yes     Chronic    Hypertension associated  with diabetes [E11.59, I15.2] 02/09/2015 Yes     Chronic      Problems Resolved During this Admission:        Discharged Condition: good    Disposition: Home or Self Care    Follow Up:   Follow-up Information     [facility] Follow up in 1 week(s).    Why: Dressing removal           Talha Santa MD Follow up in 4 week(s).    Specialty: Obstetrics and Gynecology  Why: Post-operative visit  Contact information:  [address]  [place] [id number] [phone]                       Patient Instructions:      Diet Adult Regular     Lifting restrictions   Order Comments: Limit lifting to 15 lb     No driving until:   Order Comments: No driving until off of all opiate pain meds and able to brake quickly without pain.     Notify your health care provider if you experience any of the following:  temperature >100.4     Notify your health care provider if you experience any of the following:  persistent nausea and vomiting or diarrhea     Notify your health care provider if you experience any of the following:  severe uncontrolled pain     Notify your health care provider if you experience any of the following:  redness, tenderness, or signs of infection (pain, swelling, redness, odor or green/yellow discharge around incision site)     Notify your health care provider if you experience any of the following:  difficulty breathing or increased cough     Notify your health care provider if you experience any of the following:  severe persistent headache     Notify your health care provider if you experience any of the following:  persistent dizziness, light-headedness, or visual disturbances     Notify your health care provider if you experience any of the following:   Order Comments: Vaginal bleeding     Medications:  Reconciled Home Medications:      Medication List      START taking these medications    docusate 50 mg/5 mL liquid  Commonly known as: COLACE  Take 10 mLs (100 mg total) by mouth 2 (two) times  daily.     ibuprofen 100 mg/5 mL suspension  Commonly known as: ADVIL,MOTRIN  Take 40 mLs (800 mg total) by mouth every 6 (six) hours as needed for Pain.     magnesium hydroxide 400 mg/5 ml 400 mg/5 mL Susp  Commonly known as: MILK OF MAGNESIA  Take 30 mLs (2,400 mg total) by mouth daily as needed (constipation).     oxyCODONE-acetaminophen 5-325 mg per tablet  Commonly known as: PERCOCET  Take 1 tablet by mouth every 4 (four) hours as needed for Pain.        CHANGE how you take these medications    amLODIPine 10 MG tablet  Commonly known as: NORVASC  Take 1 tablet (10 mg total) by mouth once daily.  What changed: when to take this     citalopram 20 MG tablet  Commonly known as: CeleXA  Take 1 tablet (20 mg total) by mouth once daily.  What changed: when to take this     metoprolol succinate 50 MG 24 hr tablet  Commonly known as: TOPROL-XL  Take 1 tablet (50 mg total) by mouth once daily. QD  What changed: when to take this        CONTINUE taking these medications    albuterol 90 mcg/actuation inhaler  Commonly known as: PROVENTIL/VENTOLIN HFA  Inhale 2 puffs into the lungs every 6 (six) hours as needed for Wheezing or Shortness of Breath.     aspirin 81 MG EC tablet  Commonly known as: ECOTRIN  Take 1 tablet (81 mg total) by mouth once daily.     EPINEPHrine 0.3 mg/0.3 mL Atin  Commonly known as: EPIPEN  INJECT CONTENTS OF 1 PEN AS NEEDED FOR ALLERGIC REACTION     fluticasone propionate 50 mcg/actuation nasal spray  Commonly known as: FLONASE  2 sprays (100 mcg total) by Each Nostril route once daily.     levothyroxine 125 MCG tablet  Commonly known as: SYNTHROID  Take 1 tablet (125 mcg total) by mouth nightly.     losartan 25 MG tablet  Commonly known as: COZAAR  Take 1 tablet (25 mg total) by mouth once daily.     metFORMIN 1000 MG tablet  Commonly known as: GLUCOPHAGE  Take 1 tablet (1,000 mg total) by mouth 2 (two) times daily with meals.     pantoprazole 20 MG tablet  Commonly known as: PROTONIX  Take 1 tablet  (20 mg total) by mouth once daily.     pulse oximeter device  Commonly known as: pulse oximeter  Use twice daily at 8 AM and 3 PM and record the value in MyChart as directed.     simvastatin 40 MG tablet  Commonly known as: ZOCOR  Take 1 tablet (40 mg total) by mouth every evening.        STOP taking these medications    ipratropium 21 mcg (0.03 %) nasal spray  Commonly known as: ATROVENT     medroxyPROGESTERone 10 MG tablet  Commonly known as: PROVERA        ASK your doctor about these medications    ondansetron 4 MG tablet  Commonly known as: ZOFRAN  Take 1 tablet (4 mg total) by mouth every 6 (six) hours as needed for Nausea.            Mely Webb PA-C  Obstetrics & Gynecology  [facility]

## 2022-09-13 NOTE — PLAN OF CARE
[facility]  Initial Discharge Assessment       Primary Care Provider: Heraclio Farrell MD    Admission Diagnosis: Uterine leiomyoma, unspecified location [D25.9]  Status post total abdominal hysterectomy [Z90.710]    Admission Date: 9/12/2022  Expected Discharge Date: 9/13/2022    Discharge Barriers Identified: None    Payor: MEDICAID / Plan: [place] [place]UE [place] [place] / Product Type: Managed Medicaid /     Extended Emergency Contact Information  Primary Emergency Contact: AnneTony vickersnadia   [facility]  Mobile Phone: [phone]  Relation: Mother    Discharge Plan A: Home with family  Discharge Plan B: Home with family      [id number] - [place] - [address]  [address] [address] [address]  Phone: [phone] Fax: [phone]    [id number] [place] [place] - [address]  [address]  [place] [address]  Phone: [phone] Fax: [phone]      Initial Assessment (most recent)       Adult Discharge Assessment - 09/13/22 0921          Discharge Assessment    Assessment Type Discharge Planning Assessment     Confirmed/corrected address, phone number and insurance Yes     Confirmed Demographics Correct on Facesheet     Source of Information patient     Reason For Admission Planned surgery     Lives With parent(s)     Facility Arrived From: Home     Do you expect to return to your current living situation? Yes     Do you have help at home or someone to help you manage your care at home? Yes     Who are your caregiver(s) and their phone number(s)? Olu Gr, mother [phone]     Prior to hospitilization cognitive status: Alert/Oriented     Current cognitive status: Alert/Oriented     Walking or Climbing Stairs Difficulty none     Dressing/Bathing Difficulty none     Equipment Currently Used at Home none     Readmission within 30 days? No     Patient currently being followed by outpatient case management? No      Do you currently have service(s) that help you manage your care at home? No     Do you take prescription medications? Yes     Do you have prescription coverage? Yes     Coverage Healthy Blue     Do you have any problems affording any of your prescribed medications? No     Is the patient taking medications as prescribed? yes     Who is going to help you get home at discharge? Mother     How do you get to doctors appointments? car, drives self;family or friend will provide     Are you on dialysis? No     Do you take coumadin? No     Discharge Plan A Home with family     Discharge Plan B Home with family     DME Needed Upon Discharge  none     Discharge Plan discussed with: Patient     Discharge Barriers Identified None        Relationship/Environment    Name(s) of Who Lives With Patient Olu Gr                   CM met with patient at the bedside to assess for discharge needs.  Patient is independent and will have the help of her mother following this discharge.  Patient has no anticipated discharge needs at this time.   CM provided a transitional care folder, information on advanced directives, information on pharmacy bedside delivery, and discharge planning begins on admission with contact information for any needs/questions.

## 2022-09-13 NOTE — ASSESSMENT & PLAN NOTE
POD #1 s/p Sx LSC/IVAN/Bilateral Salpingectomy  Pt is doing well.  Afebrile overnight.  Proceed with routine postoperative care, encouraged ambulation, aware ok to shower.  Pt counseled on management plan and discharge goals. Anticipate discharge today pending void trial.

## 2022-09-13 NOTE — PROGRESS NOTES
CHW - Follow Up    This Community Health Worker completed a follow up visit with patient via telephone today.  Pt reported: She was admitted in the hospital and her lights was turned off.  Community Health Worker will do a follow up with this pt to help her with the energy bill.  Follow up required: Yes  Follow-up Outreach - Due: 9/15/2022

## 2022-09-13 NOTE — SUBJECTIVE & OBJECTIVE
Interval History: Patient reports she is doing well. She is ambulating well. Lunsford out not yet void. No nausea or vomiting, + flatus as yet. She is eating and tolerating diet. No fever overnight. Pain is well controlled. No vaginal bleeding.      Scheduled Meds:   amLODIPine  10 mg Oral Nightly    chlorhexidine  10 mL Mouth/Throat BID    citalopram  20 mg Oral Nightly    docusate sodium  100 mg Oral BID    fluticasone propionate  2 spray Each Nostril Daily    ibuprofen  600 mg Oral Q6H    ketorolac  30 mg Intravenous Q8H    levothyroxine  125 mcg Oral Nightly    losartan  25 mg Oral Daily    metoprolol succinate  50 mg Oral Nightly    pantoprazole  40 mg Oral Daily     Continuous Infusions:   lactated ringers 125 mL/hr at 09/13/22 0124     PRN Meds:albuterol sulfate, bisacodyL, diphenhydrAMINE, diphenhydrAMINE, HYDROmorphone, ondansetron, oxyCODONE-acetaminophen, oxyCODONE-acetaminophen, prochlorperazine    Review of patient's allergies indicates:   Allergen Reactions    Pcn [penicillins] Anaphylaxis     Throat , tongue swelling     Banana      Itchy mouth    Lisinopril Other (See Comments)     Lip swelling    Melon      Itchy mouth    Morphine Other (See Comments)     Unknown    Tree nuts      Oral itching       Objective:     Vital Signs (Most Recent):  Temp: 98.7 °F (37.1 °C) (09/13/22 0419)  Pulse: (!) 53 (09/13/22 0419)  Resp: 14 (09/13/22 0436)  BP: 123/69 (09/13/22 0419)  SpO2: 97 % (09/13/22 0630)   Vital Signs (24h Range):  Temp:  [97.8 °F (36.6 °C)-98.7 °F (37.1 °C)] 98.7 °F (37.1 °C)  Pulse:  [53-94] 53  Resp:  [11-24] 14  SpO2:  [92 %-100 %] 97 %  BP: (117-199)/(59-99) 123/69     Weight: 77.1 kg (169 lb 15.6 oz)  Body mass index is 27.43 kg/m².  Patient's last menstrual period was 09/12/2022.    I&O (Last 24H):    Intake/Output Summary (Last 24 hours) at 9/13/2022 0712  Last data filed at 9/13/2022 0500  Gross per 24 hour   Intake 1200 ml   Output 3350 ml   Net -2150 ml         Laboratory:  I have  personallly reviewed all pertinent lab results from the last 24 hours.    Diagnostic Results:  Labs: Reviewed      Physical Exam:   Constitutional: She is oriented to person, place, and time. She appears well-developed and well-nourished. No distress.       Cardiovascular:  Normal rate, regular rhythm, normal heart sounds and intact distal pulses.            No murmur heard.   Pulmonary/Chest: Effort normal and breath sounds normal. No respiratory distress. She has no wheezes. She has no rales.        Abdominal: Soft. Bowel sounds are normal. She exhibits abdominal incision (Aquacel dressing in place, intact, drainage <50%). She exhibits no distension. There is no abdominal tenderness. There is no guarding.             Musculoskeletal: Moves all extremeties. No edema.      Comments: No calf tenderness       Neurological: She is alert and oriented to person, place, and time.    Skin: Skin is warm and dry. No rash noted. She is not diaphoretic.

## 2022-09-13 NOTE — PROGRESS NOTES
[facility]  Obstetrics & Gynecology  Progress Note    Patient Name: Shelly Kam  MRN: [id number]  Admission Date: 9/12/2022  Primary Care Provider: Heraclio Farrell MD  Principal Problem: Status post total abdominal hysterectomy    Subjective:     HPI:  46 yo G0 with history of symptomatic fibroids who is here for scheduled hysterectomy.  Pt reports no new complaints and is ready for surgery.      Interval History: Patient reports she is doing well. She is ambulating well. Lunsford out not yet void. No nausea or vomiting, + flatus as yet. She is eating and tolerating diet. No fever overnight. Pain is well controlled. No vaginal bleeding.      Scheduled Meds:   amLODIPine  10 mg Oral Nightly    chlorhexidine  10 mL Mouth/Throat BID    citalopram  20 mg Oral Nightly    docusate sodium  100 mg Oral BID    fluticasone propionate  2 spray Each Nostril Daily    ibuprofen  600 mg Oral Q6H    ketorolac  30 mg Intravenous Q8H    levothyroxine  125 mcg Oral Nightly    losartan  25 mg Oral Daily    metoprolol succinate  50 mg Oral Nightly    pantoprazole  40 mg Oral Daily     Continuous Infusions:   lactated ringers 125 mL/hr at 09/13/22 0124     PRN Meds:albuterol sulfate, bisacodyL, diphenhydrAMINE, diphenhydrAMINE, HYDROmorphone, ondansetron, oxyCODONE-acetaminophen, oxyCODONE-acetaminophen, prochlorperazine    Review of patient's allergies indicates:   Allergen Reactions    Pcn [penicillins] Anaphylaxis     Throat , tongue swelling     Banana      Itchy mouth    Lisinopril Other (See Comments)     Lip swelling    Melon      Itchy mouth    Morphine Other (See Comments)     Unknown    Tree nuts      Oral itching       Objective:     Vital Signs (Most Recent):  Temp: 98.7 °F (37.1 °C) (09/13/22 0419)  Pulse: (!) 53 (09/13/22 0419)  Resp: 14 (09/13/22 0436)  BP: 123/69 (09/13/22 0419)  SpO2: 97 % (09/13/22 0630)   Vital Signs (24h Range):  Temp:  [97.8 °F (36.6 °C)-98.7 °F (37.1 °C)] 98.7 °F  (37.1 °C)  Pulse:  [53-94] 53  Resp:  [11-24] 14  SpO2:  [92 %-100 %] 97 %  BP: (117-199)/(59-99) 123/69     Weight: 77.1 kg (169 lb 15.6 oz)  Body mass index is 27.43 kg/m².  Patient's last menstrual period was 09/12/2022.    I&O (Last 24H):    Intake/Output Summary (Last 24 hours) at 9/13/2022 0712  Last data filed at 9/13/2022 0500  Gross per 24 hour   Intake 1200 ml   Output 3350 ml   Net -2150 ml         Laboratory:  I have personallly reviewed all pertinent lab results from the last 24 hours.    Diagnostic Results:  Labs: Reviewed      Physical Exam:   Constitutional: She is oriented to person, place, and time. She appears well-developed and well-nourished. No distress.       Cardiovascular:  Normal rate, regular rhythm, normal heart sounds and intact distal pulses.            No murmur heard.   Pulmonary/Chest: Effort normal and breath sounds normal. No respiratory distress. She has no wheezes. She has no rales.        Abdominal: Soft. Bowel sounds are normal. She exhibits abdominal incision (Aquacel dressing in place, intact, drainage <50%). She exhibits no distension. There is no abdominal tenderness. There is no guarding.             Musculoskeletal: Moves all extremeties. No edema.      Comments: No calf tenderness       Neurological: She is alert and oriented to person, place, and time.    Skin: Skin is warm and dry. No rash noted. She is not diaphoretic.        Assessment/Plan:     * Status post total abdominal hysterectomy  POD #1 s/p Sx LSC/VIAN/Bilateral Salpingectomy  Pt is doing well.  Afebrile overnight.  Proceed with routine postoperative care, encouraged ambulation, aware ok to shower.  Pt counseled on management plan and discharge goals. Anticipate discharge today pending void trial.              History of MRSA infection  CHG wipes daily while in house and Nozin.  Also will proceed with Vancomycin x 24 hrs for prophylaxis.        Mely Webb PA-C  Obstetrics & Gynecology  [facility]  Surg

## 2022-09-13 NOTE — PLAN OF CARE
[facility]  Discharge Final Note    Primary Care Provider: Heraclio Farrell MD    Expected Discharge Date: 9/13/2022    Final Discharge Note (most recent)       Final Note - 09/13/22 0925          Final Note    Assessment Type Final Discharge Note     Anticipated Discharge Disposition Home or Self Care     Hospital Resources/Appts/Education Provided Appointments scheduled and added to AVS                     Important Message from Medicare             Contact Info       [facility]        Next Steps: Follow up in 1 week(s)    Instructions: Dressing removal    Talha Santa MD   Specialty: Obstetrics and Gynecology    [address]  [place] [address]   Phone: [phone]       Next Steps: Follow up in 4 week(s)    Instructions: Post-operative visit          Sep20 Post OP  Tuesday Sep 20, 2022 1:00 PM

## 2022-09-13 NOTE — NURSING
AVS/discharge instructions reviewed and printed for pt. Pt and pt's mother verbalizes understanding of follow up and wound. Pt's understands medications and medications delivered to room. Pt's vitals wnl, pain controlled with oral pain meds. Pt refuses to wait for transport and walks out.

## 2022-09-14 ENCOUNTER — PATIENT OUTREACH (OUTPATIENT)
Dept: ADMINISTRATIVE | Facility: CLINIC | Age: 47
End: 2022-09-14
Payer: MEDICAID

## 2022-09-15 ENCOUNTER — PATIENT MESSAGE (OUTPATIENT)
Dept: PRIMARY CARE CLINIC | Facility: CLINIC | Age: 47
End: 2022-09-15
Payer: MEDICAID

## 2022-09-15 ENCOUNTER — TELEPHONE (OUTPATIENT)
Dept: PHARMACY | Facility: CLINIC | Age: 47
End: 2022-09-15
Payer: MEDICAID

## 2022-09-15 ENCOUNTER — TELEPHONE (OUTPATIENT)
Dept: INTERNAL MEDICINE | Facility: CLINIC | Age: 47
End: 2022-09-15
Payer: MEDICAID

## 2022-09-15 ENCOUNTER — TELEPHONE (OUTPATIENT)
Dept: OBSTETRICS AND GYNECOLOGY | Facility: CLINIC | Age: 47
End: 2022-09-15
Payer: MEDICAID

## 2022-09-15 DIAGNOSIS — Z90.710 STATUS POST TOTAL ABDOMINAL HYSTERECTOMY: Primary | ICD-10-CM

## 2022-09-15 DIAGNOSIS — Z90.710 STATUS POST TOTAL ABDOMINAL HYSTERECTOMY: ICD-10-CM

## 2022-09-15 LAB
FINAL PATHOLOGIC DIAGNOSIS: NORMAL
GROSS: NORMAL
Lab: NORMAL

## 2022-09-15 RX ORDER — OXYCODONE HCL 5 MG/5 ML
10 SOLUTION, ORAL ORAL EVERY 4 HOURS PRN
Qty: 140 ML | Refills: 0 | Status: SHIPPED | OUTPATIENT
Start: 2022-09-15 | End: 2022-10-11

## 2022-09-15 RX ORDER — ACETAMINOPHEN 160 MG/5ML
500 LIQUID ORAL EVERY 4 HOURS PRN
Qty: 473 ML | Refills: 0 | Status: SHIPPED | OUTPATIENT
Start: 2022-09-15 | End: 2022-10-11

## 2022-09-15 RX ORDER — OXYCODONE HYDROCHLORIDE AND ACETAMINOPHEN 325; 5 MG/5ML; MG/5ML
10 SOLUTION ORAL EVERY 4 HOURS PRN
Qty: 140 ML | Refills: 0 | Status: SHIPPED | OUTPATIENT
Start: 2022-09-15 | End: 2022-09-15 | Stop reason: SDUPTHER

## 2022-09-15 RX ORDER — OXYCODONE HYDROCHLORIDE AND ACETAMINOPHEN 325; 5 MG/5ML; MG/5ML
10 SOLUTION ORAL EVERY 4 HOURS PRN
Qty: 140 ML | Refills: 0 | Status: SHIPPED | OUTPATIENT
Start: 2022-09-15 | End: 2022-09-15

## 2022-09-15 NOTE — TELEPHONE ENCOUNTER
Spoke to patient after she called [facility] with c/o hospital stay after surgery. Patient is requesting a little more pain medication as her pain is 10/10 and she has been unable to get any rest. She is requesting that liquid pain medication be sent to the [facility] pharmacy on [address] since she is not able to swallow pills well. Please advise.

## 2022-09-15 NOTE — TELEPHONE ENCOUNTER
----- Message from Jorge A Stokes sent at 9/15/2022  9:46 AM CDT -----  Contact: nadia Beckford had surgery on 09/12 and is in a lot of pain. Please call her back at [phone] to advise what she need to do.          Thanks  DD

## 2022-09-15 NOTE — TELEPHONE ENCOUNTER
Patient called regarding her treatment at [facility]. Patient was advised to call patient advocacy to put in complaint , patient stated she already put in concern. Patient was informed that provider was in clinic and I will inform provider. Patient voiced understanding.

## 2022-09-15 NOTE — TELEPHONE ENCOUNTER
"MA returned pt phone call regarding she recently had surgery and she was in pain; MA informed pt she would have to go to the ER pt stated "she doesn't know why she called PCP office because we can't help her and she was calling for a certain nurse who was no longer here; MA apologized pt said "thank you" and ended phone call /LD   "

## 2022-09-15 NOTE — TELEPHONE ENCOUNTER
Spoke with patient at length regarding her post op concerns, having an increased amount of pain. Reports was receiving dilaudid in the hospital and the percocet is not helping. Advised will send in liquid roxicet start with 10 mg prn and then wean to 5mg, continue ibuprofen. Also increase bowel regimen, she plans to do mag citrate. Can try suppository as well. She expressed understanding and has no further questions at this time.

## 2022-09-16 ENCOUNTER — PATIENT OUTREACH (OUTPATIENT)
Dept: ADMINISTRATIVE | Facility: OTHER | Age: 47
End: 2022-09-16
Payer: MEDICAID

## 2022-09-16 NOTE — PROGRESS NOTES
CHW - Outreach Attempt    Community Health Worker could not leave a voicemail message for 1st attempt to contact patient regarding follow-up visit.  Community Health Worker to attempt to contact  on 9/16/2022.

## 2022-09-19 ENCOUNTER — HOSPITAL ENCOUNTER (OUTPATIENT)
Dept: RADIOLOGY | Facility: HOSPITAL | Age: 47
Discharge: HOME | End: 2022-09-19
Attending: OTOLARYNGOLOGY
Payer: MEDICAID

## 2022-09-19 ENCOUNTER — CLINICAL SUPPORT (OUTPATIENT)
Dept: REHABILITATION | Facility: HOSPITAL | Age: 47
End: 2022-09-19
Attending: OTOLARYNGOLOGY
Payer: MEDICAID

## 2022-09-19 ENCOUNTER — TELEPHONE (OUTPATIENT)
Dept: OTOLARYNGOLOGY | Facility: CLINIC | Age: 47
End: 2022-09-19
Payer: MEDICAID

## 2022-09-19 DIAGNOSIS — R13.10 DYSPHAGIA, UNSPECIFIED TYPE: ICD-10-CM

## 2022-09-19 DIAGNOSIS — R13.10 DYSPHAGIA, UNSPECIFIED TYPE: Primary | ICD-10-CM

## 2022-09-19 PROCEDURE — 74230 X-RAY XM SWLNG FUNCJ C+: CPT | Mod: TC

## 2022-09-19 PROCEDURE — 74230 X-RAY XM SWLNG FUNCJ C+: CPT | Mod: 26,,, | Performed by: RADIOLOGY

## 2022-09-19 PROCEDURE — 25500020 PHARM REV CODE 255: Performed by: OTOLARYNGOLOGY

## 2022-09-19 PROCEDURE — 92610 EVALUATE SWALLOWING FUNCTION: CPT | Performed by: SPEECH-LANGUAGE PATHOLOGIST

## 2022-09-19 PROCEDURE — A9698 NON-RAD CONTRAST MATERIALNOC: HCPCS | Performed by: OTOLARYNGOLOGY

## 2022-09-19 PROCEDURE — 92611 MOTION FLUOROSCOPY/SWALLOW: CPT | Performed by: SPEECH-LANGUAGE PATHOLOGIST

## 2022-09-19 PROCEDURE — 74230 FL MODIFIED BARIUM SWALLOW SPEECH STUDY: ICD-10-PCS | Mod: 26,,, | Performed by: RADIOLOGY

## 2022-09-19 RX ADMIN — BARIUM SULFATE 68 ML: 0.81 POWDER, FOR SUSPENSION ORAL at 02:09

## 2022-09-19 NOTE — TELEPHONE ENCOUNTER
----- Message from Amy Jiang CCC-SLP sent at 9/19/2022  3:55 PM CDT -----  Regarding: MBSS Follow Up  Dr. Cleary,     An MBSS was completed on this patient today. Her swallow is overall safe but inefficient and she will benefit from therapy to strengthen swallow musculature. Can you please put in an order for speech therapy?     Thanks!  Amy

## 2022-09-19 NOTE — PROGRESS NOTES
See Modified Barium Swallow Study (MBSS) in plan of care.     Amy Jiang MA, CCC-SLP  Speech Language Pathologist  9/19/2022

## 2022-09-19 NOTE — PLAN OF CARE
"[facility]   Outpatient MODIFIED BARIUM SWALLOW STUDY    Date: 9/19/2022     Name: Shelly Kam   MRN: [id number]    Therapy Diagnosis: mild-moderate pharyngeal dysphagia    Physician: Rinku Cleary MD  Physician Orders: Fl Modified Barium Swallow Speech/SLP Video Swallow  Medical Diagnosis from Referral: Dysphagia, unspecified type [R13.10]    Date of Evaluation:  9/19/2022    Procedure Min.   Swallow and Oral Function Evaluation   15   Fl Modified Barium Swallow Speech  15     Time in: 2:00 PM  Time out: 2:30 PM  Total Billable Time: 30 minutes    Radiation time: 2.9 minutes    Precautions: Standard  Subjective   History of Current Condition: Shelly Kam is a 47 y.o. female here today for Modified Barium Swallow Study (MBSS). Patient's medical history is significant for, per patient, anaphylaxis with tonsillitis in 2016 with difficult airway and inability to be intubated resulting in tracheostomy. She was decannulated ~1 year later. About 10 months ago, she underwent total thyroidectomy for Graves disease with Dr. Alon Barton. Since then, she reports constant difficulty swallow solids and pills. She describes food/pills will "get stuck" in her throat after the swallow. She reports ~30 lb weight loss and significant fatigue after meals since onset.       History was provided by patient, family, and/or taken from chart review:   -Current diet at home: regular diet with thin liquids   -Recommended diet from previous study: NA  -Therapy received: NA  -Neurological: Pt denied any neurological diagnoses.  -Gastroenterologist (GI): Pt denied any GI diagnoses.    -Pulmonary: Pt denied any pulmonary diagnoses.   -Surgery: total throidectomy, tracheostomy (2016), total hysterectomy 9/12  -Cancer: NA    The following observations were made:   -Mental status: Alert and Cooperative  -Factors affecting performance: no difficulties participating in the study  -Feeding Method: independent in " self-feeding    Respiratory Status:   -Respiratory Status: room air    Past Medical History: Shelly Kam  has a past medical history of Allergy, Anemia, Asthma, Depression, Diabetes mellitus, Diabetes mellitus, type 2, Encounter for blood transfusion, Fibromyalgia, GERD (gastroesophageal reflux disease), Hyperlipidemia, Hypertension, Hyperthyroidism, and Panic attack.  Shelly Kam  has a past surgical history that includes Tonsillectomy; Bronchoscopy; Tracheostomy tube placement; Hernia repair; Tracheostomy; Thyroidectomy (Bilateral, 3/25/2021); xi robotic hysterectomy, with salpingectomy (N/A, 9/12/2022); Total abdominal hysterectomy (N/A, 9/12/2022); Salpingectomy (Bilateral, 9/12/2022); and Diagnostic laparoscopy (N/A, 9/12/2022).    Medical Hx and Allergies:    Review of patient's allergies indicates:   Allergen Reactions    Pcn [penicillins] Anaphylaxis     Throat , tongue swelling     Banana      Itchy mouth    Lisinopril Other (See Comments)     Lip swelling    Melon      Itchy mouth    Morphine Other (See Comments)     Unknown    Tree nuts      Oral itching       Relevant Imaging:  NA    Objective     Cranial Nerve Examination  Cranial Nerve 5: Trigeminal Nerve  Motor Jaw Posture at rest: Closed  Mandible Elevation/Depression: WFL  Mandible lateralization: WFL  Abnormal movement: absent Interpretation: Intact bilaterally    Sensory Forehead: WFL  Cheek: WFL  Jaw: WFL  Facial Pain: None noted Interpretation: Intact bilaterally      Cranial Nerve 7: Facial Nerve  Motor Facial Symmetry: WNL  Wrinkle Forehead: WFL  Close eyes tightly: WFL  Labial Protrusion: WFL  Labial Retraction: WFL  Buccal Strength with Labial Seal: WFL  Abnormal movement: absent Interpretation: Intact bilaterally    Sensory Formal testing not completed.       Cranial Nerves IX and X: Glossopharyngeal and Vagus Nerves  Motor Palatal Symmetry (Rest): WNL  Palatal Symmetry (Movement): WNL  Cough: Perceptually  "strong  Voice Prior to PO intake: Clear  Resonance: Normal  Abnormal movement: absent Interpretation: Intact bilaterally      Cranial Nerve XII: Hypoglossal Nerve  Motor Tongue at rest: WNL  Lingual Protrusion: WNL  Lingual Protrusion against Resistance: WNL  Lingual Lateralization: WNL  Abnormal movement: absent Interpretation: Intact bilaterally      Other information:  Volitional Swallow: Able to palpate laryngeal rise  Mucosal Quality: No abnormal findings  Secretion Management: no overt deficits noted/observed  Dentition: Good condition for speech and mastication        Modified Barium Swallow Study  Purpose: to evaluate anatomy and physiology of the oropharyngeal swallow, to determine effectiveness of rehabilitation strategies, and to determine diet consistency and intervention recommendations. The study was performed using the "Gold Standard" of 30 fps with as low as reasonably achievable (ALARA) exposure.     Consistency  Presentation  Findings Strategy Attempted Rosenbeck's Penetration/Aspiration Scale (PAS)   Thin (IDDSI 0) Self-fed; cup sip; lateral view; AP view  Oral phase: WFL    Pharyngeal phase: moderate valleculae/BOT residue, trace pyriform sinus, scattered PPW residue reduced with spontaneous sequential swallows (3-4)    Esophageal screen: WFL  Best: (1) Material does not enter the airway    Worst: (1) Material does not enter the airway     Nectar thick (mildly thick/IDDSI 2) Self-fed; cup sip; lateral view Oral phase: WFL    Pharyngeal phase: minimal-moderate valleculae/base of tongue residue reduced with spontaneous       Best: (1) Material does not enter the airway    Worst: (1) Material does not enter the airway     Puree (extremely thick/ IDDSI 4) Self-fed; spoon; lateral view; AP view  Oral phase: WFL    Pharyngeal phase: minimal-moderate base of tongue and valleculae residue reduced with spontaneous sequential swallows (2-3)    Esophageal screen: WFL    Best: (1) Material does not enter the " airway    Worst: (1) Material does not enter the airway     Solid (regular/ IDDSI 7) Self-fed; spoon; lateral view Oral phase: WFL    Pharyngeal phase: trace-minimal base of tongue and valleculae residue   Best: (1) Material does not enter the airway    Worst: (1) Material does not enter the airway     Mixed consistency (thin/ IDDSI 0 + soft and bite sized/ IDDSI 6) Self-fed; spoon; lateral view  Oral phase: WFL    Pharyngeal phase: moderate valleculae residue reduced with spontaneous/cued   Best: (1) Material does not enter the airway    Worst: (1) Material does not enter the airway     Barium tablet  Self-fed with water bolus; lateral view  Brief hang up at valleculae then timely pharyngeal clearance         Treatment   Treatment Time In: 2:25 PM  Treatment Time Out: 2:30 PM  Total Treatment Time: 5 minutes  Patient educated regarding results and recommendations of the evaluation. See the recommendations section below.    Education: Plan of Care and role of SLP in care and anatomy and physiology of swallow mechanism as it relates to MBSS findings and recommendations were discussed with the patient. Patient expressed understanding. All questions were answered.     Assessment     Shelly Kam is a 47 y.o. female referred for Modified Barium Swallow Study with a medical diagnosis of Dysphagia, unspecified type [R13.10]. The patient presents with mild-moderate pharyngeal dysphagia as determined by the Dysphagia Outcome and Severity Scale (ONI). Level 4: Mild-Moderate Dysphagia.    Modified Barium Swallow Study (MBSS) revealed oral phase characterized by lingual and labial strength and range of motion within functional limits for tongue control, bolus preparation and transport. Lip closure was adequate with no labial escape. Bolus prep and mastication was timely and efficient. Lingual motion was brisk for adequate bolus transport. There was no significant oral residue. The swallow was initiated when the  head of the bolus passed the ramus of the mandible.    Pharyngeal phase characterized by timely initiation of swallow across consistencies. The soft palate elevated for complete closure of the velopharyngeal port. During pharyngeal swallow, decreased base of tongue retraction resulted in minimal-moderate valleculae residue that was reduced with 3-4 spontaneous sequential swallows. Anterior hyoid excursion, laryngeal elevation, and pharyngeal stripping wave were within functional limits resulting in complete epiglottic inversion and UES opening. No penetration or aspiration was observed in today's study. Of note, patient appeared to fatigue over the course of the study which did result in increased buildup of residue as the study progressed.     On esophageal screen, no significant findings were noted.       Impressions: Mild-moderate pharyngeal dysphagia, likely chronic related to total thyroidectomy. Swallow safety is preserved; however, swallow efficiency is impaired. Patient appears to be at low risk for aspiration related PNA in consideration of three pillars of aspiration pneumonia (Johanne, 2005) including preserved oral health status, overall health/immune status, and laryngeal vestibule closure/severity of dysphagia. Patient appears to be a good candidate for behavioral swallow rehabilitation.     References:  ABDIAS Whiting (2005, March). Pneumonia: Factors Beyond Aspiration. Perspectives in Swallowing and Swallowing Disorders (Dysphagia), 14, 10-16.    Recommendations:     Consistency Recommendations: Thin liquids (IDDSI 0) and Regular consistencies (IDDSI 7).  Medications should be taken whole in thin liquids.   Risk Management/Swallow Guidelines: use good oral hygiene, sit upright for all PO intake, increase physical mobility as tolerated, and multiple swallows per bolus  Specialist Referrals: NA  Ancillary Tests: NA  Therapy: Dysphagia therapy is recommended including effortful swallows.  Follow-up  exam: Follow up instrumental assessment of the swallow should be completed at the recommendation of the treating clinician.    Please contact [facility] at [phone] if there are questions re: the above or if we can be of additional service to this patient.    mAy Jiang MA, CCC-SLP  Speech Language Pathologist  9/19/2022

## 2022-09-20 ENCOUNTER — PATIENT OUTREACH (OUTPATIENT)
Dept: ADMINISTRATIVE | Facility: OTHER | Age: 47
End: 2022-09-20
Payer: MEDICAID

## 2022-09-20 ENCOUNTER — TELEPHONE (OUTPATIENT)
Dept: PRIMARY CARE CLINIC | Facility: CLINIC | Age: 47
End: 2022-09-20
Payer: MEDICAID

## 2022-09-20 ENCOUNTER — CLINICAL SUPPORT (OUTPATIENT)
Dept: OBSTETRICS AND GYNECOLOGY | Facility: CLINIC | Age: 47
End: 2022-09-20
Payer: MEDICAID

## 2022-09-20 ENCOUNTER — TELEPHONE (OUTPATIENT)
Dept: INTERNAL MEDICINE | Facility: CLINIC | Age: 47
End: 2022-09-20
Payer: MEDICAID

## 2022-09-20 DIAGNOSIS — Z48.01 DRESSING CHANGE OR REMOVAL, SURGICAL WOUND: Primary | ICD-10-CM

## 2022-09-20 PROCEDURE — 99213 OFFICE O/P EST LOW 20 MIN: CPT | Mod: PBBFAC

## 2022-09-20 PROCEDURE — 99999 PR PBB SHADOW E&M-EST. PATIENT-LVL III: CPT | Mod: PBBFAC,,,

## 2022-09-20 PROCEDURE — 99999 PR PBB SHADOW E&M-EST. PATIENT-LVL III: ICD-10-PCS | Mod: PBBFAC,,,

## 2022-09-20 NOTE — TELEPHONE ENCOUNTER
----- Message from Geo Vann sent at 9/20/2022  9:43 AM CDT -----  Contact: evan [phone]  Pt requesting a call from Kymeesha.    Please call and advise

## 2022-09-20 NOTE — PHYSICIAN QUERY
PT Name: Shelly Kam  MR #: [id number]    DOCUMENTATION CLARIFICATION     CDS/: ALPESH Bethea,RNC-MNN       Contact information:[email or web address]     Query Date: September 20, 2022    By submitting this query, we are merely seeking further clarification of documentation. Please utilize your independent clinical judgment when addressing the question(s) below.    The medical record contains the following:   Diagnosis / Indicator(s)  Supporting Clinical Findings Location in Medical Record   X Uterine Fibroid(s) PREOPERATIVE DIAGNOSES: Symptomatic Uterine Fibroids Op note 9/12    Leiomyoma or Leiomyomata      Fibromyoma or Fibromyomata      Radiology Findings     X Treatment PROCEDURE:    Diagnostic Laparoscopy  Total Abdominal Hysterectomy  Bilateral Salpingectomy  Op note 9/12   X Pathology Findings UTERUS, CERVIX AND BILATERAL FALLOPIAN TUBES WEIGHING 2480 G SHOWING:   INACTIVE ENDOMETRIUM WITH A BENIGN ENDOMETRIAL POLYP   NEGATIVE CERVIX   MULTIPLE SUBSEROSAL AND INTRAMURAL LEIOMYOMAS MEASURING UP TO 11.9 CM.  THE   LARGEST SHOWS EDEMA AND FIBROSIS.   UNREMARKABLE LEFT AND RIGHT FALLOPIAN TUBES Pathology report 9/12    Other       Provider, please further specify the Leiomyoma/Uterine Fibroids diagnosis (please select all that apply).    [ x  ] Intramural   [  x ] Subserosal   [   ] Unspecified   [   ] Other Condition (please specify):_________   [  ] Clinically Undetermined       Please document in your progress notes daily for the duration of treatment until resolved and include in your discharge summary.

## 2022-09-20 NOTE — TELEPHONE ENCOUNTER
SPOKE WITH PATIENT, SHE STATES THAT SHE WAS INQUIRING ABOUT EXPEDITING SOME HELP THAT WAS OFFERED DUE TO HER HAVING SURGERY, INFORMED PATIENT THAT MESSAGE WOULD BE GIVEN TO KYMEESHA TO GIVE HER A CALL. SHE VOICED UNDERSTANDING.

## 2022-09-20 NOTE — TELEPHONE ENCOUNTER
Spoke with pt; pt was calling to schedule an appt to get her medication refilled; pt stated she just had surgery (hysterectomy) and hasn't taken any medication; also pt stated her took broke in half and wanted to know if provider and refer her to an orthodontist; MA made appt for 10/11 /LD

## 2022-09-20 NOTE — TELEPHONE ENCOUNTER
----- Message from Maggie Vin sent at 9/20/2022  8:36 AM CDT -----  Contact: Patient  Type:  Sooner Apoointment Request    Caller is requesting a sooner appointment.  Caller declined first available appointment listed below.  Caller will not accept being placed on the waitlist and is requesting a message be sent to doctor.  Name of Caller:Shelly Kam  When is the first available appointment?n/a  Symptoms:med refill/follow up  Would the patient rather a call back or a response via [facility]? Call back  Best Call Back Number:[phone]  Additional Information: Patient states she also is needing a tooth pulled and was told she needed a referral, so she would like to speak with office regarding this as well.

## 2022-09-20 NOTE — PROGRESS NOTES
Removed pts aqasil dressing from lower abdominal area. All edges intact.  No signs of discharge, bleeding, or tears noted. Advised pt on how to shower and splint abdominal area. Pt acknowledged understanding.

## 2022-09-20 NOTE — PROGRESS NOTES
CHW - Outreach Attempt    Community Health Worker left a voicemail message for 2nd attempt to contact patient regarding follow up visit.  Community Health Worker to attempt to contact patient on 9/20/2022.

## 2022-09-21 ENCOUNTER — PATIENT MESSAGE (OUTPATIENT)
Dept: OTHER | Facility: OTHER | Age: 47
End: 2022-09-21
Payer: MEDICAID

## 2022-09-22 ENCOUNTER — PATIENT OUTREACH (OUTPATIENT)
Dept: ADMINISTRATIVE | Facility: OTHER | Age: 47
End: 2022-09-22
Payer: MEDICAID

## 2022-09-22 NOTE — PROGRESS NOTES
CHW - Unable to Contact    Community Health Worker to close episode at this time due to three missed attempts for patient contact.

## 2022-09-29 ENCOUNTER — TELEPHONE (OUTPATIENT)
Dept: OBSTETRICS AND GYNECOLOGY | Facility: CLINIC | Age: 47
End: 2022-09-29
Payer: MEDICAID

## 2022-09-29 NOTE — TELEPHONE ENCOUNTER
----- Message from Tamika Bernstein sent at 9/29/2022 12:21 PM CDT -----  Contact: [phone]  Pt is calling in regards to rescheduling her post op appt. She stated that she is having transportation issues. Please call her back at [phone]. Thanks KB

## 2022-10-07 ENCOUNTER — TELEPHONE (OUTPATIENT)
Dept: INTERNAL MEDICINE | Facility: CLINIC | Age: 47
End: 2022-10-07
Payer: MEDICAID

## 2022-10-07 RX ORDER — LEVOTHYROXINE SODIUM 125 UG/1
125 TABLET ORAL NIGHTLY
Qty: 90 TABLET | Refills: 0 | Status: SHIPPED | OUTPATIENT
Start: 2022-10-07 | End: 2022-11-07 | Stop reason: SDUPTHER

## 2022-10-07 NOTE — TELEPHONE ENCOUNTER
----- Message from Juliette Johnson sent at 10/7/2022 10:45 AM CDT -----  Contact: Self/  Pt is calling in regards to getting a refill for medication (levothyroxine (SYNTHROID) 125 MCG tablet) sent to     [facility] - [place] - [address]  [address] [address]  Phone: [phone] Fax: [phone]    She states she is completley out and have not taken it in 4 days. Please give her a call back at [phone]. Thank you s/g

## 2022-10-07 NOTE — TELEPHONE ENCOUNTER
Spoke with pt; informed pt provider sent over Synthroid medication to pharmacy for a 3month supply and advised pt she would have to keep f/u appt; pt verbalized understanding /LD

## 2022-10-07 NOTE — TELEPHONE ENCOUNTER
No new care gaps identified.  [facility] Embedded Care Gaps. Reference number: [id number]. 10/07/2022   10:57:56 AM JASVIRT

## 2022-10-07 NOTE — TELEPHONE ENCOUNTER
Approved but for a three month supply only   Needs to keep appointment scheduled on 10/11/2022  No additional refills without a visit.

## 2022-10-11 ENCOUNTER — OFFICE VISIT (OUTPATIENT)
Dept: OBSTETRICS AND GYNECOLOGY | Facility: CLINIC | Age: 47
End: 2022-10-11
Payer: MEDICAID

## 2022-10-11 ENCOUNTER — OFFICE VISIT (OUTPATIENT)
Dept: INTERNAL MEDICINE | Facility: CLINIC | Age: 47
End: 2022-10-11
Payer: MEDICAID

## 2022-10-11 ENCOUNTER — LAB VISIT (OUTPATIENT)
Dept: LAB | Facility: HOSPITAL | Age: 47
End: 2022-10-11
Attending: FAMILY MEDICINE
Payer: MEDICAID

## 2022-10-11 VITALS
SYSTOLIC BLOOD PRESSURE: 124 MMHG | OXYGEN SATURATION: 98 % | HEIGHT: 66 IN | DIASTOLIC BLOOD PRESSURE: 80 MMHG | BODY MASS INDEX: 28.23 KG/M2 | HEART RATE: 72 BPM | WEIGHT: 175.69 LBS

## 2022-10-11 VITALS
SYSTOLIC BLOOD PRESSURE: 124 MMHG | HEIGHT: 66 IN | BODY MASS INDEX: 28.34 KG/M2 | DIASTOLIC BLOOD PRESSURE: 72 MMHG | WEIGHT: 176.38 LBS

## 2022-10-11 DIAGNOSIS — Z23 NEED FOR PNEUMOCOCCAL VACCINATION: ICD-10-CM

## 2022-10-11 DIAGNOSIS — R73.03 PREDIABETES: Primary | ICD-10-CM

## 2022-10-11 DIAGNOSIS — F41.9 ANXIETY: Chronic | ICD-10-CM

## 2022-10-11 DIAGNOSIS — E78.2 MIXED HYPERLIPIDEMIA: ICD-10-CM

## 2022-10-11 DIAGNOSIS — E89.0 POSTOPERATIVE HYPOTHYROIDISM: Chronic | ICD-10-CM

## 2022-10-11 DIAGNOSIS — M79.7 FIBROMYALGIA: Chronic | ICD-10-CM

## 2022-10-11 DIAGNOSIS — J45.20 MILD INTERMITTENT ASTHMA WITHOUT COMPLICATION: Chronic | ICD-10-CM

## 2022-10-11 DIAGNOSIS — I10 ESSENTIAL HYPERTENSION: ICD-10-CM

## 2022-10-11 DIAGNOSIS — Z90.710 STATUS POST TOTAL ABDOMINAL HYSTERECTOMY: Primary | ICD-10-CM

## 2022-10-11 PROBLEM — M54.2 NECK PAIN: Status: RESOLVED | Noted: 2021-04-19 | Resolved: 2022-10-11

## 2022-10-11 PROBLEM — R13.10 DYSPHAGIA: Status: RESOLVED | Noted: 2021-03-19 | Resolved: 2022-10-11

## 2022-10-11 PROBLEM — E11.9 TYPE 2 DIABETES MELLITUS WITHOUT RETINOPATHY: Status: RESOLVED | Noted: 2017-07-24 | Resolved: 2022-10-11

## 2022-10-11 PROBLEM — R29.898 DECREASED ROM OF NECK: Status: RESOLVED | Noted: 2021-04-19 | Resolved: 2022-10-11

## 2022-10-11 PROBLEM — R29.898 TIGHTNESS OF NECK: Status: RESOLVED | Noted: 2021-04-19 | Resolved: 2022-10-11

## 2022-10-11 PROBLEM — K21.9 GASTROESOPHAGEAL REFLUX DISEASE WITHOUT ESOPHAGITIS: Chronic | Status: ACTIVE | Noted: 2021-03-19

## 2022-10-11 PROBLEM — Z79.02 LONG TERM (CURRENT) USE OF ANTITHROMBOTICS/ANTIPLATELETS: Status: RESOLVED | Noted: 2021-03-19 | Resolved: 2022-10-11

## 2022-10-11 PROBLEM — D21.9 FIBROID: Status: RESOLVED | Noted: 2021-03-19 | Resolved: 2022-10-11

## 2022-10-11 PROBLEM — D64.9 ANEMIA: Status: RESOLVED | Noted: 2017-11-22 | Resolved: 2022-10-11

## 2022-10-11 PROBLEM — R00.2 HEART PALPITATIONS: Status: RESOLVED | Noted: 2021-03-19 | Resolved: 2022-10-11

## 2022-10-11 LAB
CHOLEST SERPL-MCNC: 194 MG/DL (ref 120–199)
CHOLEST/HDLC SERPL: 2.9 {RATIO} (ref 2–5)
HDLC SERPL-MCNC: 67 MG/DL (ref 40–75)
HDLC SERPL: 34.5 % (ref 20–50)
LDLC SERPL CALC-MCNC: 105.8 MG/DL (ref 63–159)
NONHDLC SERPL-MCNC: 127 MG/DL
TRIGL SERPL-MCNC: 106 MG/DL (ref 30–150)
TSH SERPL DL<=0.005 MIU/L-ACNC: 5.85 UIU/ML (ref 0.4–4)

## 2022-10-11 PROCEDURE — 99024 POSTOP FOLLOW-UP VISIT: CPT | Mod: ,,, | Performed by: OBSTETRICS & GYNECOLOGY

## 2022-10-11 PROCEDURE — 4010F PR ACE/ARB THEARPY RXD/TAKEN: ICD-10-PCS | Mod: CPTII,,, | Performed by: FAMILY MEDICINE

## 2022-10-11 PROCEDURE — 3008F BODY MASS INDEX DOCD: CPT | Mod: CPTII,,, | Performed by: OBSTETRICS & GYNECOLOGY

## 2022-10-11 PROCEDURE — 3072F LOW RISK FOR RETINOPATHY: CPT | Mod: CPTII,,, | Performed by: OBSTETRICS & GYNECOLOGY

## 2022-10-11 PROCEDURE — 99214 OFFICE O/P EST MOD 30 MIN: CPT | Mod: PBBFAC | Performed by: OBSTETRICS & GYNECOLOGY

## 2022-10-11 PROCEDURE — 3078F PR MOST RECENT DIASTOLIC BLOOD PRESSURE < 80 MM HG: ICD-10-PCS | Mod: CPTII,,, | Performed by: OBSTETRICS & GYNECOLOGY

## 2022-10-11 PROCEDURE — 99215 OFFICE O/P EST HI 40 MIN: CPT | Mod: S$PBB,,, | Performed by: FAMILY MEDICINE

## 2022-10-11 PROCEDURE — 99999 PR PBB SHADOW E&M-EST. PATIENT-LVL IV: ICD-10-PCS | Mod: PBBFAC,,, | Performed by: FAMILY MEDICINE

## 2022-10-11 PROCEDURE — 3079F DIAST BP 80-89 MM HG: CPT | Mod: CPTII,,, | Performed by: FAMILY MEDICINE

## 2022-10-11 PROCEDURE — 3044F PR MOST RECENT HEMOGLOBIN A1C LEVEL <7.0%: ICD-10-PCS | Mod: CPTII,,, | Performed by: FAMILY MEDICINE

## 2022-10-11 PROCEDURE — 3074F SYST BP LT 130 MM HG: CPT | Mod: CPTII,,, | Performed by: FAMILY MEDICINE

## 2022-10-11 PROCEDURE — 3072F LOW RISK FOR RETINOPATHY: CPT | Mod: CPTII,,, | Performed by: FAMILY MEDICINE

## 2022-10-11 PROCEDURE — 1159F MED LIST DOCD IN RCRD: CPT | Mod: CPTII,,, | Performed by: OBSTETRICS & GYNECOLOGY

## 2022-10-11 PROCEDURE — 3074F PR MOST RECENT SYSTOLIC BLOOD PRESSURE < 130 MM HG: ICD-10-PCS | Mod: CPTII,,, | Performed by: OBSTETRICS & GYNECOLOGY

## 2022-10-11 PROCEDURE — 3074F SYST BP LT 130 MM HG: CPT | Mod: CPTII,,, | Performed by: OBSTETRICS & GYNECOLOGY

## 2022-10-11 PROCEDURE — 3044F HG A1C LEVEL LT 7.0%: CPT | Mod: CPTII,,, | Performed by: FAMILY MEDICINE

## 2022-10-11 PROCEDURE — 84443 ASSAY THYROID STIM HORMONE: CPT | Performed by: FAMILY MEDICINE

## 2022-10-11 PROCEDURE — 3074F PR MOST RECENT SYSTOLIC BLOOD PRESSURE < 130 MM HG: ICD-10-PCS | Mod: CPTII,,, | Performed by: FAMILY MEDICINE

## 2022-10-11 PROCEDURE — 3072F PR LOW RISK FOR RETINOPATHY: ICD-10-PCS | Mod: CPTII,,, | Performed by: OBSTETRICS & GYNECOLOGY

## 2022-10-11 PROCEDURE — 3078F DIAST BP <80 MM HG: CPT | Mod: CPTII,,, | Performed by: OBSTETRICS & GYNECOLOGY

## 2022-10-11 PROCEDURE — 3072F PR LOW RISK FOR RETINOPATHY: ICD-10-PCS | Mod: CPTII,,, | Performed by: FAMILY MEDICINE

## 2022-10-11 PROCEDURE — 99215 PR OFFICE/OUTPT VISIT, EST, LEVL V, 40-54 MIN: ICD-10-PCS | Mod: S$PBB,,, | Performed by: FAMILY MEDICINE

## 2022-10-11 PROCEDURE — 3008F PR BODY MASS INDEX (BMI) DOCUMENTED: ICD-10-PCS | Mod: CPTII,,, | Performed by: FAMILY MEDICINE

## 2022-10-11 PROCEDURE — 3008F PR BODY MASS INDEX (BMI) DOCUMENTED: ICD-10-PCS | Mod: CPTII,,, | Performed by: OBSTETRICS & GYNECOLOGY

## 2022-10-11 PROCEDURE — 3079F PR MOST RECENT DIASTOLIC BLOOD PRESSURE 80-89 MM HG: ICD-10-PCS | Mod: CPTII,,, | Performed by: FAMILY MEDICINE

## 2022-10-11 PROCEDURE — 4010F ACE/ARB THERAPY RXD/TAKEN: CPT | Mod: CPTII,,, | Performed by: FAMILY MEDICINE

## 2022-10-11 PROCEDURE — 3044F PR MOST RECENT HEMOGLOBIN A1C LEVEL <7.0%: ICD-10-PCS | Mod: CPTII,,, | Performed by: OBSTETRICS & GYNECOLOGY

## 2022-10-11 PROCEDURE — 99999 PR PBB SHADOW E&M-EST. PATIENT-LVL IV: CPT | Mod: PBBFAC,,, | Performed by: OBSTETRICS & GYNECOLOGY

## 2022-10-11 PROCEDURE — 4010F ACE/ARB THERAPY RXD/TAKEN: CPT | Mod: CPTII,,, | Performed by: OBSTETRICS & GYNECOLOGY

## 2022-10-11 PROCEDURE — 3044F HG A1C LEVEL LT 7.0%: CPT | Mod: CPTII,,, | Performed by: OBSTETRICS & GYNECOLOGY

## 2022-10-11 PROCEDURE — 99999 PR PBB SHADOW E&M-EST. PATIENT-LVL IV: ICD-10-PCS | Mod: PBBFAC,,, | Performed by: OBSTETRICS & GYNECOLOGY

## 2022-10-11 PROCEDURE — 84439 ASSAY OF FREE THYROXINE: CPT | Performed by: FAMILY MEDICINE

## 2022-10-11 PROCEDURE — 99214 OFFICE O/P EST MOD 30 MIN: CPT | Mod: PBBFAC,27 | Performed by: FAMILY MEDICINE

## 2022-10-11 PROCEDURE — 99999 PR PBB SHADOW E&M-EST. PATIENT-LVL IV: CPT | Mod: PBBFAC,,, | Performed by: FAMILY MEDICINE

## 2022-10-11 PROCEDURE — 99024 PR POST-OP FOLLOW-UP VISIT: ICD-10-PCS | Mod: ,,, | Performed by: OBSTETRICS & GYNECOLOGY

## 2022-10-11 PROCEDURE — 3008F BODY MASS INDEX DOCD: CPT | Mod: CPTII,,, | Performed by: FAMILY MEDICINE

## 2022-10-11 PROCEDURE — 1159F PR MEDICATION LIST DOCUMENTED IN MEDICAL RECORD: ICD-10-PCS | Mod: CPTII,,, | Performed by: OBSTETRICS & GYNECOLOGY

## 2022-10-11 PROCEDURE — 90677 PCV20 VACCINE IM: CPT | Mod: PBBFAC

## 2022-10-11 PROCEDURE — 4010F PR ACE/ARB THEARPY RXD/TAKEN: ICD-10-PCS | Mod: CPTII,,, | Performed by: OBSTETRICS & GYNECOLOGY

## 2022-10-11 PROCEDURE — 80061 LIPID PANEL: CPT | Performed by: FAMILY MEDICINE

## 2022-10-11 PROCEDURE — 36415 COLL VENOUS BLD VENIPUNCTURE: CPT | Performed by: FAMILY MEDICINE

## 2022-10-11 RX ORDER — CITALOPRAM 20 MG/1
20 TABLET, FILM COATED ORAL NIGHTLY
Qty: 90 TABLET | Refills: 3 | Status: SHIPPED | OUTPATIENT
Start: 2022-10-11 | End: 2023-11-06

## 2022-10-11 RX ORDER — AMLODIPINE BESYLATE 10 MG/1
10 TABLET ORAL NIGHTLY
Qty: 90 TABLET | Refills: 3 | Status: SHIPPED | OUTPATIENT
Start: 2022-10-11 | End: 2023-10-11

## 2022-10-11 RX ORDER — LOSARTAN POTASSIUM 25 MG/1
25 TABLET ORAL DAILY
Qty: 90 TABLET | Refills: 3 | Status: SHIPPED | OUTPATIENT
Start: 2022-10-11 | End: 2023-10-11

## 2022-10-11 RX ORDER — SIMVASTATIN 40 MG/1
40 TABLET, FILM COATED ORAL NIGHTLY
Qty: 90 TABLET | Refills: 3 | Status: SHIPPED | OUTPATIENT
Start: 2022-10-11 | End: 2024-03-07

## 2022-10-11 RX ORDER — METOPROLOL SUCCINATE 50 MG/1
50 TABLET, EXTENDED RELEASE ORAL NIGHTLY
Qty: 90 TABLET | Refills: 3 | Status: SHIPPED | OUTPATIENT
Start: 2022-10-11 | End: 2024-01-26

## 2022-10-11 RX ORDER — ASPIRIN 81 MG/1
81 TABLET ORAL DAILY
Qty: 90 TABLET | Refills: 3 | Status: SHIPPED | OUTPATIENT
Start: 2022-10-11 | End: 2024-03-07 | Stop reason: SDUPTHER

## 2022-10-11 NOTE — PROGRESS NOTES
Subjective:   Patient ID: Shelly Kam is a 47 y.o. female.  Chief Complaint:  Medication Refill    Presents for follow-up on chronic medical conditions  Last visit September 2022 for preoperative clearance for hysterectomy with bilateral salpingo oophorectomy   Patient underwent procedure without significant complication  CBC and CMP stable at that time   A1c 4.7% and normal     Medical history:  - Prediabetes.  On metformin 1000 mg twice a day.  Reports compliance.  Denies symptoms hyperglycemia.  Some symptoms hypoglycemia.  Questions if she can discontinue medication since most recent A1c normal.  - Hypertension.  Questionable control at home.  No active  Currently not compliant with medication.  Symptoms.  Needs refills    - Hyperlipidemia.  On aspirin 81 mg daily and simvastatin 40 mg daily.  Reports compliance.  Denies side effects.  No chest pain claudication.  Needs repeat lipid   - Hypothyroidism.  On Synthroid 125 mcg daily.  Reports compliance.  Denies side effects.  Denies symptoms hypo hyperthyroidism.  Needs repeat TSH   - Anxiety/fibromyalgia.  Increased symptoms.  Currently off SSRI.  Previously citalopram 20 mg daily was effective without side effects.    - Allergic rhinosinusitis.  On Flonase.  Symptoms controlled.    - GERD.  On Protonix 40 mg daily.  Symptoms controlled.    - Mild intermittent asthma.  Stable.  No frequent rescue inhaler use.    Routine health maintenance needs include:  -Prevnar 20 vaccine   -COVID booster    Other than increased anxiety and fibromyalgia symptoms, no additional complaints concerns today    Review of Systems   Constitutional:  Positive for fatigue. Negative for activity change, appetite change, chills, diaphoresis and fever.   Eyes:  Negative for visual disturbance.   Respiratory:  Negative for cough, chest tightness, shortness of breath and wheezing.    Cardiovascular:  Negative for chest pain, palpitations and leg swelling.   Gastrointestinal:   "Negative for abdominal distention, abdominal pain, constipation, diarrhea, nausea and vomiting.   Endocrine: Negative for polydipsia, polyphagia and polyuria.   Genitourinary:  Negative for difficulty urinating, dysuria, flank pain, frequency, hematuria and urgency.   Musculoskeletal:  Positive for myalgias.   Skin:  Negative for rash.   Neurological:  Negative for dizziness, tremors, syncope, weakness, light-headedness, numbness and headaches.   Psychiatric/Behavioral:  Positive for decreased concentration, dysphoric mood and sleep disturbance. Negative for agitation, behavioral problems, confusion, hallucinations, self-injury and suicidal ideas. The patient is nervous/anxious. The patient is not hyperactive.      Objective:   /80 (BP Location: Left arm, Patient Position: Sitting, BP Method: Small (Manual))   Pulse 72   Ht 5' 6" (1.676 m)   Wt 79.7 kg (175 lb 11.3 oz)   LMP 09/12/2022   SpO2 98%   BMI 28.36 kg/m²     Physical Exam  Vitals and nursing note reviewed.   Constitutional:       Appearance: Normal appearance. She is well-developed. She is obese.   Eyes:      General: No scleral icterus.     Conjunctiva/sclera: Conjunctivae normal.      Right eye: Right conjunctiva is not injected.      Left eye: Left conjunctiva is not injected.   Neck:      Thyroid: No thyroid mass, thyromegaly or thyroid tenderness.   Cardiovascular:      Rate and Rhythm: Normal rate and regular rhythm.   Pulmonary:      Effort: Pulmonary effort is normal. No tachypnea, accessory muscle usage or respiratory distress.   Musculoskeletal:      Right lower leg: No edema.      Left lower leg: No edema.   Skin:     Findings: No rash.   Neurological:      Mental Status: She is alert.      Coordination: Coordination is intact.      Gait: Gait is intact.   Psychiatric:         Attention and Perception: Perception normal. She is inattentive.         Mood and Affect: Mood is anxious. Mood is not elated. Affect is not labile, blunt, " flat, angry, tearful or inappropriate.         Speech: She is communicative. Speech is rapid and pressured and tangential. Speech is not delayed or slurred.         Behavior: Behavior normal. Behavior is not agitated, slowed, aggressive, withdrawn, hyperactive or combative. Behavior is cooperative.         Thought Content: Thought content normal. Thought content is not paranoid or delusional. Thought content does not include homicidal or suicidal ideation.         Cognition and Memory: Cognition and memory normal.         Judgment: Judgment normal. Judgment is not impulsive or inappropriate.       Assessment:       ICD-10-CM ICD-9-CM   1. Prediabetes  R73.03 790.29   2. Postoperative hypothyroidism  E89.0 244.0   3. Essential hypertension  I10 401.9   4. Mixed hyperlipidemia  E78.2 272.2   5. Fibromyalgia  M79.7 729.1   6. Mild intermittent asthma without complication  J45.20 493.90   7. Anxiety  F41.9 300.00   8. Need for pneumococcal vaccination  Z23 V03.82     Plan:   Prediabetes  Controlled.  Stable.  Asymptomatic.  A1c normal.    Okay to discontinue metformin     Postoperative hypothyroidism  -     TSH; Future; Expected date: 10/11/2022  Stable.  Asymptomatic.  Last TSH at goal.    Recheck TSH today   Adjust Synthroid 125 mcg daily if needed     Essential hypertension  -     amLODIPine (NORVASC) 10 MG tablet; Take 1 tablet (10 mg total) by mouth nightly.  Dispense: 90 tablet; Refill: 3  -     losartan (COZAAR) 25 MG tablet; Take 1 tablet (25 mg total) by mouth once daily.  Dispense: 90 tablet; Refill: 3  -     metoprolol succinate (TOPROL-XL) 50 MG 24 hr tablet; Take 1 tablet (50 mg total) by mouth nightly. QD  Dispense: 90 tablet; Refill: 3  Restart previous medications   Recheck blood pressure 1 month     Mixed hyperlipidemia  -     Lipid Panel; Future; Expected date: 10/11/2022  -     aspirin (ECOTRIN) 81 MG EC tablet; Take 1 tablet (81 mg total) by mouth once daily.  Dispense: 90 tablet; Refill: 3  -      simvastatin (ZOCOR) 40 MG tablet; Take 1 tablet (40 mg total) by mouth every evening.  Dispense: 90 tablet; Refill: 3  Controlled.  Stable.  Asymptomatic.  Last LDL at goal.    Continue aspirin 81 mg daily   Adjust simvastatin 40 mg daily if needed     Fibromyalgia  Anxiety  -     citalopram (CELEXA) 20 MG tablet; Take 1 tablet (20 mg total) by mouth nightly.  Dispense: 90 tablet; Refill: 3  Restart citalopram 20 mg daily   Reassess 1 month     Mild intermittent asthma without complication  Controlled.  Stable.  Asymptomatic.  No frequent rescue inhaler use.    No controller medication indicated     Need for pneumococcal vaccination  -     (In Office Administered) Pneumococcal Conjugate Vaccine (20 Valent) (IM)    Return to clinic 1 month    40+ minutes of total time spent on the encounter, which includes face to face time and non-face to face time preparing to see the patient (eg, review of tests), Obtaining and/or reviewing separately obtained history, documenting clinical information in the electronic or other health record, independently interpreting results (not separately reported) and communicating results to the patient/family/caregiver, or Care coordination (not separately reported).

## 2022-10-11 NOTE — PROGRESS NOTES
Subjective:       Patient ID: Shelly Kam is a 47 y.o. female.    Chief Complaint:  Post-op Evaluation      History of Present Illness  HPI  Pt is s/p Dx LSC/IVAN/Bilateral Salpingectomy on 2022.  Pt is here for her routine post-op visit.  Pt is doing well and is happy with results.  Denies pain or vaginal bleeding.  Reports normal bowel and bladder function.  Denies intercourse or strenuous activity since surgery.      GYN & OB History  Patient's last menstrual period was 2022.   Date of Last Pap: No result found    OB History    Para Term  AB Living   0 0 0 0 0 0   SAB IAB Ectopic Multiple Live Births   0 0 0 0 0       Review of Systems  Review of Systems   Constitutional:  Negative for activity change, appetite change, chills, fatigue, fever and unexpected weight change.   Respiratory:  Negative for shortness of breath.    Cardiovascular:  Negative for chest pain, palpitations and leg swelling.   Gastrointestinal:  Negative for abdominal pain, bloating, blood in stool, constipation, diarrhea, nausea and vomiting.   Genitourinary:  Negative for dysuria, flank pain, frequency, genital sores, hematuria, pelvic pain, urgency, vaginal bleeding, vaginal discharge, vaginal pain, urinary incontinence, vaginal dryness and vaginal odor.   Musculoskeletal:  Negative for back pain.   Neurological:  Negative for syncope and headaches.         Objective:    Physical Exam:   Constitutional: She is oriented to person, place, and time. She appears well-developed and well-nourished. No distress.       Cardiovascular:  Normal rate, regular rhythm and normal heart sounds.             Pulmonary/Chest: Effort normal and breath sounds normal.        Abdominal: Soft. Bowel sounds are normal. She exhibits abdominal incision (all wounds healed well). She exhibits no distension and no mass. There is no abdominal tenderness. There is no rebound and no guarding. No hernia.     Genitourinary:    Vagina  normal.      Pelvic exam was performed with patient supine.   There is no rash, tenderness, lesion or injury on the right labia. There is no rash, tenderness, lesion or injury on the left labia. Right adnexum displays no mass, no tenderness and no fullness. Left adnexum displays no mass, no tenderness and no fullness. Vaginal cuff normal.  No erythema,  no vaginal discharge, tenderness or bleeding in the vagina.    No foreign body in the vagina.      No signs of injury in the vagina.   Cervix is absent.Uterus is absent.           Musculoskeletal: Normal range of motion and moves all extremeties.       Neurological: She is alert and oriented to person, place, and time.    Skin: Skin is warm and dry.    Psychiatric: She has a normal mood and affect. Her behavior is normal. Thought content normal.        Assessment:        1. Status post total abdominal hysterectomy              Plan:      Status post total abdominal hysterectomy  -      Pt doing well and is happy with results.  Pathology benign.  Pt cleared for all activities.  Return to work at 6 wk postop.      Follow up for Annual exam.

## 2022-10-12 ENCOUNTER — TELEPHONE (OUTPATIENT)
Dept: SMOKING CESSATION | Facility: CLINIC | Age: 47
End: 2022-10-12
Payer: MEDICAID

## 2022-10-12 LAB — T4 FREE SERPL-MCNC: 0.83 NG/DL (ref 0.71–1.51)

## 2022-10-12 NOTE — TELEPHONE ENCOUNTER
Call to patient regarding her scheduled 8 am intake appointment for tobacco cessation. Patient states she needs to reschedule due to she doesn't have transportation due to her car is broken. Rescheduled for 10/31/2022 at 3 pm.

## 2022-10-31 ENCOUNTER — TELEPHONE (OUTPATIENT)
Dept: SMOKING CESSATION | Facility: CLINIC | Age: 47
End: 2022-10-31
Payer: MEDICAID

## 2022-10-31 ENCOUNTER — PATIENT MESSAGE (OUTPATIENT)
Dept: SMOKING CESSATION | Facility: CLINIC | Age: 47
End: 2022-10-31
Payer: MEDICAID

## 2022-10-31 NOTE — TELEPHONE ENCOUNTER
Received message from switchboard stating the patient can't make her appointment & would like a call back.

## 2022-11-01 ENCOUNTER — TELEPHONE (OUTPATIENT)
Dept: SMOKING CESSATION | Facility: CLINIC | Age: 47
End: 2022-11-01
Payer: MEDICAID

## 2022-11-01 NOTE — TELEPHONE ENCOUNTER
Rescheduled patient for her missed intake appointment for tobacco cessation for 11/28/2022 at 5:00 pm

## 2022-11-07 ENCOUNTER — OFFICE VISIT (OUTPATIENT)
Dept: INTERNAL MEDICINE | Facility: CLINIC | Age: 47
End: 2022-11-07
Payer: MEDICAID

## 2022-11-07 VITALS
WEIGHT: 184.06 LBS | TEMPERATURE: 96 F | DIASTOLIC BLOOD PRESSURE: 78 MMHG | HEART RATE: 65 BPM | SYSTOLIC BLOOD PRESSURE: 122 MMHG | OXYGEN SATURATION: 98 % | HEIGHT: 66 IN | BODY MASS INDEX: 29.58 KG/M2

## 2022-11-07 DIAGNOSIS — R73.03 PREDIABETES: ICD-10-CM

## 2022-11-07 DIAGNOSIS — M79.7 FIBROMYALGIA: ICD-10-CM

## 2022-11-07 DIAGNOSIS — I10 ESSENTIAL HYPERTENSION: ICD-10-CM

## 2022-11-07 DIAGNOSIS — E89.0 POSTOPERATIVE HYPOTHYROIDISM: ICD-10-CM

## 2022-11-07 DIAGNOSIS — F41.9 ANXIETY: Primary | ICD-10-CM

## 2022-11-07 DIAGNOSIS — E78.2 MIXED HYPERLIPIDEMIA: ICD-10-CM

## 2022-11-07 PROCEDURE — 3044F PR MOST RECENT HEMOGLOBIN A1C LEVEL <7.0%: ICD-10-PCS | Mod: CPTII,,, | Performed by: FAMILY MEDICINE

## 2022-11-07 PROCEDURE — 3078F PR MOST RECENT DIASTOLIC BLOOD PRESSURE < 80 MM HG: ICD-10-PCS | Mod: CPTII,,, | Performed by: FAMILY MEDICINE

## 2022-11-07 PROCEDURE — 99215 OFFICE O/P EST HI 40 MIN: CPT | Mod: S$PBB,,, | Performed by: FAMILY MEDICINE

## 2022-11-07 PROCEDURE — 1160F PR REVIEW ALL MEDS BY PRESCRIBER/CLIN PHARMACIST DOCUMENTED: ICD-10-PCS | Mod: CPTII,,, | Performed by: FAMILY MEDICINE

## 2022-11-07 PROCEDURE — 99215 PR OFFICE/OUTPT VISIT, EST, LEVL V, 40-54 MIN: ICD-10-PCS | Mod: S$PBB,,, | Performed by: FAMILY MEDICINE

## 2022-11-07 PROCEDURE — 1159F MED LIST DOCD IN RCRD: CPT | Mod: CPTII,,, | Performed by: FAMILY MEDICINE

## 2022-11-07 PROCEDURE — 1160F RVW MEDS BY RX/DR IN RCRD: CPT | Mod: CPTII,,, | Performed by: FAMILY MEDICINE

## 2022-11-07 PROCEDURE — 3074F PR MOST RECENT SYSTOLIC BLOOD PRESSURE < 130 MM HG: ICD-10-PCS | Mod: CPTII,,, | Performed by: FAMILY MEDICINE

## 2022-11-07 PROCEDURE — 3072F PR LOW RISK FOR RETINOPATHY: ICD-10-PCS | Mod: CPTII,,, | Performed by: FAMILY MEDICINE

## 2022-11-07 PROCEDURE — 3074F SYST BP LT 130 MM HG: CPT | Mod: CPTII,,, | Performed by: FAMILY MEDICINE

## 2022-11-07 PROCEDURE — 3078F DIAST BP <80 MM HG: CPT | Mod: CPTII,,, | Performed by: FAMILY MEDICINE

## 2022-11-07 PROCEDURE — 4010F ACE/ARB THERAPY RXD/TAKEN: CPT | Mod: CPTII,,, | Performed by: FAMILY MEDICINE

## 2022-11-07 PROCEDURE — 3044F HG A1C LEVEL LT 7.0%: CPT | Mod: CPTII,,, | Performed by: FAMILY MEDICINE

## 2022-11-07 PROCEDURE — 3008F PR BODY MASS INDEX (BMI) DOCUMENTED: ICD-10-PCS | Mod: CPTII,,, | Performed by: FAMILY MEDICINE

## 2022-11-07 PROCEDURE — 99999 PR PBB SHADOW E&M-EST. PATIENT-LVL III: CPT | Mod: PBBFAC,,, | Performed by: FAMILY MEDICINE

## 2022-11-07 PROCEDURE — 4010F PR ACE/ARB THEARPY RXD/TAKEN: ICD-10-PCS | Mod: CPTII,,, | Performed by: FAMILY MEDICINE

## 2022-11-07 PROCEDURE — 99213 OFFICE O/P EST LOW 20 MIN: CPT | Mod: PBBFAC | Performed by: FAMILY MEDICINE

## 2022-11-07 PROCEDURE — 99999 PR PBB SHADOW E&M-EST. PATIENT-LVL III: ICD-10-PCS | Mod: PBBFAC,,, | Performed by: FAMILY MEDICINE

## 2022-11-07 PROCEDURE — 3008F BODY MASS INDEX DOCD: CPT | Mod: CPTII,,, | Performed by: FAMILY MEDICINE

## 2022-11-07 PROCEDURE — 3072F LOW RISK FOR RETINOPATHY: CPT | Mod: CPTII,,, | Performed by: FAMILY MEDICINE

## 2022-11-07 PROCEDURE — 1159F PR MEDICATION LIST DOCUMENTED IN MEDICAL RECORD: ICD-10-PCS | Mod: CPTII,,, | Performed by: FAMILY MEDICINE

## 2022-11-07 RX ORDER — BUPROPION HYDROCHLORIDE 150 MG/1
150 TABLET ORAL DAILY
Qty: 90 TABLET | Refills: 0 | Status: SHIPPED | OUTPATIENT
Start: 2022-11-07 | End: 2023-03-17

## 2022-11-07 RX ORDER — LEVOTHYROXINE SODIUM 137 UG/1
137 TABLET ORAL NIGHTLY
Qty: 90 TABLET | Refills: 0 | Status: SHIPPED | OUTPATIENT
Start: 2022-11-07 | End: 2023-04-14

## 2022-11-07 NOTE — PROGRESS NOTES
Subjective:   Patient ID: Shelly Kam is a 47 y.o. female.  Chief Complaint:  Annual Exam    Presents for one-month follow-up     Last visit October 2022   TSH mildly increased.  Free T4 normal.    Lipid panel with  and close to goal.      Medical history:  -hypertension.  Questionable control last visit.  Restarted losartan 25 mg daily, amlodipine 10 mg daily, and Toprol XL 50 mg daily.  Reports compliance.  Denies side effects.  Blood pressure controlled.  No shortness breath swelling.    -prediabetes.  Stable A1c 4.7%.  Discontinue metformin.  Denies any symptoms hypo hyperglycemia.    -hyperlipidemia.  LDL close to goal.  Continue aspirin 81 mg daily and simvastatin 40 mg daily.  Reports compliance.  Denies side effects.  No chest pain claudication.    -hypothyroidism.  On Synthroid 125 mcg daily.  TSH mildly elevated.  Free T4 normal.  Concerned about weight gain today.  Questions if she can have a higher dose supplement.    -fibromyalgia and anxiety.  Started citalopram 20 mg daily.  Reports compliance.  Denies side effects.  Symptoms only minimally improved.  Also concerned about weight gain.  Main issue is that she is smoking 1 decline ID and questions if she could be treated with medical marijuana  -mild intermittent asthma.  Stable.  No controller medication.    - Allergic rhinosinusitis.  Stable on Flonase.    - GERD.  Stable on PPI.    Health maintenance up-to-date      Current Outpatient Medications:     albuterol (PROVENTIL/VENTOLIN HFA) 90 mcg/actuation inhaler, Inhale 2 puffs into the lungs every 6 (six) hours as needed for Wheezing or Shortness of Breath., Disp: 18 g, Rfl: 3    amLODIPine (NORVASC) 10 MG tablet, Take 1 tablet (10 mg total) by mouth nightly., Disp: 90 tablet, Rfl: 3    aspirin (ECOTRIN) 81 MG EC tablet, Take 1 tablet (81 mg total) by mouth once daily., Disp: 90 tablet, Rfl: 3    citalopram (CELEXA) 20 MG tablet, Take 1 tablet (20 mg total) by mouth nightly., Disp:  90 tablet, Rfl: 3    EPINEPHrine (EPIPEN) 0.3 mg/0.3 mL AtIn, INJECT CONTENTS OF 1 PEN AS NEEDED FOR ALLERGIC REACTION, Disp: 2 each, Rfl: 0    fluticasone propionate (FLONASE) 50 mcg/actuation nasal spray, 2 sprays (100 mcg total) by Each Nostril route once daily., Disp: 16 g, Rfl: 11    ibuprofen (ADVIL,MOTRIN) 100 mg/5 mL suspension, Take 40 mLs (800 mg total) by mouth every 6 (six) hours as needed for Pain., Disp: 473 mL, Rfl: 0    losartan (COZAAR) 25 MG tablet, Take 1 tablet (25 mg total) by mouth once daily., Disp: 90 tablet, Rfl: 3    metoprolol succinate (TOPROL-XL) 50 MG 24 hr tablet, Take 1 tablet (50 mg total) by mouth nightly. QD, Disp: 90 tablet, Rfl: 3    pantoprazole (PROTONIX) 20 MG tablet, Take 1 tablet (20 mg total) by mouth once daily., Disp: 90 tablet, Rfl: 3    simvastatin (ZOCOR) 40 MG tablet, Take 1 tablet (40 mg total) by mouth every evening., Disp: 90 tablet, Rfl: 3    buPROPion (WELLBUTRIN XL) 150 MG TB24 tablet, Take 1 tablet (150 mg total) by mouth once daily., Disp: 90 tablet, Rfl: 0    levothyroxine (SYNTHROID) 137 MCG Tab tablet, Take 1 tablet (137 mcg total) by mouth nightly., Disp: 90 tablet, Rfl: 0    Review of Systems   Constitutional:  Positive for unexpected weight change. Negative for activity change, appetite change and fatigue.   Eyes:  Negative for visual disturbance.   Respiratory:  Negative for cough, chest tightness, shortness of breath and wheezing.    Cardiovascular:  Negative for chest pain, palpitations and leg swelling.   Gastrointestinal:  Negative for abdominal pain, constipation, diarrhea, nausea and vomiting.   Genitourinary:  Negative for difficulty urinating.   Musculoskeletal:  Negative for myalgias.   Skin:  Negative for rash.   Neurological:  Negative for dizziness, tremors, syncope, weakness, light-headedness, numbness and headaches.   Psychiatric/Behavioral:  Negative for agitation, behavioral problems, confusion, decreased concentration, dysphoric mood,  "hallucinations, self-injury, sleep disturbance and suicidal ideas. The patient is nervous/anxious. The patient is not hyperactive.      Objective:   /78 (BP Location: Left arm, Patient Position: Sitting, BP Method: Small (Manual))   Pulse 65   Temp 96.3 °F (35.7 °C) (Tympanic)   Ht 5' 6" (1.676 m)   Wt 83.5 kg (184 lb 1.4 oz)   LMP 09/12/2022   SpO2 98%   BMI 29.71 kg/m²     Physical Exam  Vitals and nursing note reviewed.   Constitutional:       General: She is not in acute distress.     Appearance: Normal appearance. She is well-developed and overweight. She is not ill-appearing or toxic-appearing.      Comments:   10 lb weight gain since last visit   Neck:      Thyroid: No thyroid mass, thyromegaly or thyroid tenderness.   Cardiovascular:      Rate and Rhythm: Normal rate and regular rhythm.   Pulmonary:      Effort: Pulmonary effort is normal. No tachypnea, accessory muscle usage or respiratory distress.   Musculoskeletal:      Right lower leg: No edema.      Left lower leg: No edema.   Skin:     General: Skin is warm and dry.      Findings: No abrasion, bruising, ecchymosis, rash or wound.   Neurological:      General: No focal deficit present.      Mental Status: She is alert. Mental status is at baseline.      Coordination: Coordination normal.   Psychiatric:         Attention and Perception: Attention and perception normal. She is attentive.         Mood and Affect: Mood and affect normal. Mood is not anxious or depressed.         Speech: Speech normal.         Behavior: Behavior normal. Behavior is cooperative.         Thought Content: Thought content normal.         Cognition and Memory: Cognition and memory normal.         Judgment: Judgment normal.       Assessment:       ICD-10-CM ICD-9-CM   1. Anxiety  F41.9 300.00   2. Fibromyalgia  M79.7 729.1   3. Essential hypertension  I10 401.9   4. Postoperative hypothyroidism  E89.0 244.0   5. Mixed hyperlipidemia  E78.2 272.2   6. Prediabetes  " R73.03 790.29   7. Mild intermittent asthma without complication  J45.20 493.90     Plan:   Anxiety  Fibromyalgia  -     buPROPion (WELLBUTRIN XL) 150 MG TB24 tablet; Take 1 tablet (150 mg total) by mouth once daily.  Dispense: 90 tablet; Refill: 0  Continue citalopram 20 mg daily  Discussed benefits of any Wellbutrin  mg daily with regards to anxiety, smoking cessation, and potential weight gain  Also provided contact information for medical marijuana clinics in [place]     Essential hypertension  Controlled.  Stable.  Asymptomatic.  BP at goal.    Continue current medications     Postoperative hypothyroidism  -     levothyroxine (SYNTHROID) 137 MCG Tab tablet; Take 1 tablet (137 mcg total) by mouth nightly.  Dispense: 90 tablet; Refill: 0  Based on weight gain and TSH being low, increase thyroid supplement 137 mcg daily   Recheck TSH 8 weeks     Mixed hyperlipidemia  Controlled.  Stable.  Asymptomatic.  LDL close to goal.    Continue aspirin 81 mg daily   Continue simvastatin 40 mg daily     Prediabetes  Stable.  Asymptomatic.    Okay to remain off metformin.    Return to clinic 8 weeks    40+ minutes of total time spent on the encounter, which includes face to face time and non-face to face time preparing to see the patient (eg, review of tests), Obtaining and/or reviewing separately obtained history, documenting clinical information in the electronic or other health record, independently interpreting results (not separately reported) and communicating results to the patient/family/caregiver, or Care coordination (not separately reported).

## 2022-11-25 ENCOUNTER — TELEPHONE (OUTPATIENT)
Dept: INTERNAL MEDICINE | Facility: CLINIC | Age: 47
End: 2022-11-25
Payer: MEDICAID

## 2022-11-25 DIAGNOSIS — Z12.31 ENCOUNTER FOR SCREENING MAMMOGRAM FOR MALIGNANT NEOPLASM OF BREAST: Primary | ICD-10-CM

## 2022-11-25 NOTE — TELEPHONE ENCOUNTER
Spoke with pt; pt states she's been having some pain in her left breast; MA put in orders but informed pt next available was in January; MA advised pt she can call  to see if they can find a closer appt; pt verbalized understanding /LD

## 2022-11-25 NOTE — TELEPHONE ENCOUNTER
----- Message from Marcia Mario sent at 11/25/2022 11:38 AM CST -----  Contact: [phone]  Pt is calling requesting mammo orders, pt states she have been experiencing pain . Please call back at [phone]/[employer]if no answer) . Thanks/dj

## 2022-11-28 ENCOUNTER — CLINICAL SUPPORT (OUTPATIENT)
Dept: SMOKING CESSATION | Facility: CLINIC | Age: 47
End: 2022-11-28
Payer: COMMERCIAL

## 2022-11-28 DIAGNOSIS — F17.200 NICOTINE DEPENDENCE: Primary | ICD-10-CM

## 2022-11-28 PROCEDURE — 99404 PREV MED CNSL INDIV APPRX 60: CPT | Mod: S$PBB,,, | Performed by: SPEECH-LANGUAGE PATHOLOGIST

## 2022-11-28 PROCEDURE — 99999 PR PBB SHADOW E&M-EST. PATIENT-LVL II: ICD-10-PCS | Mod: PBBFAC,,, | Performed by: SPEECH-LANGUAGE PATHOLOGIST

## 2022-11-28 PROCEDURE — 99999 PR PBB SHADOW E&M-EST. PATIENT-LVL II: CPT | Mod: PBBFAC,,, | Performed by: SPEECH-LANGUAGE PATHOLOGIST

## 2022-11-28 PROCEDURE — 99404 PR PREVENT COUNSEL,INDIV,60 MIN: ICD-10-PCS | Mod: S$PBB,,, | Performed by: SPEECH-LANGUAGE PATHOLOGIST

## 2022-11-28 RX ORDER — IBUPROFEN 200 MG
1 TABLET ORAL DAILY
Qty: 30 PATCH | Refills: 0 | Status: SHIPPED | OUTPATIENT
Start: 2022-11-28 | End: 2023-10-17

## 2022-11-28 RX ORDER — DIPHENHYDRAMINE HCL 25 MG
4 CAPSULE ORAL
Qty: 190 EACH | Refills: 0 | Status: SHIPPED | OUTPATIENT
Start: 2022-11-28 | End: 2023-10-17

## 2022-11-28 RX ORDER — ASPIRIN/CALCIUM CARB/MAGNESIUM 325 MG
4 TABLET ORAL
Qty: 270 LOZENGE | Refills: 0 | Status: SHIPPED | OUTPATIENT
Start: 2022-11-28 | End: 2023-10-17

## 2022-11-29 ENCOUNTER — PATIENT MESSAGE (OUTPATIENT)
Dept: SMOKING CESSATION | Facility: CLINIC | Age: 47
End: 2022-11-29
Payer: MEDICAID

## 2022-11-29 NOTE — PROGRESS NOTES
At SOC, patient reports smoking 8 cpd that she relights. Assessed CO level with patient reporting 4 cigarettes prior to session. CO . Discussed the role of tobacco cessation program, role of NRT & behavioral changes to assist the patient to reach her goal of being tobacco free. The patient reports she is willing to utilize 14 mg patches, 4 mg gum & lozenge & will return for a follow up visit.  Education & instruction on the role of the NRT, usage & proper placement of the patch, lozenges & gum technique. The patient verbalized understanding & willingness to apply. Patient instructed to call CTTS anytime. Follow up visit set with the patient for 12/5/2022 at 3:00 pm for individual & 11/30/2022 at 6:00 pm for group

## 2022-12-05 ENCOUNTER — CLINICAL SUPPORT (OUTPATIENT)
Dept: SMOKING CESSATION | Facility: CLINIC | Age: 47
End: 2022-12-05

## 2022-12-05 DIAGNOSIS — F17.200 NICOTINE DEPENDENCE: Primary | ICD-10-CM

## 2022-12-05 PROCEDURE — 99999 PR PBB SHADOW E&M-EST. PATIENT-LVL II: ICD-10-PCS | Mod: PBBFAC,,, | Performed by: SPEECH-LANGUAGE PATHOLOGIST

## 2022-12-05 PROCEDURE — 99404 PREV MED CNSL INDIV APPRX 60: CPT | Mod: S$GLB,,, | Performed by: SPEECH-LANGUAGE PATHOLOGIST

## 2022-12-05 PROCEDURE — 99999 PR PBB SHADOW E&M-EST. PATIENT-LVL II: CPT | Mod: PBBFAC,,, | Performed by: SPEECH-LANGUAGE PATHOLOGIST

## 2022-12-05 PROCEDURE — 99404 PR PREVENT COUNSEL,INDIV,60 MIN: ICD-10-PCS | Mod: S$GLB,,, | Performed by: SPEECH-LANGUAGE PATHOLOGIST

## 2022-12-05 NOTE — PROGRESS NOTES
Individual Follow-Up Form    12/5/2022    Quit Date: 11/29/2022    Clinical Status of Patient: Outpatient    Continuing Medication: yes  Patches 14 mg, 4 mg lozenges, 4 mg gum (hasn't used gum yet)    Other Medications:      Target Symptoms: Withdrawal and medication side effects. The following were  rated moderate (3) to severe (4) on TCRS:  Moderate (3): none  Severe (4): none    Comments: Telephone visit due to the patient has to  children from school. Patient reports she has been smoke free since 11/29/2022. Congratulated & praised the patient on her gains & achievement. Patient remains on her NRT of 14 mg patches & 4 mg lozenges. Patient reports she has 4 mg gum; but hasn't used it yet. Administered TCRS with patient reporting no moderate or severe symptoms. Reports mild symptoms of restless/impatient as well as slight symptoms of frustration, increased appetite, difficulty concentrating, anxious/nervous & insomnia. Discussed healthier snack options to include carrots, celery & edamame. Discussed alternative behaviors which patient reports she has been crafting to keep her hands busy which she reports is has been successful. Patient states her goal is to remain smoke free by next session. Follow up set up for 12/27/2022 at 3:00 pm.     Diagnosis: F17.200    Next Visit: 3 weeks

## 2022-12-26 ENCOUNTER — PATIENT MESSAGE (OUTPATIENT)
Dept: OTHER | Facility: OTHER | Age: 47
End: 2022-12-26
Payer: MEDICAID

## 2022-12-27 ENCOUNTER — CLINICAL SUPPORT (OUTPATIENT)
Dept: SMOKING CESSATION | Facility: CLINIC | Age: 47
End: 2022-12-27

## 2022-12-27 DIAGNOSIS — F17.200 NICOTINE DEPENDENCE: Primary | ICD-10-CM

## 2022-12-27 PROCEDURE — 99999 PR PBB SHADOW E&M-EST. PATIENT-LVL II: ICD-10-PCS | Mod: PBBFAC,,, | Performed by: SPEECH-LANGUAGE PATHOLOGIST

## 2022-12-27 PROCEDURE — 99404 PR PREVENT COUNSEL,INDIV,60 MIN: ICD-10-PCS | Mod: S$GLB,,, | Performed by: SPEECH-LANGUAGE PATHOLOGIST

## 2022-12-27 PROCEDURE — 99999 PR PBB SHADOW E&M-EST. PATIENT-LVL II: CPT | Mod: PBBFAC,,, | Performed by: SPEECH-LANGUAGE PATHOLOGIST

## 2022-12-27 PROCEDURE — 99404 PREV MED CNSL INDIV APPRX 60: CPT | Mod: S$GLB,,, | Performed by: SPEECH-LANGUAGE PATHOLOGIST

## 2022-12-27 NOTE — PROGRESS NOTES
Individual Follow-Up Form    12/27/2022    Quit Date: 11/29/2022    Clinical Status of Patient: Outpatient    Continuing Medication: yes Patches 14 mg (not using), 4 mg lozenges, 4 mg gum    Other Medications:      Target Symptoms: Withdrawal and medication side effects. The following were  rated moderate (3) to severe (4) on TCRS:  Moderate (3): increased weight  Severe (4): none    Comments: Telephone visit at patient's request. Patient reports she has remained smoke free since her quit date of 11/29/2022. Patient states she is not utilizing her 14 mg patches due to feeling her heart race with the patches. She reports utilizing her 4 mg lozenges & 4 mg gum. Patient reports a situation of stress where in the past she would have smoked. She was able to get through a stressful situation while in the hospital assisting her nephew without smoking & that she is feeling proud. She reports utilizing candies when she has urges/craves. She also states she has gained weight & that she is concerned about her weight gain. Discussed not having snacks in her bedroom & only eating at the dining table due to the patient reports she snacks at night while watching tv. Discussed high risk situations to include being around other smokers. Patient reports her  & her mother smokes & that she hasn't had any desire. She feels states that she feels she has lost interest in smoking based on how it doesn't bother her. Discussed strategies for stress management for emotional situations which included mindfulness, deep  breathing & her deandra. Patient states her goal is to remain smoke free. Follow up set for 1/23/2023 at 5:00 pm.     Diagnosis: F17.200    Next Visit: 4 weeks

## 2023-01-19 ENCOUNTER — TELEPHONE (OUTPATIENT)
Dept: INTERNAL MEDICINE | Facility: CLINIC | Age: 48
End: 2023-01-19
Payer: MEDICAID

## 2023-01-19 NOTE — TELEPHONE ENCOUNTER
"Spoke with pt; pt stated she has a sinus infection which is causing her "eye sockets to hurt"; MA advised pt she would have to come into clinic or go to an Urgent Care to get tested for Flu or Covid ; pt stated she took a home covid test on 1/18 results were negative; pt is asking for medication to treat infection /LD  "

## 2023-01-19 NOTE — TELEPHONE ENCOUNTER
Even if her COVID test is negative, for any antibiotics she would need a visit whether it is telemedicine, in person, or through urgent care

## 2023-01-19 NOTE — TELEPHONE ENCOUNTER
----- Message from Juliette Johnson sent at 1/19/2023  1:11 PM CST -----  Contact: self/[phone]  Pt is calling in regards to getting a medication to help with a very bad sinus infection she has. Please send prescription to     [facility] - [place] - [id number]  [address] [address]  Phone: [phone] Fax: [phone]    Please give her a call back at [phone]. Thank you s/g

## 2023-01-19 NOTE — TELEPHONE ENCOUNTER
Spoke with pt; MA explained PCP recommendations pt verbalized understanding & disconnected call /LD

## 2023-01-23 ENCOUNTER — TELEPHONE (OUTPATIENT)
Dept: SMOKING CESSATION | Facility: CLINIC | Age: 48
End: 2023-01-23
Payer: MEDICAID

## 2023-01-23 ENCOUNTER — HOSPITAL ENCOUNTER (EMERGENCY)
Facility: HOSPITAL | Age: 48
Discharge: HOME | End: 2023-01-23
Attending: EMERGENCY MEDICINE
Payer: MEDICAID

## 2023-01-23 ENCOUNTER — TELEPHONE (OUTPATIENT)
Dept: INTERNAL MEDICINE | Facility: CLINIC | Age: 48
End: 2023-01-23
Payer: MEDICAID

## 2023-01-23 VITALS
HEIGHT: 66 IN | SYSTOLIC BLOOD PRESSURE: 152 MMHG | HEART RATE: 68 BPM | RESPIRATION RATE: 20 BRPM | DIASTOLIC BLOOD PRESSURE: 80 MMHG | OXYGEN SATURATION: 97 % | WEIGHT: 201.81 LBS | TEMPERATURE: 98 F | BODY MASS INDEX: 32.43 KG/M2

## 2023-01-23 DIAGNOSIS — J01.10 ACUTE FRONTAL SINUSITIS, RECURRENCE NOT SPECIFIED: Primary | ICD-10-CM

## 2023-01-23 DIAGNOSIS — J01.30 ACUTE SPHENOIDAL SINUSITIS, RECURRENCE NOT SPECIFIED: ICD-10-CM

## 2023-01-23 DIAGNOSIS — J01.20 ACUTE ETHMOIDAL SINUSITIS, RECURRENCE NOT SPECIFIED: ICD-10-CM

## 2023-01-23 PROCEDURE — 25000003 PHARM REV CODE 250: Performed by: NURSE PRACTITIONER

## 2023-01-23 PROCEDURE — 99285 EMERGENCY DEPT VISIT HI MDM: CPT | Mod: 25

## 2023-01-23 PROCEDURE — 96372 THER/PROPH/DIAG INJ SC/IM: CPT | Performed by: NURSE PRACTITIONER

## 2023-01-23 PROCEDURE — 63600175 PHARM REV CODE 636 W HCPCS: Performed by: NURSE PRACTITIONER

## 2023-01-23 RX ORDER — TETRACAINE HYDROCHLORIDE 5 MG/ML
2 SOLUTION OPHTHALMIC
Status: COMPLETED | OUTPATIENT
Start: 2023-01-23 | End: 2023-01-23

## 2023-01-23 RX ORDER — OXYCODONE AND ACETAMINOPHEN 10; 325 MG/1; MG/1
1 TABLET ORAL
Status: COMPLETED | OUTPATIENT
Start: 2023-01-23 | End: 2023-01-23

## 2023-01-23 RX ORDER — DEXAMETHASONE SODIUM PHOSPHATE 4 MG/ML
8 INJECTION, SOLUTION INTRA-ARTICULAR; INTRALESIONAL; INTRAMUSCULAR; INTRAVENOUS; SOFT TISSUE
Status: COMPLETED | OUTPATIENT
Start: 2023-01-23 | End: 2023-01-23

## 2023-01-23 RX ORDER — OXYCODONE AND ACETAMINOPHEN 5; 325 MG/1; MG/1
1 TABLET ORAL EVERY 6 HOURS PRN
Qty: 12 TABLET | Refills: 0 | Status: SHIPPED | OUTPATIENT
Start: 2023-01-23 | End: 2023-10-17

## 2023-01-23 RX ORDER — LEVOFLOXACIN 750 MG/1
750 TABLET ORAL DAILY
Qty: 7 TABLET | Refills: 0 | Status: SHIPPED | OUTPATIENT
Start: 2023-01-23 | End: 2023-01-30

## 2023-01-23 RX ORDER — ONDANSETRON 8 MG/1
8 TABLET, ORALLY DISINTEGRATING ORAL
Status: COMPLETED | OUTPATIENT
Start: 2023-01-23 | End: 2023-01-23

## 2023-01-23 RX ORDER — METHYLPREDNISOLONE 4 MG/1
TABLET ORAL
Qty: 1 EACH | Refills: 0 | Status: SHIPPED | OUTPATIENT
Start: 2023-01-23 | End: 2023-10-17

## 2023-01-23 RX ADMIN — TETRACAINE HYDROCHLORIDE 2 DROP: 5 SOLUTION OPHTHALMIC at 05:01

## 2023-01-23 RX ADMIN — DEXAMETHASONE SODIUM PHOSPHATE 8 MG: 4 INJECTION INTRA-ARTICULAR; INTRALESIONAL; INTRAMUSCULAR; INTRAVENOUS; SOFT TISSUE at 05:01

## 2023-01-23 RX ADMIN — OXYCODONE AND ACETAMINOPHEN 1 TABLET: 10; 325 TABLET ORAL at 05:01

## 2023-01-23 RX ADMIN — ONDANSETRON 8 MG: 8 TABLET, ORALLY DISINTEGRATING ORAL at 05:01

## 2023-01-23 NOTE — Clinical Note
"Shelly"Barbrichaitanya Kam was seen and treated in our emergency department on 1/23/2023.  She may return to work on 01/25/2023.       If you have any questions or concerns, please don't hesitate to call.      Devyn Lea NP"

## 2023-01-23 NOTE — TELEPHONE ENCOUNTER
Spoke with pt; pt stated she's at the ER due to she's dealing with extreme eye pain; MA advised pt after being discharged to contact PCP office with updates; pt verbalized understanding /LD

## 2023-01-23 NOTE — TELEPHONE ENCOUNTER
----- Message from Leandra Vann sent at 1/23/2023  1:08 PM CST -----  Contact: self [phone]  Patient is calling in requesting  a same day appointment she is in extreme pain in her right eye.[phone]

## 2023-01-23 NOTE — ED PROVIDER NOTES
Encounter Date: 1/23/2023       History     Chief Complaint   Patient presents with    Facial Pain     Pain behind right eye and to right upper face, also reports green nasal secretions. X 5 days.     Patient is a 48-year-old female who presents with complaints of right-sided headache and sinus pain and pressure.  Onset of symptoms was approximately 5 we days ago.  Patient was seen at urgent care for same symptoms.  No relief with medications taken at home.  She denies any vision problems.  Patient is in obvious pain.    Review of patient's allergies indicates:   Allergen Reactions    Pcn [penicillins] Anaphylaxis     Throat , tongue swelling     Banana      Itchy mouth    Lisinopril Other (See Comments)     Lip swelling    Melon      Itchy mouth    Morphine Other (See Comments)     Unknown    Tree nuts      Oral itching     Past Medical History:   Diagnosis Date    Allergy     Anemia     Asthma     Depression     Diabetes mellitus     Diabetes mellitus, type 2     Encounter for blood transfusion     Fibromyalgia     GERD (gastroesophageal reflux disease)     Hyperlipidemia     Hypertension     Hyperthyroidism     Panic attack     Prozac in past     Past Surgical History:   Procedure Laterality Date    BRONCHOSCOPY      DIAGNOSTIC LAPAROSCOPY N/A 9/12/2022    Procedure: LAPAROSCOPY, DIAGNOSTIC;  Surgeon: Talha Santa MD;  Location: [facility] OR;  Service: OB/GYN;  Laterality: N/A;    HERNIA REPAIR      SALPINGECTOMY Bilateral 9/12/2022    Procedure: SALPINGECTOMY;  Surgeon: Talha Santa MD;  Location: [facility] OR;  Service: OB/GYN;  Laterality: Bilateral;    THYROIDECTOMY Bilateral 3/25/2021    Procedure: THYROIDECTOMY;  Surgeon: Alon Barton MD;  Location: [address];  Service: ENT;  Laterality: Bilateral;  NIMS monitor    TONSILLECTOMY      TOTAL ABDOMINAL HYSTERECTOMY N/A 9/12/2022    Procedure: HYSTERECTOMY, TOTAL, ABDOMINAL;  Surgeon: Talha Santa MD;  Location: [facility] OR;  Service: OB/GYN;  Laterality:  N/A;    TRACHEOSTOMY      TRACHEOSTOMY TUBE PLACEMENT      XI ROBOTIC HYSTERECTOMY, WITH SALPINGECTOMY N/A 2022    Procedure: XI ROBOTIC HYSTERECTOMY,WITH SALPINGECTOMY;  Surgeon: Talha Santa MD;  Location: [facility];  Service: OB/GYN;  Laterality: N/A;  converted to open without docking     Family History   Problem Relation Age of Onset    Cancer Paternal Aunt      Social History     Tobacco Use    Smoking status: Former     Packs/day: 0.50     Types: Cigarettes     Quit date: 2022     Years since quittin.1    Smokeless tobacco: Never    Tobacco comments:     Smokes 8 long cigarettes that she relights   Substance Use Topics    Alcohol use: Yes     Comment: once a year; hold 72 hrs prior to sx    Drug use: Yes     Types: Marijuana     Review of Systems   Constitutional:  Negative for fever.   HENT:  Positive for sinus pressure and sinus pain. Negative for sore throat.    Eyes:  Positive for pain (right).   Respiratory:  Negative for shortness of breath.    Cardiovascular:  Negative for chest pain.   Gastrointestinal:  Negative for nausea.   Genitourinary:  Negative for dysuria.   Musculoskeletal:  Negative for back pain.   Skin:  Negative for rash.   Neurological:  Negative for weakness.   Hematological:  Does not bruise/bleed easily.     Physical Exam     Initial Vitals [23 1546]   BP Pulse Resp Temp SpO2   (!) 152/80 68 20 97.7 °F (36.5 °C) 97 %      MAP       --         Physical Exam    Nursing note and vitals reviewed.  Constitutional: She appears well-developed and well-nourished.   HENT:   Head: Normocephalic and atraumatic.   Nose: Right sinus exhibits maxillary sinus tenderness and frontal sinus tenderness.   Eyes: EOM are normal. Pupils are equal, round, and reactive to light.   Neck: Neck supple.   Normal range of motion.  Cardiovascular:  Normal rate, regular rhythm, normal heart sounds and intact distal pulses.           Pulmonary/Chest: Breath sounds normal.   Abdominal: Abdomen  is soft. Bowel sounds are normal.   Musculoskeletal:         General: Normal range of motion.      Cervical back: Normal range of motion and neck supple.     Neurological: She is alert and oriented to person, place, and time. She has normal strength and normal reflexes.   Skin: Skin is warm and dry.       ED Course   Procedures  Labs Reviewed - No data to display       Imaging Results              CT Head Without Contrast (Final result)  Result time 01/23/23 16:37:58      Final result by PAULINA Mesa Sr., MD (01/23/23 16:37:58)                   Impression:      There is complete or nearly complete opacification of the frontal, ethmoidal, and sphenoidal sinuses bilaterally.  This is consistent with the patient's history and characteristic of sinusitis.    All CT scans at this facility use dose modulation, iterative reconstruction, and/or weight base dosing when appropriate to reduce radiation dose when appropriate to reduce radiation dose to as low as reasonably achievable.      Electronically signed by: Clarence Mesa MD  Date:    01/23/2023  Time:    16:37               Narrative:    EXAMINATION:  CT HEAD WITHOUT CONTRAST    CLINICAL HISTORY:  Headache, chronic, new features or increased frequency;    TECHNIQUE:  Standard brain CT protocol without IV contrast was performed.    COMPARISON:  03/15/2016    FINDINGS:  The ventricles have a normal size, position, and appearance. There is no abnormal intracranial mass or intracranial hemorrhage. There is no skull fracture.  There is complete or nearly complete opacification of the frontal, ethmoidal, and sphenoidal sinuses bilaterally.                                       Medications   dexAMETHasone injection 8 mg (has no administration in time range)   oxyCODONE-acetaminophen  mg per tablet 1 tablet (1 tablet Oral Given 1/23/23 1708)   TETRAcaine HCl (PF) 0.5 % Drop 2 drop (2 drops Right Eye Given 1/23/23 1709)   ondansetron disintegrating tablet 8 mg (8 mg  Oral Given 1/23/23 1708)     Medical Decision Making:   ED Management:  Patient instructed take medications as prescribed and follow-up with her primary care doctor.  Patient to return to the emergency room with any worsening symptoms.  No distress noted at time of discharge.                        Clinical Impression:   Final diagnoses:  [J01.10] Acute frontal sinusitis, recurrence not specified (Primary)  [J01.30] Acute sphenoidal sinusitis, recurrence not specified  [J01.20] Acute ethmoidal sinusitis, recurrence not specified        ED Disposition Condition    Discharge Stable          ED Prescriptions       Medication Sig Dispense Start Date End Date Auth. Provider    levoFLOXacin (LEVAQUIN) 750 MG tablet Take 1 tablet (750 mg total) by mouth once daily. for 7 days 7 tablet 1/23/2023 1/30/2023 Devyn Lea NP    oxyCODONE-acetaminophen (PERCOCET) 5-325 mg per tablet Take 1 tablet by mouth every 6 (six) hours as needed for Pain. 12 tablet 1/23/2023 -- Devyn Lea NP    methylPREDNISolone (MEDROL DOSEPACK) 4 mg tablet Take per package directions 1 each 1/23/2023 -- Devyn Lea NP          Follow-up Information       Follow up With Specialties Details Why Contact Info    eHraclio Farrell MD Family Medicine  As needed [id number]  [place] [address]  [phone]               Devyn Lea NP  01/23/23 8230

## 2023-01-23 NOTE — FIRST PROVIDER EVALUATION
Medical screening examination initiated.  I have conducted a focused provider triage encounter, findings are as follows:    Brief history of present illness:  48-year-old female with complaint of right-sided headache for the past 4-5 days.  Patient reports moderate to severe pain at present.  Patient reports mild blurred vision.    There were no vitals filed for this visit.    Pertinent physical exam:  PERRLA, no cloudy cornea noted

## 2023-01-24 ENCOUNTER — TELEPHONE (OUTPATIENT)
Dept: SMOKING CESSATION | Facility: CLINIC | Age: 48
End: 2023-01-24
Payer: MEDICAID

## 2023-01-24 ENCOUNTER — CLINICAL SUPPORT (OUTPATIENT)
Dept: SMOKING CESSATION | Facility: CLINIC | Age: 48
End: 2023-01-24

## 2023-01-24 DIAGNOSIS — F17.200 NICOTINE DEPENDENCE: Primary | ICD-10-CM

## 2023-01-24 PROCEDURE — 99402 PREV MED CNSL INDIV APPRX 30: CPT | Mod: S$GLB,,, | Performed by: SPEECH-LANGUAGE PATHOLOGIST

## 2023-01-24 PROCEDURE — 99999 PR PBB SHADOW E&M-EST. PATIENT-LVL II: ICD-10-PCS | Mod: PBBFAC,,, | Performed by: SPEECH-LANGUAGE PATHOLOGIST

## 2023-01-24 PROCEDURE — 99999 PR PBB SHADOW E&M-EST. PATIENT-LVL II: CPT | Mod: PBBFAC,,, | Performed by: SPEECH-LANGUAGE PATHOLOGIST

## 2023-01-24 PROCEDURE — 99402 PR PREVENT COUNSEL,INDIV,30 MIN: ICD-10-PCS | Mod: S$GLB,,, | Performed by: SPEECH-LANGUAGE PATHOLOGIST

## 2023-01-24 NOTE — TELEPHONE ENCOUNTER
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

## 2023-01-24 NOTE — TELEPHONE ENCOUNTER
Call to patient regarding missed 6:30 pm appointment yesterday. Patient reports she was in the ER due to a sinus infection

## 2023-01-24 NOTE — TELEPHONE ENCOUNTER
Text sent to confirm scheduled 5 pm appointment today. Patient replied she has to attend a meet & greet tonight for a class that doesn't end up 6 pm & that she is available for a phone session after 6:25 pm. Offered earlier cancellation appointment times; however patient states she has to work. Agreed upon 6:30 pm phone session.

## 2023-01-24 NOTE — TELEPHONE ENCOUNTER
Additional attempt to reach patient via phone. Phone continues to go straight to voicemail. Voicemail left regarding scheduled 6:30 pm phone session

## 2023-01-24 NOTE — PROGRESS NOTES
Individual Follow-Up Form    1/24/2023    Quit Date: 11/29/2022    Clinical Status of Patient: Outpatient    Continuing Medication: yes  Patches 14 mg (not using), 4 mg lozenges, 4 mg gum    Other Medications:      Target Symptoms: Withdrawal and medication side effects. The following were  rated moderate (3) to severe (4) on TCRS:  Moderate (3): none  Severe (4): none    Comments: DELAY WITH ADDING PATIENT ON TO SCHEDULE WHICH IMPACTED TIME STAMP. CALL BEGAN AT 9:15 AM. Telephone visit. Patient reports she wasn't able to do the appointment yesterday due to being in the ER. Patient reports she has remained smoke free since her quit date of 11/29/2022. Praised the patient on her continued commitment to her smoke free life. Patient reports she utilizes her NRT of 4 mg gum & states she utilized it yesterday with the stress of being in the ER & her eye pain. Commended the patient on utilizing healthier coping strategies with times of emotional stress that does not include smoking. Discussed strategies for stress management for emotional situations which included mindfulness & deep breathing & mindfulness apps on her smart phone to assist the patient with shifting from leaning on nicotine as a form of stress relief.  Patient states her goal is to remain smoke free. Follow up set for 2/14/2023 at 9:00 am.     Diagnosis: F17.200    Next Visit: 3 weeks

## 2023-02-07 ENCOUNTER — HOSPITAL ENCOUNTER (OUTPATIENT)
Dept: RADIOLOGY | Facility: HOSPITAL | Age: 48
Discharge: HOME | End: 2023-02-07
Attending: FAMILY MEDICINE
Payer: MEDICAID

## 2023-02-07 VITALS — WEIGHT: 201.94 LBS | HEIGHT: 66 IN | BODY MASS INDEX: 32.45 KG/M2

## 2023-02-07 DIAGNOSIS — Z12.31 ENCOUNTER FOR SCREENING MAMMOGRAM FOR MALIGNANT NEOPLASM OF BREAST: ICD-10-CM

## 2023-02-07 PROCEDURE — 77067 SCR MAMMO BI INCL CAD: CPT | Mod: TC

## 2023-02-07 PROCEDURE — 77063 MAMMO DIGITAL SCREENING BILAT WITH TOMO: ICD-10-PCS | Mod: 26,,, | Performed by: RADIOLOGY

## 2023-02-07 PROCEDURE — 77063 BREAST TOMOSYNTHESIS BI: CPT | Mod: 26,,, | Performed by: RADIOLOGY

## 2023-02-07 PROCEDURE — 77067 MAMMO DIGITAL SCREENING BILAT WITH TOMO: ICD-10-PCS | Mod: 26,,, | Performed by: RADIOLOGY

## 2023-02-07 PROCEDURE — 77067 SCR MAMMO BI INCL CAD: CPT | Mod: 26,,, | Performed by: RADIOLOGY

## 2023-02-14 ENCOUNTER — CLINICAL SUPPORT (OUTPATIENT)
Dept: SMOKING CESSATION | Facility: CLINIC | Age: 48
End: 2023-02-14
Payer: COMMERCIAL

## 2023-02-14 DIAGNOSIS — F17.200 NICOTINE DEPENDENCE: Primary | ICD-10-CM

## 2023-02-14 PROCEDURE — 99404 PR PREVENT COUNSEL,INDIV,60 MIN: ICD-10-PCS | Mod: S$PBB,,, | Performed by: SPEECH-LANGUAGE PATHOLOGIST

## 2023-02-14 PROCEDURE — 99999 PR PBB SHADOW E&M-EST. PATIENT-LVL II: ICD-10-PCS | Mod: PBBFAC,,, | Performed by: SPEECH-LANGUAGE PATHOLOGIST

## 2023-02-14 PROCEDURE — 99999 PR PBB SHADOW E&M-EST. PATIENT-LVL II: CPT | Mod: PBBFAC,,, | Performed by: SPEECH-LANGUAGE PATHOLOGIST

## 2023-02-14 PROCEDURE — 99404 PREV MED CNSL INDIV APPRX 60: CPT | Mod: S$PBB,,, | Performed by: SPEECH-LANGUAGE PATHOLOGIST

## 2023-02-14 NOTE — PROGRESS NOTES
Individual Follow-Up Form    2/14/2023    Quit Date: 11/29/2022    Clinical Status of Patient: Outpatient    Continuing Medication: yes  Nicotine gum 4 mg gum & 4 mg lozenges, 14 mg patches (not using patches)     Other Medications:      Target Symptoms: Withdrawal and medication side effects. The following were  rated moderate (3) to severe (4) on TCRS:  Moderate (3): none  Severe (4): none    Comments: Patient seen in clinic. Reports she has remained smoke free since her quit of 11/29/2022. Assessed CO level. CO 9. Patient resides in the home with a smoker. Discussed with patient the impact of 2nd hand & 3rd hand smoke. Discussed impact of CO level on her healthy & functions. Patient reports using her NRT 4 mg lozenges & 4 mg gum; but not using her 14 mg patches. Discussed patient's triggers to include stress. Discussed with patient the utilization of mindfulness & breathing to assist with managing stress. Patient states her goal is to remain smoke free. Follow up set for 3/1/2023 at 12:00 pm.     Diagnosis: F17.200    Next Visit: 2 weeks

## 2023-02-23 ENCOUNTER — CLINICAL SUPPORT (OUTPATIENT)
Dept: SMOKING CESSATION | Facility: CLINIC | Age: 48
End: 2023-02-23

## 2023-02-23 DIAGNOSIS — F17.200 NICOTINE DEPENDENCE: Primary | ICD-10-CM

## 2023-02-23 PROCEDURE — 99407 BEHAV CHNG SMOKING > 10 MIN: CPT | Mod: S$GLB,,,

## 2023-02-23 PROCEDURE — 99999 PR PBB SHADOW E&M-EST. PATIENT-LVL I: ICD-10-PCS | Mod: PBBFAC,,,

## 2023-02-23 PROCEDURE — 99407 PR TOBACCO USE CESSATION INTENSIVE >10 MINUTES: ICD-10-PCS | Mod: S$GLB,,,

## 2023-02-23 PROCEDURE — 99999 PR PBB SHADOW E&M-EST. PATIENT-LVL I: CPT | Mod: PBBFAC,,,

## 2023-02-23 NOTE — PROGRESS NOTES
Called pt to f/u on her 3 month smoking cessation quit status. Pt stated she remains tobacco free since 11/28/22, the day she started the program. Congratulated her on her hard work and success. Informed her of benefit period, phone follow ups, and contact information. Will complete smart form and will continue to follow up on quit #1 episode.

## 2023-03-01 ENCOUNTER — TELEPHONE (OUTPATIENT)
Dept: SMOKING CESSATION | Facility: CLINIC | Age: 48
End: 2023-03-01
Payer: COMMERCIAL

## 2023-03-01 NOTE — TELEPHONE ENCOUNTER
Text sent regarding scheduled 12 pm appointment & to offer a 1 pm or 2 pm option if the patient is not available for her scheduled appointment time.   
Imaging Studies

## 2023-03-01 NOTE — TELEPHONE ENCOUNTER
Text sent to confirm scheduled 12 pm appointment today. 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

## 2023-03-06 ENCOUNTER — TELEPHONE (OUTPATIENT)
Dept: SMOKING CESSATION | Facility: CLINIC | Age: 48
End: 2023-03-06
Payer: COMMERCIAL

## 2023-03-07 ENCOUNTER — CLINICAL SUPPORT (OUTPATIENT)
Dept: SMOKING CESSATION | Facility: CLINIC | Age: 48
End: 2023-03-07
Payer: COMMERCIAL

## 2023-03-07 DIAGNOSIS — F17.200 NICOTINE DEPENDENCE: Primary | ICD-10-CM

## 2023-03-07 PROCEDURE — 99999 PR PBB SHADOW E&M-EST. PATIENT-LVL II: ICD-10-PCS | Mod: PBBFAC,,, | Performed by: SPEECH-LANGUAGE PATHOLOGIST

## 2023-03-07 PROCEDURE — 99999 PR PBB SHADOW E&M-EST. PATIENT-LVL II: CPT | Mod: PBBFAC,,, | Performed by: SPEECH-LANGUAGE PATHOLOGIST

## 2023-03-07 PROCEDURE — 99404 PREV MED CNSL INDIV APPRX 60: CPT | Mod: S$PBB,,, | Performed by: SPEECH-LANGUAGE PATHOLOGIST

## 2023-03-07 PROCEDURE — 99404 PR PREVENT COUNSEL,INDIV,60 MIN: ICD-10-PCS | Mod: S$PBB,,, | Performed by: SPEECH-LANGUAGE PATHOLOGIST

## 2023-03-07 NOTE — PROGRESS NOTES
Individual Follow-Up Form    3/7/2023    Quit Date: 11/29/2022    Clinical Status of Patient: Outpatient    Continuing Medication: yes  4 mg gum & 4 mg lozenges (not using stating it burns her throat), 14 mg patches (not using patches)    Other Medications:      Target Symptoms: Withdrawal and medication side effects. The following were  rated moderate (3) to severe (4) on TCRS:  Moderate (3):   Severe (4):     Comments: Patient seen in clinic. She reports she has been smoke free since her quit date of 11/29/2022. She reports she is hasn't been using her patches or lozenges. She reports utilizing the gum every now & then. She states she used the other day when her nerves were bad. Discussed the impact of emotions & nicotine's role with stress. Explored utilizing other behaviors for relaxation to include guided meditation & deep breathing tasks to avoid leaning on nicotine as a coping strategy. CTTS instruction on a guided meditation task which patient reports was helpful & she was able to experience what it felt like for her body to relax. Provided patient with a written copy for the patient to refer to as well as encouraged patient to explore guided meditation apps & videos that can be found online. Patient's goal is to remain smoke free. Follow up set for 3/21/2023 at 2:00 pm.    Diagnosis: F17.200    Next Visit: 2 weeks

## 2023-03-09 ENCOUNTER — LAB VISIT (OUTPATIENT)
Dept: LAB | Facility: HOSPITAL | Age: 48
End: 2023-03-09
Attending: NURSE PRACTITIONER
Payer: MEDICAID

## 2023-03-09 DIAGNOSIS — Z11.59 ENCOUNTER FOR HEPATITIS C SCREENING TEST FOR LOW RISK PATIENT: ICD-10-CM

## 2023-03-09 DIAGNOSIS — I15.2 HYPERTENSION ASSOCIATED WITH DIABETES: ICD-10-CM

## 2023-03-09 DIAGNOSIS — E11.59 HYPERTENSION ASSOCIATED WITH DIABETES: ICD-10-CM

## 2023-03-09 DIAGNOSIS — E11.9 TYPE 2 DIABETES MELLITUS WITHOUT RETINOPATHY: ICD-10-CM

## 2023-03-09 DIAGNOSIS — Z11.4 SCREENING FOR HIV (HUMAN IMMUNODEFICIENCY VIRUS): ICD-10-CM

## 2023-03-09 PROCEDURE — 83036 HEMOGLOBIN GLYCOSYLATED A1C: CPT | Performed by: NURSE PRACTITIONER

## 2023-03-09 PROCEDURE — 84439 ASSAY OF FREE THYROXINE: CPT | Performed by: NURSE PRACTITIONER

## 2023-03-09 PROCEDURE — 86803 HEPATITIS C AB TEST: CPT | Performed by: NURSE PRACTITIONER

## 2023-03-09 PROCEDURE — 85025 COMPLETE CBC W/AUTO DIFF WBC: CPT | Performed by: NURSE PRACTITIONER

## 2023-03-09 PROCEDURE — 36415 COLL VENOUS BLD VENIPUNCTURE: CPT | Performed by: NURSE PRACTITIONER

## 2023-03-09 PROCEDURE — 87389 HIV-1 AG W/HIV-1&-2 AB AG IA: CPT | Performed by: NURSE PRACTITIONER

## 2023-03-09 PROCEDURE — 80053 COMPREHEN METABOLIC PANEL: CPT | Performed by: NURSE PRACTITIONER

## 2023-03-10 LAB
ALBUMIN SERPL BCP-MCNC: 4.4 G/DL (ref 3.5–5.2)
ALP SERPL-CCNC: 77 U/L (ref 55–135)
ALT SERPL W/O P-5'-P-CCNC: 9 U/L (ref 10–44)
ANION GAP SERPL CALC-SCNC: 11 MMOL/L (ref 8–16)
AST SERPL-CCNC: 15 U/L (ref 10–40)
BASOPHILS # BLD AUTO: 0.08 K/UL (ref 0–0.2)
BASOPHILS NFR BLD: 1.4 % (ref 0–1.9)
BILIRUB SERPL-MCNC: 0.5 MG/DL (ref 0.1–1)
BUN SERPL-MCNC: 13 MG/DL (ref 6–20)
CALCIUM SERPL-MCNC: 10 MG/DL (ref 8.7–10.5)
CHLORIDE SERPL-SCNC: 103 MMOL/L (ref 95–110)
CO2 SERPL-SCNC: 26 MMOL/L (ref 23–29)
CREAT SERPL-MCNC: 0.9 MG/DL (ref 0.5–1.4)
DIFFERENTIAL METHOD: ABNORMAL
EOSINOPHIL # BLD AUTO: 0.5 K/UL (ref 0–0.5)
EOSINOPHIL NFR BLD: 8.5 % (ref 0–8)
ERYTHROCYTE [DISTWIDTH] IN BLOOD BY AUTOMATED COUNT: 13.9 % (ref 11.5–14.5)
EST. GFR  (NO RACE VARIABLE): >60 ML/MIN/1.73 M^2
ESTIMATED AVG GLUCOSE: 120 MG/DL (ref 68–131)
GLUCOSE SERPL-MCNC: 80 MG/DL (ref 70–110)
HBA1C MFR BLD: 5.8 % (ref 4–5.6)
HCT VFR BLD AUTO: 40.8 % (ref 37–48.5)
HCV AB SERPL QL IA: NORMAL
HGB BLD-MCNC: 13.2 G/DL (ref 12–16)
HIV 1+2 AB+HIV1 P24 AG SERPL QL IA: NORMAL
IMM GRANULOCYTES # BLD AUTO: 0.01 K/UL (ref 0–0.04)
IMM GRANULOCYTES NFR BLD AUTO: 0.2 % (ref 0–0.5)
LYMPHOCYTES # BLD AUTO: 2.4 K/UL (ref 1–4.8)
LYMPHOCYTES NFR BLD: 39.9 % (ref 18–48)
MCH RBC QN AUTO: 28.3 PG (ref 27–31)
MCHC RBC AUTO-ENTMCNC: 32.4 G/DL (ref 32–36)
MCV RBC AUTO: 87 FL (ref 82–98)
MONOCYTES # BLD AUTO: 0.4 K/UL (ref 0.3–1)
MONOCYTES NFR BLD: 7.1 % (ref 4–15)
NEUTROPHILS # BLD AUTO: 2.5 K/UL (ref 1.8–7.7)
NEUTROPHILS NFR BLD: 42.9 % (ref 38–73)
NRBC BLD-RTO: 0 /100 WBC
PLATELET # BLD AUTO: 220 K/UL (ref 150–450)
PMV BLD AUTO: 12.2 FL (ref 9.2–12.9)
POTASSIUM SERPL-SCNC: 4.2 MMOL/L (ref 3.5–5.1)
PROT SERPL-MCNC: 7.3 G/DL (ref 6–8.4)
RBC # BLD AUTO: 4.67 M/UL (ref 4–5.4)
SODIUM SERPL-SCNC: 140 MMOL/L (ref 136–145)
T4 FREE SERPL-MCNC: 1.21 NG/DL (ref 0.71–1.51)
WBC # BLD AUTO: 5.89 K/UL (ref 3.9–12.7)

## 2023-03-21 ENCOUNTER — CLINICAL SUPPORT (OUTPATIENT)
Dept: SMOKING CESSATION | Facility: CLINIC | Age: 48
End: 2023-03-21

## 2023-03-21 ENCOUNTER — TELEPHONE (OUTPATIENT)
Dept: SMOKING CESSATION | Facility: CLINIC | Age: 48
End: 2023-03-21
Payer: COMMERCIAL

## 2023-03-21 DIAGNOSIS — F17.200 NICOTINE DEPENDENCE: Primary | ICD-10-CM

## 2023-03-21 PROCEDURE — 99999 PR PBB SHADOW E&M-EST. PATIENT-LVL II: CPT | Mod: PBBFAC,,, | Performed by: SPEECH-LANGUAGE PATHOLOGIST

## 2023-03-21 PROCEDURE — 99999 PR PBB SHADOW E&M-EST. PATIENT-LVL II: ICD-10-PCS | Mod: PBBFAC,,, | Performed by: SPEECH-LANGUAGE PATHOLOGIST

## 2023-03-21 PROCEDURE — 99404 PR PREVENT COUNSEL,INDIV,60 MIN: ICD-10-PCS | Mod: S$GLB,,, | Performed by: SPEECH-LANGUAGE PATHOLOGIST

## 2023-03-21 PROCEDURE — 99404 PREV MED CNSL INDIV APPRX 60: CPT | Mod: S$GLB,,, | Performed by: SPEECH-LANGUAGE PATHOLOGIST

## 2023-03-21 NOTE — PROGRESS NOTES
Individual Follow-Up Form    3/21/2023    Quit Date: 11/29/2022     Clinical Status of Patient: Outpatient     Continuing Medication: yes  4 mg gum & 4 mg lozenges (not using stating it burns her throat), 14 mg patches (not using patches)     Other Medications:                  Target Symptoms: Withdrawal and medication side effects. The following were   rated moderate (3) to severe (4) on TCRS:  Moderate (3): none  Severe (4): none    Comments: Telephone visit at the patient's request due to she doesn't have gas. Patient reports she has remained smoke free since her quit date of 11/29/2022 & not utilizing her NRT. Patient reports she is dealing with life stress. Praised patient on her continued dedication & determination on her smoke free life. Discussed coping strategies to manage emotions & stress to include meditation & prayer. Patient states she can think back to a time in her life where she told herself cigarettes helped her to cope; but she is now able to recognize it didn't change anything. Discussed the impact of becoming smoke free has made on the patient's life in that she feels healthier & better, she smells better, increased finances & decreased stress. Patient reports her mother even quit smoking after the patient quit. Commended patient on the example she has set for her family following her lead. Patient states her goal is to take things day by day & to remain smoke free. Follow up set for 4/11/2023 at 2:00 pm.     Diagnosis: F17.200    Next Visit: 3 weeks

## 2023-03-28 NOTE — PROGRESS NOTES
Pre visit chart audit letter sent.   Detail Level: Zone Render In Strict Bullet Format?: No Plan: 3/28/2023:\\nStable\\nILK was helpful for back & right axilla\\nILK initially helpful for left breast, however became painful & inflamed again and just drained on Saturday \\nMometasone was helpful for groin itching \\nRecently did bloodwork for former dermatologist to restart Humira Continue Regimen: Spironolactone 100 mg QD (prescribed by [facility])

## 2023-04-11 ENCOUNTER — TELEPHONE (OUTPATIENT)
Dept: SMOKING CESSATION | Facility: CLINIC | Age: 48
End: 2023-04-11
Payer: COMMERCIAL

## 2023-05-24 ENCOUNTER — TELEPHONE (OUTPATIENT)
Dept: SMOKING CESSATION | Facility: CLINIC | Age: 48
End: 2023-05-24
Payer: COMMERCIAL

## 2023-05-30 ENCOUNTER — CLINICAL SUPPORT (OUTPATIENT)
Dept: SMOKING CESSATION | Facility: CLINIC | Age: 48
End: 2023-05-30

## 2023-05-30 DIAGNOSIS — F17.200 NICOTINE DEPENDENCE: Primary | ICD-10-CM

## 2023-05-30 PROCEDURE — 99999 PR PBB SHADOW E&M-EST. PATIENT-LVL I: ICD-10-PCS | Mod: PBBFAC,,,

## 2023-05-30 PROCEDURE — 99999 PR PBB SHADOW E&M-EST. PATIENT-LVL I: CPT | Mod: PBBFAC,,,

## 2023-05-30 PROCEDURE — 99407 BEHAV CHNG SMOKING > 10 MIN: CPT | Mod: S$GLB,,,

## 2023-05-30 PROCEDURE — 99407 PR TOBACCO USE CESSATION INTENSIVE >10 MINUTES: ICD-10-PCS | Mod: S$GLB,,,

## 2023-05-30 NOTE — PROGRESS NOTES
Spoke with patient today in regard to smoking cessation progress for 6 month telephone follow up, she states tobacco free. Commended patient on the accomplishment thus far. Patient states the nicotine gum was too strong and she quit on her own. Informed patient of benefit period, future follow up, and contact information if any further help or support is needed. Will complete smart form for 6 month follow up on Quit attempt #1.

## 2023-06-11 ENCOUNTER — NURSE TRIAGE (OUTPATIENT)
Dept: ADMINISTRATIVE | Facility: CLINIC | Age: 48
End: 2023-06-11
Payer: COMMERCIAL

## 2023-06-11 NOTE — TELEPHONE ENCOUNTER
"Intermittent abdominal pain. 6/10. Advised, per protocol. Verbalizes understanding, unsure she can make it to the ED today. May utilize [facility], would like follow up with providers office in this regard.    Reason for Disposition   [1] MILD-MODERATE pain AND [2] constant AND [3] present > 2 hours    Additional Information   Negative: Shock suspected (e.g., cold/pale/clammy skin, too weak to stand, low BP, rapid pulse)   Negative: Difficult to awaken or acting confused (e.g., disoriented, slurred speech)   Negative: Passed out (i.e., lost consciousness, collapsed and was not responding)   Negative: Sounds like a life-threatening emergency to the triager   Negative: [1] SEVERE pain (e.g., excruciating) AND [2] present > 1 hour   Negative: [1] SEVERE pain AND [2] age > 60 years   Negative: [1] Vomiting AND [2] contains red blood or black ("coffee ground") material  (Exception: Few red streaks in vomit that only happened once.)   Negative: Blood in bowel movements  (Exception: Blood on surface of BM with constipation.)   Negative: Black or tarry bowel movements  (Exception: Chronic-unchanged black-grey BMs AND is taking iron pills or Pepto-Bismol.)   Negative: [1] Vomiting AND [2] contains bile (green color)   Negative: Patient sounds very sick or weak to the triager    Protocols used: Abdominal Pain - Female-A-AH    "

## 2023-06-14 ENCOUNTER — OFFICE VISIT (OUTPATIENT)
Dept: URGENT CARE | Facility: CLINIC | Age: 48
End: 2023-06-14
Payer: COMMERCIAL

## 2023-06-14 VITALS
OXYGEN SATURATION: 97 % | RESPIRATION RATE: 12 BRPM | DIASTOLIC BLOOD PRESSURE: 88 MMHG | HEART RATE: 55 BPM | SYSTOLIC BLOOD PRESSURE: 138 MMHG | BODY MASS INDEX: 32.3 KG/M2 | TEMPERATURE: 98 F | WEIGHT: 201 LBS | HEIGHT: 66 IN

## 2023-06-14 DIAGNOSIS — R20.0 RUE NUMBNESS: Primary | ICD-10-CM

## 2023-06-14 PROCEDURE — 99213 PR OFFICE/OUTPT VISIT, EST, LEVL III, 20-29 MIN: ICD-10-PCS | Mod: S$GLB,,,

## 2023-06-14 PROCEDURE — 99213 OFFICE O/P EST LOW 20 MIN: CPT | Mod: S$GLB,,,

## 2023-06-14 NOTE — LETTER
June 14, 2023      [facility]  [address] [place] [address] [place] [address]  Phone: [phone]  Fax: [phone]       Patient: Shelly Kam   YOB: 1975  Date of Visit: 06/14/2023    To Whom It May Concern:    Yashira Kam  was at [facility] on 06/14/2023. The patient may return to work/school on 6/16/23 with no restrictions. If you have any questions or concerns, or if I can be of further assistance, please do not hesitate to contact me.    Sincerely,    Henny Kay PA-C

## 2023-06-14 NOTE — PROGRESS NOTES
"Subjective:      Patient ID: Shelly Kam is a 48 y.o. female.    Vitals:  height is 5' 6" (1.676 m) and weight is 91.2 kg (201 lb). Her temperature is 98.4 °F (36.9 °C). Her blood pressure is 138/88 and her pulse is 55 (abnormal). Her respiration is 12 and oxygen saturation is 97%.     Chief Complaint: Numbness    Shelly Kam is a 48 y.o. female with hx of HTN, DM, HLD, anxiety, and fibromyalgia who presents for numbness which onset 2 days ago. Symptoms began in R face and RUE and is now in LUE. Patient was told to get evaluated by her employer so she went to the ED 2 days ago. However, she states she did not feel like waiting so she went home. She reports confusion and dizziness at that time. Associated sxs include fatigue. Patient denies any fever, chills, SOB, CP, abd pain, n/v/d, rash, confusion, vision changes, slurred speech, LOC, weakness, or dizziness. Prior Tx includes aspirin and rest.    Other  This is a new problem. The current episode started in the past 7 days (Onset Monday). The problem occurs intermittently. The problem has been waxing and waning. Associated symptoms include numbness. Pertinent negatives include no abdominal pain, change in bowel habit, chest pain, chills, congestion, coughing, diaphoresis, fatigue, fever, headaches, joint swelling, myalgias, nausea, neck pain, sore throat, swollen glands or vomiting. She has tried rest (Aspirin and regular meds) for the symptoms.     Constitution: Negative for chills, sweating, fatigue and fever.   HENT:  Negative for congestion and sore throat.    Neck: Negative for neck pain.   Cardiovascular:  Negative for chest pain and palpitations.   Respiratory:  Negative for cough and shortness of breath.    Gastrointestinal:  Negative for abdominal pain, nausea, vomiting and diarrhea.   Musculoskeletal:  Negative for joint swelling and muscle ache.   Neurological:  Positive for numbness. Negative for dizziness, headaches and " tingling.    Objective:     Physical Exam   Constitutional: She is oriented to person, place, and time. She appears well-developed.  Non-toxic appearance. She does not appear ill. No distress.   HENT:   Head: Normocephalic and atraumatic.   Ears:   Right Ear: Hearing and external ear normal.   Left Ear: Hearing and external ear normal.   Nose: Nose normal. No mucosal edema, rhinorrhea or nasal deformity. No epistaxis. Right sinus exhibits no maxillary sinus tenderness and no frontal sinus tenderness. Left sinus exhibits no maxillary sinus tenderness and no frontal sinus tenderness.   Mouth/Throat: Uvula is midline, oropharynx is clear and moist and mucous membranes are normal. No trismus in the jaw. Normal dentition. No uvula swelling. No posterior oropharyngeal erythema.   Eyes: Conjunctivae, EOM and lids are normal. Pupils are equal, round, and reactive to light. No scleral icterus. Right eye exhibits no nystagmus. Left eye exhibits no nystagmus. Extraocular movement intact vision grossly intact gaze aligned appropriately   Neck: Trachea normal and phonation normal. Neck supple. No neck rigidity present.   Cardiovascular: Normal rate, regular rhythm, normal heart sounds and normal pulses.   Pulmonary/Chest: Effort normal and breath sounds normal. No respiratory distress.   Abdominal: Normal appearance and bowel sounds are normal. She exhibits no distension. Soft. There is no abdominal tenderness.   Musculoskeletal: Normal range of motion.         General: No deformity. Normal range of motion.   Neurological: no focal deficit. She is alert and oriented to person, place, and time. She has normal motor skills and normal sensation. She displays no weakness and facial symmetry. No cranial nerve deficit. She exhibits normal muscle tone. She has a normal Finger-Nose-Finger Test. Coordination: Romberg sign negative. She shows no pronator drift. Gait and coordination normal. Coordination normal. GCS eye subscore is 4. GCS  verbal subscore is 5. GCS motor subscore is 6.      Comments: No focal neurological deficits. AOx3. CN 2-12 intact. No facial asymmetry. Speech intact. Romberg negative. Finger to nose negative. Negative pronator drift. Gait intact. 5/5 strength to BUE and BLE.  strength 5/5 bilaterally. Sensation intact and equal bilaterally.   Skin: Skin is warm, dry, intact, not diaphoretic and not pale.   Psychiatric: Her speech is normal and behavior is normal. Judgment and thought content normal.   Nursing note and vitals reviewed.    Assessment:     1. RUE numbness        Vision Screening    Right eye Left eye Both eyes   Without correction 20/50 20/70 20/50   With correction        She is prescribed correction lens but not currently wearing them.  Plan:       RUE numbness      Afebrile. VSS. Patient in NAD.  AOx3. No focal neurological deficits.  No CP or SOB.  Visual acuity reviewed.  DDx: stress induced, anxiety, fibromyalgia, TIA  Advised patient it is best that she get further evaluation in the ED. However, she states she does not feel like going tonight and if anything will go in the morning.  She states she is using marijuana to help with stress and pain. Advised patient against use of marijuana.   Follow up with PCP for further evaluation and pain control.  Patient requesting work excuse.  Strict ER precautions given such as continued numbness with no improvement, weakness, confusion, slurred speech, syncope, SOB, CP, or blurred vision.   Patient verbalizes understanding and exits exam room in NAD.    Discussed with patient all pertinent information and results. Discussed patient diagnosis and plan of treatment. Patient was given all follow up and return instructions. All questions and concerns were addressed at this time. Patient expresses understanding of information and instructions, and is comfortable with plan.    Patient was instructed to return to clinic or go to ED immediately for any worsening or change  in current symptoms.

## 2023-06-15 NOTE — PATIENT INSTRUCTIONS
You must understand that you have received Urgent Care treatment only and that you may be released before all of your medical problems are known or treated.   Return to Urgent Care or go to ER if symptoms worsen or fail to improve.  Follow up with PCP as recommended for further management.     Go to the Emergency Room for any worsening numbness, any weakness, confusion, slurred speech, passing out, SOB, CP, or blurred vision.

## 2023-07-06 ENCOUNTER — OFFICE VISIT (OUTPATIENT)
Dept: INTERNAL MEDICINE | Facility: CLINIC | Age: 48
End: 2023-07-06
Payer: COMMERCIAL

## 2023-07-06 ENCOUNTER — TELEPHONE (OUTPATIENT)
Dept: INTERNAL MEDICINE | Facility: CLINIC | Age: 48
End: 2023-07-06
Payer: COMMERCIAL

## 2023-07-06 VITALS
DIASTOLIC BLOOD PRESSURE: 82 MMHG | WEIGHT: 214.75 LBS | HEART RATE: 52 BPM | BODY MASS INDEX: 34.51 KG/M2 | TEMPERATURE: 96 F | SYSTOLIC BLOOD PRESSURE: 150 MMHG | HEIGHT: 66 IN | OXYGEN SATURATION: 98 %

## 2023-07-06 DIAGNOSIS — R20.2 PARESTHESIA OF RIGHT ARM: ICD-10-CM

## 2023-07-06 DIAGNOSIS — J06.9 UPPER RESPIRATORY TRACT INFECTION, UNSPECIFIED TYPE: Primary | ICD-10-CM

## 2023-07-06 PROCEDURE — 99213 PR OFFICE/OUTPT VISIT, EST, LEVL III, 20-29 MIN: ICD-10-PCS | Mod: 25,S$GLB,, | Performed by: NURSE PRACTITIONER

## 2023-07-06 PROCEDURE — 3044F PR MOST RECENT HEMOGLOBIN A1C LEVEL <7.0%: ICD-10-PCS | Mod: CPTII,S$GLB,, | Performed by: NURSE PRACTITIONER

## 2023-07-06 PROCEDURE — 3008F BODY MASS INDEX DOCD: CPT | Mod: CPTII,S$GLB,, | Performed by: NURSE PRACTITIONER

## 2023-07-06 PROCEDURE — 3044F HG A1C LEVEL LT 7.0%: CPT | Mod: CPTII,S$GLB,, | Performed by: NURSE PRACTITIONER

## 2023-07-06 PROCEDURE — 3079F PR MOST RECENT DIASTOLIC BLOOD PRESSURE 80-89 MM HG: ICD-10-PCS | Mod: CPTII,S$GLB,, | Performed by: NURSE PRACTITIONER

## 2023-07-06 PROCEDURE — 3077F SYST BP >= 140 MM HG: CPT | Mod: CPTII,S$GLB,, | Performed by: NURSE PRACTITIONER

## 2023-07-06 PROCEDURE — 4010F ACE/ARB THERAPY RXD/TAKEN: CPT | Mod: CPTII,S$GLB,, | Performed by: NURSE PRACTITIONER

## 2023-07-06 PROCEDURE — 96372 THER/PROPH/DIAG INJ SC/IM: CPT | Mod: S$GLB,,, | Performed by: NURSE PRACTITIONER

## 2023-07-06 PROCEDURE — 96372 PR INJECTION,THERAP/PROPH/DIAG2ST, IM OR SUBCUT: ICD-10-PCS | Mod: S$GLB,,, | Performed by: NURSE PRACTITIONER

## 2023-07-06 PROCEDURE — 99999 PR PBB SHADOW E&M-EST. PATIENT-LVL V: CPT | Mod: PBBFAC,,, | Performed by: NURSE PRACTITIONER

## 2023-07-06 PROCEDURE — 99213 OFFICE O/P EST LOW 20 MIN: CPT | Mod: 25,S$GLB,, | Performed by: NURSE PRACTITIONER

## 2023-07-06 PROCEDURE — 3079F DIAST BP 80-89 MM HG: CPT | Mod: CPTII,S$GLB,, | Performed by: NURSE PRACTITIONER

## 2023-07-06 PROCEDURE — 99999 PR PBB SHADOW E&M-EST. PATIENT-LVL V: ICD-10-PCS | Mod: PBBFAC,,, | Performed by: NURSE PRACTITIONER

## 2023-07-06 PROCEDURE — 1159F PR MEDICATION LIST DOCUMENTED IN MEDICAL RECORD: ICD-10-PCS | Mod: CPTII,S$GLB,, | Performed by: NURSE PRACTITIONER

## 2023-07-06 PROCEDURE — 1159F MED LIST DOCD IN RCRD: CPT | Mod: CPTII,S$GLB,, | Performed by: NURSE PRACTITIONER

## 2023-07-06 PROCEDURE — 3008F PR BODY MASS INDEX (BMI) DOCUMENTED: ICD-10-PCS | Mod: CPTII,S$GLB,, | Performed by: NURSE PRACTITIONER

## 2023-07-06 PROCEDURE — 3077F PR MOST RECENT SYSTOLIC BLOOD PRESSURE >= 140 MM HG: ICD-10-PCS | Mod: CPTII,S$GLB,, | Performed by: NURSE PRACTITIONER

## 2023-07-06 PROCEDURE — 4010F PR ACE/ARB THEARPY RXD/TAKEN: ICD-10-PCS | Mod: CPTII,S$GLB,, | Performed by: NURSE PRACTITIONER

## 2023-07-06 RX ORDER — KETOROLAC TROMETHAMINE 30 MG/ML
30 INJECTION, SOLUTION INTRAMUSCULAR; INTRAVENOUS
Status: COMPLETED | OUTPATIENT
Start: 2023-07-06 | End: 2023-07-06

## 2023-07-06 RX ORDER — AMLODIPINE BESYLATE 100 %
POWDER (GRAM) MISCELLANEOUS
COMMUNITY
End: 2023-10-17

## 2023-07-06 RX ORDER — METOPROLOL TARTRATE 100 MG/1
TABLET ORAL
COMMUNITY
End: 2023-10-17

## 2023-07-06 RX ORDER — LOSARTAN POTASSIUM 100 %
POWDER (GRAM) MISCELLANEOUS
COMMUNITY
End: 2023-10-17 | Stop reason: SDUPTHER

## 2023-07-06 RX ADMIN — KETOROLAC TROMETHAMINE 30 MG: 30 INJECTION, SOLUTION INTRAMUSCULAR; INTRAVENOUS at 09:07

## 2023-07-12 NOTE — PROGRESS NOTES
Subjective:       Patient ID: Shelly Kam is a 48 y.o. female.    Chief Complaint:   Leg Pain   Pertinent negatives include no numbness.     Reports numbness/tingling/shooting pain R arm-ongoing- getting worse per pt. Worse at night    Mild uri s/s      Past Medical History:   Diagnosis Date    Allergy     Anemia     Asthma     Depression     Diabetes mellitus     Diabetes mellitus, type 2     Encounter for blood transfusion     Fibromyalgia     GERD (gastroesophageal reflux disease)     Hyperlipidemia     Hypertension     Hyperthyroidism     Panic attack     Prozac in past     Past Surgical History:   Procedure Laterality Date    BRONCHOSCOPY      DIAGNOSTIC LAPAROSCOPY N/A 2022    Procedure: LAPAROSCOPY, DIAGNOSTIC;  Surgeon: Talha Santa MD;  Location: [facility] OR;  Service: OB/GYN;  Laterality: N/A;    HERNIA REPAIR      SALPINGECTOMY Bilateral 2022    Procedure: SALPINGECTOMY;  Surgeon: Talha Santa MD;  Location: [facility] OR;  Service: OB/GYN;  Laterality: Bilateral;    THYROIDECTOMY Bilateral 3/25/2021    Procedure: THYROIDECTOMY;  Surgeon: Alon Barton MD;  Location: [address];  Service: ENT;  Laterality: Bilateral;  NIMS monitor    TONSILLECTOMY      TOTAL ABDOMINAL HYSTERECTOMY N/A 2022    Procedure: HYSTERECTOMY, TOTAL, ABDOMINAL;  Surgeon: Talha Santa MD;  Location: [facility] OR;  Service: OB/GYN;  Laterality: N/A;    TRACHEOSTOMY      TRACHEOSTOMY TUBE PLACEMENT      XI ROBOTIC HYSTERECTOMY, WITH SALPINGECTOMY N/A 2022    Procedure: XI ROBOTIC HYSTERECTOMY,WITH SALPINGECTOMY;  Surgeon: Talha Santa MD;  Location: [facility];  Service: OB/GYN;  Laterality: N/A;  converted to open without docking     Social History     Socioeconomic History    Marital status:    Tobacco Use    Smoking status: Former     Packs/day: 0.50     Types: Cigarettes     Quit date: 2022     Years since quittin.6    Smokeless tobacco: Never    Tobacco comments:     Smokes 8  long cigarettes that she relights   Substance and Sexual Activity    Alcohol use: Yes     Comment: once a year; hold 72 hrs prior to sx    Drug use: Yes     Types: Marijuana    Sexual activity: Yes     Partners: Male   Social History Narrative    Wears a seatbelt.     Social Determinants of Health     Financial Resource Strain: Low Risk     Difficulty of Paying Living Expenses: Not very hard   Food Insecurity: No Food Insecurity    Worried About Running Out of Food in the Last Year: Never true    Ran Out of Food in the Last Year: Never true   Transportation Needs: No Transportation Needs    Lack of Transportation (Medical): No    Lack of Transportation (Non-Medical): No   Physical Activity: Sufficiently Active    Days of Exercise per Week: 4 days    Minutes of Exercise per Session: 80 min   Stress: No Stress Concern Present    Feeling of Stress : Only a little   Social Connections: Socially Isolated    Frequency of Communication with Friends and Family: More than three times a week    Frequency of Social Gatherings with Friends and Family: More than three times a week    Attends Restorationism Services: Never    Active Member of Clubs or Organizations: No    Attends Club or Organization Meetings: Never    Marital Status:    Housing Stability: Low Risk     Unable to Pay for Housing in the Last Year: No    Number of Places Lived in the Last Year: 1    Unstable Housing in the Last Year: No     Review of patient's allergies indicates:   Allergen Reactions    Pcn [penicillins] Anaphylaxis     Throat , tongue swelling     Banana      Itchy mouth    Lisinopril Other (See Comments)     Lip swelling    Melon      Itchy mouth    Morphine Other (See Comments)     Unknown    Tree nuts      Oral itching     Current Outpatient Medications   Medication Sig    albuterol (PROVENTIL/VENTOLIN HFA) 90 mcg/actuation inhaler Inhale 2 puffs into the lungs every 6 (six) hours as needed for Wheezing or Shortness of Breath.    amLODIPine  (NORVASC) 10 MG tablet Take 1 tablet (10 mg total) by mouth nightly.    amLODIPine besylate, bulk, 100 % Powd amLODIPine Besylate    aspirin (ECOTRIN) 81 MG EC tablet Take 1 tablet (81 mg total) by mouth once daily.    buPROPion (WELLBUTRIN XL) 150 MG TB24 tablet Take 1 tablet by mouth once daily    citalopram (CELEXA) 20 MG tablet Take 1 tablet (20 mg total) by mouth nightly.    EPINEPHrine (EPIPEN) 0.3 mg/0.3 mL AtIn INJECT CONTENTS OF 1 PEN AS NEEDED FOR ALLERGIC REACTION    fluticasone propionate (FLONASE) 50 mcg/actuation nasal spray 2 sprays (100 mcg total) by Each Nostril route once daily.    ibuprofen (ADVIL,MOTRIN) 100 mg/5 mL suspension Take 40 mLs (800 mg total) by mouth every 6 (six) hours as needed for Pain.    losartan (COZAAR) 25 MG tablet Take 1 tablet (25 mg total) by mouth once daily.    losartan potassium, bulk, 100 % Powd Losartan Potassium    methylPREDNISolone (MEDROL DOSEPACK) 4 mg tablet Take per package directions    metoprolol succinate (TOPROL-XL) 50 MG 24 hr tablet Take 1 tablet (50 mg total) by mouth nightly. QD    metoprolol tartrate (LOPRESSOR) 100 MG tablet Metoprolol Tartrate    nicotine (NICODERM CQ) 14 mg/24 hr Place 1 patch onto the skin once daily.    nicotine polacrilex (NICORETTE) 4 MG Gum Take 1 each (4 mg total) by mouth as needed (Use in place of a cigarette. Do not exceed 10 pieces in a day. Use chew & park technique. Avoid food/drink for 15 min before & after).    nicotine polacrilex 4 MG Lozg Take 1 lozenge (4 mg total) by mouth as needed (Use in place of a cigarette. Not to exceed 10 pieces in a day. Do not chew. Avoid food/drink 15 min before & after using).    oxyCODONE-acetaminophen (PERCOCET) 5-325 mg per tablet Take 1 tablet by mouth every 6 (six) hours as needed for Pain.    pantoprazole (PROTONIX) 20 MG tablet Take 1 tablet (20 mg total) by mouth once daily.    simvastatin (ZOCOR) 40 MG tablet Take 1 tablet (40 mg total) by mouth every evening.    levothyroxine  (SYNTHROID) 137 MCG Tab tablet Take 1 tablet (137 mcg total) by mouth before breakfast.     No current facility-administered medications for this visit.           Review of Systems   Constitutional:  Negative for activity change, appetite change, chills, diaphoresis, fatigue, fever and unexpected weight change.   HENT:  Positive for congestion and rhinorrhea. Negative for ear pain, postnasal drip, sinus pressure, sinus pain, sneezing, sore throat, tinnitus, trouble swallowing and voice change.    Eyes:  Negative for photophobia, pain and visual disturbance.   Respiratory:  Negative for cough, chest tightness, shortness of breath and wheezing.    Cardiovascular:  Negative for chest pain, palpitations and leg swelling.   Gastrointestinal:  Negative for abdominal distention, abdominal pain, constipation, diarrhea, nausea and vomiting.   Genitourinary:  Negative for decreased urine volume, difficulty urinating, dysuria, flank pain, frequency, hematuria and urgency.   Musculoskeletal:  Positive for myalgias. Negative for arthralgias, back pain, joint swelling, neck pain and neck stiffness.   Allergic/Immunologic: Negative for immunocompromised state.   Neurological:  Negative for dizziness, tremors, seizures, syncope, facial asymmetry, speech difficulty, weakness, light-headedness, numbness and headaches.   Hematological:  Negative for adenopathy. Does not bruise/bleed easily.   Psychiatric/Behavioral:  Negative for confusion and sleep disturbance.      Objective:      Physical Exam  Vitals reviewed.   HENT:      Nose: Rhinorrhea present.   Cardiovascular:      Rate and Rhythm: Normal rate and regular rhythm.      Pulses: Normal pulses.      Heart sounds: Normal heart sounds.   Pulmonary:      Effort: Pulmonary effort is normal.   Musculoskeletal:      Cervical back: Normal range of motion.      Comments: Neg phalens/tinels   Neurological:      Mental Status: She is alert.       Assessment:     Vitals:    07/06/23 0915    BP: (!) 150/82   Pulse: (!) 52   Temp: 96.4 °F (35.8 °C)         1. Upper respiratory tract infection, unspecified type    2. Paresthesia of right arm        Plan:   Upper respiratory tract infection, unspecified type  -     POCT COVID-19 Rapid Screening; Future; Expected date: 07/06/2023    Paresthesia of right arm  -     EMG W/ ULTRASOUND AND NERVE CONDUCTION TEST 1 Extremity; Future    Other orders  -     ketorolac injection 30 mg      Neg covid supp care for uri discussed

## 2023-08-08 ENCOUNTER — HOSPITAL ENCOUNTER (EMERGENCY)
Facility: HOSPITAL | Age: 48
Discharge: HOME | End: 2023-08-08
Attending: FAMILY MEDICINE
Payer: COMMERCIAL

## 2023-08-08 VITALS
TEMPERATURE: 99 F | HEIGHT: 65 IN | BODY MASS INDEX: 35.01 KG/M2 | SYSTOLIC BLOOD PRESSURE: 121 MMHG | OXYGEN SATURATION: 98 % | RESPIRATION RATE: 18 BRPM | WEIGHT: 210.13 LBS | DIASTOLIC BLOOD PRESSURE: 67 MMHG | HEART RATE: 59 BPM

## 2023-08-08 DIAGNOSIS — M25.561 BILATERAL KNEE PAIN: ICD-10-CM

## 2023-08-08 DIAGNOSIS — M25.562 BILATERAL KNEE PAIN: ICD-10-CM

## 2023-08-08 DIAGNOSIS — S80.211A ABRASION OF RIGHT KNEE, INITIAL ENCOUNTER: ICD-10-CM

## 2023-08-08 DIAGNOSIS — M25.562 ACUTE PAIN OF BOTH KNEES: Primary | ICD-10-CM

## 2023-08-08 DIAGNOSIS — M25.561 ACUTE PAIN OF BOTH KNEES: Primary | ICD-10-CM

## 2023-08-08 PROCEDURE — 25000003 PHARM REV CODE 250: Performed by: NURSE PRACTITIONER

## 2023-08-08 PROCEDURE — 96372 THER/PROPH/DIAG INJ SC/IM: CPT | Performed by: NURSE PRACTITIONER

## 2023-08-08 PROCEDURE — 99284 EMERGENCY DEPT VISIT MOD MDM: CPT

## 2023-08-08 PROCEDURE — 63600175 PHARM REV CODE 636 W HCPCS: Performed by: NURSE PRACTITIONER

## 2023-08-08 RX ORDER — METHOCARBAMOL 750 MG/1
750 TABLET, FILM COATED ORAL 4 TIMES DAILY
Qty: 40 TABLET | Refills: 0 | Status: SHIPPED | OUTPATIENT
Start: 2023-08-08 | End: 2023-08-18

## 2023-08-08 RX ORDER — METHOCARBAMOL 500 MG/1
500 TABLET, FILM COATED ORAL
Status: COMPLETED | OUTPATIENT
Start: 2023-08-08 | End: 2023-08-08

## 2023-08-08 RX ORDER — IBUPROFEN 800 MG/1
800 TABLET ORAL EVERY 6 HOURS PRN
Qty: 20 TABLET | Refills: 0 | Status: SHIPPED | OUTPATIENT
Start: 2023-08-08 | End: 2023-10-17

## 2023-08-08 RX ORDER — MUPIROCIN 20 MG/G
1 OINTMENT TOPICAL
Status: COMPLETED | OUTPATIENT
Start: 2023-08-08 | End: 2023-08-08

## 2023-08-08 RX ORDER — HYDROCODONE BITARTRATE AND ACETAMINOPHEN 5; 325 MG/1; MG/1
1 TABLET ORAL
Status: COMPLETED | OUTPATIENT
Start: 2023-08-08 | End: 2023-08-08

## 2023-08-08 RX ORDER — HYDROCODONE BITARTRATE AND ACETAMINOPHEN 5; 325 MG/1; MG/1
1 TABLET ORAL EVERY 4 HOURS PRN
Qty: 18 TABLET | Refills: 0 | Status: SHIPPED | OUTPATIENT
Start: 2023-08-08 | End: 2023-10-17

## 2023-08-08 RX ORDER — KETOROLAC TROMETHAMINE 30 MG/ML
30 INJECTION, SOLUTION INTRAMUSCULAR; INTRAVENOUS
Status: COMPLETED | OUTPATIENT
Start: 2023-08-08 | End: 2023-08-08

## 2023-08-08 RX ADMIN — KETOROLAC TROMETHAMINE 30 MG: 30 INJECTION INTRAMUSCULAR; INTRAVENOUS at 05:08

## 2023-08-08 RX ADMIN — HYDROCODONE BITARTRATE AND ACETAMINOPHEN 1 TABLET: 5; 325 TABLET ORAL at 05:08

## 2023-08-08 RX ADMIN — MUPIROCIN 22 G: 20 OINTMENT TOPICAL at 05:08

## 2023-08-08 RX ADMIN — METHOCARBAMOL 500 MG: 500 TABLET ORAL at 05:08

## 2023-08-09 ENCOUNTER — PATIENT OUTREACH (OUTPATIENT)
Dept: ADMINISTRATIVE | Facility: HOSPITAL | Age: 48
End: 2023-08-09
Payer: COMMERCIAL

## 2023-08-09 NOTE — ED PROVIDER NOTES
Encounter Date: 8/8/2023       History     Chief Complaint   Patient presents with    Leg Injury     Pt c/o  bilateral leg and hip pn.  Pt states she fell off a treadmill last night.  Pt has a skin abrasion on her r knee.  Pt states her blood sugar was found to be a little low last night by the gym staff.     Patient is a 48-year-old female who presents with complaints of bilateral knee pain after falling off a treadmill last night.  Abrasion also noted to the right knee.  No relief with over-the-counter medications taken at home.  Patient shows no signs of distress at this time.      Review of patient's allergies indicates:   Allergen Reactions    Pcn [penicillins] Anaphylaxis     Throat , tongue swelling     Banana      Itchy mouth    Lisinopril Other (See Comments)     Lip swelling    Melon      Itchy mouth    Morphine Other (See Comments)     Unknown    Tree nuts      Oral itching     Past Medical History:   Diagnosis Date    Allergy     Anemia     Asthma     Depression     Diabetes mellitus     Diabetes mellitus, type 2     Encounter for blood transfusion     Fibromyalgia     GERD (gastroesophageal reflux disease)     Hyperlipidemia     Hypertension     Hyperthyroidism     Panic attack     Prozac in past     Past Surgical History:   Procedure Laterality Date    BRONCHOSCOPY      DIAGNOSTIC LAPAROSCOPY N/A 9/12/2022    Procedure: LAPAROSCOPY, DIAGNOSTIC;  Surgeon: Talha Santa MD;  Location: [facility] OR;  Service: OB/GYN;  Laterality: N/A;    HERNIA REPAIR      SALPINGECTOMY Bilateral 9/12/2022    Procedure: SALPINGECTOMY;  Surgeon: Talha Santa MD;  Location: [facility] OR;  Service: OB/GYN;  Laterality: Bilateral;    THYROIDECTOMY Bilateral 3/25/2021    Procedure: THYROIDECTOMY;  Surgeon: Alon Barton MD;  Location: [address];  Service: ENT;  Laterality: Bilateral;  NIMS monitor    TONSILLECTOMY      TOTAL ABDOMINAL HYSTERECTOMY N/A 9/12/2022    Procedure: HYSTERECTOMY, TOTAL, ABDOMINAL;  Surgeon: Talha MORGAN  MD Kiet;  Location: [facility] OR;  Service: OB/GYN;  Laterality: N/A;    TRACHEOSTOMY      TRACHEOSTOMY TUBE PLACEMENT      XI ROBOTIC HYSTERECTOMY, WITH SALPINGECTOMY N/A 2022    Procedure: XI ROBOTIC HYSTERECTOMY,WITH SALPINGECTOMY;  Surgeon: Talha Santa MD;  Location: [facility] OR;  Service: OB/GYN;  Laterality: N/A;  converted to open without docking     Family History   Problem Relation Age of Onset    Cancer Paternal Aunt      Social History     Tobacco Use    Smoking status: Former     Current packs/day: 0.00     Types: Cigarettes     Quit date: 2022     Years since quittin.6    Smokeless tobacco: Never    Tobacco comments:     Smokes 8 long cigarettes that she relights   Substance Use Topics    Alcohol use: Yes     Comment: once a year; hold 72 hrs prior to sx    Drug use: Yes     Types: Marijuana     Review of Systems   Constitutional:  Negative for fever.   HENT:  Negative for sore throat.    Respiratory:  Negative for shortness of breath.    Cardiovascular:  Negative for chest pain.   Gastrointestinal:  Negative for nausea.   Genitourinary:  Negative for dysuria.   Musculoskeletal:  Positive for arthralgias (bilateral knees). Negative for back pain.   Skin:  Positive for wound (abrasion right knee). Negative for rash.   Neurological:  Negative for weakness.   Hematological:  Does not bruise/bleed easily.       Physical Exam     Initial Vitals [23 1726]   BP Pulse Resp Temp SpO2   121/67 (!) 59 20 99 °F (37.2 °C) 98 %      MAP       --         Physical Exam    Nursing note and vitals reviewed.  Constitutional: She appears well-developed and well-nourished.   HENT:   Head: Normocephalic and atraumatic.   Eyes: EOM are normal. Pupils are equal, round, and reactive to light.   Neck: Neck supple.   Normal range of motion.  Cardiovascular:  Normal rate, regular rhythm and intact distal pulses.           Murmur heard.  Pulmonary/Chest: Breath sounds normal.   Abdominal: Abdomen is soft. Bowel  sounds are normal.   Musculoskeletal:         General: Normal range of motion.      Cervical back: Normal range of motion and neck supple.      Right knee: Bony tenderness present. No swelling, deformity or effusion. Normal range of motion. Tenderness present.      Left knee: Bony tenderness present. No swelling, deformity or effusion. Normal range of motion. Tenderness present.        Legs:      Neurological: She is alert and oriented to person, place, and time. She has normal strength and normal reflexes.   Skin: Skin is warm and dry.         ED Course   Procedures  Labs Reviewed - No data to display       Imaging Results              X-Ray Knee 3 View Bilateral (Final result)  Result time 08/08/23 19:02:47      Final result by Chelle Fournier MD (08/08/23 19:02:47)                   Impression:    FINDINGS/  No acute fracture or dislocation seen.  Tri compartment osteoarthritis with medial compartment predominance bilateral mild-to-moderate      Electronically signed by: Chelle Fournier  Date:    08/08/2023  Time:    19:02               Narrative:    EXAMINATION:  XR KNEE 3 VIEW BILATERAL    CLINICAL HISTORY:  Pain in right knee    COMPARISON:  None                                       Medications   mupirocin 2 % ointment 22 g (22 g Topical (Top) Given 8/8/23 1759)   HYDROcodone-acetaminophen 5-325 mg per tablet 1 tablet (1 tablet Oral Given 8/8/23 1757)   ketorolac injection 30 mg (30 mg Intramuscular Given 8/8/23 1757)   methocarbamoL tablet 500 mg (500 mg Oral Given 8/8/23 1757)     Medical Decision Making:   ED Management:  I discussed with patient and/or family/caretaker that evaluation in the ED does not suggest any emergent or life threatening medical conditions requiring immediate intervention beyond what was provided in the ED, and I believe patient is safe for discharge. Regardless, an unremarkable evaluation in the ED does not preclude the development or presence of a serious of life  threatening condition. As such, patient was instructed to return immediately for any worsening or change in current symptoms.                            Clinical Impression:   Final diagnoses:  [M25.561, M25.562] Bilateral knee pain  [M25.561, M25.562] Acute pain of both knees (Primary)  [S80.211A] Abrasion of right knee, initial encounter        ED Disposition Condition    Discharge Stable          ED Prescriptions       Medication Sig Dispense Start Date End Date Auth. Provider    HYDROcodone-acetaminophen (NORCO) 5-325 mg per tablet Take 1 tablet by mouth every 4 (four) hours as needed for Pain. 18 tablet 8/8/2023 -- Devyn Lea NP    methocarbamoL (ROBAXIN) 750 MG Tab Take 1 tablet (750 mg total) by mouth 4 (four) times daily. for 10 days 40 tablet 8/8/2023 8/18/2023 Devyn Lea NP    ibuprofen (ADVIL,MOTRIN) 800 MG tablet Take 1 tablet (800 mg total) by mouth every 6 (six) hours as needed for Pain. 20 tablet 8/8/2023 -- Devyn Lea NP          Follow-up Information       Follow up With Specialties Details Why Contact Info    Heraclio Farrell MD Family Medicine  As needed [id number]  [place] [id number] [phone]               Devyn Lea NP  08/08/23 8616

## 2023-08-09 NOTE — PROGRESS NOTES
HTN REPORT: spoke to pt, she went to ER on yesterday, bp was wnl at visit, she will take bp again today and call back later with result.

## 2023-08-16 ENCOUNTER — PATIENT MESSAGE (OUTPATIENT)
Dept: INTERNAL MEDICINE | Facility: CLINIC | Age: 48
End: 2023-08-16
Payer: COMMERCIAL

## 2023-08-22 ENCOUNTER — PATIENT OUTREACH (OUTPATIENT)
Dept: ADMINISTRATIVE | Facility: HOSPITAL | Age: 48
End: 2023-08-22
Payer: COMMERCIAL

## 2023-10-17 ENCOUNTER — LAB VISIT (OUTPATIENT)
Dept: LAB | Facility: HOSPITAL | Age: 48
End: 2023-10-17
Attending: FAMILY MEDICINE
Payer: COMMERCIAL

## 2023-10-17 ENCOUNTER — OFFICE VISIT (OUTPATIENT)
Dept: INTERNAL MEDICINE | Facility: CLINIC | Age: 48
End: 2023-10-17
Payer: COMMERCIAL

## 2023-10-17 ENCOUNTER — LAB VISIT (OUTPATIENT)
Dept: LAB | Facility: HOSPITAL | Age: 48
End: 2023-10-17
Payer: COMMERCIAL

## 2023-10-17 VITALS
HEIGHT: 65 IN | DIASTOLIC BLOOD PRESSURE: 82 MMHG | WEIGHT: 218.94 LBS | OXYGEN SATURATION: 96 % | HEART RATE: 50 BPM | SYSTOLIC BLOOD PRESSURE: 140 MMHG | BODY MASS INDEX: 36.48 KG/M2 | RESPIRATION RATE: 20 BRPM

## 2023-10-17 DIAGNOSIS — E78.2 MIXED HYPERLIPIDEMIA: Chronic | ICD-10-CM

## 2023-10-17 DIAGNOSIS — E66.01 SEVERE OBESITY WITH BODY MASS INDEX (BMI) OF 35.0 TO 39.9 WITH SERIOUS COMORBIDITY: ICD-10-CM

## 2023-10-17 DIAGNOSIS — Z00.00 ROUTINE GENERAL MEDICAL EXAMINATION AT A HEALTH CARE FACILITY: ICD-10-CM

## 2023-10-17 DIAGNOSIS — R73.03 PREDIABETES: Chronic | ICD-10-CM

## 2023-10-17 DIAGNOSIS — E89.0 POSTOPERATIVE HYPOTHYROIDISM: Chronic | ICD-10-CM

## 2023-10-17 DIAGNOSIS — M17.0 PRIMARY OSTEOARTHRITIS OF BOTH KNEES: ICD-10-CM

## 2023-10-17 DIAGNOSIS — F41.1 GAD (GENERALIZED ANXIETY DISORDER): ICD-10-CM

## 2023-10-17 DIAGNOSIS — I10 ESSENTIAL HYPERTENSION: Chronic | ICD-10-CM

## 2023-10-17 DIAGNOSIS — J45.20 MILD INTERMITTENT ASTHMA WITHOUT COMPLICATION: Chronic | ICD-10-CM

## 2023-10-17 DIAGNOSIS — Z00.00 ROUTINE GENERAL MEDICAL EXAMINATION AT A HEALTH CARE FACILITY: Primary | ICD-10-CM

## 2023-10-17 DIAGNOSIS — R06.2 WHEEZING: ICD-10-CM

## 2023-10-17 DIAGNOSIS — K21.9 GASTROESOPHAGEAL REFLUX DISEASE WITHOUT ESOPHAGITIS: Chronic | ICD-10-CM

## 2023-10-17 DIAGNOSIS — M79.7 FIBROMYALGIA: Chronic | ICD-10-CM

## 2023-10-17 PROBLEM — F17.200 NICOTINE DEPENDENCE: Status: RESOLVED | Noted: 2022-11-28 | Resolved: 2023-10-17

## 2023-10-17 PROBLEM — F17.200 TOBACCO USE DISORDER: Status: RESOLVED | Noted: 2021-02-02 | Resolved: 2023-10-17

## 2023-10-17 LAB
ALBUMIN SERPL BCP-MCNC: 4.1 G/DL (ref 3.5–5.2)
ALP SERPL-CCNC: 73 U/L (ref 55–135)
ALT SERPL W/O P-5'-P-CCNC: 11 U/L (ref 10–44)
ANION GAP SERPL CALC-SCNC: 12 MMOL/L (ref 8–16)
AST SERPL-CCNC: 18 U/L (ref 10–40)
BASOPHILS # BLD AUTO: 0.07 K/UL (ref 0–0.2)
BASOPHILS NFR BLD: 1.1 % (ref 0–1.9)
BILIRUB SERPL-MCNC: 0.2 MG/DL (ref 0.1–1)
BILIRUB UR QL STRIP: NEGATIVE
BUN SERPL-MCNC: 11 MG/DL (ref 6–20)
CALCIUM SERPL-MCNC: 9.7 MG/DL (ref 8.7–10.5)
CHLORIDE SERPL-SCNC: 100 MMOL/L (ref 95–110)
CHOLEST SERPL-MCNC: 191 MG/DL (ref 120–199)
CHOLEST/HDLC SERPL: 3.5 {RATIO} (ref 2–5)
CLARITY UR: CLEAR
CO2 SERPL-SCNC: 28 MMOL/L (ref 23–29)
COLOR UR: NORMAL
CREAT SERPL-MCNC: 0.8 MG/DL (ref 0.5–1.4)
DIFFERENTIAL METHOD: ABNORMAL
EOSINOPHIL # BLD AUTO: 0.6 K/UL (ref 0–0.5)
EOSINOPHIL NFR BLD: 8.5 % (ref 0–8)
ERYTHROCYTE [DISTWIDTH] IN BLOOD BY AUTOMATED COUNT: 13.6 % (ref 11.5–14.5)
EST. GFR  (NO RACE VARIABLE): >60 ML/MIN/1.73 M^2
ESTIMATED AVG GLUCOSE: 111 MG/DL (ref 68–131)
GLUCOSE SERPL-MCNC: 79 MG/DL (ref 70–110)
GLUCOSE UR QL STRIP: NEGATIVE
HBA1C MFR BLD: 5.5 % (ref 4–5.6)
HCT VFR BLD AUTO: 41 % (ref 37–48.5)
HDLC SERPL-MCNC: 55 MG/DL (ref 40–75)
HDLC SERPL: 28.8 % (ref 20–50)
HGB BLD-MCNC: 13.4 G/DL (ref 12–16)
HGB UR QL STRIP: NEGATIVE
IMM GRANULOCYTES # BLD AUTO: 0.01 K/UL (ref 0–0.04)
IMM GRANULOCYTES NFR BLD AUTO: 0.2 % (ref 0–0.5)
KETONES UR QL STRIP: NEGATIVE
LDLC SERPL CALC-MCNC: 114.8 MG/DL (ref 63–159)
LEUKOCYTE ESTERASE UR QL STRIP: NEGATIVE
LYMPHOCYTES # BLD AUTO: 2.6 K/UL (ref 1–4.8)
LYMPHOCYTES NFR BLD: 39.9 % (ref 18–48)
MCH RBC QN AUTO: 30 PG (ref 27–31)
MCHC RBC AUTO-ENTMCNC: 32.7 G/DL (ref 32–36)
MCV RBC AUTO: 92 FL (ref 82–98)
MONOCYTES # BLD AUTO: 0.5 K/UL (ref 0.3–1)
MONOCYTES NFR BLD: 8 % (ref 4–15)
NEUTROPHILS # BLD AUTO: 2.8 K/UL (ref 1.8–7.7)
NEUTROPHILS NFR BLD: 42.3 % (ref 38–73)
NITRITE UR QL STRIP: NEGATIVE
NONHDLC SERPL-MCNC: 136 MG/DL
NRBC BLD-RTO: 0 /100 WBC
PH UR STRIP: 8 [PH] (ref 5–8)
PLATELET # BLD AUTO: 181 K/UL (ref 150–450)
PMV BLD AUTO: 13.4 FL (ref 9.2–12.9)
POTASSIUM SERPL-SCNC: 4.3 MMOL/L (ref 3.5–5.1)
PROT SERPL-MCNC: 7.4 G/DL (ref 6–8.4)
PROT UR QL STRIP: NEGATIVE
RBC # BLD AUTO: 4.46 M/UL (ref 4–5.4)
SODIUM SERPL-SCNC: 140 MMOL/L (ref 136–145)
SP GR UR STRIP: 1.02 (ref 1–1.03)
TRIGL SERPL-MCNC: 106 MG/DL (ref 30–150)
URN SPEC COLLECT METH UR: NORMAL
WBC # BLD AUTO: 6.61 K/UL (ref 3.9–12.7)

## 2023-10-17 PROCEDURE — 3044F HG A1C LEVEL LT 7.0%: CPT | Mod: CPTII,S$GLB,, | Performed by: FAMILY MEDICINE

## 2023-10-17 PROCEDURE — 3079F PR MOST RECENT DIASTOLIC BLOOD PRESSURE 80-89 MM HG: ICD-10-PCS | Mod: CPTII,S$GLB,, | Performed by: FAMILY MEDICINE

## 2023-10-17 PROCEDURE — 3044F PR MOST RECENT HEMOGLOBIN A1C LEVEL <7.0%: ICD-10-PCS | Mod: CPTII,S$GLB,, | Performed by: FAMILY MEDICINE

## 2023-10-17 PROCEDURE — 1159F PR MEDICATION LIST DOCUMENTED IN MEDICAL RECORD: ICD-10-PCS | Mod: CPTII,S$GLB,, | Performed by: FAMILY MEDICINE

## 2023-10-17 PROCEDURE — 1160F PR REVIEW ALL MEDS BY PRESCRIBER/CLIN PHARMACIST DOCUMENTED: ICD-10-PCS | Mod: CPTII,S$GLB,, | Performed by: FAMILY MEDICINE

## 2023-10-17 PROCEDURE — 4010F PR ACE/ARB THEARPY RXD/TAKEN: ICD-10-PCS | Mod: CPTII,S$GLB,, | Performed by: FAMILY MEDICINE

## 2023-10-17 PROCEDURE — 99999 PR PBB SHADOW E&M-EST. PATIENT-LVL V: ICD-10-PCS | Mod: PBBFAC,,, | Performed by: FAMILY MEDICINE

## 2023-10-17 PROCEDURE — 3008F BODY MASS INDEX DOCD: CPT | Mod: CPTII,S$GLB,, | Performed by: FAMILY MEDICINE

## 2023-10-17 PROCEDURE — 84439 ASSAY OF FREE THYROXINE: CPT | Performed by: FAMILY MEDICINE

## 2023-10-17 PROCEDURE — 84443 ASSAY THYROID STIM HORMONE: CPT | Performed by: FAMILY MEDICINE

## 2023-10-17 PROCEDURE — 99396 PREV VISIT EST AGE 40-64: CPT | Mod: S$GLB,,, | Performed by: FAMILY MEDICINE

## 2023-10-17 PROCEDURE — 1159F MED LIST DOCD IN RCRD: CPT | Mod: CPTII,S$GLB,, | Performed by: FAMILY MEDICINE

## 2023-10-17 PROCEDURE — 3079F DIAST BP 80-89 MM HG: CPT | Mod: CPTII,S$GLB,, | Performed by: FAMILY MEDICINE

## 2023-10-17 PROCEDURE — 83036 HEMOGLOBIN GLYCOSYLATED A1C: CPT | Performed by: FAMILY MEDICINE

## 2023-10-17 PROCEDURE — 80061 LIPID PANEL: CPT | Performed by: FAMILY MEDICINE

## 2023-10-17 PROCEDURE — 4010F ACE/ARB THERAPY RXD/TAKEN: CPT | Mod: CPTII,S$GLB,, | Performed by: FAMILY MEDICINE

## 2023-10-17 PROCEDURE — 3077F SYST BP >= 140 MM HG: CPT | Mod: CPTII,S$GLB,, | Performed by: FAMILY MEDICINE

## 2023-10-17 PROCEDURE — 80053 COMPREHEN METABOLIC PANEL: CPT | Performed by: FAMILY MEDICINE

## 2023-10-17 PROCEDURE — 3008F PR BODY MASS INDEX (BMI) DOCUMENTED: ICD-10-PCS | Mod: CPTII,S$GLB,, | Performed by: FAMILY MEDICINE

## 2023-10-17 PROCEDURE — 99999 PR PBB SHADOW E&M-EST. PATIENT-LVL V: CPT | Mod: PBBFAC,,, | Performed by: FAMILY MEDICINE

## 2023-10-17 PROCEDURE — 81003 URINALYSIS AUTO W/O SCOPE: CPT | Performed by: FAMILY MEDICINE

## 2023-10-17 PROCEDURE — 3077F PR MOST RECENT SYSTOLIC BLOOD PRESSURE >= 140 MM HG: ICD-10-PCS | Mod: CPTII,S$GLB,, | Performed by: FAMILY MEDICINE

## 2023-10-17 PROCEDURE — 99396 PR PREVENTIVE VISIT,EST,40-64: ICD-10-PCS | Mod: S$GLB,,, | Performed by: FAMILY MEDICINE

## 2023-10-17 PROCEDURE — 1160F RVW MEDS BY RX/DR IN RCRD: CPT | Mod: CPTII,S$GLB,, | Performed by: FAMILY MEDICINE

## 2023-10-17 PROCEDURE — 36415 COLL VENOUS BLD VENIPUNCTURE: CPT | Performed by: FAMILY MEDICINE

## 2023-10-17 PROCEDURE — 85025 COMPLETE CBC W/AUTO DIFF WBC: CPT | Performed by: FAMILY MEDICINE

## 2023-10-17 RX ORDER — AMLODIPINE BESYLATE 10 MG/1
10 TABLET ORAL NIGHTLY
COMMUNITY
Start: 2023-09-09 | End: 2023-10-17 | Stop reason: SDUPTHER

## 2023-10-17 RX ORDER — LEVOTHYROXINE SODIUM 137 UG/1
137 TABLET ORAL
Qty: 30 TABLET | Refills: 1 | Status: SHIPPED | OUTPATIENT
Start: 2023-10-17 | End: 2023-12-18

## 2023-10-17 RX ORDER — AMLODIPINE BESYLATE 10 MG/1
10 TABLET ORAL NIGHTLY
Qty: 90 TABLET | Refills: 1 | Status: SHIPPED | OUTPATIENT
Start: 2023-10-17 | End: 2024-01-26 | Stop reason: SDUPTHER

## 2023-10-17 RX ORDER — LOSARTAN POTASSIUM 25 MG/1
25 TABLET ORAL
COMMUNITY
Start: 2023-09-10 | End: 2024-01-26 | Stop reason: SDUPTHER

## 2023-10-17 RX ORDER — ALBUTEROL SULFATE 90 UG/1
2 AEROSOL, METERED RESPIRATORY (INHALATION) EVERY 6 HOURS PRN
Qty: 18 G | Refills: 3 | Status: SHIPPED | OUTPATIENT
Start: 2023-10-17

## 2023-10-17 NOTE — PROGRESS NOTES
"Subjective:       Patient ID: Shelly Kam is a 48 y.o. female.    Chief Complaint: Establish Care and Medication Refill    48-year-old  female patient Dr. Farrell here with Patient Active Problem List:     Anxiety     Fibromyalgia     Mild intermittent asthma without complication     Essential hypertension     History of MRSA infection     Gastroesophageal reflux disease without esophagitis     Sleep apnea     Family history of thrombosis     PFO (patent foramen ovale)     Prediabetes     Postoperative hypothyroidism     Mixed hyperlipidemia     Severe obesity with body mass index (BMI) of 35.0 to 39.9 with serious comorbidity     Primary osteoarthritis of both knees  For complete annual physicals as she could not get an appointment with her PCP and requesting refill on thyroid and blood pressure medication amlodipine, reports that she has refills on other medications.  Patient has been out of levothyroxine for last 9 days and has been feeling jittery  Reports chronic pain in bilateral knees with underlying arthritis      Review of Systems   Constitutional:  Negative for fatigue.   Eyes:  Negative for visual disturbance.   Respiratory:  Negative for shortness of breath.    Cardiovascular:  Negative for chest pain and leg swelling.   Gastrointestinal:  Negative for abdominal pain, nausea and vomiting.   Musculoskeletal:  Positive for arthralgias. Negative for myalgias.   Skin:  Negative for rash.   Neurological:  Negative for light-headedness and headaches.   Psychiatric/Behavioral:  Negative for sleep disturbance.          BP (!) 140/82 (BP Location: Left arm, Patient Position: Sitting, BP Method: Large (Manual))   Pulse (!) 50   Resp 20   Ht 5' 5" (1.651 m)   Wt 99.3 kg (218 lb 14.7 oz)   LMP 09/12/2022   SpO2 96%   BMI 36.43 kg/m²   Objective:      Physical Exam  Constitutional:       Appearance: She is well-developed.   HENT:      Head: Normocephalic and atraumatic. "   Cardiovascular:      Rate and Rhythm: Normal rate and regular rhythm.      Heart sounds: Normal heart sounds. No murmur heard.  Pulmonary:      Effort: Pulmonary effort is normal.      Breath sounds: Normal breath sounds. No wheezing.   Abdominal:      General: Bowel sounds are normal.      Palpations: Abdomen is soft.      Tenderness: There is no abdominal tenderness.   Musculoskeletal:         General: Tenderness present.      Comments: Positive for tenderness to bilateral knees   Skin:     General: Skin is warm and dry.      Findings: No rash.   Neurological:      Mental Status: She is alert and oriented to person, place, and time.   Psychiatric:         Mood and Affect: Mood normal.           Assessment/Plan:   1. Routine general medical examination at a health care facility  -     Urinalysis, Reflex to Urine Culture Urine, Clean Catch; Future; Expected date: 10/17/2023  -     Hemoglobin A1C; Future; Expected date: 10/17/2023  -     TSH; Future; Expected date: 10/17/2023  -     Lipid Panel; Future; Expected date: 10/17/2023  -     Comprehensive Metabolic Panel; Future; Expected date: 10/17/2023  -     CBC Auto Differential; Future; Expected date: 10/17/2023  Vital signs stable today.  Clinical exam stable  Continue lifestyle modifications with low-fat and low-cholesterol diet and exercise 30 minutes daily      2. Essential hypertension  -     Urinalysis, Reflex to Urine Culture Urine, Clean Catch; Future; Expected date: 10/17/2023  -     TSH; Future; Expected date: 10/17/2023  -     Lipid Panel; Future; Expected date: 10/17/2023  -     Comprehensive Metabolic Panel; Future; Expected date: 10/17/2023  -     amLODIPine (NORVASC) 10 MG tablet; Take 1 tablet (10 mg total) by mouth every evening.  Dispense: 90 tablet; Refill: 1  Blood pressure mildly elevated but stable currently on metoprolol 50 mg amlodipine 10 mg and losartan 25 mg daily    3. Mixed hyperlipidemia  -     Lipid Panel; Future; Expected date:  10/17/2023  Currently taking simvastatin 40 mg    4. Prediabetes  -     Hemoglobin A1C; Future; Expected date: 10/17/2023  Diet controlled    5. Postoperative hypothyroidism  Overview:  Graves disease, status post thyroidectomy on 03/2021    Orders:  -     levothyroxine (SYNTHROID) 137 MCG Tab tablet; Take 1 tablet (137 mcg total) by mouth before breakfast.  Dispense: 30 tablet; Refill: 1  -     TSH; Future; Expected date: 10/17/2023  Has been out of levothyroxine for last 9 days, refill given today  Patient was advised to request refill from PCP    6. Mild intermittent asthma without complication  -     albuterol (PROVENTIL/VENTOLIN HFA) 90 mcg/actuation inhaler; Inhale 2 puffs into the lungs every 6 (six) hours as needed for Wheezing or Shortness of Breath.  Dispense: 18 g; Refill: 3  Stable on albuterol inhaler as needed    7. Fibromyalgia  Graded exercise regimen recommended    8. Gastroesophageal reflux disease without esophagitis  Clinically doing well on pantoprazole 20 mg    9. MATTHEW (generalized anxiety disorder)  Stable on Celexa 20 mg    10. Severe obesity with body mass index (BMI) of 35.0 to 39.9 with serious comorbidity  Lifestyle modifications discussed to lose weight with BMI 36    11. Primary osteoarthritis of both knees  -     Ambulatory referral/consult to Orthopedics; Future; Expected date: 10/24/2023  Reviewed recent x-rays showing underlying arthritis  Will refer to Orthopedic for further evaluation

## 2023-10-18 ENCOUNTER — OFFICE VISIT (OUTPATIENT)
Dept: SPORTS MEDICINE | Facility: CLINIC | Age: 48
End: 2023-10-18
Payer: COMMERCIAL

## 2023-10-18 DIAGNOSIS — M17.0 PRIMARY OSTEOARTHRITIS OF BOTH KNEES: Primary | ICD-10-CM

## 2023-10-18 DIAGNOSIS — E89.0 POSTOPERATIVE HYPOTHYROIDISM: Primary | ICD-10-CM

## 2023-10-18 LAB
T4 FREE SERPL-MCNC: 0.77 NG/DL (ref 0.71–1.51)
TSH SERPL DL<=0.005 MIU/L-ACNC: 12.63 UIU/ML (ref 0.4–4)

## 2023-10-18 PROCEDURE — 20610 DRAIN/INJ JOINT/BURSA W/O US: CPT | Mod: 50,S$GLB,, | Performed by: PHYSICIAN ASSISTANT

## 2023-10-18 PROCEDURE — 4010F PR ACE/ARB THEARPY RXD/TAKEN: ICD-10-PCS | Mod: CPTII,S$GLB,, | Performed by: PHYSICIAN ASSISTANT

## 2023-10-18 PROCEDURE — 99203 OFFICE O/P NEW LOW 30 MIN: CPT | Mod: 25,S$GLB,, | Performed by: PHYSICIAN ASSISTANT

## 2023-10-18 PROCEDURE — 3044F HG A1C LEVEL LT 7.0%: CPT | Mod: CPTII,S$GLB,, | Performed by: PHYSICIAN ASSISTANT

## 2023-10-18 PROCEDURE — 97110 PR THERAPEUTIC EXERCISES: ICD-10-PCS | Mod: GP,S$GLB,, | Performed by: PHYSICIAN ASSISTANT

## 2023-10-18 PROCEDURE — 1160F RVW MEDS BY RX/DR IN RCRD: CPT | Mod: CPTII,S$GLB,, | Performed by: PHYSICIAN ASSISTANT

## 2023-10-18 PROCEDURE — 97110 THERAPEUTIC EXERCISES: CPT | Mod: GP,S$GLB,, | Performed by: PHYSICIAN ASSISTANT

## 2023-10-18 PROCEDURE — 1159F MED LIST DOCD IN RCRD: CPT | Mod: CPTII,S$GLB,, | Performed by: PHYSICIAN ASSISTANT

## 2023-10-18 PROCEDURE — 3044F PR MOST RECENT HEMOGLOBIN A1C LEVEL <7.0%: ICD-10-PCS | Mod: CPTII,S$GLB,, | Performed by: PHYSICIAN ASSISTANT

## 2023-10-18 PROCEDURE — 1159F PR MEDICATION LIST DOCUMENTED IN MEDICAL RECORD: ICD-10-PCS | Mod: CPTII,S$GLB,, | Performed by: PHYSICIAN ASSISTANT

## 2023-10-18 PROCEDURE — 1160F PR REVIEW ALL MEDS BY PRESCRIBER/CLIN PHARMACIST DOCUMENTED: ICD-10-PCS | Mod: CPTII,S$GLB,, | Performed by: PHYSICIAN ASSISTANT

## 2023-10-18 PROCEDURE — 99999 PR PBB SHADOW E&M-EST. PATIENT-LVL III: CPT | Mod: PBBFAC,,, | Performed by: PHYSICIAN ASSISTANT

## 2023-10-18 PROCEDURE — 99999 PR PBB SHADOW E&M-EST. PATIENT-LVL III: ICD-10-PCS | Mod: PBBFAC,,, | Performed by: PHYSICIAN ASSISTANT

## 2023-10-18 PROCEDURE — 4010F ACE/ARB THERAPY RXD/TAKEN: CPT | Mod: CPTII,S$GLB,, | Performed by: PHYSICIAN ASSISTANT

## 2023-10-18 PROCEDURE — 99203 PR OFFICE/OUTPT VISIT, NEW, LEVL III, 30-44 MIN: ICD-10-PCS | Mod: 25,S$GLB,, | Performed by: PHYSICIAN ASSISTANT

## 2023-10-18 PROCEDURE — 20610 LARGE JOINT ASPIRATION/INJECTION: BILATERAL KNEE: ICD-10-PCS | Mod: 50,S$GLB,, | Performed by: PHYSICIAN ASSISTANT

## 2023-10-18 RX ORDER — MELOXICAM 15 MG/1
15 TABLET ORAL DAILY
Qty: 60 TABLET | Refills: 0 | Status: SHIPPED | OUTPATIENT
Start: 2023-10-18 | End: 2024-01-09

## 2023-10-18 RX ORDER — TRIAMCINOLONE ACETONIDE 40 MG/ML
20 INJECTION, SUSPENSION INTRA-ARTICULAR; INTRAMUSCULAR
Status: DISCONTINUED | OUTPATIENT
Start: 2023-10-18 | End: 2023-10-18 | Stop reason: HOSPADM

## 2023-10-18 RX ADMIN — TRIAMCINOLONE ACETONIDE 20 MG: 40 INJECTION, SUSPENSION INTRA-ARTICULAR; INTRAMUSCULAR at 10:10

## 2023-10-18 NOTE — PROGRESS NOTES
Orthopaedics Sports Medicine     Knee Initial Visit         10/18/2023    Referring MD: Bella Mendoza MD    Chief Complaint   Patient presents with    Left Knee - Pain    Right Knee - Pain       History of Present Illness:   Shelly Kam is a 48 y.o. year old female patient presents with pain and dysfunction involving BILATERAL knees.  Her left knee is worse than her right knee     Onset of the symptoms was January but has been worsening over the last 2 months since she sustained an injury on the treadmill.     Inciting event:  Fell off the treadmill in August, worsening knee pain since then.     Current symptoms include bilateral knee pain, anterior knee pain, night pain, morning stiffness, intermittent swelling.     Pain is aggravated by prolonged standing, prolonged periods of rest, going from sit to stand position, night pain    Evaluation to date:  X-ray.     Treatment to date:  Rest, activity modification, oral anti-inflammatories, Tylenol, Voltaren gel, lidocaine patches.       Past Medical History:   Past Medical History:   Diagnosis Date    Allergy     Anemia     Asthma     Depression     Diabetes mellitus     Diabetes mellitus, type 2     Encounter for blood transfusion     Fibromyalgia     GERD (gastroesophageal reflux disease)     Hyperlipidemia     Hypertension     Hyperthyroidism     Panic attack     Prozac in past    Primary osteoarthritis of both knees 10/17/2023       Past Surgical History:   Past Surgical History:   Procedure Laterality Date    BRONCHOSCOPY      DIAGNOSTIC LAPAROSCOPY N/A 09/12/2022    Procedure: LAPAROSCOPY, DIAGNOSTIC;  Surgeon: Talha Santa MD;  Location: [facility] OR;  Service: OB/GYN;  Laterality: N/A;    HERNIA REPAIR      SALPINGECTOMY Bilateral 09/12/2022    Procedure: SALPINGECTOMY;  Surgeon: Talha Santa MD;  Location: [facility] OR;  Service: OB/GYN;  Laterality: Bilateral;    THYROIDECTOMY Bilateral 03/25/2021    Procedure: THYROIDECTOMY;  Surgeon: Alon STERLING  MD Mick;  [address];  Service: ENT;  Laterality: Bilateral;  NIMS monitor    TONSILLECTOMY      TOTAL ABDOMINAL HYSTERECTOMY N/A 09/12/2022    Procedure: HYSTERECTOMY, TOTAL, ABDOMINAL;  Surgeon: Talha Santa MD;  Location: [facility];  Service: OB/GYN;  Laterality: N/A;    TRACHEOSTOMY      TRACHEOSTOMY TUBE PLACEMENT      XI ROBOTIC HYSTERECTOMY, WITH SALPINGECTOMY N/A 09/12/2022    Procedure: XI ROBOTIC HYSTERECTOMY,WITH SALPINGECTOMY;  Surgeon: Talha Santa MD;  Location: [facility] OR;  Service: OB/GYN;  Laterality: N/A;  converted to open without docking       Medications:  Patient's Medications   New Prescriptions    MELOXICAM (MOBIC) 15 MG TABLET    Take 1 tablet (15 mg total) by mouth once daily.   Previous Medications    ALBUTEROL (PROVENTIL/VENTOLIN HFA) 90 MCG/ACTUATION INHALER    Inhale 2 puffs into the lungs every 6 (six) hours as needed for Wheezing or Shortness of Breath.    AMLODIPINE (NORVASC) 10 MG TABLET    Take 1 tablet (10 mg total) by mouth every evening.    ASPIRIN (ECOTRIN) 81 MG EC TABLET    Take 1 tablet (81 mg total) by mouth once daily.    CITALOPRAM (CELEXA) 20 MG TABLET    Take 1 tablet (20 mg total) by mouth nightly.    EPINEPHRINE (EPIPEN) 0.3 MG/0.3 ML ATIN    INJECT CONTENTS OF 1 PEN AS NEEDED FOR ALLERGIC REACTION    FLUTICASONE PROPIONATE (FLONASE) 50 MCG/ACTUATION NASAL SPRAY    2 sprays (100 mcg total) by Each Nostril route once daily.    LEVOTHYROXINE (SYNTHROID) 137 MCG TAB TABLET    Take 1 tablet (137 mcg total) by mouth before breakfast.    LOSARTAN (COZAAR) 25 MG TABLET    Take 25 mg by mouth.    METOPROLOL SUCCINATE (TOPROL-XL) 50 MG 24 HR TABLET    Take 1 tablet (50 mg total) by mouth nightly. QD    PANTOPRAZOLE (PROTONIX) 20 MG TABLET    Take 1 tablet (20 mg total) by mouth once daily.    SIMVASTATIN (ZOCOR) 40 MG TABLET    Take 1 tablet (40 mg total) by mouth every evening.   Modified Medications    No medications on file   Discontinued Medications     "No medications on file        Allergies:   Review of patient's allergies indicates:   Allergen Reactions    Pcn [penicillins] Anaphylaxis     Throat , tongue swelling     Banana      Itchy mouth    Lisinopril Other (See Comments)     Lip swelling    Melon      Itchy mouth    Morphine Other (See Comments)     Unknown    Tree nuts      Oral itching       Social History:   [place]: JAMAL Kevin  occupation: CNA   alcohol use: She reports that she does not currently use alcohol.  tobacco use: She reports that she has been smoking cigarettes. She started smoking about 24 years ago. She has a 5.8 pack-year smoking history. She has never used smokeless tobacco.    Review of systems:  history of recent illness, fevers, shakes, or chills: no  history of cardiac problems or chest pain: htn, hld  history of of pulmonary problems or asthma: SHANE  history of diabetes: no  history of prior DVT or clotting problems: no  history of sleep apnea: no    Physical Examination:  Estimated body mass index is 36.43 kg/m² as calculated from the following:    Height as of 10/17/23: 5' 5" (1.651 m).    Weight as of 10/17/23: 99.3 kg (218 lb 14.7 oz).    Standing exam  stance: normal alignment, no significant leg-length discrepancy  gait: no limp    Knee      RIGHT  LEFT  Skin:     Intact   Intact  ROM:     0-130  0-130  Effusion:    Neg   +  Medial joint line tenderness:  +   +  Lateral joint line tenderness:  Neg   Neg  Pretty:     Neg   Neg  Patella crepitus:   +   +  Patella tenderness:   Neg   Neg  Patella grind:      Neg   Neg  Lachman:    Neg   Neg  Valgus stress:    Neg   Neg  Varus stress:    Neg   Neg  Posterior drawer:   Neg   Neg  N-V               intact  intact  Hip:    nml    nml   Lower extremity edema: Negative negative    Neurovascular exam  - motor function grossly intact bilaterally to hip flexion, knee extension and flexion, ankle dorsiflexion and plantarflexion  - sensation intact to light touch bilaterally to " femoral, tibial, tibial and peroneal distributions  - symmetrical pedal pulses    Imaging:   XR Results:  Results for orders placed during the hospital encounter of 08/08/23    X-Ray Knee 3 View Bilateral    Narrative  EXAMINATION:  XR KNEE 3 VIEW BILATERAL    CLINICAL HISTORY:  Pain in right knee    COMPARISON:  None    Impression  FINDINGS/  No acute fracture or dislocation seen.  Tri compartment osteoarthritis with medial compartment predominance bilateral mild-to-moderate      Electronically signed by: Chelle Fournier  Date:    08/08/2023  Time:    19:02      Physician Read: I agree with the above impression. There is medial joint space loss noted bilaterally with marginal osteophyte formation present.  Kellgren Alexander grade 2 bilaterally    Impression:  48 y.o. female with primary arthritis of both knees    Plan:  Discussed diagnosis and treatment options with the patient today.  Her history, physical exam, and imaging is consistent with primary osteoarthritis of both knees  She has tried conservative treatment in the form of rest, activity modification, oral anti-inflammatories, over-the-counter Tylenol, voltaren gel, lidocaine patches  Despite these interventions she still continues to have pain with ADLs  I recommend we move forward with bilateral knee corticosteroid injections.  Patient does have a history of hypertension, we will move forward with half dose corticosteroid injections in each knee   Bilateral half dose knee CSI given in clinic, patient tolerated the procedure well with no immediate complications  I also recommend we move forward with a home exercise program to work on quad and hamstring strengthening  [facility] knee strengthening conditioning program printed, reviewed, and provided with the patient  8 minutes were spent developing, teaching, and performing a home exercise program. A written summary was provided and all questions were answered. This service was performed by Amy  CATINA Lazo under direction of Amy Lazo PA-C.  CPT 78699-AT.  Prescription for meloxicam 15 mg once a day provided.  Encouraged her to begin taking this once a day.  Okay to add Tylenol for additional pain relief not to exceed greater than 3000 mg of Tylenol per day  I also discussed with the patient that I do think she will begin good candidate for viscosupplementation in the future, I would like to see how she does with the corticosteroid injection 1st  Follow-up with me in 8 weeks to check progress          Amy Lazo PA-C  Sports Medicine Physician Assistant       Disclaimer: This note was prepared using a voice recognition system and is likely to have sound alike errors within the text.

## 2023-10-18 NOTE — PATIENT INSTRUCTIONS
STRETCHING EXERCISES            STRENGTHENING EXERCISES                  If you have any difficulties reading this information, you may visit the online version using the following link: Knee Conditioning Program ([email or web address].aaos.org/globalassets/[email or web address]-[email or web address]_[email or web address])

## 2023-10-18 NOTE — PROCEDURES
Large Joint Aspiration/Injection: bilateral knee    Date/Time: 10/18/2023 10:00 AM    Performed by: Amy Lazo PA-C  Authorized by: Amy Lazo PA-C    Consent Done?:  Yes (Verbal)  Indications:  Pain  Site marked: the procedure site was marked    Timeout: prior to procedure the correct patient, procedure, and site was verified    Prep: patient was prepped and draped in usual sterile fashion      Local anesthesia used?: Yes    Anesthesia:  Local infiltration  Local anesthetic:  Lidocaine 1% without epinephrine    Details:  Needle Size:  22 G  Ultrasonic Guidance for needle placement?: No    Approach:  Anterolateral  Location:  Knee  Laterality:  Bilateral  Site:  Bilateral knee  Medications (Right):  20 mg triamcinolone acetonide 40 mg/mL  Medications (Left):  20 mg triamcinolone acetonide 40 mg/mL  Patient tolerance:  Patient tolerated the procedure well with no immediate complications      Bilateral half dose CSI   Procedure Note:  We discussed the risk and benefits of injections, including pain, infection, bleeding, damage to adjacent structures, risk of reaction to injection. We discussed the steroid/cortisone injections will not heal the problem but mat help decrease inflammation and help with symptoms. We discussed the risk of repeated injections. The patient expressed understanding and wanted to proceed with the injection. We performed a timeout to verify the proper patient, proper procedure, and the proper site. The injection site was prepared in a sterile fashion. The patient tolerated it well and there were no complication. We did discuss with the patient that steroid injections can cause some increase in blood sugar and blood pressure for up to a week after the injection.

## 2023-11-03 DIAGNOSIS — F41.9 ANXIETY: Chronic | ICD-10-CM

## 2023-11-03 DIAGNOSIS — M79.7 FIBROMYALGIA: Chronic | ICD-10-CM

## 2023-11-03 NOTE — TELEPHONE ENCOUNTER
No care due was identified.  [facility] Embedded Care Due Messages. Reference number: [id number].   11/03/2023 10:05:05 AM CDT

## 2023-11-06 RX ORDER — CITALOPRAM 20 MG/1
20 TABLET, FILM COATED ORAL NIGHTLY
Qty: 90 TABLET | Refills: 3 | Status: SHIPPED | OUTPATIENT
Start: 2023-11-06 | End: 2024-11-05

## 2023-11-22 ENCOUNTER — TELEPHONE (OUTPATIENT)
Dept: SMOKING CESSATION | Facility: CLINIC | Age: 48
End: 2023-11-22
Payer: COMMERCIAL

## 2023-11-22 NOTE — TELEPHONE ENCOUNTER
1st attempt left message regarding smoking cessation 12 month telephone follow up for quit 1 episode.

## 2023-11-29 ENCOUNTER — TELEPHONE (OUTPATIENT)
Dept: SMOKING CESSATION | Facility: CLINIC | Age: 48
End: 2023-11-29
Payer: COMMERCIAL

## 2023-12-09 DIAGNOSIS — E89.0 POSTOPERATIVE HYPOTHYROIDISM: Chronic | ICD-10-CM

## 2023-12-09 NOTE — TELEPHONE ENCOUNTER
No care due was identified.  [facility] Embedded Care Due Messages. Reference number: [id number].   12/09/2023 12:40:46 PM CST

## 2023-12-18 RX ORDER — LEVOTHYROXINE SODIUM 137 UG/1
137 TABLET ORAL
Qty: 90 TABLET | Refills: 0 | Status: SHIPPED | OUTPATIENT
Start: 2023-12-18 | End: 2024-03-13

## 2024-01-05 NOTE — TELEPHONE ENCOUNTER
----- Message from Izabella Blandon LPN sent at 9/13/2019  5:09 PM CDT -----  Contact: PCP  Hello,     It looks like the referral has been cancelled.     Thanks,   Izabella    ----- Message -----  From: Bibiana Blevins MA  Sent: 9/13/2019   5:03 PM  To: , #    See referral, please call pt to sched Pain Mgt appt, thank you. LEOPOLDO    
New referral needed for Pain Mgt, sent to PCP Dr. Farrell for authorization.  
One more question, need to know for what pain.  If fibromyalgia related nothing interventional pain management can do.    Is it for her chronic rib/chest wall pain or something else?
Please verify she wants to see interventional pain management here.    They will evaluate her for an appropriate procedure.   They will not fill any hydrocodone for her long-term.    If she is interviewed interested in a procedure, I will place the referral.      If she is looking for pain medication long term, she needs to see outside pain management.  Unfortunately Medicaid does not cover any pain management.    We can provide her the list of [facility] improved facilities.
no rashes , no suspicious lesions , no areas of discoloration
-Attending Only-, .(Resident)

## 2024-01-09 DIAGNOSIS — M17.0 PRIMARY OSTEOARTHRITIS OF BOTH KNEES: ICD-10-CM

## 2024-01-09 RX ORDER — MELOXICAM 15 MG/1
15 TABLET ORAL
Qty: 60 TABLET | Refills: 1 | Status: SHIPPED | OUTPATIENT
Start: 2024-01-09

## 2024-01-24 DIAGNOSIS — Z76.89 ENCOUNTER TO ESTABLISH CARE: Primary | ICD-10-CM

## 2024-01-24 DIAGNOSIS — Z00.00 ROUTINE ADULT HEALTH MAINTENANCE: ICD-10-CM

## 2024-01-25 NOTE — PROGRESS NOTES
Subjective:   Patient ID:  Shelly Kam is a 49 y.o. female who presents for cardiac consult of Annual Exam, Palpitations, Medication Refill (Refills needed on medications aspirin, Pantoprazole, Simvastatin, Losartan, and metoprolol. Pt hasn't been taking. ), and Chest Pain (Chest pain rated at an 6. Pt states pain is present with inhaling and exhaling. )    Referring Physician: Adan Beck MD   Reason for consult: HTN    Follow-up  Associated symptoms include chest pain.     The patient came in today for cardiac consult of Annual Exam, Palpitations, Medication Refill (Refills needed on medications aspirin, Pantoprazole, Simvastatin, Losartan, and metoprolol. Pt hasn't been taking. ), and Chest Pain (Chest pain rated at an 6. Pt states pain is present with inhaling and exhaling. )      Shelly Kam is a 49 y.o. female pt with HTN, HLD, obesity, asthma, Fibromyalgia, anxiety presents for follow up CV eval.     10/13/20  She had recent BB changed to Toprol. She has intermittent chest pain, hard to describe. She was on on ACE-I in the past but had lip swelling.   She has a lot of joint pain as well.       6/2/21  She underwent thyroidectomy in March. She has not had follow up eval yet.  BNP was elevated post op - 153. Today Bp and HR well controlled.     1/26/24  Follow up since 6/2021    10/8/2023 6/11/2023 6/3/2023 9/20/2022 9/17/2022                    Recent Readings   SBP (mmHg) 115 131 134 128 123   DBP (mmHg) 71 77 81 80 78   Pulse 58 59 108 62 58     Digital HTN program active until last June 2023.     BP elevated 160s/90s. HR 50s. BMI 35 - 215 lbs.       Patient has fairly good exercise tolerance. Takes care of elderly.     Patient is compliant with medications.      Results for orders placed during the hospital encounter of 10/27/20   Echo Color Flow Doppler? Yes    Narrative · The left ventricle is normal in size with normal systolic function. The   estimated ejection fraction is  60%.  · Normal left ventricular diastolic function.  · Normal right ventricular systolic function.  · The estimated PA systolic pressure is 29 mmHg.  · Normal central venous pressure (3 mmHg).  · Bubble study: shunt across atrial septum noted.            Past Medical History:   Diagnosis Date    Allergy     Anemia     Asthma     Depression     Diabetes mellitus     Diabetes mellitus, type 2     Encounter for blood transfusion     Fibromyalgia     GERD (gastroesophageal reflux disease)     Hyperlipidemia     Hypertension     Hyperthyroidism     Panic attack     Prozac in past    Primary osteoarthritis of both knees 10/17/2023       Past Surgical History:   Procedure Laterality Date    BRONCHOSCOPY      DIAGNOSTIC LAPAROSCOPY N/A 09/12/2022    Procedure: LAPAROSCOPY, DIAGNOSTIC;  Surgeon: Talha Santa MD;  Location: [facility] OR;  Service: OB/GYN;  Laterality: N/A;    HERNIA REPAIR      SALPINGECTOMY Bilateral 09/12/2022    Procedure: SALPINGECTOMY;  Surgeon: Talha Santa MD;  Location: [facility] OR;  Service: OB/GYN;  Laterality: Bilateral;    THYROIDECTOMY Bilateral 03/25/2021    Procedure: THYROIDECTOMY;  Surgeon: Alon Barton MD;  Location: [address];  Service: ENT;  Laterality: Bilateral;  NIMS monitor    TONSILLECTOMY      TOTAL ABDOMINAL HYSTERECTOMY N/A 09/12/2022    Procedure: HYSTERECTOMY, TOTAL, ABDOMINAL;  Surgeon: Talha Santa MD;  Location: [facility] OR;  Service: OB/GYN;  Laterality: N/A;    TRACHEOSTOMY      TRACHEOSTOMY TUBE PLACEMENT      XI ROBOTIC HYSTERECTOMY, WITH SALPINGECTOMY N/A 09/12/2022    Procedure: XI ROBOTIC HYSTERECTOMY,WITH SALPINGECTOMY;  Surgeon: Talha Santa MD;  Location: [facility] OR;  Service: OB/GYN;  Laterality: N/A;  converted to open without docking       Social History     Tobacco Use    Smoking status: Every Day     Current packs/day: 0.00     Average packs/day: 0.3 packs/day for 23.3 years (5.8 ttl pk-yrs)     Types: Cigarettes     Start date: 8/10/1999     Last attempt  to quit: 2022     Years since quittin.1    Smokeless tobacco: Never    Tobacco comments:     Smokes 8 long cigarettes that she relights   Substance Use Topics    Alcohol use: Not Currently     Comment: once a year; hold 72 hrs prior to sx    Drug use: Yes     Types: Marijuana       Family History   Problem Relation Age of Onset    Cancer Paternal Aunt     Asthma Mother     COPD Father     Vision loss Father     COPD Maternal Grandmother     Stroke Maternal Uncle        Patient's Medications   New Prescriptions    No medications on file   Previous Medications    ALBUTEROL (PROVENTIL/VENTOLIN HFA) 90 MCG/ACTUATION INHALER    Inhale 2 puffs into the lungs every 6 (six) hours as needed for Wheezing or Shortness of Breath.    ASPIRIN (ECOTRIN) 81 MG EC TABLET    Take 1 tablet (81 mg total) by mouth once daily.    CITALOPRAM (CELEXA) 20 MG TABLET    Take 1 tablet (20 mg total) by mouth every evening.    EPINEPHRINE (EPIPEN) 0.3 MG/0.3 ML ATIN    INJECT CONTENTS OF 1 PEN AS NEEDED FOR ALLERGIC REACTION    FLUTICASONE PROPIONATE (FLONASE) 50 MCG/ACTUATION NASAL SPRAY    2 sprays (100 mcg total) by Each Nostril route once daily.    LEVOTHYROXINE (SYNTHROID) 137 MCG TAB TABLET    Take 1 tablet (137 mcg total) by mouth before breakfast.    MELOXICAM (MOBIC) 15 MG TABLET    Take 1 tablet by mouth once daily    PANTOPRAZOLE (PROTONIX) 20 MG TABLET    Take 1 tablet (20 mg total) by mouth once daily.    SIMVASTATIN (ZOCOR) 40 MG TABLET    Take 1 tablet (40 mg total) by mouth every evening.   Modified Medications    Modified Medication Previous Medication    AMLODIPINE (NORVASC) 10 MG TABLET amLODIPine (NORVASC) 10 MG tablet       Take 1 tablet (10 mg total) by mouth every evening.    Take 1 tablet (10 mg total) by mouth every evening.    LOSARTAN (COZAAR) 50 MG TABLET losartan (COZAAR) 25 MG tablet       Take 1 tablet (50 mg total) by mouth once daily.    Take 25 mg by mouth.   Discontinued Medications    METOPROLOL  "SUCCINATE (TOPROL-XL) 50 MG 24 HR TABLET    Take 1 tablet (50 mg total) by mouth nightly. QD       Review of Systems   Constitutional:  Positive for malaise/fatigue.   HENT: Negative.     Eyes: Negative.    Respiratory: Negative.     Cardiovascular:  Positive for chest pain and palpitations.   Gastrointestinal: Negative.    Genitourinary: Negative.    Musculoskeletal:  Positive for back pain and joint pain.   Skin: Negative.    Neurological: Negative.    Endo/Heme/Allergies: Negative.    Psychiatric/Behavioral: Negative.     All 12 systems otherwise negative.      Wt Readings from Last 3 Encounters:   01/26/24 97.6 kg (215 lb 2.7 oz)   10/17/23 99.3 kg (218 lb 14.7 oz)   08/08/23 95.3 kg (210 lb 1.6 oz)     Temp Readings from Last 3 Encounters:   08/08/23 99 °F (37.2 °C) (Oral)   07/06/23 96.4 °F (35.8 °C) (Tympanic)   06/14/23 98.4 °F (36.9 °C)     BP Readings from Last 3 Encounters:   01/26/24 (!) 160/94   10/17/23 (!) 140/82   08/08/23 121/67     Pulse Readings from Last 3 Encounters:   01/26/24 (!) 56   10/17/23 (!) 50   08/08/23 (!) 59       BP (!) 160/94 (BP Location: Right arm, Patient Position: Sitting, BP Method: Medium (Manual))   Pulse (!) 56   Ht 5' 5" (1.651 m)   Wt 97.6 kg (215 lb 2.7 oz)   LMP 09/12/2022   SpO2 99%   BMI 35.81 kg/m²     Objective:   Physical Exam  Vitals and nursing note reviewed.   Constitutional:       General: She is not in acute distress.     Appearance: She is well-developed. She is not diaphoretic.   HENT:      Head: Normocephalic and atraumatic.      Nose: Nose normal.   Eyes:      General: No scleral icterus.     Conjunctiva/sclera: Conjunctivae normal.   Neck:      Thyroid: No thyromegaly.      Vascular: No JVD.   Cardiovascular:      Rate and Rhythm: Normal rate and regular rhythm.      Heart sounds: S1 normal and S2 normal. No murmur heard.     No friction rub. No gallop. No S3 or S4 sounds.   Pulmonary:      Effort: Pulmonary effort is normal. No respiratory " distress.      Breath sounds: Normal breath sounds. No stridor. No wheezing or rales.   Chest:      Chest wall: No tenderness.   Abdominal:      General: Bowel sounds are normal. There is no distension.      Palpations: Abdomen is soft. There is no mass.      Tenderness: There is no abdominal tenderness. There is no rebound.   Genitourinary:     Comments: Deferred  Musculoskeletal:         General: No tenderness or deformity. Normal range of motion.      Cervical back: Normal range of motion and neck supple.   Lymphadenopathy:      Cervical: No cervical adenopathy.   Skin:     General: Skin is warm and dry.      Coloration: Skin is not pale.      Findings: No erythema or rash.   Neurological:      Mental Status: She is alert and oriented to person, place, and time.      Motor: No abnormal muscle tone.      Coordination: Coordination normal.   Psychiatric:         Behavior: Behavior normal.         Thought Content: Thought content normal.         Judgment: Judgment normal.         Lab Results   Component Value Date     10/17/2023    K 4.3 10/17/2023     10/17/2023    CO2 28 10/17/2023    BUN 11 10/17/2023    CREATININE 0.8 10/17/2023    GLU 79 10/17/2023    HGBA1C 5.5 10/17/2023    MG 1.9 03/23/2016    AST 18 10/17/2023    ALT 11 10/17/2023    ALBUMIN 4.1 10/17/2023    PROT 7.4 10/17/2023    BILITOT 0.2 10/17/2023    WBC 6.61 10/17/2023    HGB 13.4 10/17/2023    HCT 41.0 10/17/2023    MCV 92 10/17/2023     10/17/2023    INR 0.9 09/07/2022    TSH 12.633 (H) 10/17/2023    CHOL 191 10/17/2023    HDL 55 10/17/2023    LDLCALC 114.8 10/17/2023    TRIG 106 10/17/2023     (H) 03/26/2021     Assessment:      1. Essential hypertension    2. PFO (patent foramen ovale)    3. Fibromyalgia    4. Anxiety    5. Primary osteoarthritis of both knees    6. Mild intermittent asthma without complication    7. Severe obesity with body mass index (BMI) of 35.0 to 39.9 with serious comorbidity    8. BALTA  (obstructive sleep apnea)          Plan:   1. HTN with small PFO, with atypical CP  - titrate meds and titrate- stop BB, increase Losartan, cont Norvasc  - needs pain control  - r/o BALTA  - order 7 day Vital monitor -  palpitations     2. Hyperthyroidism - s/p thyroidectomy, now hypothyroid  - cont Synthroid  - cont tx per endo, TSH - 12.6    3. HLD  - titrate statin  - worse lipids 10/2023    4. Asthma with BALTA  - cont meds per PCP  - needs CPAP machine   - f/u pulm    5. Not DM A1c 5.5  -diet controlled     6. GERD  - cont PPI    7. Fibromyalgia, OA  - f.u pain and cont tx       Thank you for allowing me to participate in this patient's care. Please do not hesitate to contact me with any questions or concerns. Consult note has been forwarded to the referral physician.

## 2024-01-26 ENCOUNTER — HOSPITAL ENCOUNTER (OUTPATIENT)
Dept: CARDIOLOGY | Facility: HOSPITAL | Age: 49
Discharge: HOME | End: 2024-01-26
Attending: INTERNAL MEDICINE
Payer: COMMERCIAL

## 2024-01-26 ENCOUNTER — OFFICE VISIT (OUTPATIENT)
Dept: CARDIOLOGY | Facility: CLINIC | Age: 49
End: 2024-01-26
Payer: COMMERCIAL

## 2024-01-26 VITALS
BODY MASS INDEX: 35.85 KG/M2 | DIASTOLIC BLOOD PRESSURE: 94 MMHG | OXYGEN SATURATION: 99 % | HEART RATE: 56 BPM | HEIGHT: 65 IN | WEIGHT: 215.19 LBS | SYSTOLIC BLOOD PRESSURE: 160 MMHG

## 2024-01-26 DIAGNOSIS — M79.7 FIBROMYALGIA: Chronic | ICD-10-CM

## 2024-01-26 DIAGNOSIS — G47.33 OSA (OBSTRUCTIVE SLEEP APNEA): ICD-10-CM

## 2024-01-26 DIAGNOSIS — R00.2 PALPITATIONS: ICD-10-CM

## 2024-01-26 DIAGNOSIS — Z00.00 ROUTINE ADULT HEALTH MAINTENANCE: ICD-10-CM

## 2024-01-26 DIAGNOSIS — Z76.89 ENCOUNTER TO ESTABLISH CARE: ICD-10-CM

## 2024-01-26 DIAGNOSIS — F41.9 ANXIETY: Chronic | ICD-10-CM

## 2024-01-26 DIAGNOSIS — M17.0 PRIMARY OSTEOARTHRITIS OF BOTH KNEES: ICD-10-CM

## 2024-01-26 DIAGNOSIS — E66.01 SEVERE OBESITY WITH BODY MASS INDEX (BMI) OF 35.0 TO 39.9 WITH SERIOUS COMORBIDITY: ICD-10-CM

## 2024-01-26 DIAGNOSIS — J45.20 MILD INTERMITTENT ASTHMA WITHOUT COMPLICATION: Chronic | ICD-10-CM

## 2024-01-26 DIAGNOSIS — I10 ESSENTIAL HYPERTENSION: Primary | Chronic | ICD-10-CM

## 2024-01-26 DIAGNOSIS — Q21.12 PFO (PATENT FORAMEN OVALE): ICD-10-CM

## 2024-01-26 PROCEDURE — 1159F MED LIST DOCD IN RCRD: CPT | Mod: CPTII,S$GLB,, | Performed by: INTERNAL MEDICINE

## 2024-01-26 PROCEDURE — 99999 PR PBB SHADOW E&M-EST. PATIENT-LVL V: CPT | Mod: PBBFAC,,, | Performed by: INTERNAL MEDICINE

## 2024-01-26 PROCEDURE — 1160F RVW MEDS BY RX/DR IN RCRD: CPT | Mod: CPTII,S$GLB,, | Performed by: INTERNAL MEDICINE

## 2024-01-26 PROCEDURE — 93010 ELECTROCARDIOGRAM REPORT: CPT | Mod: ,,, | Performed by: INTERNAL MEDICINE

## 2024-01-26 PROCEDURE — 99214 OFFICE O/P EST MOD 30 MIN: CPT | Mod: S$GLB,,, | Performed by: INTERNAL MEDICINE

## 2024-01-26 PROCEDURE — 93005 ELECTROCARDIOGRAM TRACING: CPT

## 2024-01-26 PROCEDURE — 3077F SYST BP >= 140 MM HG: CPT | Mod: CPTII,S$GLB,, | Performed by: INTERNAL MEDICINE

## 2024-01-26 PROCEDURE — 3008F BODY MASS INDEX DOCD: CPT | Mod: CPTII,S$GLB,, | Performed by: INTERNAL MEDICINE

## 2024-01-26 PROCEDURE — 3080F DIAST BP >= 90 MM HG: CPT | Mod: CPTII,S$GLB,, | Performed by: INTERNAL MEDICINE

## 2024-01-26 RX ORDER — AMLODIPINE BESYLATE 10 MG/1
10 TABLET ORAL NIGHTLY
Qty: 90 TABLET | Refills: 1 | Status: SHIPPED | OUTPATIENT
Start: 2024-01-26

## 2024-01-26 RX ORDER — LOSARTAN POTASSIUM 50 MG/1
50 TABLET ORAL DAILY
Qty: 90 TABLET | Refills: 1 | Status: SHIPPED | OUTPATIENT
Start: 2024-01-26 | End: 2024-03-06 | Stop reason: SDUPTHER

## 2024-02-09 ENCOUNTER — PATIENT MESSAGE (OUTPATIENT)
Dept: PULMONOLOGY | Facility: CLINIC | Age: 49
End: 2024-02-09

## 2024-02-13 ENCOUNTER — TELEPHONE (OUTPATIENT)
Dept: CARDIOLOGY | Facility: HOSPITAL | Age: 49
End: 2024-02-13

## 2024-02-28 ENCOUNTER — TELEPHONE (OUTPATIENT)
Dept: INTERNAL MEDICINE | Facility: CLINIC | Age: 49
End: 2024-02-28

## 2024-02-28 NOTE — TELEPHONE ENCOUNTER
----- Message from Sunil Mario sent at 2/28/2024 11:04 AM CST -----  Contact: Shelly  Type:  Same Day Appointment Request    Caller is requesting a same day appointment.  Caller declined first available appointment listed below.    Name of Caller:Shelly  When is the first available appointment?03/05  Symptoms:body pain  Best Call Back Number:[phone]  Additional Information:

## 2024-03-05 NOTE — PROGRESS NOTES
Subjective:   Patient ID:  Shelly Kam is a 49 y.o. female who presents for cardiac consult of Chest Pain (Rated at an 7. Pt describes pain as sharp. Pt states pain is mostly located unde the left breast. ) and Medication Refill (Simvastatin. )    Referring Physician: Adan Beck MD   Reason for consult: HTN      The patient came in today for cardiac consult of Chest Pain (Rated at an 7. Pt describes pain as sharp. Pt states pain is mostly located unde the left breast. ) and Medication Refill (Simvastatin. )      Shelly Kam is a 49 y.o. female pt with HTN, HLD, obesity, asthma, Fibromyalgia, anxiety presents for follow up CV eval.     10/13/20  She had recent BB changed to Toprol. She has intermittent chest pain, hard to describe. She was on on ACE-I in the past but had lip swelling.   She has a lot of joint pain as well.       6/2/21  She underwent thyroidectomy in March. She has not had follow up eval yet.  BNP was elevated post op - 153. Today Bp and HR well controlled.     1/26/24  Follow up since 6/2021    10/8/2023 6/11/2023 6/3/2023 9/20/2022 9/17/2022                    Recent Readings   SBP (mmHg) 115 131 134 128 123   DBP (mmHg) 71 77 81 80 78   Pulse 58 59 108 62 58     Digital HTN program active until last June 2023.     BP elevated 160s/90s. HR 50s. BMI 35 - 215 lbs.       3/6/24  BP [id number]. HR 60. BMI 36 - 220 lbs.     Recent ER eval - Wed Feb 28, 2024   1306 Patient with acute exacerbation of chronic pain. She does have a diagnosis of fibromyalgia but does not have a pain management doctor or pain medication at home. Will discharge with Robaxin and naproxen. She was provided with information for a new PCP and advised to follow-up. Discussed return precautions. Answered all questions. Pt verbalized understanding of plan and is ready for discharge. [ET]   1307 Seen by midlevel only. Attending physician Dr. Hair. 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       Patient has fairly good exercise tolerance. [facility]     Patient is compliant with medications.      Results for orders placed during the hospital encounter of 10/27/20   Echo Color Flow Doppler? Yes    Narrative · The left ventricle is normal in size with normal systolic function. The   estimated ejection fraction is 60%.  · Normal left ventricular diastolic function.  · Normal right ventricular systolic function.  · The estimated PA systolic pressure is 29 mmHg.  · Normal central venous pressure (3 mmHg).  · Bubble study: shunt across atrial septum noted.            Past Medical History:   Diagnosis Date    Allergy     Anemia     Asthma     Depression     Diabetes mellitus     Diabetes mellitus, type 2     Encounter for blood transfusion     Fibromyalgia     GERD (gastroesophageal reflux disease)     Hyperlipidemia     Hypertension     Hyperthyroidism     Panic attack     Prozac in past    Primary osteoarthritis of both knees 10/17/2023       Past Surgical History:   Procedure Laterality Date    BRONCHOSCOPY      DIAGNOSTIC LAPAROSCOPY N/A 09/12/2022    Procedure: LAPAROSCOPY, DIAGNOSTIC;  Surgeon: Talha Santa MD;  Location: [facility] OR;  Service: OB/GYN;  Laterality: N/A;    HERNIA REPAIR      SALPINGECTOMY Bilateral 09/12/2022    Procedure: SALPINGECTOMY;  Surgeon: Talha Santa MD;  Location: [facility] OR;  Service: OB/GYN;  Laterality: Bilateral;    THYROIDECTOMY Bilateral 03/25/2021    Procedure: THYROIDECTOMY;  Surgeon: Alon Barton MD;  Location: [address];  Service: ENT;  Laterality: Bilateral;  NIMS monitor    TONSILLECTOMY      TOTAL ABDOMINAL HYSTERECTOMY N/A 09/12/2022    Procedure: HYSTERECTOMY, TOTAL, ABDOMINAL;  Surgeon: Talha Santa MD;  Location: [facility] OR;  Service: OB/GYN;  Laterality: N/A;    TRACHEOSTOMY      TRACHEOSTOMY TUBE PLACEMENT      XI ROBOTIC HYSTERECTOMY, WITH SALPINGECTOMY N/A 09/12/2022    Procedure: XI ROBOTIC  HYSTERECTOMY,WITH SALPINGECTOMY;  Surgeon: Talha Santa MD;  Location: [facility];  Service: OB/GYN;  Laterality: N/A;  converted to open without docking       Social History     Tobacco Use    Smoking status: Every Day     Current packs/day: 0.00     Average packs/day: 0.3 packs/day for 23.3 years (5.8 ttl pk-yrs)     Types: Cigarettes     Start date: 8/10/1999     Last attempt to quit: 2022     Years since quittin.2    Smokeless tobacco: Never    Tobacco comments:     Smokes 8 long cigarettes that she relights   Substance Use Topics    Alcohol use: Not Currently     Comment: once a year; hold 72 hrs prior to sx    Drug use: Yes     Types: Marijuana       Family History   Problem Relation Age of Onset    Cancer Paternal Aunt     Asthma Mother     COPD Father     Vision loss Father     COPD Maternal Grandmother     Stroke Maternal Uncle        Patient's Medications   New Prescriptions    No medications on file   Previous Medications    ALBUTEROL (PROVENTIL/VENTOLIN HFA) 90 MCG/ACTUATION INHALER    Inhale 2 puffs into the lungs every 6 (six) hours as needed for Wheezing or Shortness of Breath.    AMLODIPINE (NORVASC) 10 MG TABLET    Take 1 tablet (10 mg total) by mouth every evening.    ASPIRIN (ECOTRIN) 81 MG EC TABLET    Take 1 tablet (81 mg total) by mouth once daily.    CITALOPRAM (CELEXA) 20 MG TABLET    Take 1 tablet (20 mg total) by mouth every evening.    EPINEPHRINE (EPIPEN) 0.3 MG/0.3 ML ATIN    INJECT CONTENTS OF 1 PEN AS NEEDED FOR ALLERGIC REACTION    FLUTICASONE PROPIONATE (FLONASE) 50 MCG/ACTUATION NASAL SPRAY    2 sprays (100 mcg total) by Each Nostril route once daily.    LEVOTHYROXINE (SYNTHROID) 137 MCG TAB TABLET    Take 1 tablet (137 mcg total) by mouth before breakfast.    LOSARTAN (COZAAR) 50 MG TABLET    Take 1 tablet (50 mg total) by mouth once daily.    MELOXICAM (MOBIC) 15 MG TABLET    Take 1 tablet by mouth once daily    PANTOPRAZOLE (PROTONIX) 20 MG TABLET    Take 1 tablet (20  "mg total) by mouth once daily.    SIMVASTATIN (ZOCOR) 40 MG TABLET    Take 1 tablet (40 mg total) by mouth every evening.   Modified Medications    No medications on file   Discontinued Medications    No medications on file       Review of Systems   Constitutional:  Positive for malaise/fatigue.   HENT: Negative.     Eyes: Negative.    Respiratory: Negative.     Cardiovascular:  Positive for chest pain and palpitations.   Gastrointestinal: Negative.    Genitourinary: Negative.    Musculoskeletal:  Positive for back pain and joint pain.   Skin: Negative.    Neurological: Negative.    Endo/Heme/Allergies: Negative.    Psychiatric/Behavioral: Negative.     All 12 systems otherwise negative.      Wt Readings from Last 3 Encounters:   03/06/24 99.8 kg (220 lb 0.3 oz)   01/26/24 97.6 kg (215 lb 2.7 oz)   10/17/23 99.3 kg (218 lb 14.7 oz)     Temp Readings from Last 3 Encounters:   08/08/23 99 °F (37.2 °C) (Oral)   07/06/23 96.4 °F (35.8 °C) (Tympanic)   06/14/23 98.4 °F (36.9 °C)     BP Readings from Last 3 Encounters:   03/06/24 (!) 146/88   01/26/24 (!) 160/94   10/17/23 (!) 140/82     Pulse Readings from Last 3 Encounters:   03/06/24 60   01/26/24 (!) 56   10/17/23 (!) 50       BP (!) 146/88 (BP Location: Right arm, Patient Position: Sitting, BP Method: Medium (Manual))   Pulse 60   Ht 5' 5" (1.651 m)   Wt 99.8 kg (220 lb 0.3 oz)   LMP 09/12/2022   SpO2 99%   BMI 36.61 kg/m²     Objective:   Physical Exam  Vitals and nursing note reviewed.   Constitutional:       General: She is not in acute distress.     Appearance: She is well-developed. She is not diaphoretic.   HENT:      Head: Normocephalic and atraumatic.      Nose: Nose normal.   Eyes:      General: No scleral icterus.     Conjunctiva/sclera: Conjunctivae normal.   Neck:      Thyroid: No thyromegaly.      Vascular: No JVD.   Cardiovascular:      Rate and Rhythm: Normal rate and regular rhythm.      Heart sounds: S1 normal and S2 normal. No murmur heard.    "  No friction rub. No gallop. No S3 or S4 sounds.   Pulmonary:      Effort: Pulmonary effort is normal. No respiratory distress.      Breath sounds: Normal breath sounds. No stridor. No wheezing or rales.   Chest:      Chest wall: No tenderness.   Abdominal:      General: Bowel sounds are normal. There is no distension.      Palpations: Abdomen is soft. There is no mass.      Tenderness: There is no abdominal tenderness. There is no rebound.   Genitourinary:     Comments: Deferred  Musculoskeletal:         General: No tenderness or deformity. Normal range of motion.      Cervical back: Normal range of motion and neck supple.   Lymphadenopathy:      Cervical: No cervical adenopathy.   Skin:     General: Skin is warm and dry.      Coloration: Skin is not pale.      Findings: No erythema or rash.   Neurological:      Mental Status: She is alert and oriented to person, place, and time.      Motor: No abnormal muscle tone.      Coordination: Coordination normal.   Psychiatric:         Behavior: Behavior normal.         Thought Content: Thought content normal.         Judgment: Judgment normal.         Lab Results   Component Value Date     10/17/2023    K 4.3 10/17/2023     10/17/2023    CO2 28 10/17/2023    BUN 11 10/17/2023    CREATININE 0.8 10/17/2023    GLU 79 10/17/2023    HGBA1C 5.5 10/17/2023    MG 1.9 03/23/2016    AST 18 10/17/2023    ALT 11 10/17/2023    ALBUMIN 4.1 10/17/2023    PROT 7.4 10/17/2023    BILITOT 0.2 10/17/2023    WBC 6.61 10/17/2023    HGB 13.4 10/17/2023    HCT 41.0 10/17/2023    MCV 92 10/17/2023     10/17/2023    INR 0.9 09/07/2022    TSH 12.633 (H) 10/17/2023    CHOL 191 10/17/2023    HDL 55 10/17/2023    LDLCALC 114.8 10/17/2023    TRIG 106 10/17/2023     (H) 03/26/2021     Assessment:      1. Essential hypertension    2. Fibromyalgia    3. Anxiety    4. PFO (patent foramen ovale)    5. Primary osteoarthritis of both knees    6. Mild intermittent asthma without  complication    7. Severe obesity with body mass index (BMI) of 35.0 to 39.9 with serious comorbidity    8. BALTA (obstructive sleep apnea)    9. Palpitations            Plan:   1. HTN with small PFO, with atypical CP  - titrate meds and titrate- stop BB, increase Losartan --> 100mg, cont Norvasc  - needs pain control  - r/o BALTA  - order 7 day Vital monitor -  palpitations     2. Hyperthyroidism - s/p thyroidectomy, now hypothyroid  - cont Synthroid  - cont tx per endo, TSH - 12.6    3. HLD  - titrate statin  - worse lipids 10/2023    4. Asthma with BALTA  - cont meds per PCP  - needs CPAP machine   - f/u pulm    5. Not DM A1c 5.5  -diet controlled     6. GERD  - cont PPI    7. Fibromyalgia, OA  - f.u pain and cont tx     Visit today included increased complexity associated with the care of the episodic problem HTN addressed and managing the longitudinal care of the patient due to the serious and/or complex managed problem(s) .      Thank you for allowing me to participate in this patient's care. Please do not hesitate to contact me with any questions or concerns. Consult note has been forwarded to the referral physician.

## 2024-03-06 ENCOUNTER — OFFICE VISIT (OUTPATIENT)
Dept: CARDIOLOGY | Facility: CLINIC | Age: 49
End: 2024-03-06

## 2024-03-06 VITALS
BODY MASS INDEX: 36.65 KG/M2 | DIASTOLIC BLOOD PRESSURE: 88 MMHG | OXYGEN SATURATION: 99 % | HEIGHT: 65 IN | WEIGHT: 220 LBS | HEART RATE: 60 BPM | SYSTOLIC BLOOD PRESSURE: 146 MMHG

## 2024-03-06 DIAGNOSIS — E66.01 SEVERE OBESITY WITH BODY MASS INDEX (BMI) OF 35.0 TO 39.9 WITH SERIOUS COMORBIDITY: ICD-10-CM

## 2024-03-06 DIAGNOSIS — M79.7 FIBROMYALGIA: ICD-10-CM

## 2024-03-06 DIAGNOSIS — Q21.12 PFO (PATENT FORAMEN OVALE): ICD-10-CM

## 2024-03-06 DIAGNOSIS — R00.2 PALPITATIONS: ICD-10-CM

## 2024-03-06 DIAGNOSIS — J45.20 MILD INTERMITTENT ASTHMA WITHOUT COMPLICATION: ICD-10-CM

## 2024-03-06 DIAGNOSIS — F41.9 ANXIETY: ICD-10-CM

## 2024-03-06 DIAGNOSIS — I10 ESSENTIAL HYPERTENSION: Primary | ICD-10-CM

## 2024-03-06 DIAGNOSIS — G47.33 OSA (OBSTRUCTIVE SLEEP APNEA): ICD-10-CM

## 2024-03-06 DIAGNOSIS — M17.0 PRIMARY OSTEOARTHRITIS OF BOTH KNEES: ICD-10-CM

## 2024-03-06 PROCEDURE — 99214 OFFICE O/P EST MOD 30 MIN: CPT | Mod: PBBFAC | Performed by: INTERNAL MEDICINE

## 2024-03-06 PROCEDURE — 99214 OFFICE O/P EST MOD 30 MIN: CPT | Mod: S$PBB,,, | Performed by: INTERNAL MEDICINE

## 2024-03-06 PROCEDURE — 99999 PR PBB SHADOW E&M-EST. PATIENT-LVL IV: CPT | Mod: PBBFAC,,, | Performed by: INTERNAL MEDICINE

## 2024-03-06 RX ORDER — LOSARTAN POTASSIUM 100 MG/1
100 TABLET ORAL DAILY
Qty: 90 TABLET | Refills: 1 | Status: SHIPPED | OUTPATIENT
Start: 2024-03-06

## 2024-03-07 ENCOUNTER — OFFICE VISIT (OUTPATIENT)
Dept: INTERNAL MEDICINE | Facility: CLINIC | Age: 49
End: 2024-03-07

## 2024-03-07 ENCOUNTER — HOSPITAL ENCOUNTER (OUTPATIENT)
Dept: RADIOLOGY | Facility: HOSPITAL | Age: 49
Discharge: HOME | End: 2024-03-07
Attending: FAMILY MEDICINE

## 2024-03-07 VITALS — WEIGHT: 220 LBS | BODY MASS INDEX: 36.65 KG/M2 | HEIGHT: 65 IN

## 2024-03-07 VITALS
WEIGHT: 218.5 LBS | HEIGHT: 65 IN | DIASTOLIC BLOOD PRESSURE: 94 MMHG | BODY MASS INDEX: 36.4 KG/M2 | HEART RATE: 67 BPM | OXYGEN SATURATION: 98 % | SYSTOLIC BLOOD PRESSURE: 142 MMHG

## 2024-03-07 DIAGNOSIS — M54.50 CHRONIC MIDLINE LOW BACK PAIN WITHOUT SCIATICA: Primary | ICD-10-CM

## 2024-03-07 DIAGNOSIS — Z12.31 ENCOUNTER FOR SCREENING MAMMOGRAM FOR BREAST CANCER: ICD-10-CM

## 2024-03-07 DIAGNOSIS — G89.29 CHRONIC MIDLINE LOW BACK PAIN WITHOUT SCIATICA: Primary | ICD-10-CM

## 2024-03-07 DIAGNOSIS — E78.2 MIXED HYPERLIPIDEMIA: ICD-10-CM

## 2024-03-07 DIAGNOSIS — E89.0 POSTOPERATIVE HYPOTHYROIDISM: Chronic | ICD-10-CM

## 2024-03-07 DIAGNOSIS — I10 PRIMARY HYPERTENSION: ICD-10-CM

## 2024-03-07 DIAGNOSIS — G47.9 SLEEPING DIFFICULTY: ICD-10-CM

## 2024-03-07 DIAGNOSIS — F41.9 ANXIETY: Chronic | ICD-10-CM

## 2024-03-07 DIAGNOSIS — K21.9 GASTROESOPHAGEAL REFLUX DISEASE WITHOUT ESOPHAGITIS: ICD-10-CM

## 2024-03-07 PROCEDURE — 77067 SCR MAMMO BI INCL CAD: CPT | Mod: TC

## 2024-03-07 PROCEDURE — 99213 OFFICE O/P EST LOW 20 MIN: CPT | Mod: PBBFAC | Performed by: FAMILY MEDICINE

## 2024-03-07 PROCEDURE — 99999 PR PBB SHADOW E&M-EST. PATIENT-LVL III: CPT | Mod: PBBFAC,,, | Performed by: FAMILY MEDICINE

## 2024-03-07 PROCEDURE — 77067 SCR MAMMO BI INCL CAD: CPT | Mod: 26,,, | Performed by: RADIOLOGY

## 2024-03-07 PROCEDURE — 99215 OFFICE O/P EST HI 40 MIN: CPT | Mod: S$PBB,,, | Performed by: FAMILY MEDICINE

## 2024-03-07 PROCEDURE — 77063 BREAST TOMOSYNTHESIS BI: CPT | Mod: 26,,, | Performed by: RADIOLOGY

## 2024-03-07 RX ORDER — MELOXICAM 15 MG/1
15 TABLET ORAL DAILY
Qty: 90 TABLET | Refills: 3 | Status: SHIPPED | OUTPATIENT
Start: 2024-03-07 | End: 2024-06-11 | Stop reason: ALTCHOICE

## 2024-03-07 RX ORDER — PANTOPRAZOLE SODIUM 40 MG/1
40 TABLET, DELAYED RELEASE ORAL DAILY
Qty: 90 TABLET | Refills: 3 | Status: SHIPPED | OUTPATIENT
Start: 2024-03-07 | End: 2025-03-07

## 2024-03-07 RX ORDER — GABAPENTIN 300 MG/1
300 CAPSULE ORAL NIGHTLY
Qty: 30 CAPSULE | Refills: 11 | Status: SHIPPED | OUTPATIENT
Start: 2024-03-07 | End: 2025-03-07

## 2024-03-07 RX ORDER — TIZANIDINE 4 MG/1
4 TABLET ORAL EVERY 8 HOURS PRN
Qty: 90 TABLET | Refills: 11 | Status: SHIPPED | OUTPATIENT
Start: 2024-03-07 | End: 2024-04-02

## 2024-03-07 RX ORDER — ATORVASTATIN CALCIUM 20 MG/1
20 TABLET, FILM COATED ORAL DAILY
Qty: 90 TABLET | Refills: 3 | Status: SHIPPED | OUTPATIENT
Start: 2024-03-07 | End: 2025-03-07

## 2024-03-07 RX ORDER — ASPIRIN 81 MG/1
81 TABLET ORAL DAILY
Qty: 90 TABLET | Refills: 3 | Status: SHIPPED | OUTPATIENT
Start: 2024-03-07 | End: 2025-03-07

## 2024-03-07 RX ORDER — QUETIAPINE FUMARATE 50 MG/1
50 TABLET, FILM COATED ORAL NIGHTLY
Qty: 30 TABLET | Refills: 11 | Status: SHIPPED | OUTPATIENT
Start: 2024-03-07 | End: 2025-03-07

## 2024-03-07 NOTE — PROGRESS NOTES
Subjective:   Patient ID: Shelly Kam is a 49 y.o. female.  Chief Complaint:  Back Pain    Presents for evaluation of back pain and follow-up chronic medical conditions    Last visit/annual physical exam October 2023  CBC, CMP, A1c all normal      TSH 12.6.  Free T4 normal.      Medical history:  - Hypertension.  Questionable control last visit.  Restarted losartan 25 mg daily, amlodipine 10 mg daily, and Toprol XL 50 mg daily.  Reports compliance.  Complains of weight gain and increased fatigue from beta-blocker.  Blood pressure elevated.  No shortness a breath or increased swelling.  - Prediabetes.  Last A1c normal.  Off medication.  Denies any symptoms hypo hyperglycemia.  - Hyperlipidemia.  LDL close to goal.  On aspirin 81 mg daily and Lipitor 20 mg daily.  Reports compliance.  Denies side effects.  Denies chest pain or claudication.    - Hypothyroidism.  Last TSH increased and free T4 normal.  Increased Synthroid 137 mcg daily.  Reports compliance.  Denies side effects.  Needs repeat TSH.    - Fibromyalgia and Anxiety.  On citalopram 20 mg daily.  Reports compliance.  Denies side effects.  Symptoms improved.  Remains concerned about weight gain.  Previously advised to consider additional treatment with medical marijuana.  Current problem sleep difficulty.  Interested in medication to help with sleep.  Has trouble both falling asleep and maintaining sleep.  - Mild intermittent asthma.  Stable.  No controller medication.    - Allergic rhinosinusitis.  Stable on Flonase.    - GERD.  Stable on PPI.    Main complaint today is exacerbation of chronic low back pain.    Tylenol not effective.  Back pain coexist with fibromyalgia so treated in the past with duloxetine for chronic pain, meloxicam as an anti-inflammatory, baclofen as a muscle relaxants, and gabapentin for neuropathic pain  Currently off all medications  Pain pattern is similar in nature, description, and location as previous  "episodes    Review of Systems   Constitutional:  Negative for activity change, appetite change, chills, diaphoresis, fatigue and fever.   Eyes:  Negative for visual disturbance.   Respiratory:  Negative for cough, chest tightness, shortness of breath and wheezing.    Cardiovascular:  Negative for chest pain, palpitations and leg swelling.   Gastrointestinal:  Negative for abdominal distention, abdominal pain, constipation, diarrhea, nausea and vomiting.   Endocrine: Negative for polydipsia, polyphagia and polyuria.   Genitourinary:  Negative for difficulty urinating, dysuria, flank pain, frequency, hematuria and urgency.   Musculoskeletal:  Positive for arthralgias, back pain and myalgias. Negative for gait problem and joint swelling.   Skin:  Negative for rash.   Neurological:  Negative for dizziness, syncope, weakness, light-headedness, numbness and headaches.   Psychiatric/Behavioral:  Positive for sleep disturbance. Negative for agitation, behavioral problems, confusion, decreased concentration, dysphoric mood and hallucinations. The patient is not nervous/anxious and is not hyperactive.      Objective:   BP (!) 142/94 (BP Location: Left arm, Patient Position: Sitting, BP Method: Large (Manual))   Pulse 67   Ht 5' 5" (1.651 m)   Wt 99.1 kg (218 lb 7.6 oz)   LMP 09/12/2022   SpO2 98%   BMI 36.36 kg/m²     Physical Exam  Vitals and nursing note reviewed.   Constitutional:       Appearance: Normal appearance. She is well-developed. She is obese.   Eyes:      General: No scleral icterus.     Conjunctiva/sclera: Conjunctivae normal.   Neck:      Vascular: No JVD.   Cardiovascular:      Rate and Rhythm: Normal rate and regular rhythm.      Heart sounds: Normal heart sounds.   Pulmonary:      Effort: Pulmonary effort is normal.      Breath sounds: Normal breath sounds.   Abdominal:      General: There is no distension.      Palpations: Abdomen is soft. There is no hepatomegaly.      Tenderness: There is no " abdominal tenderness. There is no guarding or rebound.   Musculoskeletal:      Lumbar back: Spasms and bony tenderness present. Decreased range of motion.      Right lower leg: No edema.      Left lower leg: No edema.   Skin:     Findings: No rash.   Neurological:      Mental Status: She is alert.   Psychiatric:         Mood and Affect: Mood and affect normal.       Assessment:       ICD-10-CM ICD-9-CM   1. Chronic midline low back pain without sciatica  M54.50 724.2    G89.29 338.29   2. Anxiety  F41.9 300.00   3. Sleeping difficulty  G47.9 780.50   4. Gastroesophageal reflux disease without esophagitis  K21.9 530.81   5. Mixed hyperlipidemia  E78.2 272.2   6. Primary hypertension  I10 401.9   7. Postoperative hypothyroidism  E89.0 244.0     Plan:   Chronic midline low back pain without sciatica  -     meloxicam (MOBIC) 15 MG tablet; Take 1 tablet (15 mg total) by mouth once daily.  Dispense: 90 tablet; Refill: 3  -     tiZANidine (ZANAFLEX) 4 MG tablet; Take 1 tablet (4 mg total) by mouth every 8 (eight) hours as needed.  Dispense: 90 tablet; Refill: 11  -     gabapentin (NEURONTIN) 300 MG capsule; Take 1 capsule (300 mg total) by mouth every evening.  Dispense: 30 capsule; Refill: 11  Restart anti-inflammatory, muscle relaxants, and gabapentin  If no significant improvement in symptoms, will need physical therapy and to reestablish with interventional pain management    Anxiety  Symptoms stable and well controlled   Continue citalopram 20 mg daily    Sleeping difficulty  -     QUEtiapine (SEROQUEL) 50 MG tablet; Take 1 tablet (50 mg total) by mouth every evening.  Dispense: 30 tablet; Refill: 11  Agrees to trial of Seroquel nightly   Reassess 4-6 weeks     Gastroesophageal reflux disease without esophagitis  -     pantoprazole (PROTONIX) 40 MG tablet; Take 1 tablet (40 mg total) by mouth once daily.  Dispense: 90 tablet; Refill: 3  Stable   Symptoms controlled   Continue current medication     Mixed  hyperlipidemia  -     aspirin (ECOTRIN) 81 MG EC tablet; Take 1 tablet (81 mg total) by mouth once daily.  Dispense: 90 tablet; Refill: 3  -     atorvastatin (LIPITOR) 20 MG tablet; Take 1 tablet (20 mg total) by mouth once daily.  Dispense: 90 tablet; Refill: 3  Stable.  Asymptomatic.  LDL cholesterol.    Continue current medication     Primary hypertension  Uncontrolled.  BP elevated.    Increase losartan 100 mg daily  Stop beta-blockers side effects   Continue amlodipine 10 mg daily   Recheck blood pressure 4-6 weeks    Postoperative hypothyroidism   Only recently restarted Synthroid 137 mcg as previously prescribed   Recheck TSH at next visit    Return to clinic 4-6 weeks    40+ minutes of total time spent on the encounter, which includes face to face time and non-face to face time preparing to see the patient (eg, review of tests), Obtaining and/or reviewing separately obtained history, documenting clinical information in the electronic or other health record, independently interpreting results (not separately reported) and communicating results to the patient/family/caregiver, or Care coordination (not separately reported).

## 2024-03-13 DIAGNOSIS — E89.0 POSTOPERATIVE HYPOTHYROIDISM: Chronic | ICD-10-CM

## 2024-03-13 RX ORDER — LEVOTHYROXINE SODIUM 137 UG/1
137 TABLET ORAL
Qty: 90 TABLET | Refills: 2 | Status: SHIPPED | OUTPATIENT
Start: 2024-03-13 | End: 2024-05-01

## 2024-03-13 NOTE — TELEPHONE ENCOUNTER
No care due was identified.  [facility] Embedded Care Due Messages. Reference number: [id number].   3/13/2024 3:10:29 PM CDT

## 2024-03-14 NOTE — TELEPHONE ENCOUNTER
Refill Decision Note   Brabmary Kam  is requesting a refill authorization.  Brief Assessment and Rationale for Refill:  Approve     Medication Therapy Plan:  10/17/24-TSH out of range, but T4 WNL. OK for approval      Comments:     Note composed:9:26 PM 03/13/2024

## 2024-04-02 ENCOUNTER — OFFICE VISIT (OUTPATIENT)
Dept: OBSTETRICS AND GYNECOLOGY | Facility: CLINIC | Age: 49
End: 2024-04-02

## 2024-04-02 VITALS
HEIGHT: 65 IN | DIASTOLIC BLOOD PRESSURE: 78 MMHG | WEIGHT: 227.94 LBS | BODY MASS INDEX: 37.98 KG/M2 | SYSTOLIC BLOOD PRESSURE: 134 MMHG

## 2024-04-02 DIAGNOSIS — Z01.419 WELL WOMAN EXAM WITH ROUTINE GYNECOLOGICAL EXAM: Primary | ICD-10-CM

## 2024-04-02 DIAGNOSIS — R68.82 DECREASED LIBIDO: ICD-10-CM

## 2024-04-02 PROCEDURE — 99396 PREV VISIT EST AGE 40-64: CPT | Mod: S$PBB,,, | Performed by: OBSTETRICS & GYNECOLOGY

## 2024-04-02 PROCEDURE — 99999 PR PBB SHADOW E&M-EST. PATIENT-LVL III: CPT | Mod: PBBFAC,,, | Performed by: OBSTETRICS & GYNECOLOGY

## 2024-04-02 PROCEDURE — 99213 OFFICE O/P EST LOW 20 MIN: CPT | Mod: PBBFAC | Performed by: OBSTETRICS & GYNECOLOGY

## 2024-04-02 NOTE — PROGRESS NOTES
Subjective     Patient ID: Shelly Kam is a 49 y.o. female.    Chief Complaint:  Annual Exam      History of Present Illness  HPI  Annual Exam-Postmenopausal  Patient presents for annual exam. The patient has no complaints today. The patient is sexually active but has noted decreased libido.  Pt admits to working a lot at night and not getting much rest throughout day. GYN screening history: last pap: was normal and last mammogram: approximate date  and was normal. The patient is not taking hormone replacement therapy. Patient denies post-menopausal vaginal bleeding. The patient wears seatbelts: yes. The patient participates in regular exercise: no. Has the patient ever been transfused or tattooed?: yes. The patient reports that there is not domestic violence in her life.    GYN & OB History  Patient's last menstrual period was 2022.   Date of Last Pap: No result found    OB History    Para Term  AB Living   0 0 0 0 0 0   SAB IAB Ectopic Multiple Live Births   0 0 0 0 0       Review of Systems  Review of Systems   Constitutional:  Positive for fatigue and unexpected weight change. Negative for activity change, appetite change, chills and fever.   Respiratory:  Negative for shortness of breath.    Cardiovascular:  Negative for chest pain, palpitations and leg swelling.   Gastrointestinal:  Positive for bloating. Negative for abdominal pain, blood in stool, constipation, diarrhea, nausea and vomiting.   Genitourinary:  Positive for decreased libido. Negative for dyspareunia, dysuria, flank pain, frequency, genital sores, hematuria, hot flashes, pelvic pain, urgency, vaginal bleeding, vaginal discharge, vaginal pain, urinary incontinence, postcoital bleeding, vaginal dryness and vaginal odor.   Musculoskeletal:  Negative for back pain.   Integumentary:  Negative for breast mass, nipple discharge, breast skin changes and breast tenderness.   Neurological:  Negative for syncope and  headaches.   Breast: Negative for asymmetry, lump, mass, mastodynia, nipple discharge, skin changes and tenderness         Objective   Physical Exam:   Constitutional: She is oriented to person, place, and time. She appears well-developed and well-nourished. No distress.    HENT:   Head: Normocephalic and atraumatic.    Eyes: Pupils are equal, round, and reactive to light. EOM are normal.     Cardiovascular:  Normal rate, regular rhythm and normal heart sounds.             Pulmonary/Chest: Effort normal and breath sounds normal.        Abdominal: Soft. Bowel sounds are normal. She exhibits no distension. There is no abdominal tenderness.     Genitourinary:    Vagina, right adnexa and left adnexa normal.      Pelvic exam was performed with patient supine.   There is no rash, tenderness, lesion or injury on the right labia. There is no rash, tenderness, lesion or injury on the left labia. Right adnexum displays no mass, no tenderness and no fullness. Left adnexum displays no mass, no tenderness and no fullness. No erythema, vaginal discharge, tenderness or bleeding in the vagina.    No foreign body in the vagina.      No signs of injury in the vagina.   Cervix is absent.Uterus is absent.           Musculoskeletal: Normal range of motion and moves all extremeties. No tenderness or edema.       Neurological: She is alert and oriented to person, place, and time.    Skin: Skin is warm and dry.    Psychiatric: She has a normal mood and affect. Her behavior is normal. Thought content normal.            Assessment and Plan     1. Well woman exam with routine gynecological exam    2. Decreased libido           Plan:  Well woman exam with routine gynecological exam  -     Pt was counseled on cervical/vaginal screening guidelines and recommendations.  As per current recommendations, pt no longer requires routine pap screening or routine screening pelvic examinations.  If pt still desires screening Gyn exams, would recommend  minimum 2 year interval.    -     Pt may follow with PCP for routine health maintenance needs.  -     Pt was advised on current breast cancer screening recommendations.  Pt desires to proceed with breast exam today and screening MMG.    Decreased libido  -    Pt was counseled on libido, including the many factors that influence it.  Pt has several risk factors for decreased libido: depression, Seroquel use, stress, fatigue, inconsistent sleep hours.  Pt was counseled on stress reduction and lifestyle modifications (healthy diet, exercise, consistent sleep hours, etc).  Hormone testing or treatment not indicated at this time.  Pt voiced understanding.      Follow up in about 1 year (around 4/2/2025).

## 2024-04-03 DIAGNOSIS — J45.20 MILD INTERMITTENT ASTHMA WITHOUT COMPLICATION: Chronic | ICD-10-CM

## 2024-04-03 NOTE — TELEPHONE ENCOUNTER
No care due was identified.  [facility] Embedded Care Due Messages. Reference number: [id number].   4/03/2024 4:50:53 PM CDT

## 2024-04-04 RX ORDER — ALBUTEROL SULFATE 90 UG/1
AEROSOL, METERED RESPIRATORY (INHALATION)
Qty: 54 G | Refills: 3 | Status: SHIPPED | OUTPATIENT
Start: 2024-04-04

## 2024-04-04 NOTE — TELEPHONE ENCOUNTER
Refill Decision Note   Melodygilbert Eddy  is requesting a refill authorization.  Brief Assessment and Rationale for Refill:  Approve     Medication Therapy Plan:         Comments:     Note composed:8:13 AM 04/04/2024

## 2024-04-16 NOTE — TELEPHONE ENCOUNTER
----- Message from Aixa Lyles sent at 3/3/2017 11:44 AM CST -----  Call pt at [phone]//regarding a refill for hydrocondone also please give me a call concerning test results//call to pharmacy in [facility]//[place]   good minus

## 2024-04-30 ENCOUNTER — OFFICE VISIT (OUTPATIENT)
Dept: INTERNAL MEDICINE | Facility: CLINIC | Age: 49
End: 2024-04-30

## 2024-04-30 ENCOUNTER — LAB VISIT (OUTPATIENT)
Dept: LAB | Facility: HOSPITAL | Age: 49
End: 2024-04-30

## 2024-04-30 VITALS
OXYGEN SATURATION: 98 % | HEIGHT: 65 IN | WEIGHT: 233 LBS | SYSTOLIC BLOOD PRESSURE: 134 MMHG | BODY MASS INDEX: 38.82 KG/M2 | HEART RATE: 88 BPM | TEMPERATURE: 97 F | DIASTOLIC BLOOD PRESSURE: 84 MMHG

## 2024-04-30 DIAGNOSIS — G89.29 CHRONIC BILATERAL LOW BACK PAIN WITHOUT SCIATICA: Primary | ICD-10-CM

## 2024-04-30 DIAGNOSIS — E89.0 POSTOPERATIVE HYPOTHYROIDISM: Chronic | ICD-10-CM

## 2024-04-30 DIAGNOSIS — J06.9 VIRAL URI: ICD-10-CM

## 2024-04-30 DIAGNOSIS — M54.50 CHRONIC BILATERAL LOW BACK PAIN WITHOUT SCIATICA: Primary | ICD-10-CM

## 2024-04-30 DIAGNOSIS — R73.03 PREDIABETES: Chronic | ICD-10-CM

## 2024-04-30 LAB
ESTIMATED AVG GLUCOSE: 114 MG/DL (ref 68–131)
HBA1C MFR BLD: 5.6 % (ref 4–5.6)
TSH SERPL DL<=0.005 MIU/L-ACNC: 6.08 UIU/ML (ref 0.4–4)

## 2024-04-30 PROCEDURE — 99214 OFFICE O/P EST MOD 30 MIN: CPT | Mod: S$PBB,,, | Performed by: PHYSICIAN ASSISTANT

## 2024-04-30 PROCEDURE — 83036 HEMOGLOBIN GLYCOSYLATED A1C: CPT | Performed by: PHYSICIAN ASSISTANT

## 2024-04-30 PROCEDURE — 99999 PR PBB SHADOW E&M-EST. PATIENT-LVL V: CPT | Mod: PBBFAC,,, | Performed by: PHYSICIAN ASSISTANT

## 2024-04-30 PROCEDURE — 84443 ASSAY THYROID STIM HORMONE: CPT | Performed by: PHYSICIAN ASSISTANT

## 2024-04-30 PROCEDURE — 84439 ASSAY OF FREE THYROXINE: CPT | Performed by: PHYSICIAN ASSISTANT

## 2024-04-30 PROCEDURE — 4010F ACE/ARB THERAPY RXD/TAKEN: CPT | Mod: ,,, | Performed by: PHYSICIAN ASSISTANT

## 2024-04-30 PROCEDURE — 99215 OFFICE O/P EST HI 40 MIN: CPT | Mod: PBBFAC | Performed by: PHYSICIAN ASSISTANT

## 2024-04-30 PROCEDURE — 36415 COLL VENOUS BLD VENIPUNCTURE: CPT | Performed by: PHYSICIAN ASSISTANT

## 2024-04-30 RX ORDER — FLUTICASONE PROPIONATE 50 MCG
1 SPRAY, SUSPENSION (ML) NASAL DAILY
Qty: 9.9 ML | Refills: 0 | Status: SHIPPED | OUTPATIENT
Start: 2024-04-30 | End: 2024-05-30

## 2024-04-30 RX ORDER — PREDNISONE 20 MG/1
TABLET ORAL
Qty: 9 TABLET | Refills: 0 | Status: SHIPPED | OUTPATIENT
Start: 2024-04-30

## 2024-04-30 NOTE — PROGRESS NOTES
Subjective:      Patient ID: Shelly Kam is a 49 y.o. female.    Chief Complaint: Pain (BACK PAIN)    Back Pain  This is a recurrent problem. The current episode started in the past 7 days. The problem occurs constantly. The problem has been gradually worsening since onset. The pain is present in the lumbar spine. The pain does not radiate. The pain is severe. Pertinent negatives include no abdominal pain, bladder incontinence, bowel incontinence, chest pain, dysuria, fever, headaches, leg pain, numbness, paresis, paresthesias, pelvic pain, perianal numbness, tingling, weakness or weight loss. Risk factors include obesity. She has tried analgesics for the symptoms.   URI   This is a new problem. Episode onset: 3 days. There has been no fever. Associated symptoms include congestion, coughing, ear pain, rhinorrhea and a sore throat. Pertinent negatives include no abdominal pain, chest pain, diarrhea, dysuria, headaches, joint pain, joint swelling, nausea, neck pain, plugged ear sensation, rash, sinus pain, sneezing, swollen glands, vomiting or wheezing. She has tried nothing for the symptoms.     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 This fall has triggered her current back pain symptoms.     Overdue to recheck A1c and microalb urine as well as TSH. Will have her complete these labs today.    Patient Active Problem List   Diagnosis    Anxiety    Fibromyalgia    Mild intermittent asthma without complication    Primary hypertension    History of MRSA infection    Gastroesophageal reflux disease without esophagitis    Sleep apnea    Family history of thrombosis    PFO (patent foramen ovale)    Prediabetes    Postoperative hypothyroidism    Mixed hyperlipidemia    Severe obesity with body mass index (BMI) of 35.0 to 39.9 with serious comorbidity    Primary osteoarthritis of both knees         Current Outpatient Medications:     albuterol (PROVENTIL/VENTOLIN  HFA) 90 mcg/actuation inhaler, INHALE 2 PUFFS BY MOUTH EVERY 6 HOURS AS NEEDED FOR WHEEZING FOR SHORTNESS OF BREATH, Disp: 54 g, Rfl: 3    amLODIPine (NORVASC) 10 MG tablet, Take 1 tablet (10 mg total) by mouth every evening., Disp: 90 tablet, Rfl: 1    aspirin (ECOTRIN) 81 MG EC tablet, Take 1 tablet (81 mg total) by mouth once daily., Disp: 90 tablet, Rfl: 3    atorvastatin (LIPITOR) 20 MG tablet, Take 1 tablet (20 mg total) by mouth once daily., Disp: 90 tablet, Rfl: 3    citalopram (CELEXA) 20 MG tablet, Take 1 tablet (20 mg total) by mouth every evening., Disp: 90 tablet, Rfl: 3    gabapentin (NEURONTIN) 300 MG capsule, Take 1 capsule (300 mg total) by mouth every evening., Disp: 30 capsule, Rfl: 11    levothyroxine (SYNTHROID) 137 MCG Tab tablet, TAKE 1 TABLET BY MOUTH IN THE MORNING BEFORE BREAKFAST, Disp: 90 tablet, Rfl: 2    losartan (COZAAR) 100 MG tablet, Take 1 tablet (100 mg total) by mouth once daily., Disp: 90 tablet, Rfl: 1    meloxicam (MOBIC) 15 MG tablet, Take 1 tablet (15 mg total) by mouth once daily., Disp: 90 tablet, Rfl: 3    pantoprazole (PROTONIX) 40 MG tablet, Take 1 tablet (40 mg total) by mouth once daily., Disp: 90 tablet, Rfl: 3    QUEtiapine (SEROQUEL) 50 MG tablet, Take 1 tablet (50 mg total) by mouth every evening., Disp: 30 tablet, Rfl: 11    fluticasone propionate (FLONASE) 50 mcg/actuation nasal spray, 1 spray (50 mcg total) by Each Nostril route once daily., Disp: 9.9 mL, Rfl: 0    predniSONE (DELTASONE) 20 MG tablet, Take 2 tablets with food for 3 days; then take one tablet with food for 2 days; then take 1/2 tablet with food for 2 days., Disp: 9 tablet, Rfl: 0    Review of Systems   Constitutional:  Negative for activity change, appetite change, chills, diaphoresis, fatigue, fever, unexpected weight change and weight loss.   HENT:  Positive for congestion, ear pain, rhinorrhea and sore throat. Negative for hearing loss, postnasal drip, sinus pain, sneezing, trouble swallowing  "and voice change.    Eyes: Negative.  Negative for visual disturbance.   Respiratory:  Positive for cough. Negative for choking, chest tightness, shortness of breath and wheezing.    Cardiovascular:  Negative for chest pain, palpitations and leg swelling.   Gastrointestinal:  Negative for abdominal distention, abdominal pain, blood in stool, bowel incontinence, constipation, diarrhea, nausea and vomiting.   Endocrine: Negative for cold intolerance, heat intolerance, polydipsia and polyuria.   Genitourinary: Negative.  Negative for bladder incontinence, difficulty urinating, dysuria, frequency and pelvic pain.   Musculoskeletal:  Positive for back pain and myalgias. Negative for arthralgias, gait problem, joint pain, joint swelling and neck pain.   Skin:  Negative for color change, pallor, rash and wound.   Neurological:  Negative for dizziness, tingling, tremors, weakness, light-headedness, numbness, headaches and paresthesias.   Hematological:  Negative for adenopathy.   Psychiatric/Behavioral:  Negative for behavioral problems, confusion, self-injury, sleep disturbance and suicidal ideas. The patient is not nervous/anxious.      Objective:   /84 (BP Location: Right arm, Patient Position: Sitting, BP Method: Large (Manual))   Pulse 88   Temp 97.4 °F (36.3 °C) (Tympanic)   Ht 5' 5" (1.651 m)   Wt 105.7 kg (233 lb 0.4 oz)   LMP 09/12/2022   SpO2 98%   BMI 38.78 kg/m²     Physical Exam  Vitals reviewed.   Constitutional:       General: She is not in acute distress.     Appearance: Normal appearance. She is well-developed. She is not ill-appearing, toxic-appearing or diaphoretic.   HENT:      Head: Normocephalic and atraumatic.      Right Ear: Hearing, ear canal and external ear normal. A middle ear effusion is present.      Left Ear: Hearing, ear canal and external ear normal. A middle ear effusion is present.      Nose: Mucosal edema, congestion and rhinorrhea present. Rhinorrhea is clear.      " Mouth/Throat:      Lips: Pink.      Pharynx: Oropharynx is clear. No pharyngeal swelling or oropharyngeal exudate.      Tonsils: No tonsillar exudate.   Eyes:      Conjunctiva/sclera: Conjunctivae normal.      Pupils: Pupils are equal, round, and reactive to light.   Cardiovascular:      Rate and Rhythm: Normal rate and regular rhythm.      Heart sounds: Normal heart sounds. No murmur heard.     No friction rub. No gallop.   Pulmonary:      Effort: Pulmonary effort is normal. No respiratory distress.      Breath sounds: Normal breath sounds. No wheezing or rales.   Chest:      Chest wall: No tenderness.   Abdominal:      General: There is no distension.      Palpations: Abdomen is soft.      Tenderness: There is no abdominal tenderness.   Musculoskeletal:         General: Normal range of motion.      Cervical back: Normal range of motion and neck supple.      Lumbar back: Spasms and tenderness present. Normal range of motion.      Comments: No weakness on exam of either lower extremity   Lymphadenopathy:      Cervical: No cervical adenopathy.   Skin:     General: Skin is warm and dry.      Capillary Refill: Capillary refill takes less than 2 seconds.      Findings: No rash.   Neurological:      Mental Status: She is alert and oriented to person, place, and time.      Motor: No weakness.      Coordination: Coordination normal.      Gait: Gait normal.   Psychiatric:         Mood and Affect: Mood normal.         Behavior: Behavior normal.         Thought Content: Thought content normal.         Judgment: Judgment normal.         Assessment:     1. Chronic bilateral low back pain without sciatica    2. Viral URI    3. Postoperative hypothyroidism    4. Prediabetes      Plan:   Chronic bilateral low back pain without sciatica  -     Urinalysis; Future; Expected date: 04/30/2024  -     Ambulatory referral/consult to Physical/Occupational Therapy; Future; Expected date: 05/07/2024  -     X-Ray Lumbar Spine 5 View; Future;  Expected date: 04/30/2024  -     predniSONE (DELTASONE) 20 MG tablet; Take 2 tablets with food for 3 days; then take one tablet with food for 2 days; then take 1/2 tablet with food for 2 days.  Dispense: 9 tablet; Refill: 0  -     fluticasone propionate (FLONASE) 50 mcg/actuation nasal spray; 1 spray (50 mcg total) by Each Nostril route once daily.  Dispense: 9.9 mL; Refill: 0    Viral URI  -     predniSONE (DELTASONE) 20 MG tablet; Take 2 tablets with food for 3 days; then take one tablet with food for 2 days; then take 1/2 tablet with food for 2 days.  Dispense: 9 tablet; Refill: 0    Postoperative hypothyroidism  -     TSH; Future    Prediabetes  -     Hemoglobin A1C; Future; Expected date: 04/30/2024  -     Microalbumin/Creatinine Ratio, Urine; Future; Expected date: 04/30/2024    Steroids can increase blood sugar and blood pressure. Therefore, diabetics need to check their blood sugar regularly while taking a steroid. Patients with hypertension need to check their blood pressure regularly as well.      Follow up in about 3 months (around 7/30/2024), or if symptoms worsen or fail to improve.

## 2024-05-01 DIAGNOSIS — E89.0 POSTOPERATIVE HYPOTHYROIDISM: Chronic | ICD-10-CM

## 2024-05-01 LAB — T4 FREE SERPL-MCNC: 0.83 NG/DL (ref 0.71–1.51)

## 2024-05-01 RX ORDER — LEVOTHYROXINE SODIUM 150 UG/1
150 TABLET ORAL
Qty: 90 TABLET | Refills: 3 | Status: SHIPPED | OUTPATIENT
Start: 2024-05-01 | End: 2025-04-26

## 2024-05-01 RX ORDER — LEVOTHYROXINE SODIUM 137 UG/1
150 TABLET ORAL
Qty: 90 TABLET | Refills: 3 | Status: SHIPPED | OUTPATIENT
Start: 2024-05-01 | End: 2024-05-01

## 2024-05-11 ENCOUNTER — LAB VISIT (OUTPATIENT)
Dept: LAB | Facility: HOSPITAL | Age: 49
End: 2024-05-11
Payer: COMMERCIAL

## 2024-05-11 ENCOUNTER — HOSPITAL ENCOUNTER (OUTPATIENT)
Dept: RADIOLOGY | Facility: HOSPITAL | Age: 49
Discharge: HOME | End: 2024-05-11
Attending: PHYSICIAN ASSISTANT
Payer: COMMERCIAL

## 2024-05-11 DIAGNOSIS — M54.50 CHRONIC BILATERAL LOW BACK PAIN WITHOUT SCIATICA: ICD-10-CM

## 2024-05-11 DIAGNOSIS — R73.03 PREDIABETES: Chronic | ICD-10-CM

## 2024-05-11 DIAGNOSIS — G89.29 CHRONIC BILATERAL LOW BACK PAIN WITHOUT SCIATICA: ICD-10-CM

## 2024-05-11 LAB
ALBUMIN/CREAT UR: NORMAL UG/MG (ref 0–30)
BILIRUB UR QL STRIP: NEGATIVE
CLARITY UR: CLEAR
COLOR UR: YELLOW
CREAT UR-MCNC: 106 MG/DL (ref 15–325)
GLUCOSE UR QL STRIP: NEGATIVE
HGB UR QL STRIP: NEGATIVE
KETONES UR QL STRIP: NEGATIVE
LEUKOCYTE ESTERASE UR QL STRIP: NEGATIVE
MICROALBUMIN UR DL<=1MG/L-MCNC: <5 UG/ML
NITRITE UR QL STRIP: NEGATIVE
PH UR STRIP: 8 [PH] (ref 5–8)
PROT UR QL STRIP: NEGATIVE
SP GR UR STRIP: 1.02 (ref 1–1.03)
URN SPEC COLLECT METH UR: NORMAL

## 2024-05-11 PROCEDURE — 82043 UR ALBUMIN QUANTITATIVE: CPT | Performed by: PHYSICIAN ASSISTANT

## 2024-05-11 PROCEDURE — 72110 X-RAY EXAM L-2 SPINE 4/>VWS: CPT | Mod: TC

## 2024-05-11 PROCEDURE — 81003 URINALYSIS AUTO W/O SCOPE: CPT | Performed by: PHYSICIAN ASSISTANT

## 2024-05-11 PROCEDURE — 72110 X-RAY EXAM L-2 SPINE 4/>VWS: CPT | Mod: 26,,, | Performed by: RADIOLOGY

## 2024-05-13 ENCOUNTER — PATIENT MESSAGE (OUTPATIENT)
Dept: INTERNAL MEDICINE | Facility: CLINIC | Age: 49
End: 2024-05-13
Payer: COMMERCIAL

## 2024-05-16 ENCOUNTER — CLINICAL SUPPORT (OUTPATIENT)
Dept: REHABILITATION | Facility: HOSPITAL | Age: 49
End: 2024-05-16
Payer: COMMERCIAL

## 2024-05-16 DIAGNOSIS — G89.29 CHRONIC BILATERAL LOW BACK PAIN WITHOUT SCIATICA: ICD-10-CM

## 2024-05-16 DIAGNOSIS — M54.50 CHRONIC BILATERAL LOW BACK PAIN WITHOUT SCIATICA: ICD-10-CM

## 2024-05-16 DIAGNOSIS — R68.89 DECREASED STRENGTH, ENDURANCE, AND MOBILITY: ICD-10-CM

## 2024-05-16 DIAGNOSIS — R53.1 DECREASED STRENGTH, ENDURANCE, AND MOBILITY: ICD-10-CM

## 2024-05-16 DIAGNOSIS — M25.60 DECREASED MOBILITY OF JOINT: Primary | ICD-10-CM

## 2024-05-16 DIAGNOSIS — Z74.09 DECREASED STRENGTH, ENDURANCE, AND MOBILITY: ICD-10-CM

## 2024-05-16 DIAGNOSIS — R26.89 FUNCTIONAL GAIT ABNORMALITY: ICD-10-CM

## 2024-05-16 PROCEDURE — 97163 PT EVAL HIGH COMPLEX 45 MIN: CPT

## 2024-05-16 PROCEDURE — 97110 THERAPEUTIC EXERCISES: CPT

## 2024-05-16 NOTE — PLAN OF CARE
[facility]   Physical Therapy Initial Evaluation      Date: 5/16/2024   Name: Shelly Kam  Clinic Number: [id number]    Therapy Diagnosis:    Encounter Diagnoses   Name Primary?    Chronic bilateral low back pain without sciatica     Decreased mobility of joint Yes    Functional gait abnormality     Decreased strength, endurance, and mobility       Physician: Rocio Lee*     Physician Orders: PT Eval and Treat  Medical Diagnosis from Referral:  Chronic bilateral low back pain without sciatica [M54.50, G89.29]   Evaluation Date: 5/16/2024  Authorization Period Expiration: 12/31/2024  Plan of Care Expiration: 8/16/2024  Progress Note Due: 6/16/2024  Visit # / Visits authorized: 1/1   FOTO: 1/3 (last performed on 5/16/2024)    Precautions: Standard and anxiety, asthma, hypertension, fibromyalgia    Time In: 1324  Time Out: 1358  Total Billable Time (timed & untimed codes): 34 minutes    Subjective     Date of onset: Chronic condition for months now    History of current condition - Shelly reports that her low back has been bothering her. Patient reports that today is a worse day but the 3 previous days were not as bad as she was wearing pain patches from the [employer] store the last 3 days. Patient reports that she has been experiencing pain for some months now but had a change in insurance so that is why she had to wait a little while before getting into Physical Therapy. Patient reports that the pain she experiences is burning going up her back as she stands or moves around for long periods of time.   Patient reports that she is not feeling good today, explaining that her knees are hurting really bad. Patient reports that the knee pain is so bad that she cannot even think about her back pain right now. Patient reports that she received injections in her knees back in October 2023 and feels that it just wore off about 5 days ago. Patient reports that she had not been  having really any knee pain following the injection up to this point.     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 and when flipping the patient over the patient landed on her and her back hit the front of the toilet     Imaging: [x] Xray Lumbar Spine 4/30/2024: Reviewed in chart    Pain:  Current 7/10 back and 12/10 knees, worst 8/10 and 15/10 knees, best 4/10 for back  Location: bilateral knees (left more than right) and central low back which radiates up   Description: aching , burning, electric, and shooting  Aggravating Factors: Standing, bending over, all activities   Easing Factors: activity avoidance, rest, Bengay, pain patches, heat     Prior Therapy:   [x] N/A    [] Yes:   Social History: Pt lives with their spouse  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  Prior Level of Function: Independent with less pain with all ADL, IADL, community mobility and functional activities.   Current Level of Function: Independent with most ADL, IADL, community mobility and functional activities with reports of increased pain and need for increased time and frequent breaks. Patient with significant pain when flexing left knee. Patient with significant pain with all functional activities.     Dominant Extremity:    [x] Right    [] Left    Pts goals: Pt reported goals are to decrease overall pain levels in order to return to prior functional level.     Medical History:   Past Medical History:   Diagnosis Date    Allergy     Anemia     Asthma     Depression     Diabetes mellitus     Diabetes mellitus, type 2     Encounter for blood transfusion     Fibromyalgia     GERD (gastroesophageal reflux disease)     Hyperlipidemia     Hypertension     Hyperthyroidism     Panic attack     Prozac in past    Primary osteoarthritis of both knees 10/17/2023       Surgical History:    Shelly Kam  has a past surgical history that includes Tonsillectomy; Bronchoscopy; Tracheostomy tube placement; Hernia repair; Tracheostomy; Thyroidectomy (Bilateral, 03/25/2021); xi robotic hysterectomy, with salpingectomy (N/A, 09/12/2022); Total abdominal hysterectomy (N/A, 09/12/2022); Salpingectomy (Bilateral, 09/12/2022); and Diagnostic laparoscopy (N/A, 09/12/2022).    Medications:   Shelly has a current medication list which includes the following prescription(s): albuterol, amlodipine, aspirin, atorvastatin, citalopram, fluticasone propionate, gabapentin, levothyroxine, losartan, meloxicam, pantoprazole, prednisone, and quetiapine.    Allergies:   Review of patient's allergies indicates:   Allergen Reactions    Pcn [penicillins] Anaphylaxis     Throat , tongue swelling     Banana      Itchy mouth    Lisinopril Other (See Comments)     Lip swelling    Melon      Itchy mouth    Morphine Other (See Comments)     Unknown    Tree nuts      Oral itching        Objective        RANGE OF MOTION:   Lumbar ROM Right  (spine) Left   Pain/Dysfunction with Movement Goal   Lumbar Flexion (60º) Finger tips to mid tibia --- Pain No pain   Lumbar Extension (30º) 20 flexion  --- Pain  10 extension   Lumbar Side Bending (25º) 5 10 Pain 20 B   Lumbar Rotation 25% 40% No pain 75%     Hip AROM/PROM Right Left Pain/Dysfunction with Movement Goal   Hip Flexion (120º) 90 90  110   Hip Extension (30º)       Hip Abduction (45º) x x     Hip IR (45º) 20 12 Pain in left knee 30 B   Hip ER (45º) 30 20 Pain in left knee 40 B     Knee AROM/PROM Right Left Pain/Dysfunction with Movement Goal   Knee Flexion (135º) 90 72 Extreme pain in left knee 120 B   Knee Extension (0º) 0 10 flexion Extreme pain in left knee 0 B   *unable to demonstrate more passive or active flexion due to severe pain    JOINT MOBILITY:   Joint Motion Right Mobility  (spine) Left Mobility Goal   Patellar Medial Glide  [] Hypo     [x] Normal     [] Hyper  [x] Hypo     [] Normal     [] Hyper Normal    Patellar Lateral Glide  [] Hypo     [x] Normal     [] Hyper [x] Hypo     [] Normal     [] Hyper Normal    Patellar Superior Glide  [] Hypo     [x] Normal     [] Hyper [x] Hypo     [] Normal     [] Hyper Normal    Patellar Inferior Glide  [] Hypo     [x] Normal     [] Hyper [x] Hypo     [] Normal     [] Hyper Normal      SENSATION  [x] Intact to Light Touch    POSTURE:  Pt presents with postural abnormalities which include:    [x] Forward Head   [] Increased Lumbar Lordosis   [x] Rounded Shoulder   [] Genu Recurvatum   [] Increased Thoracic Kyphosis [] Genu Valgus   [] Trunk Deviated    [] Pes Planus   [] Scapular Winging    [x] Other: stiff knee posturing in stance on left     GAIT ANALYSIS The patient ambulated with the following assistive device: none; the pt presents with the following gait abnormalities: increased ELIZABETH, decreased step length on left, decreased stance time on left, decreased heel strike on left, decreased hip extension on left, and decreased knee flexion on left    FUNCTIONAL TESTING    Outcome Norms Goal   Five Time Sit to Stand 33.97 sec with B UE use 60s: <11.4 sec  70s: <12.6 sec  80s: 14.8 sec 11 sec     Function:     Intake Outcome Measure for FOTO Back and Knee Survey    Therapist reviewed FOTO scores for Shelly on 5/16/2024.   FOTO report - see Media section or FOTO account for episode details    Intake Score: Unable to assess today due to time constraints with late arrival and patient complexity- will assess next visit%         Treatment     Total Treatment time (time-based codes) separate from Evaluation: (15) minutes     Shelly received the treatments listed below:      THERAPEUTIC EXERCISES to develop strength, endurance, ROM, flexibility, posture, and core stabilization for (15) minutes including:    Intervention Performed Today    Educated patient on the impairments present and the plan of care to address impairments x Discussed that  due to high levels of pain and limited tolerance to mobility- pool would likely be the best option for her at this time    Patient was screened for the pool and is appropriate   Discussed the importance of allowing for mobility through her left knee as she is limiting motion at this time x Educated patient on how to perform tailgators to allow for some relief of pain and motion in her left knee                                   Plan for Next Visit: Aquatic therapy     Patient Education and Home Exercises     Education provided: (included in billable time) minutes  PURPOSE: Patient educated on the impairments noted above and the effects of physical therapy intervention to improve overall condition and QOL.   EXERCISE: Patient was educated on all the above exercise prior/during/after for proper posture, positioning, and execution for safe performance with home exercise program.   STRENGTH: Patient educated on the importance of improved core and extremity strength in order to improve alignment of the spine and extremities with static positions and dynamic movement.   GAIT & BALANCE: Patient educated on the importance of strong core and lower extremity musculature in order to improve both static and dynamic balance, improve gait mechanics, reduce fall risk and improve household and community mobility.   POSTURE: Patient educated on postural awareness to reduce stress and maintain optimal alignment of the spine with static positions and dynamic movement     Written Home Exercises Provided: yes.  Exercises were reviewed and Shelly was able to demonstrate them prior to the end of the session.  Shelly demonstrated good  understanding of the education provided. See EMR under Patient Instructions for exercises provided during therapy sessions.    Assessment     Shelly is a 49 y.o. female referred to outpatient Physical Therapy with a medical diagnosis of Chronic bilateral low back pain without sciatica [M54.50, G89.29]. Pt  presents with impairments in the following categories: IMPAIRMENTS: ROM, strength, endurance, joint mobility, balance, posture, gait mechanics, core strength and stability, and functional movement patterns resulting in decreased quality of life and functional independence as she has extreme pain with all functional tasks.     Pt prognosis is Fair secondary to chronicity and severity of pain and compliance with therapy with work schedule  Pt will benefit from skilled outpatient Physical Therapy to address the deficits stated above and in the chart below, provide pt/family education, and to maximize pt's level of independence.     Plan of care discussed with patient: Yes  Pt's spiritual, cultural and educational needs considered and patient is agreeable to the plan of care and goals as stated below:     Anticipated Barriers for therapy: co-morbidities, sedentary lifestyle, chronicity of condition, adherence to treatment plan, insurance coverage, and occupation    Medical Necessity is demonstrated by the following  History  Co-morbidities and personal factors that may impact the plan of care [] LOW: no personal factors / co-morbidities  [] MODERATE: 1-2 personal factors / co-morbidities  [x] HIGH: 3+ personal factors / co-morbidities    Moderate / High Support Documentation:   Past Medical History:   Diagnosis Date    Allergy     Anemia     Asthma     Depression     Diabetes mellitus     Diabetes mellitus, type 2     Encounter for blood transfusion     Fibromyalgia     GERD (gastroesophageal reflux disease)     Hyperlipidemia     Hypertension     Hyperthyroidism     Panic attack     Prozac in past    Primary osteoarthritis of both knees 10/17/2023        Examination  Body Structures and Functions, activity limitations and participation restrictions that may impact the plan of care [] LOW: addressing 1-2 elements  [] MODERATE: 3+ elements  [x] HIGH: 4+ elements (please support below)    Moderate / High Support  "Documentation: See above in "Current Level of Function"      Clinical Presentation [] LOW: stable  [] MODERATE: Evolving  [x] HIGH: Unstable     Decision Making/ Complexity Score: high         Short Term Goals:  6 weeks Status  Date Met   PAIN: Pt will report worst pain of 6/10 in order to progress toward max functional ability and improve quality of life. [x] Progressing  [] Met  [] Not Met    FUNCTION: Patient will demonstrate improved function as indicated by a score of greater than or equal to (assess next visit and adjust accordingly) out of 100 on FOTO. [x] Progressing  [] Met  [] Not Met    MOBILITY: Patient will improve AROM to 50% of stated goals, listed in objective measures above, in order to progress towards independence with functional activities.  [x] Progressing  [] Met  [] Not Met    STRENGTH: Patient will assess strength in order to progress towards independence with functional activities. [x] Progressing  [] Met  [] Not Met    POSTURE: Patient will correct postural deviations in sitting and standing, to decrease pain and promote long term stability.  [x] Progressing  [] Met  [] Not Met    GAIT: Patient will demonstrate improved gait mechanics including more functional knee flexion and extension through left lower extremity in order to improve functional mobility, improve quality of life, and decrease risk of further injury or fall.  [x] Progressing  [] Met  [] Not Met    HEP: Patient will demonstrate independence with HEP in order to progress toward functional independence. [x] Progressing  [] Met  [] Not Met      Long Term Goals:  12 weeks Status Date Met   PAIN: Pt will report worst pain of 2/10 in order to progress toward max functional ability and improve quality of life [x] Progressing  [] Met  [] Not Met    FUNCTION: Patient will demonstrate improved function as indicated by a score of greater than or equal to (assess next visit and adjust accordingly)  out of 100 on FOTO. [x] Progressing  [] " Met  [] Not Met    MOBILITY: Patient will improve AROM to stated goals, listed in objective measures above, in order to return to maximal functional potential and improve quality of life.  [x] Progressing  [] Met  [] Not Met    STRENGTH: Patient will improve strength to stated goals, listed in objective measures above, in order to improve functional independence and quality of life.  [x] Progressing  [] Met  [] Not Met    GAIT: Patient will demonstrate normalized gait mechanics with minimal compensation in order to return to PLOF. [x] Progressing  [] Met  [] Not Met    Patient will return to normal ADL's, IADL's, community involvement, recreational activities, and work-related activities with less than or equal to 2/10 pain and maximal function.  [x] Progressing  [] Met  [] Not Met      Plan     Plan of care Certification: 5/16/2024 to 8/16/2024.    Outpatient Physical Therapy 2 times weekly for 6 weeks in aquatic therapy and 2 times weekly for 6 weeks in land therapy totaling 12 weeks to include any combination of the following interventions: virtual visits, dry needling, modalities, electrical stimulation (IFC, Pre-Mod, Attended with Functional Dry Needling), Aquatic Therapy, Cervical/Lumbar Traction, Gait Training, Manual Therapy, Neuromuscular Re-ed, Patient Education, Self Care, Therapeutic Exercise, and Therapeutic Activites     Ebonie Mares, PT, DPT

## 2024-05-17 PROBLEM — R53.1 DECREASED STRENGTH, ENDURANCE, AND MOBILITY: Status: ACTIVE | Noted: 2024-05-17

## 2024-05-17 PROBLEM — R68.89 DECREASED STRENGTH, ENDURANCE, AND MOBILITY: Status: ACTIVE | Noted: 2024-05-17

## 2024-05-17 PROBLEM — M25.60 DECREASED MOBILITY OF JOINT: Status: ACTIVE | Noted: 2024-05-17

## 2024-05-17 PROBLEM — Z74.09 DECREASED STRENGTH, ENDURANCE, AND MOBILITY: Status: ACTIVE | Noted: 2024-05-17

## 2024-05-17 PROBLEM — R26.89 FUNCTIONAL GAIT ABNORMALITY: Status: ACTIVE | Noted: 2024-05-17

## 2024-05-17 NOTE — PATIENT INSTRUCTIONS
Instructed patient on performed Tailgators off of a higher support surface to allow for motion through knee and improvements in knee pain- no picture HEP provided today

## 2024-05-20 ENCOUNTER — CLINICAL SUPPORT (OUTPATIENT)
Dept: REHABILITATION | Facility: HOSPITAL | Age: 49
End: 2024-05-20
Payer: COMMERCIAL

## 2024-05-20 DIAGNOSIS — Z74.09 DECREASED STRENGTH, ENDURANCE, AND MOBILITY: ICD-10-CM

## 2024-05-20 DIAGNOSIS — R26.89 FUNCTIONAL GAIT ABNORMALITY: ICD-10-CM

## 2024-05-20 DIAGNOSIS — R68.89 DECREASED STRENGTH, ENDURANCE, AND MOBILITY: ICD-10-CM

## 2024-05-20 DIAGNOSIS — R53.1 DECREASED STRENGTH, ENDURANCE, AND MOBILITY: ICD-10-CM

## 2024-05-20 DIAGNOSIS — M25.60 DECREASED MOBILITY OF JOINT: Primary | ICD-10-CM

## 2024-05-20 PROCEDURE — 97113 AQUATIC THERAPY/EXERCISES: CPT | Performed by: PHYSICAL THERAPIST

## 2024-05-20 NOTE — PROGRESS NOTES
[facility]   Physical Therapy Treatment Note        Name: Shelly Kam  Clinic Number: [id number]    Therapy Diagnosis:   Encounter Diagnoses   Name Primary?    Decreased mobility of joint Yes    Functional gait abnormality     Decreased strength, endurance, and mobility      Physician: Rocio Lee*    Visit Date: 5/20/2024    Physician Orders: PT Eval and Treat  Medical Diagnosis from Referral:  Chronic bilateral low back pain without sciatica [M54.50, G89.29]   Evaluation Date: 5/16/2024  Authorization Period Expiration: 12/31/2024  Plan of Care Expiration: 8/16/2024  Progress Note Due: 6/16/2024  Visit # / Visits authorized: 1/1   FOTO: 1/3 (last performed on 5/16/2024)     Precautions: Standard and anxiety, asthma, hypertension, fibromyalgia  PTA Visit #: 0/5     Time In: 5:12 pm   Time Out: 5:50 pm   Total Billable Time: 38 minutes (Billing reflects 1 on 1 treatment time spent with patient)    Subjective     Patient reports: she is hurting a lot today in the knee.  She hasn't really moved with it much today.     He/She N/A compliant with home exercise program.  Response to previous treatment: no change.   Functional change: no change.     Pain: 7/10     Location: knee    Objective      Objective Measures updated at progress report or POC update only unless otherwise noted.       Treatment     Shelly received the treatments listed below:     Shelly received aquatic therapy with the use of water to decrease the pull of gravity and weightbearing forces on the body to increase tolerance to resisted therex for 38 minutes including the following exercise as well as stepping in and out of the pool with use of Bilateral upper extremity on handrail and step-to pattern:     Exercise Performed Today  5/20/2024   Treadmill, forward/backward x 5 minutes   Treadmill, side-stepping, alternating side x 5 minutes total   Treadmill, backwards  - minutes   Marching, alternating  "x 3 minutes   Hamstring curls, alternating  x 3 minutes   Hip 3-way, B lower extremity x 1.5 minutes each         Un-weighting on noodle x 3 Minutes x 2    Bicycle forward/backward x 3 minutes fwd    Flutter kick  - minutes    Scissor kicks  - minutes    Hip flexor Stretch on treadmill  - minutes    Quad stretch on step   -   Hamstring stretch on step   2 x 30"    Gastroc stretch  at wall  2 x 30"         Heel/toe raises   - minutes    1/4 squat   - minutes    Standing hip circles clockwise/counter clockwise  - minutes each    Standing figure 8   10x each         UE alt flexion - paddles  - Minutes    UE alt abduction - paddles  - Minutes    UE alt hugs - paddles  - Minutes    UE internal rotation/external rotation - paddles  - Minutes    UE alt rows - paddles  - Minutes              x = exercise details same as prior session        Patient Education and Home Exercises       Home Exercises Provided and Patient Education Provided     Education provided: (with treatment above) minutes  PURPOSE: Patient educated on the impairments noted above and the effects of physical therapy intervention to improve overall condition and QOL.   EXERCISE: Patient was educated on all the above exercise prior/during/after for proper posture, positioning, and execution for safe performance with home exercise program.   STRENGTH: Patient educated on the importance of improved core and extremity strength in order to improve alignment of the spine and extremities with static positions and dynamic movement.   GAIT & BALANCE: Patient educated on the importance of strong core and lower extremity musculature in order to improve both static and dynamic balance, improve gait mechanics, reduce fall risk and improve household and community mobility.   POSTURE: Patient educated on postural awareness to reduce stress and maintain optimal alignment of the spine with static positions and dynamic movement     Written Home Exercises Provided: " yes.  Exercises were reviewed and Shelly was able to demonstrate them prior to the end of the session.  Shelly demonstrated good  understanding of the education provided. See EMR under Patient Instructions for exercises provided during therapy sessions.    Assessment     Patient tolerated first pool session fair with Verbal Cues for form and posture to ensure proper technique with exercise.  Patient with increased left knee pain that limited tolerance for motion.  Patient able to exit pool with faster speed than entrance.      Shelly is progressing well towards her goals.   Patient prognosis is Fair secondary to chronicity and severity of pain and compliance with therapy with work schedule.     Patient will continue to benefit from skilled outpatient physical therapy to address the deficits listed in the problem list box on initial evaluation, provide pt/family education and to maximize patient's level of independence in the home and community environment.     Patient's spiritual, cultural and educational needs considered and pt agreeable to plan of care and goals.     Anticipated Barriers for therapy: co-morbidities, sedentary lifestyle, chronicity of condition, adherence to treatment plan, insurance coverage, and occupation        Short Term Goals:  6 weeks Status  Date Met   PAIN: Pt will report worst pain of 6/10 in order to progress toward max functional ability and improve quality of life. [x] Progressing  [] Met  [] Not Met     FUNCTION: Patient will demonstrate improved function as indicated by a score of greater than or equal to (assess next visit and adjust accordingly) out of 100 on FOTO. [x] Progressing  [] Met  [] Not Met     MOBILITY: Patient will improve AROM to 50% of stated goals, listed in objective measures above, in order to progress towards independence with functional activities.  [x] Progressing  [] Met  [] Not Met     STRENGTH: Patient will assess strength in order to progress towards  independence with functional activities. [x] Progressing  [] Met  [] Not Met     POSTURE: Patient will correct postural deviations in sitting and standing, to decrease pain and promote long term stability.  [x] Progressing  [] Met  [] Not Met     GAIT: Patient will demonstrate improved gait mechanics including more functional knee flexion and extension through left lower extremity in order to improve functional mobility, improve quality of life, and decrease risk of further injury or fall.  [x] Progressing  [] Met  [] Not Met     HEP: Patient will demonstrate independence with HEP in order to progress toward functional independence. [x] Progressing  [] Met  [] Not Met        Long Term Goals:  12 weeks Status Date Met   PAIN: Pt will report worst pain of 2/10 in order to progress toward max functional ability and improve quality of life [x] Progressing  [] Met  [] Not Met     FUNCTION: Patient will demonstrate improved function as indicated by a score of greater than or equal to (assess next visit and adjust accordingly)  out of 100 on FOTO. [x] Progressing  [] Met  [] Not Met     MOBILITY: Patient will improve AROM to stated goals, listed in objective measures above, in order to return to maximal functional potential and improve quality of life.  [x] Progressing  [] Met  [] Not Met     STRENGTH: Patient will improve strength to stated goals, listed in objective measures above, in order to improve functional independence and quality of life.  [x] Progressing  [] Met  [] Not Met     GAIT: Patient will demonstrate normalized gait mechanics with minimal compensation in order to return to PLOF. [x] Progressing  [] Met  [] Not Met     Patient will return to normal ADL's, IADL's, community involvement, recreational activities, and work-related activities with less than or equal to 2/10 pain and maximal function.  [x] Progressing  [] Met  [] Not Met          Plan     Continue Plan of Care (POC) and progress per patient  tolerance. See treatment section for details on planned progressions next session.      Fidelina García, PT

## 2024-05-21 ENCOUNTER — CLINICAL SUPPORT (OUTPATIENT)
Dept: REHABILITATION | Facility: HOSPITAL | Age: 49
End: 2024-05-21
Payer: COMMERCIAL

## 2024-05-21 DIAGNOSIS — R26.89 FUNCTIONAL GAIT ABNORMALITY: ICD-10-CM

## 2024-05-21 DIAGNOSIS — M25.60 DECREASED MOBILITY OF JOINT: Primary | ICD-10-CM

## 2024-05-21 DIAGNOSIS — R53.1 DECREASED STRENGTH, ENDURANCE, AND MOBILITY: ICD-10-CM

## 2024-05-21 DIAGNOSIS — Z74.09 DECREASED STRENGTH, ENDURANCE, AND MOBILITY: ICD-10-CM

## 2024-05-21 DIAGNOSIS — R68.89 DECREASED STRENGTH, ENDURANCE, AND MOBILITY: ICD-10-CM

## 2024-05-21 PROCEDURE — 97113 AQUATIC THERAPY/EXERCISES: CPT | Performed by: PHYSICAL THERAPIST

## 2024-05-21 NOTE — PROGRESS NOTES
[facility]   Physical Therapy Treatment Note        Name: Shelly Kam  Clinic Number: [id number]    Therapy Diagnosis:   Encounter Diagnoses   Name Primary?    Decreased mobility of joint Yes    Functional gait abnormality     Decreased strength, endurance, and mobility      Physician: Rocio Lee*    Visit Date: 5/21/2024    Physician Orders: PT Eval and Treat  Medical Diagnosis from Referral:  Chronic bilateral low back pain without sciatica [M54.50, G89.29]   Evaluation Date: 5/16/2024  Authorization Period Expiration: 12/31/2024  Plan of Care Expiration: 8/16/2024  Progress Note Due: 6/16/2024  Visit # / Visits authorized: 2/20 + eval   FOTO: 1/3 (last performed on 5/16/2024)     Precautions: Standard and anxiety, asthma, hypertension, fibromyalgia  PTA Visit #: 0/5     Time In: 5:23 pm   Time Out: 6:01 pm   Total Billable Time: 38 minutes (Billing reflects 1 on 1 treatment time spent with patient)    Subjective     Patient reports: she felt good in the pool last session but feels that the back pain came back on once she was changing to leave.      He/She N/A compliant with home exercise program.  Response to previous treatment: no change.   Functional change: no change.     Pain: 7/10     Location: knee    Objective      Objective Measures updated at progress report or POC update only unless otherwise noted.       Treatment     Shelly received the treatments listed below:     Shelly received aquatic therapy with the use of water to decrease the pull of gravity and weightbearing forces on the body to increase tolerance to resisted therex for 38 minutes including the following exercise as well as stepping in and out of the pool with use of Bilateral upper extremity on handrail and step-to pattern:     Exercise Performed Today  5/21/2024   Treadmill, forward/backward x 5 minutes   Treadmill, side-stepping, alternating side x 5 minutes total   Treadmill,  "backwards  - minutes   Marching, alternating x 3 minutes   Hamstring curls, alternating  x 3 minutes   Hip 3-way, B lower extremity x 2 minutes each         Un-weighting on noodle x 3 Minutes    Bicycle forward/backward x 3 minutes fwd    Flutter kick  - minutes    Scissor kicks x 2 minutes    Hip flexor Stretch on treadmill  - minutes    Quad stretch on step   -   Hamstring stretch on step   2 x 30"    Gastroc stretch  at wall  2 x 30"         Heel/toe raises  x 2 minutes    1/4 squat   - minutes    Standing hip circles clockwise/counter clockwise  - minutes each    Standing figure 8   10x each         UE alt flexion - paddles  - Minutes    UE alt abduction - paddles  - Minutes    UE alt hugs - paddles  - Minutes    UE internal rotation/external rotation - paddles  - Minutes    UE alt rows - paddles  - Minutes         Body mechanics x 2 minutes    x = exercise details same as prior session        Patient Education and Home Exercises       Home Exercises Provided and Patient Education Provided     Education provided: (with treatment above) minutes  PURPOSE: Patient educated on the impairments noted above and the effects of physical therapy intervention to improve overall condition and QOL.   EXERCISE: Patient was educated on all the above exercise prior/during/after for proper posture, positioning, and execution for safe performance with home exercise program.   STRENGTH: Patient educated on the importance of improved core and extremity strength in order to improve alignment of the spine and extremities with static positions and dynamic movement.   GAIT & BALANCE: Patient educated on the importance of strong core and lower extremity musculature in order to improve both static and dynamic balance, improve gait mechanics, reduce fall risk and improve household and community mobility.   POSTURE: Patient educated on postural awareness to reduce stress and maintain optimal alignment of the spine with static positions and " dynamic movement     Written Home Exercises Provided: yes.  Exercises were reviewed and Shelly was able to demonstrate them prior to the end of the session.  Shelly demonstrated good  understanding of the education provided. See EMR under Patient Instructions for exercises provided during therapy sessions.    Assessment     Patient able to increase activities today with less pain.  She did have cramping in the Left lower extremity after performing heel/toe raises but this subsided with slow flex/ext of the leg.  She was educated on proper bending technique to decrease strain on her lower back with bending to dry her legs off.  She was able to bend the Right knee and keep left a little straight to demonstrate better form.      Shelly is progressing well towards her goals.   Patient prognosis is Fair secondary to chronicity and severity of pain and compliance with therapy with work schedule.     Patient will continue to benefit from skilled outpatient physical therapy to address the deficits listed in the problem list box on initial evaluation, provide pt/family education and to maximize patient's level of independence in the home and community environment.     Patient's spiritual, cultural and educational needs considered and pt agreeable to plan of care and goals.     Anticipated Barriers for therapy: co-morbidities, sedentary lifestyle, chronicity of condition, adherence to treatment plan, insurance coverage, and occupation        Short Term Goals:  6 weeks Status  Date Met   PAIN: Pt will report worst pain of 6/10 in order to progress toward max functional ability and improve quality of life. [x] Progressing  [] Met  [] Not Met     FUNCTION: Patient will demonstrate improved function as indicated by a score of greater than or equal to (assess next visit and adjust accordingly) out of 100 on FOTO. [x] Progressing  [] Met  [] Not Met     MOBILITY: Patient will improve AROM to 50% of stated goals, listed in  objective measures above, in order to progress towards independence with functional activities.  [x] Progressing  [] Met  [] Not Met     STRENGTH: Patient will assess strength in order to progress towards independence with functional activities. [x] Progressing  [] Met  [] Not Met     POSTURE: Patient will correct postural deviations in sitting and standing, to decrease pain and promote long term stability.  [x] Progressing  [] Met  [] Not Met     GAIT: Patient will demonstrate improved gait mechanics including more functional knee flexion and extension through left lower extremity in order to improve functional mobility, improve quality of life, and decrease risk of further injury or fall.  [x] Progressing  [] Met  [] Not Met     HEP: Patient will demonstrate independence with HEP in order to progress toward functional independence. [x] Progressing  [] Met  [] Not Met        Long Term Goals:  12 weeks Status Date Met   PAIN: Pt will report worst pain of 2/10 in order to progress toward max functional ability and improve quality of life [x] Progressing  [] Met  [] Not Met     FUNCTION: Patient will demonstrate improved function as indicated by a score of greater than or equal to (assess next visit and adjust accordingly)  out of 100 on FOTO. [x] Progressing  [] Met  [] Not Met     MOBILITY: Patient will improve AROM to stated goals, listed in objective measures above, in order to return to maximal functional potential and improve quality of life.  [x] Progressing  [] Met  [] Not Met     STRENGTH: Patient will improve strength to stated goals, listed in objective measures above, in order to improve functional independence and quality of life.  [x] Progressing  [] Met  [] Not Met     GAIT: Patient will demonstrate normalized gait mechanics with minimal compensation in order to return to PLOF. [x] Progressing  [] Met  [] Not Met     Patient will return to normal ADL's, IADL's, community involvement, recreational  activities, and work-related activities with less than or equal to 2/10 pain and maximal function.  [x] Progressing  [] Met  [] Not Met          Plan     Continue Plan of Care (POC) and progress per patient tolerance. See treatment section for details on planned progressions next session.      Fidelina García, PT

## 2024-05-28 ENCOUNTER — CLINICAL SUPPORT (OUTPATIENT)
Dept: REHABILITATION | Facility: HOSPITAL | Age: 49
End: 2024-05-28
Payer: COMMERCIAL

## 2024-05-28 ENCOUNTER — OFFICE VISIT (OUTPATIENT)
Dept: SPORTS MEDICINE | Facility: CLINIC | Age: 49
End: 2024-05-28
Payer: COMMERCIAL

## 2024-05-28 VITALS — HEIGHT: 65 IN | BODY MASS INDEX: 38.82 KG/M2 | WEIGHT: 233 LBS

## 2024-05-28 DIAGNOSIS — M23.92 INTERNAL DERANGEMENT OF KNEE, LEFT: Primary | ICD-10-CM

## 2024-05-28 DIAGNOSIS — R26.89 FUNCTIONAL GAIT ABNORMALITY: ICD-10-CM

## 2024-05-28 DIAGNOSIS — Z74.09 DECREASED STRENGTH, ENDURANCE, AND MOBILITY: ICD-10-CM

## 2024-05-28 DIAGNOSIS — R68.89 DECREASED STRENGTH, ENDURANCE, AND MOBILITY: ICD-10-CM

## 2024-05-28 DIAGNOSIS — M25.60 DECREASED MOBILITY OF JOINT: Primary | ICD-10-CM

## 2024-05-28 DIAGNOSIS — M17.0 PRIMARY OSTEOARTHRITIS OF BOTH KNEES: ICD-10-CM

## 2024-05-28 DIAGNOSIS — R53.1 DECREASED STRENGTH, ENDURANCE, AND MOBILITY: ICD-10-CM

## 2024-05-28 PROCEDURE — 3008F BODY MASS INDEX DOCD: CPT | Mod: CPTII,S$GLB,, | Performed by: PHYSICIAN ASSISTANT

## 2024-05-28 PROCEDURE — 3061F NEG MICROALBUMINURIA REV: CPT | Mod: CPTII,S$GLB,, | Performed by: PHYSICIAN ASSISTANT

## 2024-05-28 PROCEDURE — 97113 AQUATIC THERAPY/EXERCISES: CPT | Performed by: PHYSICAL THERAPIST

## 2024-05-28 PROCEDURE — 4010F ACE/ARB THERAPY RXD/TAKEN: CPT | Mod: CPTII,S$GLB,, | Performed by: PHYSICIAN ASSISTANT

## 2024-05-28 PROCEDURE — 3066F NEPHROPATHY DOC TX: CPT | Mod: CPTII,S$GLB,, | Performed by: PHYSICIAN ASSISTANT

## 2024-05-28 PROCEDURE — 99999 PR PBB SHADOW E&M-EST. PATIENT-LVL IV: CPT | Mod: PBBFAC,,, | Performed by: PHYSICIAN ASSISTANT

## 2024-05-28 PROCEDURE — 1160F RVW MEDS BY RX/DR IN RCRD: CPT | Mod: CPTII,S$GLB,, | Performed by: PHYSICIAN ASSISTANT

## 2024-05-28 PROCEDURE — 99213 OFFICE O/P EST LOW 20 MIN: CPT | Mod: S$GLB,,, | Performed by: PHYSICIAN ASSISTANT

## 2024-05-28 PROCEDURE — 1159F MED LIST DOCD IN RCRD: CPT | Mod: CPTII,S$GLB,, | Performed by: PHYSICIAN ASSISTANT

## 2024-05-28 PROCEDURE — 3044F HG A1C LEVEL LT 7.0%: CPT | Mod: CPTII,S$GLB,, | Performed by: PHYSICIAN ASSISTANT

## 2024-05-28 RX ORDER — METHOCARBAMOL 500 MG/1
500 TABLET, FILM COATED ORAL 3 TIMES DAILY
Qty: 21 TABLET | Refills: 0 | Status: SHIPPED | OUTPATIENT
Start: 2024-05-28 | End: 2024-05-28 | Stop reason: ALTCHOICE

## 2024-05-28 RX ORDER — KETOROLAC TROMETHAMINE 10 MG/1
10 TABLET, FILM COATED ORAL EVERY 6 HOURS PRN
Qty: 12 TABLET | Refills: 0 | Status: SHIPPED | OUTPATIENT
Start: 2024-05-28 | End: 2024-05-31

## 2024-05-28 NOTE — PROGRESS NOTES
[facility]   Physical Therapy Treatment Note        Name: Shelly Kam  Clinic Number: [id number]    Therapy Diagnosis:   Encounter Diagnoses   Name Primary?    Decreased mobility of joint Yes    Functional gait abnormality     Decreased strength, endurance, and mobility      Physician: Rocio Lee*    Visit Date: 5/28/2024    Physician Orders: PT Eval and Treat  Medical Diagnosis from Referral:  Chronic bilateral low back pain without sciatica [M54.50, G89.29]   Evaluation Date: 5/16/2024  Authorization Period Expiration: 12/31/2024  Plan of Care Expiration: 8/16/2024  Progress Note Due: 6/16/2024  Visit # / Visits authorized: 3/20 + eval   FOTO: 1/3 (last performed on 5/16/2024)     Precautions: Standard and anxiety, asthma, hypertension, fibromyalgia  PTA Visit #: 0/5     Time In: 4:50 pm   Time Out: 5:29 pm   Total Billable Time: 39 minutes (Billing reflects 1 on 1 treatment time spent with patient)    Subjective     Patient reports: she will be having an MRI for her left knee tomorrow after seeing the PA today in orthopedics.      He/She N/A compliant with home exercise program.  Response to previous treatment: no change.   Functional change: no change.     Pain: 7/10     Location: knee    Objective      Objective Measures updated at progress report or POC update only unless otherwise noted.       Treatment     Shelly received the treatments listed below:     Shelly received aquatic therapy with the use of water to decrease the pull of gravity and weightbearing forces on the body to increase tolerance to resisted therex for 39 minutes including the following exercise as well as stepping in and out of the pool with use of Bilateral upper extremity on handrail and step-to pattern:     Exercise Performed Today  5/28/2024   Treadmill, forward/backward x 5 minutes   Treadmill, side-stepping, alternating side x 5 minutes total   Treadmill, backwards  - minutes  "  Marching, alternating x 3 minutes   Hamstring curls, alternating  x 3 minutes   Hip 3-way, B lower extremity x 2 minutes each         Un-weighting on noodle x 3 Minutes    Bicycle forward/backward x 3 minutes fwd    Flutter kick  - minutes    Scissor kicks x 2 minutes    Hip flexor Stretch on treadmill  - minutes    Quad stretch on step   -   Hamstring stretch on step   2 x 30"    Gastroc stretch  at wall  2 x 30"    Alt taps to second level  x 2 Minutes    Heel/toe raises  x 2 minutes    1/4 squat   - minutes    Standing hip circles clockwise/counter clockwise  - minutes each    Standing figure 8   10x each         UE alt flexion - paddles  - Minutes    UE alt abduction - paddles  - Minutes    UE alt hugs - paddles  - Minutes    UE internal rotation/external rotation - paddles  - Minutes    UE alt rows - paddles  - Minutes         Body mechanics x 2 minutes    x = exercise details same as prior session        Patient Education and Home Exercises       Home Exercises Provided and Patient Education Provided     Education provided: (with treatment above) minutes  PURPOSE: Patient educated on the impairments noted above and the effects of physical therapy intervention to improve overall condition and QOL.   EXERCISE: Patient was educated on all the above exercise prior/during/after for proper posture, positioning, and execution for safe performance with home exercise program.   STRENGTH: Patient educated on the importance of improved core and extremity strength in order to improve alignment of the spine and extremities with static positions and dynamic movement.   GAIT & BALANCE: Patient educated on the importance of strong core and lower extremity musculature in order to improve both static and dynamic balance, improve gait mechanics, reduce fall risk and improve household and community mobility.   POSTURE: Patient educated on postural awareness to reduce stress and maintain optimal alignment of the spine with " static positions and dynamic movement     Written Home Exercises Provided: yes.  Exercises were reviewed and Shelly was able to demonstrate them prior to the end of the session.  Shelly demonstrated good  understanding of the education provided. See EMR under Patient Instructions for exercises provided during therapy sessions.    Assessment     Patient with increased pain today but was able to still perform activities.  Continued cues to decrease Range of Motion  in order to lesson pain in movement.      Shelly is progressing well towards her goals.   Patient prognosis is Fair secondary to chronicity and severity of pain and compliance with therapy with work schedule.     Patient will continue to benefit from skilled outpatient physical therapy to address the deficits listed in the problem list box on initial evaluation, provide pt/family education and to maximize patient's level of independence in the home and community environment.     Patient's spiritual, cultural and educational needs considered and pt agreeable to plan of care and goals.     Anticipated Barriers for therapy: co-morbidities, sedentary lifestyle, chronicity of condition, adherence to treatment plan, insurance coverage, and occupation        Short Term Goals:  6 weeks Status  Date Met   PAIN: Pt will report worst pain of 6/10 in order to progress toward max functional ability and improve quality of life. [x] Progressing  [] Met  [] Not Met     FUNCTION: Patient will demonstrate improved function as indicated by a score of greater than or equal to (assess next visit and adjust accordingly) out of 100 on FOTO. [x] Progressing  [] Met  [] Not Met     MOBILITY: Patient will improve AROM to 50% of stated goals, listed in objective measures above, in order to progress towards independence with functional activities.  [x] Progressing  [] Met  [] Not Met     STRENGTH: Patient will assess strength in order to progress towards independence with  functional activities. [x] Progressing  [] Met  [] Not Met     POSTURE: Patient will correct postural deviations in sitting and standing, to decrease pain and promote long term stability.  [x] Progressing  [] Met  [] Not Met     GAIT: Patient will demonstrate improved gait mechanics including more functional knee flexion and extension through left lower extremity in order to improve functional mobility, improve quality of life, and decrease risk of further injury or fall.  [x] Progressing  [] Met  [] Not Met     HEP: Patient will demonstrate independence with HEP in order to progress toward functional independence. [x] Progressing  [] Met  [] Not Met        Long Term Goals:  12 weeks Status Date Met   PAIN: Pt will report worst pain of 2/10 in order to progress toward max functional ability and improve quality of life [x] Progressing  [] Met  [] Not Met     FUNCTION: Patient will demonstrate improved function as indicated by a score of greater than or equal to (assess next visit and adjust accordingly)  out of 100 on FOTO. [x] Progressing  [] Met  [] Not Met     MOBILITY: Patient will improve AROM to stated goals, listed in objective measures above, in order to return to maximal functional potential and improve quality of life.  [x] Progressing  [] Met  [] Not Met     STRENGTH: Patient will improve strength to stated goals, listed in objective measures above, in order to improve functional independence and quality of life.  [x] Progressing  [] Met  [] Not Met     GAIT: Patient will demonstrate normalized gait mechanics with minimal compensation in order to return to PLOF. [x] Progressing  [] Met  [] Not Met     Patient will return to normal ADL's, IADL's, community involvement, recreational activities, and work-related activities with less than or equal to 2/10 pain and maximal function.  [x] Progressing  [] Met  [] Not Met          Plan     Continue Plan of Care (POC) and progress per patient tolerance. See  treatment section for details on planned progressions next session.      Fidelina García, PT

## 2024-05-28 NOTE — PROGRESS NOTES
Orthopaedic Follow-Up Visit    Last Appointment:  10/18/2023  Diagnosis:  Primary osteoarthritis of both knees  Prior Procedure:  Bilateral knee corticosteroid injection, Rx for meloxicam, home exercise program    Shelly Kam is a 49 y.o. female who is here for f/u evaluation of bilateral knee pain. The patient was last seen here by me on 10/18/2023 at which point we decided to try bilateral knee corticosteroid injections, prescription of meloxicam, and home exercise program prior to considering further treatment options. The patient returns today reporting that the symptoms have returned and worsened in the left knee after she sustained an injury. She reports that she had an emergent situation at work and in which she had to perform CPR and she had to flip onto her knees quickly and she has had extreme pain and swelling since this incident happened.    To review her history, Shelly Kam is a 48 y.o. year old female patient who initially presented to clinic on 10/18/2023 for bilateral knee pain, her left knee is worse than her right knee.  Her symptoms began in January but has been worsening at that time.  She fell off the treadmill back in August and that is when her knee pain began.  Her symptoms include bilateral knee pain, anterior knee pain, night pain, morning stiffness, intermittent swelling.  Her pain is made worse by prolonged standing, prolonged periods of rest, going from sit to stand position, night pain.  Her treatment has included rest, activity modification, oral anti-inflammatories, Tylenol, Voltaren gel, lidocaine patches, ibuprofen, previous corticosteroid injection    Patient's medications, allergies, past medical, surgical, social and family histories were reviewed and updated as appropriate.    Review of Systems   All systems reviewed were negative.  Specifically, the patient denies fever, chills, weight loss, chest pain, shortness of breath, or dyspnea on exertion.       Past Medical History:   Diagnosis Date    Allergy     Anemia     Asthma     Depression     Diabetes mellitus     Diabetes mellitus, type 2     Encounter for blood transfusion     Fibromyalgia     GERD (gastroesophageal reflux disease)     Hyperlipidemia     Hypertension     Hyperthyroidism     Panic attack     Prozac in past    Primary osteoarthritis of both knees 10/17/2023       Objective:      Physical Exam  Patient is alert and oriented, no distress. Skin is intact. Neuro is normal with no focal motor or sensory findings.    Standing exam  stance: normal alignment, no significant leg-length discrepancy  gait: antalgic    Knee      RIGHT  LEFT  Skin:     Intact   Intact  ROM:     0-130  0-130  Effusion:    Neg   +  Medial joint line tenderness:  +   +  Lateral joint line tenderness:  Neg   +  Pretty:     Neg   +  Patella crepitus:   +   +  Patella tenderness:   Neg   Neg  Patella grind:      Neg   Neg  Lachman:    Neg   Neg, guarding  Valgus stress:    Neg   Neg, pain  Varus stress:    Neg   Neg, pain  Posterior drawer:   Neg   Neg  N-V               intact  intact  Hip:    nml    nml   Lower extremity edema: Negative negative    Neurovascular exam  - motor function grossly intact bilaterally to hip flexion, knee extension and flexion, ankle dorsiflexion and plantarflexion  - sensation intact to light touch bilaterally to femoral, tibial, tibial and peroneal distributions  - symmetrical pedal pulses    Imaging:   XR Results:  Results for orders placed during the hospital encounter of 08/08/23    X-Ray Knee 3 View Bilateral    Narrative  EXAMINATION:  XR KNEE 3 VIEW BILATERAL    CLINICAL HISTORY:  Pain in right knee    COMPARISON:  None    Impression  FINDINGS/  No acute fracture or dislocation seen.  Tri compartment osteoarthritis with medial compartment predominance bilateral mild-to-moderate      Electronically signed by: Chelle Fournier  Date:    08/08/2023  Time:    19:02      Physician Read: I  agree with the above impression.    Assessment/Plan:   Assessment:  Shelly Kam is a 49 y.o. female with primary osteoarthritis of both knees, suspected internal derangement of left knee    Plan:    Discussed diagnosis and treatment options with the patient today.  At last visit she was diagnosed with primary osteoarthritis of both knees  At last visit we elected to try bilateral intra-articular corticosteroid injections, she had excellent relief of symptoms with these injections.  Her symptoms have only returned over the last months since she sustained an injury in her left knee.  I recommend we move forward with a stat MRI of her left knee to rule out internal derangement prior to considering a repeat intra-articular injection  Patient is requesting a repeat injection today, discussed with her that we should hold off until after she obtains her MRI. Based on her clinical exam today, I do suspect she has an acute meniscus tear or possible insufficiency fracture that is causing her severe pain.   For her acute pain, I will send her a brief course of strong anti-inflammatories, Toradol.  Encouraged to discontinue use of ibuprofen while on the Toradol.  Take with meals to avoid adverse GI side effects  OK to continue Tylenol, not to exceed 3000 mg of Tylenol per day  Follow-up with me after MRI to review results          Amy Lazo PA-C  Sports Medicine Physician Assistant       Disclaimer: This note was prepared using a voice recognition system and is likely to have sound alike errors within the text.

## 2024-05-29 ENCOUNTER — HOSPITAL ENCOUNTER (OUTPATIENT)
Dept: RADIOLOGY | Facility: HOSPITAL | Age: 49
Discharge: HOME | End: 2024-05-29
Attending: PHYSICIAN ASSISTANT
Payer: COMMERCIAL

## 2024-05-29 DIAGNOSIS — M23.92 INTERNAL DERANGEMENT OF KNEE, LEFT: ICD-10-CM

## 2024-05-29 DIAGNOSIS — M17.0 PRIMARY OSTEOARTHRITIS OF BOTH KNEES: ICD-10-CM

## 2024-05-29 PROCEDURE — 73721 MRI JNT OF LWR EXTRE W/O DYE: CPT | Mod: TC,LT

## 2024-05-29 PROCEDURE — 73721 MRI JNT OF LWR EXTRE W/O DYE: CPT | Mod: 26,LT,, | Performed by: RADIOLOGY

## 2024-05-31 ENCOUNTER — OFFICE VISIT (OUTPATIENT)
Dept: SPORTS MEDICINE | Facility: CLINIC | Age: 49
End: 2024-05-31
Payer: COMMERCIAL

## 2024-05-31 VITALS — HEIGHT: 65 IN | WEIGHT: 233 LBS | BODY MASS INDEX: 38.82 KG/M2

## 2024-05-31 DIAGNOSIS — S83.242D TEAR OF MEDIAL MENISCUS OF LEFT KNEE, CURRENT, UNSPECIFIED TEAR TYPE, SUBSEQUENT ENCOUNTER: ICD-10-CM

## 2024-05-31 DIAGNOSIS — M17.12 PRIMARY OSTEOARTHRITIS OF LEFT KNEE: Primary | ICD-10-CM

## 2024-05-31 DIAGNOSIS — M17.11 PRIMARY OSTEOARTHRITIS OF RIGHT KNEE: ICD-10-CM

## 2024-05-31 PROCEDURE — 1159F MED LIST DOCD IN RCRD: CPT | Mod: CPTII,S$GLB,, | Performed by: PHYSICIAN ASSISTANT

## 2024-05-31 PROCEDURE — 4010F ACE/ARB THERAPY RXD/TAKEN: CPT | Mod: CPTII,S$GLB,, | Performed by: PHYSICIAN ASSISTANT

## 2024-05-31 PROCEDURE — 1160F RVW MEDS BY RX/DR IN RCRD: CPT | Mod: CPTII,S$GLB,, | Performed by: PHYSICIAN ASSISTANT

## 2024-05-31 PROCEDURE — 20610 DRAIN/INJ JOINT/BURSA W/O US: CPT | Mod: 50,S$GLB,, | Performed by: PHYSICIAN ASSISTANT

## 2024-05-31 PROCEDURE — 3008F BODY MASS INDEX DOCD: CPT | Mod: CPTII,S$GLB,, | Performed by: PHYSICIAN ASSISTANT

## 2024-05-31 PROCEDURE — 3061F NEG MICROALBUMINURIA REV: CPT | Mod: CPTII,S$GLB,, | Performed by: PHYSICIAN ASSISTANT

## 2024-05-31 PROCEDURE — 99999 PR PBB SHADOW E&M-EST. PATIENT-LVL III: CPT | Mod: PBBFAC,,, | Performed by: PHYSICIAN ASSISTANT

## 2024-05-31 PROCEDURE — 3066F NEPHROPATHY DOC TX: CPT | Mod: CPTII,S$GLB,, | Performed by: PHYSICIAN ASSISTANT

## 2024-05-31 PROCEDURE — 3044F HG A1C LEVEL LT 7.0%: CPT | Mod: CPTII,S$GLB,, | Performed by: PHYSICIAN ASSISTANT

## 2024-05-31 PROCEDURE — 99214 OFFICE O/P EST MOD 30 MIN: CPT | Mod: 25,S$GLB,, | Performed by: PHYSICIAN ASSISTANT

## 2024-05-31 RX ORDER — TRIAMCINOLONE ACETONIDE 40 MG/ML
40 INJECTION, SUSPENSION INTRA-ARTICULAR; INTRAMUSCULAR
Status: DISCONTINUED | OUTPATIENT
Start: 2024-05-31 | End: 2024-05-31 | Stop reason: HOSPADM

## 2024-05-31 RX ADMIN — TRIAMCINOLONE ACETONIDE 40 MG: 40 INJECTION, SUSPENSION INTRA-ARTICULAR; INTRAMUSCULAR at 09:05

## 2024-05-31 NOTE — PROGRESS NOTES
Orthopaedic Follow-Up Visit    Last Appointment:  05/28/2024  Diagnosis:  Internal derangement left knee, primary osteoarthritis of left knee  Prior Procedure:  MRI left knee    Shelly Kam is a 49 y.o. female who is here for f/u evaluation of left knee pain. The patient was last seen here by me on 05/28/2024 at which point we decided to send her for a stat MRI of her left knee prior to considering further treatment options. The patient returns today reporting that the symptoms have persisted and is interested in proceeding with further treatment options.     To review her history,  Shelly Kam is a 48 y.o. year old female patient who initially presented to clinic on 10/18/2023 for bilateral knee pain, her left knee is worse than her right knee. Her symptoms began in January but has been worsening at that time. She fell off the treadmill back in August and that is when her knee pain began. Her symptoms include bilateral knee pain, anterior knee pain, night pain, morning stiffness, intermittent swelling.  Her pain is made worse by prolonged standing, prolonged periods of rest, going from sit to stand position, night pain.  Her treatment has included rest, activity modification, oral anti-inflammatories, Tylenol, Voltaren gel, lidocaine patches, ibuprofen, previous corticosteroid injection.  She again returned to clinic on 05/28/2024 for worsening left knee pain after she sustained an injury while at work.  She was in an emergent situation in which she would performed CPR and had to flip onto her knees quickly and she had extreme left knee pain since then.    Patient's medications, allergies, past medical, surgical, social and family histories were reviewed and updated as appropriate.    Review of Systems   All systems reviewed were negative.  Specifically, the patient denies fever, chills, weight loss, chest pain, shortness of breath, or dyspnea on exertion.      Past Medical History:    Diagnosis Date    Allergy     Anemia     Asthma     Depression     Diabetes mellitus     Diabetes mellitus, type 2     Encounter for blood transfusion     Fibromyalgia     GERD (gastroesophageal reflux disease)     Hyperlipidemia     Hypertension     Hyperthyroidism     Panic attack     Prozac in past    Primary osteoarthritis of both knees 10/17/2023       Objective:      Physical Exam  Patient is alert and oriented, no distress. Skin is intact. Neuro is normal with no focal motor or sensory findings.    Standing exam  stance: normal alignment, no significant leg-length discrepancy  gait: antalgic     Knee                                                  RIGHT             LEFT  Skin:                                         Intact               Intact  ROM:                                        0-130              0-130  Effusion:                                   Neg                  +  Medial joint line tenderness:    +                      +  Lateral joint line tenderness:    Neg                  +  Pretty:                                Neg                  +  Patella crepitus:                       +                      +  Patella tenderness:                  Neg                  Neg  Patella grind:                            Neg                  Neg  Lachman:                                 Neg                  Neg, guarding  Valgus stress:                          Neg                  Neg, pain  Varus stress:                            Neg                  Neg, pain  Posterior drawer:                     Neg                  Neg  N-V                                          intact               intact  Hip:                                          nml                    nml              Lower extremity edema:         Negative          negative    Neurovascular exam  - motor function grossly intact bilaterally to hip flexion, knee extension and flexion, ankle dorsiflexion and plantarflexion  - sensation  intact to light touch bilaterally to femoral, tibial, tibial and peroneal distributions  - symmetrical pedal pulses    Imaging:   XR Results:  Results for orders placed during the hospital encounter of 08/08/23    X-Ray Knee 3 View Bilateral    Narrative  EXAMINATION:  XR KNEE 3 VIEW BILATERAL    CLINICAL HISTORY:  Pain in right knee    COMPARISON:  None    Impression  FINDINGS/  No acute fracture or dislocation seen.  Tri compartment osteoarthritis with medial compartment predominance bilateral mild-to-moderate      Electronically signed by: Chelle Fournier  Date:    08/08/2023  Time:    19:02      MRI Results:  Results for orders placed during the hospital encounter of 05/29/24    MRI Knee Without Contrast Left    Narrative  EXAMINATION:  MRI KNEE WITHOUT CONTRAST LEFT    CLINICAL HISTORY:  Knee pain, chronic, negative xray (Age >= 5y);Meniscal tear, untreated, new symptoms;Bilateral primary osteoarthritis of knee    TECHNIQUE:  Multiplanar, multisequence images were preformed of the left knee.    COMPARISON:  None.    FINDINGS:  Medial meniscus: Partially extruded with intrasubstance mucoid degeneration.  Free edge tear lateral aspect posterior horn.    Lateral meniscus: No evidence of meniscal tear.    Ligaments:  ACL, PCL, MCL, and LCL complex are intact.  Mild diffuse edema adjacent to the MCL.    Tendons:  Extensor mechanism is maintained.    Cartilage:    Patellofemoral: Mild surface irregularity of the retropatellar cartilage.  No full-thickness chondral defects.    Medial tibiofemoral: Mild thinning and surface irregularity of weight-bearing cartilage.  No full-thickness chondral defects.    Lateral tibiofemoral: Articular cartilage is maintained.    Bone: No fracture or marrow replacing process.  Mild marginal spurring of the medial tibiofemoral joint.    Miscellaneous: Mild-to-moderate joint effusion.    Impression  1. Medial meniscus mucoid degeneration and posterior horn tear.  2. Retropatellar and  medial tibiofemoral chondromalacia.  3. Intact supporting tendon and ligament structures.  4. Joint effusion.      Electronically signed by: HALLIE Kuo MD  Date:    05/30/2024  Time:    08:08      Physician Read: I agree with the above impression.    Assessment/Plan:   Assessment:  Shelly Kam is a 49 y.o. female with primary osteoarthritis of the right knee    Plan:    Reviewed left knee MRI with the patient today.  Her MRI revealed that she does have a chronic appearing horizontal tear of her medial meniscus.  It also revealed cartilage degeneration in the medial tibiofemoral compartments of her left knee  We discussed treatment options today.  Discussed with her that based on the location of her meniscus tear, I do not recommend operative management at this time.  I reviewed the imaging with my supervising physician, he agrees that this is not something that needs surgical repair at this time.  Instead, I recommend we move forward with a left knee corticosteroid injection.  She has also requesting a right knee injection at this time  Bilateral knee corticosteroid injections given in clinic, patient tolerated procedure well with no immediate complications  She recently began formal physical therapy at the [place] [facility], encouraged her to continue this to help improve quad and hamstring strengthening  Continue meloxicam once a day  OK to add Tylenol, not to exceed 3000 mg of Tylenol per day  Follow-up me in 8 weeks to check progress, we can discuss Visco at that time if necessary        Amy Lazo PA-C  Sports Medicine Physician Assistant       Disclaimer: This note was prepared using a voice recognition system and is likely to have sound alike errors within the text.

## 2024-05-31 NOTE — PROCEDURES
Large Joint Aspiration/Injection: bilateral knee    Date/Time: 5/31/2024 9:00 AM    Performed by: Amy Lazo PA-C  Authorized by: Amy Lazo PA-C    Consent Done?:  Yes (Verbal)  Indications:  Pain  Site marked: the procedure site was marked    Timeout: prior to procedure the correct patient, procedure, and site was verified    Prep: patient was prepped and draped in usual sterile fashion      Local anesthesia used?: Yes    Anesthesia:  Local infiltration  Local anesthetic:  Lidocaine 1% without epinephrine    Details:  Needle Size:  22 G  Ultrasonic Guidance for needle placement?: No    Approach:  Anterolateral  Location:  Knee  Laterality:  Bilateral  Site:  Bilateral knee  Medications (Right):  40 mg triamcinolone acetonide 40 mg/mL  Medications (Left):  40 mg triamcinolone acetonide 40 mg/mL  Patient tolerance:  Patient tolerated the procedure well with no immediate complications    Procedure Note:  We discussed the risk and benefits of injections, including pain, infection, bleeding, damage to adjacent structures, risk of reaction to injection. We discussed the steroid/cortisone injections will not heal the problem but mat help decrease inflammation and help with symptoms. We discussed the risk of repeated injections. The patient expressed understanding and wanted to proceed with the injection. We performed a timeout to verify the proper patient, proper procedure, and the proper site. The injection site was prepared in a sterile fashion. The patient tolerated it well and there were no complication. We did discuss with the patient that steroid injections can cause some increase in blood sugar and blood pressure for up to a week after the injection.

## 2024-06-07 ENCOUNTER — TELEPHONE (OUTPATIENT)
Dept: SPORTS MEDICINE | Facility: CLINIC | Age: 49
End: 2024-06-07
Payer: COMMERCIAL

## 2024-06-07 NOTE — TELEPHONE ENCOUNTER
Reached out to pt about her message regarding still being in pain after receiving a CSI Injection, I advised patient that there wasn't much that could provide relief that we could do today due to Amy being out of town, Advised that injections still have a chance to work over the weekend but if no relief by Monday to contact office for further assistance      ----- Message from Amadou Otero sent at 6/7/2024  1:06 PM CDT -----  Contact: [phone]  Call and let her know Amy is out and will be back Monday. She had injections and had Mobic.. so see if its the Mobic that is not giving her relief or not  ----- Message -----  From: Tamika Bernstein  Sent: 6/7/2024   7:32 AM CDT  To: Violet Reyes Staff    Patient is requesting a call in regards to her medication she was prescribe. Pt stated that she is still in pain and would like something else called into pharmacy or see what else can be done. Please call pt back at [phone]. Thanks KB

## 2024-06-11 ENCOUNTER — PATIENT MESSAGE (OUTPATIENT)
Dept: SPORTS MEDICINE | Facility: CLINIC | Age: 49
End: 2024-06-11
Payer: COMMERCIAL

## 2024-06-11 DIAGNOSIS — S83.242D TEAR OF MEDIAL MENISCUS OF LEFT KNEE, CURRENT, UNSPECIFIED TEAR TYPE, SUBSEQUENT ENCOUNTER: ICD-10-CM

## 2024-06-11 DIAGNOSIS — M17.12 PRIMARY OSTEOARTHRITIS OF LEFT KNEE: Primary | ICD-10-CM

## 2024-06-11 RX ORDER — DICLOFENAC SODIUM 75 MG/1
75 TABLET, DELAYED RELEASE ORAL 2 TIMES DAILY WITH MEALS
Qty: 60 TABLET | Refills: 0 | Status: SHIPPED | OUTPATIENT
Start: 2024-06-11

## 2024-10-23 DIAGNOSIS — I10 PRIMARY HYPERTENSION: ICD-10-CM

## 2024-11-08 ENCOUNTER — HOSPITAL ENCOUNTER (EMERGENCY)
Facility: HOSPITAL | Age: 49
Discharge: HOME | End: 2024-11-08
Attending: EMERGENCY MEDICINE
Payer: COMMERCIAL

## 2024-11-08 VITALS
HEART RATE: 82 BPM | BODY MASS INDEX: 37.49 KG/M2 | HEIGHT: 65 IN | SYSTOLIC BLOOD PRESSURE: 185 MMHG | WEIGHT: 225 LBS | OXYGEN SATURATION: 99 % | TEMPERATURE: 99 F | RESPIRATION RATE: 18 BRPM | DIASTOLIC BLOOD PRESSURE: 95 MMHG

## 2024-11-08 DIAGNOSIS — R05.9 COUGH: Primary | ICD-10-CM

## 2024-11-08 DIAGNOSIS — J40 BRONCHITIS: ICD-10-CM

## 2024-11-08 DIAGNOSIS — F17.200 NEEDS SMOKING CESSATION EDUCATION: ICD-10-CM

## 2024-11-08 DIAGNOSIS — M25.511 RIGHT SHOULDER PAIN: ICD-10-CM

## 2024-11-08 LAB
INFLUENZA A, MOLECULAR: NEGATIVE
INFLUENZA B, MOLECULAR: NEGATIVE
SARS-COV-2 RDRP RESP QL NAA+PROBE: NEGATIVE
SPECIMEN SOURCE: NORMAL

## 2024-11-08 PROCEDURE — 63600175 PHARM REV CODE 636 W HCPCS: Performed by: PHYSICIAN ASSISTANT

## 2024-11-08 PROCEDURE — 87502 INFLUENZA DNA AMP PROBE: CPT | Performed by: PHYSICIAN ASSISTANT

## 2024-11-08 PROCEDURE — 96372 THER/PROPH/DIAG INJ SC/IM: CPT | Performed by: PHYSICIAN ASSISTANT

## 2024-11-08 PROCEDURE — 99284 EMERGENCY DEPT VISIT MOD MDM: CPT | Mod: 25

## 2024-11-08 PROCEDURE — 87635 SARS-COV-2 COVID-19 AMP PRB: CPT | Performed by: PHYSICIAN ASSISTANT

## 2024-11-08 RX ORDER — MELOXICAM 15 MG/1
15 TABLET ORAL DAILY
Qty: 10 TABLET | Refills: 0 | Status: SHIPPED | OUTPATIENT
Start: 2024-11-08

## 2024-11-08 RX ORDER — AZITHROMYCIN 250 MG/1
TABLET, FILM COATED ORAL
Qty: 6 TABLET | Refills: 0 | Status: SHIPPED | OUTPATIENT
Start: 2024-11-08 | End: 2024-11-13

## 2024-11-08 RX ORDER — ALBUTEROL SULFATE 90 UG/1
2 INHALANT RESPIRATORY (INHALATION) EVERY 6 HOURS PRN
Qty: 18 G | Refills: 0 | Status: SHIPPED | OUTPATIENT
Start: 2024-11-08 | End: 2025-11-08

## 2024-11-08 RX ORDER — KETOROLAC TROMETHAMINE 30 MG/ML
15 INJECTION, SOLUTION INTRAMUSCULAR; INTRAVENOUS
Status: COMPLETED | OUTPATIENT
Start: 2024-11-08 | End: 2024-11-08

## 2024-11-08 RX ADMIN — KETOROLAC TROMETHAMINE 15 MG: 30 INJECTION, SOLUTION INTRAMUSCULAR at 02:11

## 2024-11-08 NOTE — Clinical Note
"Shelly Santoschaitanya Kam was seen and treated in our emergency department on 11/8/2024.  She may return to work on 11/15/2024.       If you have any questions or concerns, please don't hesitate to call.      Lanie Morrissey, CATINA"

## 2024-11-08 NOTE — ED PROVIDER NOTES
History      Chief Complaint   Patient presents with    Nasal Congestion     Nasal congestion and a cold for a week . Also pain to right shoulder into neck and behind right ear       Review of patient's allergies indicates:   Allergen Reactions    Pcn [penicillins] Anaphylaxis     Throat , tongue swelling     Banana      Itchy mouth    Lisinopril Other (See Comments)     Lip swelling    Melon      Itchy mouth    Morphine Other (See Comments)     Unknown    Tree nuts      Oral itching        HPI   HPI    11/8/2024, 3:09 PM   History obtained from the patient      History of Present Illness: Shelly Kam is a 49 y.o. female patient who presents to the Emergency Department for productive cough for a week with nasal congestion.  She is a smoker and says her sputum has turned from green to gray.  Denies known fever.  Also pain to right lateral shoulder that radiates down arm.  Denies injury.    No further complaints or concerns at this time.           PCP: Heraclio Farrell MD       Past Medical History:  Past Medical History:   Diagnosis Date    Allergy     Anemia     Asthma     Depression     Diabetes mellitus     Diabetes mellitus, type 2     Encounter for blood transfusion     Fibromyalgia     GERD (gastroesophageal reflux disease)     Hyperlipidemia     Hypertension     Hyperthyroidism     Panic attack     Prozac in past    Primary osteoarthritis of both knees 10/17/2023         Past Surgical History:  Past Surgical History:   Procedure Laterality Date    BRONCHOSCOPY      DIAGNOSTIC LAPAROSCOPY N/A 09/12/2022    Procedure: LAPAROSCOPY, DIAGNOSTIC;  Surgeon: Talha Santa MD;  Location: [facility] OR;  Service: OB/GYN;  Laterality: N/A;    HERNIA REPAIR      SALPINGECTOMY Bilateral 09/12/2022    Procedure: SALPINGECTOMY;  Surgeon: Talha Santa MD;  Location: [facility] OR;  Service: OB/GYN;  Laterality: Bilateral;    THYROIDECTOMY Bilateral 03/25/2021    Procedure: THYROIDECTOMY;  Surgeon: Alon Barton  MD;  [address];  Service: ENT;  Laterality: Bilateral;  NIMS monitor    TONSILLECTOMY      TOTAL ABDOMINAL HYSTERECTOMY N/A 2022    Procedure: HYSTERECTOMY, TOTAL, ABDOMINAL;  Surgeon: Talha Santa MD;  Location: [facility] OR;  Service: OB/GYN;  Laterality: N/A;    TRACHEOSTOMY      TRACHEOSTOMY TUBE PLACEMENT      XI ROBOTIC HYSTERECTOMY, WITH SALPINGECTOMY N/A 2022    Procedure: XI ROBOTIC HYSTERECTOMY,WITH SALPINGECTOMY;  Surgeon: Talha Santa MD;  Location: [facility] OR;  Service: OB/GYN;  Laterality: N/A;  converted to open without docking           Family History:  Family History   Problem Relation Name Age of Onset    Cancer Paternal Aunt      Asthma Mother Olu Gr     COPD Father Ysabel Ramirez     Vision loss Father Ysabel Ramirez     COPD Maternal Grandmother Jada Mejia     Stroke Maternal Uncle Steve Mejia            Social History:  Social History     Tobacco Use    Smoking status: Every Day     Current packs/day: 0.00     Average packs/day: 0.3 packs/day for 23.3 years (5.8 ttl pk-yrs)     Types: Cigarettes     Start date: 8/10/1999     Last attempt to quit: 2022     Years since quittin.9    Smokeless tobacco: Never    Tobacco comments:     Smokes 8 long cigarettes that she relights   Substance and Sexual Activity    Alcohol use: Not Currently     Comment: once a year; hold 72 hrs prior to sx    Drug use: Yes     Types: Marijuana    Sexual activity: Yes     Partners: Male     Birth control/protection: Condom       ROS     Review of Systems   HENT:  Positive for congestion.    Respiratory:  Positive for cough.    Musculoskeletal:  Positive for arthralgias.       Physical Exam      Initial Vitals [24 1304]   BP Pulse Resp Temp SpO2   (!) 137/91 81 18 98 °F (36.7 °C) 98 %      MAP       --         Physical Exam  Vital signs and nursing notes reviewed.  Constitutional: Patient is in NAD. Awake and alert. Well-developed and well-nourished.  Head:  "Atraumatic. Normocephalic.  Eyes:  EOM intact. Conjunctivae nl. No scleral icterus.  ENT: Mucous membranes are moist.   Neck: Supple.  No meningismus  Cardiovascular: Regular rate and rhythm. No murmurs, rubs, or gallops.   Pulmonary/Chest: No respiratory distress. Clear to auscultation bilaterally. No wheezing, rales, or rhonchi.  Coarse sounding cough  Abdominal: Soft. Non-distended. No TTP. No rebound, guarding, or rigidity. Good bowel sounds.  Genitourinary: No CVA tenderness  Musculoskeletal: Moves all extremities. No edema.   Skin: Warm and dry.  Neurological: Awake and alert. No acute focal neurological deficits are appreciated.  Psychiatric: Normal affect. Good eye contact. Appropriate in content.      ED Course          Procedures  ED Vital Signs:  Vitals:    11/08/24 1304 11/08/24 1409   BP: (!) 137/91 (!) 185/95   Pulse: 81 82   Resp: 18 18   Temp: 98 °F (36.7 °C) 98.5 °F (36.9 °C)   TempSrc: Oral Oral   SpO2: 98% 99%   Weight:  102.1 kg (225 lb)   Height:  5' 5" (1.651 m)         Results for orders placed or performed during the hospital encounter of 11/08/24   Influenza A & B by Molecular    Collection Time: 11/08/24  1:08 PM    Specimen: Nasopharyngeal Swab   Result Value Ref Range    Influenza A, Molecular Negative Negative    Influenza B, Molecular Negative Negative    Flu A & B Source Nasal swab    COVID-19 Rapid Screening    Collection Time: 11/08/24  1:08 PM   Result Value Ref Range    SARS-CoV-2 RNA, Amplification, Qual Negative Negative     *Note: Due to a large number of results and/or encounters for the requested time period, some results have not been displayed. A complete set of results can be found in Results Review.             Imaging Results:  Imaging Results              X-Ray Chest 1 View (Final result)  Result time 11/08/24 13:59:40      Final result by Neftaly Arreola MD (11/08/24 13:59:40)                   Impression:      1.  Negative for acute process involving the chest.    2.  " Stable findings as noted above.      Electronically signed by: Neftaly Arreola MD  Date:    11/08/2024  Time:    13:59               Narrative:    EXAMINATION:  XR CHEST 1 VIEW    CLINICAL HISTORY:  Cough, unspecified    COMPARISON:  Studies dating back to January 11, 2021    FINDINGS:  The lungs are clear. The cardiac silhouette size is normal. The trachea is midline and the mediastinal width is normal. Negative for focal infiltrate, effusion or pneumothorax. Pulmonary vasculature is normal. Negative for osseous abnormalities. Stable postop changes in the thyroid bed, convex-right curvature of the upper thoracic spine with marginal spondylosis, tortuous aorta and aortic arch calcifications.  Cardiophrenic fat pads.                                       X-Ray Shoulder Trauma Right (Final result)  Result time 11/08/24 13:59:00      Final result by Neftaly Arreola MD (11/08/24 13:59:00)                   Impression:      1.  Negative for acute process.    2.  Stable findings as noted above.      Electronically signed by: Neftaly Arreola MD  Date:    11/08/2024  Time:    13:59               Narrative:    EXAMINATION:  XR SHOULDER TRAUMA 3 VIEW RIGHT    CLINICAL HISTORY:  Pain in right shoulder    TECHNIQUE:  Three or four views of the right shoulder were performed.    COMPARISON:  Chest x-ray from January 11, 2021    FINDINGS:  The glenohumeral joint and acromioclavicular joint are well aligned.  Coracoclavicular distance is normal.  Moderate degenerative changes of the acromioclavicular joint.  Mild degenerative changes of the glenohumeral joint.  Calcific tendinitis overlies the greater tuberosity.    Stable postoperative changes to the thyroid bed when compared to March 26, 2021 CT scan.  The lungs are clear.  Marginal spondylosis.                                         The Emergency Provider reviewed the vital signs and test results, which are outlined above.    ED Discussion             Medication(s) given in the  ER:  Medications   ketorolac injection 15 mg (15 mg Intramuscular Given 11/8/24 0120)            Follow-up Information       Heraclio Farrell MD In 2 days.    Specialty: Family Medicine  Contact information:  [address]  [place] [address] [phone]                                    Medication List        START taking these medications      azithromycin 250 MG tablet  Commonly known as: Z-KALI  Take 2 tablets (500 mg total) by mouth once daily for 1 day, THEN 1 tablet (250 mg total) once daily for 4 days.  Start taking on: November 8, 2024     meloxicam 15 MG tablet  Commonly known as: MOBIC  Take 1 tablet (15 mg total) by mouth once daily.            CHANGE how you take these medications      * albuterol 90 mcg/actuation inhaler  Commonly known as: PROVENTIL/VENTOLIN HFA  INHALE 2 PUFFS BY MOUTH EVERY 6 HOURS AS NEEDED FOR WHEEZING FOR SHORTNESS OF BREATH  What changed: Another medication with the same name was added. Make sure you understand how and when to take each.     * albuterol 90 mcg/actuation inhaler  Commonly known as: VENTOLIN HFA  Inhale 2 puffs into the lungs every 6 (six) hours as needed for Wheezing. Rescue  What changed: You were already taking a medication with the same name, and this prescription was added. Make sure you understand how and when to take each.           * This list has 2 medication(s) that are the same as other medications prescribed for you. Read the directions carefully, and ask your doctor or other care provider to review them with you.                ASK your doctor about these medications      amLODIPine 10 MG tablet  Commonly known as: NORVASC  Take 1 tablet (10 mg total) by mouth every evening.     aspirin 81 MG EC tablet  Commonly known as: ECOTRIN  Take 1 tablet (81 mg total) by mouth once daily.     atorvastatin 20 MG tablet  Commonly known as: LIPITOR  Take 1 tablet (20 mg total) by mouth once daily.     citalopram 20 MG tablet  Commonly known as:  CeleXA  Take 1 tablet (20 mg total) by mouth every evening.     diclofenac 75 MG EC tablet  Commonly known as: VOLTAREN  Take 1 tablet (75 mg total) by mouth 2 (two) times daily with meals.     gabapentin 300 MG capsule  Commonly known as: NEURONTIN  Take 1 capsule (300 mg total) by mouth every evening.     levothyroxine 150 MCG tablet  Commonly known as: SYNTHROID  Take 1 tablet (150 mcg total) by mouth before breakfast.     losartan 100 MG tablet  Commonly known as: COZAAR  Take 1 tablet (100 mg total) by mouth once daily.     pantoprazole 40 MG tablet  Commonly known as: PROTONIX  Take 1 tablet (40 mg total) by mouth once daily.     predniSONE 20 MG tablet  Commonly known as: DELTASONE  Take 2 tablets with food for 3 days; then take one tablet with food for 2 days; then take 1/2 tablet with food for 2 days.     QUEtiapine 50 MG tablet  Commonly known as: SEROQUEL  Take 1 tablet (50 mg total) by mouth every evening.               Where to Get Your Medications        These medications were sent to [facility]  [address]  [address] [place] [address]      Phone: [phone]   albuterol 90 mcg/actuation inhaler  azithromycin 250 MG tablet  meloxicam 15 MG tablet             Medical Decision Making      Concerned about occult pneumonia.  Very coarse productive sounding cough in a smoker.  Prescribed antibiotics.    All findings were reviewed with the patient/family in detail.   All remaining questions and concerns were addressed at that time.  Patient/family has been counseled regarding the need for follow-up as well as the indication to return to the emergency room should new or worrisome developments occur.        MDM     Amount and/or Complexity of Data Reviewed  Clinical lab tests: ordered and reviewed  Tests in the radiology section of CPT®: ordered and reviewed                   Clinical Impression:        ICD-10-CM ICD-9-CM   1. Cough  R05.9 786.2   2. Right  shoulder pain  M25.511 719.41   3. Bronchitis  J40 490               Lanie Morrissey, PA-KT  11/08/24 1512

## 2024-11-18 DIAGNOSIS — M79.7 FIBROMYALGIA: Chronic | ICD-10-CM

## 2024-11-18 DIAGNOSIS — F41.9 ANXIETY: Chronic | ICD-10-CM

## 2024-11-18 RX ORDER — CITALOPRAM 20 MG/1
20 TABLET, FILM COATED ORAL NIGHTLY
Qty: 90 TABLET | Refills: 3 | Status: SHIPPED | OUTPATIENT
Start: 2024-11-18 | End: 2025-11-18

## 2024-11-18 NOTE — TELEPHONE ENCOUNTER
Care Due:                  Date            Visit Type   Department     Provider  --------------------------------------------------------------------------------                                EP -                              PRIMARY      [facility] INTERNAL  Last Visit: 03-      CARE ([facility])   MEDICINE       Heraclio Farrell  Next Visit: None Scheduled  None         None Found                                                            Last  Test          Frequency    Reason                     Performed    Due Date  --------------------------------------------------------------------------------    CMP.........  12 months..  atorvastatin.............  10-   10-    Lipid Panel.  12 months..  atorvastatin.............  10-   10-    [employer] Embedded Care Due Messages. Reference number: [id number].   11/18/2024 10:31:58 AM CST

## 2024-12-17 ENCOUNTER — TELEPHONE (OUTPATIENT)
Dept: SMOKING CESSATION | Facility: CLINIC | Age: 49
End: 2024-12-17
Payer: COMMERCIAL

## 2025-01-19 ENCOUNTER — PATIENT MESSAGE (OUTPATIENT)
Dept: SPORTS MEDICINE | Facility: CLINIC | Age: 50
End: 2025-01-19
Payer: COMMERCIAL

## 2025-01-24 ENCOUNTER — OFFICE VISIT (OUTPATIENT)
Dept: SPORTS MEDICINE | Facility: CLINIC | Age: 50
End: 2025-01-24
Payer: COMMERCIAL

## 2025-01-24 VITALS — WEIGHT: 225.06 LBS | BODY MASS INDEX: 37.46 KG/M2

## 2025-01-24 DIAGNOSIS — M17.0 PRIMARY OSTEOARTHRITIS OF BOTH KNEES: Primary | ICD-10-CM

## 2025-01-24 DIAGNOSIS — S83.242D TEAR OF MEDIAL MENISCUS OF LEFT KNEE, CURRENT, UNSPECIFIED TEAR TYPE, SUBSEQUENT ENCOUNTER: ICD-10-CM

## 2025-01-24 DIAGNOSIS — M17.12 PRIMARY OSTEOARTHRITIS OF LEFT KNEE: ICD-10-CM

## 2025-01-24 PROCEDURE — 99214 OFFICE O/P EST MOD 30 MIN: CPT | Mod: 25,S$GLB,, | Performed by: PHYSICIAN ASSISTANT

## 2025-01-24 PROCEDURE — 3008F BODY MASS INDEX DOCD: CPT | Mod: CPTII,S$GLB,, | Performed by: PHYSICIAN ASSISTANT

## 2025-01-24 PROCEDURE — 1159F MED LIST DOCD IN RCRD: CPT | Mod: CPTII,S$GLB,, | Performed by: PHYSICIAN ASSISTANT

## 2025-01-24 PROCEDURE — 1160F RVW MEDS BY RX/DR IN RCRD: CPT | Mod: CPTII,S$GLB,, | Performed by: PHYSICIAN ASSISTANT

## 2025-01-24 PROCEDURE — 97760 ORTHOTIC MGMT&TRAING 1ST ENC: CPT | Mod: S$GLB,,, | Performed by: PHYSICIAN ASSISTANT

## 2025-01-24 PROCEDURE — 99999 PR PBB SHADOW E&M-EST. PATIENT-LVL III: CPT | Mod: PBBFAC,,, | Performed by: PHYSICIAN ASSISTANT

## 2025-01-24 PROCEDURE — 20610 DRAIN/INJ JOINT/BURSA W/O US: CPT | Mod: 50,S$GLB,, | Performed by: PHYSICIAN ASSISTANT

## 2025-01-24 RX ORDER — DICLOFENAC SODIUM 10 MG/G
2 GEL TOPICAL 3 TIMES DAILY
Qty: 100 G | Refills: 2 | Status: SHIPPED | OUTPATIENT
Start: 2025-01-24

## 2025-01-24 RX ORDER — CELECOXIB 200 MG/1
200 CAPSULE ORAL 2 TIMES DAILY
Qty: 60 CAPSULE | Refills: 0 | Status: SHIPPED | OUTPATIENT
Start: 2025-01-24 | End: 2025-01-28 | Stop reason: ALTCHOICE

## 2025-01-24 RX ADMIN — METHYLPREDNISOLONE ACETATE 40 MG: 40 INJECTION, SUSPENSION INTRA-ARTICULAR; INTRALESIONAL; INTRAMUSCULAR; SOFT TISSUE at 09:01

## 2025-01-24 NOTE — PROCEDURES
Large Joint Aspiration/Injection: bilateral knee    Date/Time: 1/24/2025 9:00 AM    Performed by: Amy Lazo PA-C  Authorized by: Amy Lazo PA-C    Consent Done?:  Yes (Verbal)  Indications:  Pain  Site marked: the procedure site was marked    Timeout: prior to procedure the correct patient, procedure, and site was verified    Prep: patient was prepped and draped in usual sterile fashion      Local anesthesia used?: Yes    Anesthesia:  Local infiltration  Local anesthetic:  Lidocaine 1% without epinephrine  Anesthetic total (ml):  4      Details:  Needle Size:  22 G  Ultrasonic Guidance for needle placement?: No    Approach:  Anterolateral  Location:  Knee  Laterality:  Bilateral  Site:  Bilateral knee  Medications (Right):  40 mg methylPREDNISolone acetate 40 mg/mL  Medications (Left):  40 mg methylPREDNISolone acetate 40 mg/mL  Patient tolerance:  Patient tolerated the procedure well with no immediate complications     Procedure Note:  We discussed the risk and benefits of injections, including pain, infection, bleeding, damage to adjacent structures, risk of reaction to injection. We discussed the steroid/cortisone injections will not heal the problem but mat help decrease inflammation and help with symptoms. We discussed the risk of repeated injections. The patient expressed understanding and wanted to proceed with the injection. We performed a timeout to verify the proper patient, proper procedure, and the proper site. The injection site was prepared in a sterile fashion. The patient tolerated it well and there were no complication. We did discuss with the patient that steroid injections can cause some increase in blood sugar and blood pressure for up to a week after the injection.

## 2025-01-24 NOTE — PROGRESS NOTES
Orthopaedic Follow-Up Visit    Last Appointment:  05/31/2024  Diagnosis: Primary osteoarthritis of both knees, tear of medial meniscus of left  Prior Procedure:  Bilateral knee CSI 05/31/2024    Shelly Kam is a 50 y.o. female who is here for f/u evaluation of bilateral knee pain. The patient was last seen here by me on 05/31/2024 at which point we decided to try bilateral knee corticosteroid injections and formal physical therapy prior to considering further treatment options. The patient returns today reporting that the symptoms have persisted and is interested in proceeding with further treatment options.  She completed a few sessions of physical therapy with no improvement of symptoms.      To review her history, Shelly Kam is a 48 y.o. year old female patient who initially presented to clinic on 10/18/2023 for bilateral knee pain, her left knee is worse than her right knee. Her symptoms began in January but has been worsening at that time. She fell off the treadmill back in August and that is when her knee pain began. Her symptoms include bilateral knee pain, anterior knee pain, night pain, morning stiffness, intermittent swelling. Her pain is made worse by prolonged standing, prolonged periods of rest, going from sit to stand position, night pain. Her treatment has included rest, activity modification, oral anti-inflammatories, Tylenol, Voltaren gel, lidocaine patches, ibuprofen, previous corticosteroid injection. She again returned to clinic on 05/28/2024 for worsening left knee pain after she sustained an injury while at work. She was in an emergent situation in which she would performed CPR and had to flip onto her knees quickly and she had extreme left knee pain since then.     Patient's medications, allergies, past medical, surgical, social and family histories were reviewed and updated as appropriate.    Review of Systems   All systems reviewed were negative.  Specifically, the  patient denies fever, chills, weight loss, chest pain, shortness of breath, or dyspnea on exertion.      Past Medical History:   Diagnosis Date    Allergy     Anemia     Asthma     Depression     Diabetes mellitus     Diabetes mellitus, type 2     Encounter for blood transfusion     Fibromyalgia     GERD (gastroesophageal reflux disease)     Hyperlipidemia     Hypertension     Hyperthyroidism     Panic attack     Prozac in past    Primary osteoarthritis of both knees 10/17/2023       Objective:      Physical Exam  Patient is alert and oriented, no distress. Skin is intact. Neuro is normal with no focal motor or sensory findings.    Standing exam  stance: normal alignment, no significant leg-length discrepancy  gait: antalgic      Knee      RIGHT  LEFT  Skin:     Intact   Intact  ROM:     0-130  0-130  Effusion:    Neg   +, mild  Medial joint line tenderness:  +   +  Lateral joint line tenderness:  Neg   Neg  Pretty:     Neg   +  Patella crepitus:   Neg   Neg  Patella tenderness:   Neg   Neg  Patella grind:      Neg   Neg  Lachman:    Neg   Neg  Pivot shift:    Neg   Neg  Valgus stress:    Neg   Neg, pain no lax  Varus stress:    Neg   Neg  Posterior drawer:   Neg   Neg  N-V               intact  intact  Hip:    nml    nml   Lower extremity edema: +  +    Neurovascular exam  - motor function grossly intact bilaterally to hip flexion, knee extension and flexion, ankle dorsiflexion and plantarflexion  - sensation intact to light touch bilaterally to femoral, tibial, tibial and peroneal distributions  - symmetrical pedal pulses    Imaging:   XR Results:  Results for orders placed during the hospital encounter of 08/08/23    X-Ray Knee 3 View Bilateral    Narrative  EXAMINATION:  XR KNEE 3 VIEW BILATERAL    CLINICAL HISTORY:  Pain in right knee    COMPARISON:  None    Impression  FINDINGS/  No acute fracture or dislocation seen.  Tri compartment osteoarthritis with medial compartment predominance bilateral  mild-to-moderate      Electronically signed by: Chelle Fournier  Date:    08/08/2023  Time:    19:02      MRI Results:  Results for orders placed during the hospital encounter of 05/29/24    MRI Knee Without Contrast Left    Narrative  EXAMINATION:  MRI KNEE WITHOUT CONTRAST LEFT    CLINICAL HISTORY:  Knee pain, chronic, negative xray (Age >= 5y);Meniscal tear, untreated, new symptoms;Bilateral primary osteoarthritis of knee    TECHNIQUE:  Multiplanar, multisequence images were preformed of the left knee.    COMPARISON:  None.    FINDINGS:  Medial meniscus: Partially extruded with intrasubstance mucoid degeneration.  Free edge tear lateral aspect posterior horn.    Lateral meniscus: No evidence of meniscal tear.    Ligaments:  ACL, PCL, MCL, and LCL complex are intact.  Mild diffuse edema adjacent to the MCL.    Tendons:  Extensor mechanism is maintained.    Cartilage:    Patellofemoral: Mild surface irregularity of the retropatellar cartilage.  No full-thickness chondral defects.    Medial tibiofemoral: Mild thinning and surface irregularity of weight-bearing cartilage.  No full-thickness chondral defects.    Lateral tibiofemoral: Articular cartilage is maintained.    Bone: No fracture or marrow replacing process.  Mild marginal spurring of the medial tibiofemoral joint.    Miscellaneous: Mild-to-moderate joint effusion.    Impression  1. Medial meniscus mucoid degeneration and posterior horn tear.  2. Retropatellar and medial tibiofemoral chondromalacia.  3. Intact supporting tendon and ligament structures.  4. Joint effusion.      Electronically signed by: HALLIE Kuo MD  Date:    05/30/2024  Time:    08:08      Physician Read: I agree with the above impression.    Assessment/Plan:   Assessment:  Shelly Kam is a 50 y.o. female with primary osteoarthritis of both knees, left knee medial meniscus tear    Plan:    Discussed diagnosis and treatment options with the patient today. She has  chronic bilateral knee pain that has failed to improve with conservative treatment.  It has been over 7 months since our last visit. At last visit we elected to try a corticosteroid injection and physical therapy.  She completed 4 sessions of physical therapy before she discontinued due to cost and no improvement of symptoms  For her acute pain today, discussed a repeat corticosteroid injection, she is interested in trying this.  Bilateral knee corticosteroid injection given in clinic, patient tolerated procedure well with no immediate complications  Discussed trialing the hyaluronic acid viscosupplementation injections, she is interested in trying these as well  Prior authorization placed for bilateral Synvisc-One  MEDICAL NECESSITY FOR VISCOSUPPLEMENTATION: After thorough evaluation of the patient, I have determined that visco-supplementation is medically necessary. The patient has painful DJD of the knee with failure of conservative therapy including lifestyle modifications and rehabilitation exercises. Oral analgesis/NSAIDs have not adequately controlled symptoms and there is radiographic evidence of joint space narrowing, subchondral sclerosis, and some early osteophytic changes Kellgren- Alexander grade 2 or greater, or in lack of radiographic evidence, there is arthroscopic or other evidence of chondrosis.  She has been taking meloxicam with no improvement of symptoms.  Encouraged to discontinue meloxicam, trialed Celebrex instead  Rx for Celebrex 200 mg b.i.d. provided, take with meals  Prescription for Voltaren gel provided as well, can apply this 3 to 4 times a day on the affected knee  Fitted for patellar stabilizing brace today  Under the direction of Amy Lazo PA-C, 15 minutes were spent sizing, fitting, and educating for durable medical equipment application today by Amy Lazo PA-C.  CPT 87008.  Follow-up in about 1 month for bilateral Visco          Amy Lazo PA-C  Sports  Medicine Physician Assistant       Disclaimer: This note was prepared using a voice recognition system and is likely to have sound alike errors within the text.

## 2025-01-28 ENCOUNTER — TELEPHONE (OUTPATIENT)
Dept: ORTHOPEDICS | Facility: CLINIC | Age: 50
End: 2025-01-28
Payer: COMMERCIAL

## 2025-01-28 DIAGNOSIS — M17.0 PRIMARY OSTEOARTHRITIS OF BOTH KNEES: Primary | ICD-10-CM

## 2025-01-28 RX ORDER — NAPROXEN 500 MG/1
500 TABLET ORAL 2 TIMES DAILY WITH MEALS
Qty: 60 TABLET | Refills: 0 | Status: SHIPPED | OUTPATIENT
Start: 2025-01-28 | End: 2025-01-30

## 2025-01-28 RX ORDER — METHYLPREDNISOLONE ACETATE 40 MG/ML
40 INJECTION, SUSPENSION INTRA-ARTICULAR; INTRALESIONAL; INTRAMUSCULAR; SOFT TISSUE
Status: DISCONTINUED | OUTPATIENT
Start: 2025-01-24 | End: 2025-01-30

## 2025-01-29 ENCOUNTER — TELEPHONE (OUTPATIENT)
Dept: INTERNAL MEDICINE | Facility: CLINIC | Age: 50
End: 2025-01-29
Payer: COMMERCIAL

## 2025-01-29 NOTE — TELEPHONE ENCOUNTER
----- Message from Ebonie sent at 1/29/2025 10:27 AM CST -----  Name of Who is Calling:MARTHA CLEANING [[id number]]        What is the request in detail:Pt requesting a call back from office to reschedule pt appointment today she could not make.        Can the clinic reply by [facility]:Call        What Number to Call Back [facility]:Telephone Information:  Mobile          [phone]

## 2025-01-30 ENCOUNTER — OFFICE VISIT (OUTPATIENT)
Dept: INTERNAL MEDICINE | Facility: CLINIC | Age: 50
End: 2025-01-30
Payer: COMMERCIAL

## 2025-01-30 VITALS
OXYGEN SATURATION: 97 % | HEIGHT: 65 IN | WEIGHT: 267.19 LBS | BODY MASS INDEX: 44.52 KG/M2 | DIASTOLIC BLOOD PRESSURE: 94 MMHG | SYSTOLIC BLOOD PRESSURE: 138 MMHG | HEART RATE: 67 BPM

## 2025-01-30 DIAGNOSIS — G89.29 CHRONIC MIDLINE LOW BACK PAIN WITHOUT SCIATICA: ICD-10-CM

## 2025-01-30 DIAGNOSIS — Z00.00 ANNUAL PHYSICAL EXAM: Primary | ICD-10-CM

## 2025-01-30 DIAGNOSIS — M17.0 PRIMARY OSTEOARTHRITIS OF BOTH KNEES: ICD-10-CM

## 2025-01-30 DIAGNOSIS — J45.20 MILD INTERMITTENT ASTHMA WITHOUT COMPLICATION: Chronic | ICD-10-CM

## 2025-01-30 DIAGNOSIS — M54.50 CHRONIC MIDLINE LOW BACK PAIN WITHOUT SCIATICA: ICD-10-CM

## 2025-01-30 DIAGNOSIS — R73.03 PREDIABETES: ICD-10-CM

## 2025-01-30 DIAGNOSIS — M79.7 FIBROMYALGIA: Chronic | ICD-10-CM

## 2025-01-30 DIAGNOSIS — G47.33 OBSTRUCTIVE SLEEP APNEA SYNDROME: ICD-10-CM

## 2025-01-30 DIAGNOSIS — E66.01 MORBID OBESITY WITH BMI OF 40.0-44.9, ADULT: ICD-10-CM

## 2025-01-30 DIAGNOSIS — I10 PRIMARY HYPERTENSION: ICD-10-CM

## 2025-01-30 DIAGNOSIS — E78.2 MIXED HYPERLIPIDEMIA: ICD-10-CM

## 2025-01-30 DIAGNOSIS — E89.0 POSTOPERATIVE HYPOTHYROIDISM: Chronic | ICD-10-CM

## 2025-01-30 DIAGNOSIS — G47.9 SLEEPING DIFFICULTY: ICD-10-CM

## 2025-01-30 DIAGNOSIS — F41.9 ANXIETY: Chronic | ICD-10-CM

## 2025-01-30 DIAGNOSIS — K21.9 GASTROESOPHAGEAL REFLUX DISEASE WITHOUT ESOPHAGITIS: ICD-10-CM

## 2025-01-30 PROCEDURE — 99396 PREV VISIT EST AGE 40-64: CPT | Mod: S$GLB,,, | Performed by: FAMILY MEDICINE

## 2025-01-30 PROCEDURE — 99999 PR PBB SHADOW E&M-EST. PATIENT-LVL III: CPT | Mod: PBBFAC,,, | Performed by: FAMILY MEDICINE

## 2025-01-30 PROCEDURE — 1159F MED LIST DOCD IN RCRD: CPT | Mod: CPTII,S$GLB,, | Performed by: FAMILY MEDICINE

## 2025-01-30 PROCEDURE — 3080F DIAST BP >= 90 MM HG: CPT | Mod: CPTII,S$GLB,, | Performed by: FAMILY MEDICINE

## 2025-01-30 PROCEDURE — 3008F BODY MASS INDEX DOCD: CPT | Mod: CPTII,S$GLB,, | Performed by: FAMILY MEDICINE

## 2025-01-30 PROCEDURE — 1160F RVW MEDS BY RX/DR IN RCRD: CPT | Mod: CPTII,S$GLB,, | Performed by: FAMILY MEDICINE

## 2025-01-30 PROCEDURE — 4010F ACE/ARB THERAPY RXD/TAKEN: CPT | Mod: CPTII,S$GLB,, | Performed by: FAMILY MEDICINE

## 2025-01-30 PROCEDURE — 3075F SYST BP GE 130 - 139MM HG: CPT | Mod: CPTII,S$GLB,, | Performed by: FAMILY MEDICINE

## 2025-01-30 RX ORDER — ASPIRIN 81 MG/1
81 TABLET ORAL DAILY
Qty: 90 TABLET | Refills: 3 | Status: SHIPPED | OUTPATIENT
Start: 2025-01-30 | End: 2026-01-30

## 2025-01-30 RX ORDER — QUETIAPINE FUMARATE 50 MG/1
50 TABLET, FILM COATED ORAL NIGHTLY
Qty: 30 TABLET | Refills: 11 | Status: SHIPPED | OUTPATIENT
Start: 2025-01-30 | End: 2026-01-30

## 2025-01-30 RX ORDER — AMLODIPINE BESYLATE 10 MG/1
10 TABLET ORAL NIGHTLY
Qty: 90 TABLET | Refills: 1 | Status: CANCELLED | OUTPATIENT
Start: 2025-01-30

## 2025-01-30 RX ORDER — CITALOPRAM 20 MG/1
20 TABLET, FILM COATED ORAL NIGHTLY
Qty: 90 TABLET | Refills: 3 | Status: SHIPPED | OUTPATIENT
Start: 2025-01-30 | End: 2026-01-30

## 2025-01-30 RX ORDER — AMLODIPINE AND OLMESARTAN MEDOXOMIL 10; 40 MG/1; MG/1
1 TABLET ORAL DAILY
Qty: 90 TABLET | Refills: 3 | Status: SHIPPED | OUTPATIENT
Start: 2025-01-30 | End: 2026-01-30

## 2025-01-30 RX ORDER — ATORVASTATIN CALCIUM 20 MG/1
20 TABLET, FILM COATED ORAL DAILY
Qty: 90 TABLET | Refills: 3 | Status: SHIPPED | OUTPATIENT
Start: 2025-01-30 | End: 2026-01-30

## 2025-01-30 RX ORDER — GABAPENTIN 300 MG/1
300 CAPSULE ORAL NIGHTLY
Qty: 30 CAPSULE | Refills: 11 | Status: CANCELLED | OUTPATIENT
Start: 2025-01-30 | End: 2026-01-30

## 2025-01-30 RX ORDER — LEVOTHYROXINE SODIUM 150 UG/1
150 TABLET ORAL
Qty: 90 TABLET | Refills: 3 | Status: SHIPPED | OUTPATIENT
Start: 2025-01-30 | End: 2026-01-25

## 2025-01-30 RX ORDER — HYDROCHLOROTHIAZIDE 25 MG/1
25 TABLET ORAL DAILY
Qty: 90 TABLET | Refills: 3 | Status: SHIPPED | OUTPATIENT
Start: 2025-01-30 | End: 2026-01-30

## 2025-01-30 RX ORDER — OMEPRAZOLE 40 MG/1
40 CAPSULE, DELAYED RELEASE ORAL DAILY
Qty: 90 CAPSULE | Refills: 3 | Status: SHIPPED | OUTPATIENT
Start: 2025-01-30 | End: 2026-01-30

## 2025-01-30 NOTE — PROGRESS NOTES
Subjective:   Patient ID: Shelly Kam is a 50 y.o. female.  Chief Complaint:  Annual Exam and Knee Pain    Presents for annual exam and follow-up chronic medical conditions     Medical History:  - Hypertension.  Uncontrolled.  Noncompliant.  Out of medication.  Last prescribed losartan 100 mg daily and amlodipine 10 mg daily.  Denies any shortness of breath.  Complains of increased bilateral lower extremity edema in addition to swelling in knees.  - Prediabetes.  Last A1c normal.  Off medication.  Denies any symptoms hypo hyperglycemia.  Needs repeat A1c.  - Hyperlipidemia.  LDL close to goal.  On aspirin 81 mg daily and Lipitor 20 mg daily.  Reports compliance.  Denies side effects.  Denies chest pain or claudication.  Needs repeat lipid panel.    - Hypothyroidism.  Last TSH increased and free T4 normal.  Increased Synthroid 150 mcg daily.  Reports compliance.  Denies side effects.  Needs repeat TSH.    - Fibromyalgia and Anxiety and sleep difficulty..  On citalopram 20 mg daily and Seroquel 50 mg nightly..  Reports compliance.  Denies side effects.  Symptoms stable and well controlled.  Happy with medication.  Wants to continue.    - Mild intermittent asthma.  Stable.  No frequent rescue inhaler use needed.  No controller medication indicated.  No controller medication.    - Allergic rhinosinusitis.  Stable on Flonase.    - GERD.  Stable on PPI.  - Chronic low back pain and arthritis of knees.  Not taking gabapentin.  Too sedating.  Recently seen by Orthopedics for knee pain.  Meloxicam not effective.  Prescribed Celebrex but too expensive.  Tylenol over-the-counter not effective.  Request prescription for tramadol.  - morbid obesity.  Significant amount of weight gain since last visit.  BMI now greater than 40.  Questions if candidate for treatment with Wegovy or Zepbound since has coexisting sleep apnea.    Additional concerns today besides pain includes bilateral lower extremity swelling  "  Orthopedist told her to discuss with her PCP possible treatment with a low-dose fluid pill      Review of Systems   Constitutional:  Negative for activity change, appetite change, chills, diaphoresis, fatigue and fever.   Eyes:  Negative for visual disturbance.   Respiratory:  Negative for cough, chest tightness, shortness of breath and wheezing.    Cardiovascular:  Positive for leg swelling. Negative for chest pain and palpitations.   Gastrointestinal:  Negative for abdominal distention, abdominal pain, constipation, diarrhea, nausea and vomiting.   Endocrine: Negative for polydipsia, polyphagia and polyuria.   Genitourinary:  Negative for difficulty urinating, dysuria, flank pain, frequency, hematuria and urgency.   Musculoskeletal:  Positive for arthralgias, back pain, gait problem, joint swelling and myalgias.   Skin:  Negative for rash.   Neurological:  Negative for dizziness, syncope, weakness, light-headedness, numbness and headaches.   Psychiatric/Behavioral:  Negative for agitation, behavioral problems, confusion, decreased concentration, dysphoric mood, hallucinations and sleep disturbance. The patient is not nervous/anxious and is not hyperactive.      Objective:   BP (!) 138/94 (BP Location: Left arm, Patient Position: Sitting)   Pulse 67   Ht 5' 5" (1.651 m)   Wt 121.2 kg (267 lb 3.2 oz)   LMP 09/12/2022   SpO2 97%   BMI 44.46 kg/m²     Physical Exam  Vitals and nursing note reviewed.   Constitutional:       Appearance: Normal appearance. She is well-developed. She is morbidly obese.      Comments:   Blood pressure elevated   Eyes:      General: No scleral icterus.     Conjunctiva/sclera: Conjunctivae normal.   Neck:      Vascular: No JVD.   Cardiovascular:      Rate and Rhythm: Normal rate and regular rhythm.      Heart sounds: Normal heart sounds.   Pulmonary:      Effort: Pulmonary effort is normal.      Breath sounds: Normal breath sounds.   Abdominal:      General: There is no distension. "      Palpations: Abdomen is soft. There is no hepatomegaly.      Tenderness: There is no abdominal tenderness. There is no guarding or rebound.   Musculoskeletal:      Lumbar back: No spasms.      Right lower leg: Edema present.      Left lower leg: Edema present.   Skin:     Findings: No rash.   Neurological:      Mental Status: She is alert.   Psychiatric:         Attention and Perception: Attention normal.         Mood and Affect: Affect normal. Mood is depressed. Mood is not anxious.         Speech: Speech normal.         Behavior: Behavior is agitated.         Thought Content: Thought content normal.         Cognition and Memory: Cognition and memory normal.         Judgment: Judgment normal.       Assessment:       ICD-10-CM ICD-9-CM   1. Annual physical exam  Z00.00 V70.0   2. Primary hypertension  I10 401.9   3. Postoperative hypothyroidism  E89.0 244.0   4. Prediabetes  R73.03 790.29   5. Mixed hyperlipidemia  E78.2 272.2   6. Anxiety  F41.9 300.00   7. Sleeping difficulty  G47.9 780.50   8. Gastroesophageal reflux disease without esophagitis  K21.9 530.81   9. Mild intermittent asthma without complication  J45.20 493.90   10. Fibromyalgia  M79.7 729.1   11. Chronic midline low back pain without sciatica  M54.50 724.2    G89.29 338.29   12. Morbid obesity with BMI of 40.0-44.9, adult  E66.01 278.01    Z68.41 V85.41   13. Obstructive sleep apnea syndrome  G47.33 327.23     Plan:   Annual physical exam  -     CBC Without Differential; Future; Expected date: 01/30/2025  -     Comprehensive Metabolic Panel; Future; Expected date: 01/30/2025  -     Lipid Panel; Future; Expected date: 01/30/2025  -     TSH; Future; Expected date: 01/30/2025  -     Hemoglobin A1C; Future; Expected date: 01/30/2025  Blood pressure elevated.  BMI 44.5.    Check labs.  Treat as indicated.    Colon cancer screening up-to-date   Breast cancer screening up-to-date   Tetanus booster up-to-date   Declines COVID vaccine   Declines flu  vaccine    Primary hypertension  -     amlodipine-olmesartan (AHMET) 10-40 mg per tablet; Take 1 tablet by mouth once daily.  Dispense: 90 tablet; Refill: 3  -     hydroCHLOROthiazide (HYDRODIURIL) 25 MG tablet; Take 1 tablet (25 mg total) by mouth once daily.  Dispense: 90 tablet; Refill: 3  Uncontrolled   Noncompliant   Start Ahmet 10/40 mg daily   Start HCTZ 25 mg daily which should help with her mild lower extremity edema   Recheck blood pressure 4 weeks     Postoperative hypothyroidism  -     levothyroxine (SYNTHROID) 150 MCG tablet; Take 1 tablet (150 mcg total) by mouth before breakfast.  Dispense: 90 tablet; Refill: 3  Asymptomatic   Check TSH   Adjust current thyroid supplement if indicated     Prediabetes  Asymptomatic   Check A1c   If greater than 6.5%, start weekly GLP 1 injections     Mixed hyperlipidemia  -     atorvastatin (LIPITOR) 20 MG tablet; Take 1 tablet (20 mg total) by mouth once daily.  Dispense: 90 tablet; Refill: 3  -     aspirin (ECOTRIN) 81 MG EC tablet; Take 1 tablet (81 mg total) by mouth once daily.  Dispense: 90 tablet; Refill: 3  Asymptomatic   Check lipid panel   Adjust Lipitor 20 mg daily as needed     Anxiety  Sleeping difficulty  -     QUEtiapine (SEROQUEL) 50 MG tablet; Take 1 tablet (50 mg total) by mouth every evening.  Dispense: 30 tablet; Refill: 11  -     citalopram (CELEXA) 20 MG tablet; Take 1 tablet (20 mg total) by mouth every evening.  Dispense: 90 tablet; Refill: 3  Mood and symptoms stable and well controlled  Continue current medications    Gastroesophageal reflux disease without esophagitis  -     omeprazole (PRILOSEC) 40 MG capsule; Take 1 capsule (40 mg total) by mouth once daily.  Dispense: 90 capsule; Refill: 3  Symptoms stable and well controlled   Continue daily PPI     Mild intermittent asthma without complication  No frequent rescue inhaler use   No controller medication indicated     Fibromyalgia  Chronic midline low back pain without sciatica  Primary  osteoarthritis of bilateral knees  Declines restarting gabapentin   Continue topical Voltaren   Can discuss with current orthopedist if they would prescribe tramadol short term   Recommend she look through her current inferior provider manual and see if/what chronic pain management clinics or doctors are in her panel external referral can be ordered    Morbid obesity with BMI of 40.0-44.9, adult  Obstructive sleep apnea syndrome  Unfortunately symptomatic weight gain since last visit   Likely due decreased mobility from pain   If all labs normal and not diabetic will try and obtain coverage for Zepbound coexisting sleep apnea     Return to clinic one-month

## 2025-02-10 ENCOUNTER — TELEPHONE (OUTPATIENT)
Dept: SPORTS MEDICINE | Facility: CLINIC | Age: 50
End: 2025-02-10
Payer: COMMERCIAL

## 2025-02-10 NOTE — TELEPHONE ENCOUNTER
Spoke with patient regarding her Gel Injections, Advised patient that her insurance has Denied the medications entirely through her plan, Offered patient multiple options per providers recommendations, Pt expressed understanding and gratitude and stated she was feeling Fine right now and would contact clinic when she needs to be seen

## 2025-04-03 ENCOUNTER — LAB VISIT (OUTPATIENT)
Dept: LAB | Facility: HOSPITAL | Age: 50
End: 2025-04-03
Attending: FAMILY MEDICINE
Payer: COMMERCIAL

## 2025-04-03 ENCOUNTER — OFFICE VISIT (OUTPATIENT)
Dept: INTERNAL MEDICINE | Facility: CLINIC | Age: 50
End: 2025-04-03
Payer: COMMERCIAL

## 2025-04-03 VITALS
HEIGHT: 65 IN | HEART RATE: 92 BPM | OXYGEN SATURATION: 98 % | BODY MASS INDEX: 46.75 KG/M2 | WEIGHT: 280.63 LBS | DIASTOLIC BLOOD PRESSURE: 90 MMHG | SYSTOLIC BLOOD PRESSURE: 142 MMHG

## 2025-04-03 DIAGNOSIS — R73.03 PREDIABETES: ICD-10-CM

## 2025-04-03 DIAGNOSIS — Z00.00 ANNUAL PHYSICAL EXAM: ICD-10-CM

## 2025-04-03 DIAGNOSIS — J45.30 MILD PERSISTENT ASTHMA WITHOUT COMPLICATION: ICD-10-CM

## 2025-04-03 DIAGNOSIS — G47.33 OBSTRUCTIVE SLEEP APNEA SYNDROME: ICD-10-CM

## 2025-04-03 DIAGNOSIS — I10 PRIMARY HYPERTENSION: Primary | ICD-10-CM

## 2025-04-03 DIAGNOSIS — E89.0 POSTOPERATIVE HYPOTHYROIDISM: ICD-10-CM

## 2025-04-03 DIAGNOSIS — E78.2 MIXED HYPERLIPIDEMIA: ICD-10-CM

## 2025-04-03 DIAGNOSIS — E66.01 SEVERE OBESITY WITH BODY MASS INDEX (BMI) OF 35.0 TO 39.9 WITH SERIOUS COMORBIDITY: ICD-10-CM

## 2025-04-03 PROBLEM — R26.89 FUNCTIONAL GAIT ABNORMALITY: Status: RESOLVED | Noted: 2024-05-17 | Resolved: 2025-04-03

## 2025-04-03 PROBLEM — R68.89 DECREASED STRENGTH, ENDURANCE, AND MOBILITY: Status: RESOLVED | Noted: 2024-05-17 | Resolved: 2025-04-03

## 2025-04-03 PROBLEM — Z74.09 DECREASED STRENGTH, ENDURANCE, AND MOBILITY: Status: RESOLVED | Noted: 2024-05-17 | Resolved: 2025-04-03

## 2025-04-03 PROBLEM — R53.1 DECREASED STRENGTH, ENDURANCE, AND MOBILITY: Status: RESOLVED | Noted: 2024-05-17 | Resolved: 2025-04-03

## 2025-04-03 PROBLEM — M25.60 DECREASED MOBILITY OF JOINT: Status: RESOLVED | Noted: 2024-05-17 | Resolved: 2025-04-03

## 2025-04-03 PROCEDURE — 3077F SYST BP >= 140 MM HG: CPT | Mod: CPTII,S$GLB,, | Performed by: FAMILY MEDICINE

## 2025-04-03 PROCEDURE — 80053 COMPREHEN METABOLIC PANEL: CPT

## 2025-04-03 PROCEDURE — 1160F RVW MEDS BY RX/DR IN RCRD: CPT | Mod: CPTII,S$GLB,, | Performed by: FAMILY MEDICINE

## 2025-04-03 PROCEDURE — 3008F BODY MASS INDEX DOCD: CPT | Mod: CPTII,S$GLB,, | Performed by: FAMILY MEDICINE

## 2025-04-03 PROCEDURE — 84439 ASSAY OF FREE THYROXINE: CPT

## 2025-04-03 PROCEDURE — 83036 HEMOGLOBIN GLYCOSYLATED A1C: CPT

## 2025-04-03 PROCEDURE — 3080F DIAST BP >= 90 MM HG: CPT | Mod: CPTII,S$GLB,, | Performed by: FAMILY MEDICINE

## 2025-04-03 PROCEDURE — 85027 COMPLETE CBC AUTOMATED: CPT

## 2025-04-03 PROCEDURE — G2211 COMPLEX E/M VISIT ADD ON: HCPCS | Mod: S$GLB,,, | Performed by: FAMILY MEDICINE

## 2025-04-03 PROCEDURE — 1159F MED LIST DOCD IN RCRD: CPT | Mod: CPTII,S$GLB,, | Performed by: FAMILY MEDICINE

## 2025-04-03 PROCEDURE — 84443 ASSAY THYROID STIM HORMONE: CPT

## 2025-04-03 PROCEDURE — 4010F ACE/ARB THERAPY RXD/TAKEN: CPT | Mod: CPTII,S$GLB,, | Performed by: FAMILY MEDICINE

## 2025-04-03 PROCEDURE — 80061 LIPID PANEL: CPT

## 2025-04-03 PROCEDURE — 36415 COLL VENOUS BLD VENIPUNCTURE: CPT

## 2025-04-03 PROCEDURE — 99215 OFFICE O/P EST HI 40 MIN: CPT | Mod: S$GLB,,, | Performed by: FAMILY MEDICINE

## 2025-04-03 PROCEDURE — 99999 PR PBB SHADOW E&M-EST. PATIENT-LVL IV: CPT | Mod: PBBFAC,,, | Performed by: FAMILY MEDICINE

## 2025-04-03 RX ORDER — AMLODIPINE BESYLATE 5 MG/1
5 TABLET ORAL DAILY
Qty: 30 TABLET | Refills: 0 | Status: SHIPPED | OUTPATIENT
Start: 2025-04-03 | End: 2025-05-03

## 2025-04-03 RX ORDER — FLUTICASONE PROPIONATE AND SALMETEROL 250; 50 UG/1; UG/1
1 POWDER RESPIRATORY (INHALATION) 2 TIMES DAILY
Qty: 30 EACH | Refills: 0 | Status: SHIPPED | OUTPATIENT
Start: 2025-04-03

## 2025-04-03 RX ORDER — OLMESARTAN MEDOXOMIL 40 MG/1
40 TABLET ORAL DAILY
Qty: 90 TABLET | Refills: 3 | Status: SHIPPED | OUTPATIENT
Start: 2025-04-03 | End: 2026-04-03

## 2025-04-03 RX ORDER — ALBUTEROL SULFATE 90 UG/1
2 INHALANT RESPIRATORY (INHALATION) EVERY 6 HOURS PRN
Qty: 18 G | Refills: 3 | Status: SHIPPED | OUTPATIENT
Start: 2025-04-03

## 2025-04-03 NOTE — PROGRESS NOTES
Subjective:   Patient ID: Shelly Kam is a 50 y.o. female.  Chief Complaint:  Wheezing and Shortness of Breath    Presents for one-month follow-up     Last visit January 2025 for annual exam   Unfortunately did not obtain blood work as ordered     - Primary hypertension  Uncontrolled   Noncompliant   Recommended to start Baldev 10/40 mg daily and HCTZ 25 mg daily  Only on HCTZ   States Baldev was too expensive   Blood pressure improved but remains elevated   Complains of increased shortness for breath and dyspnea on exertion but more likely related to deconditioning or asthma   Lower extremity swelling has improved      - hypothyroidism   Asymptomatic   Check TSH level   Adjust current thyroid supplement if indicated   Still needs to obtain TSH level     - Prediabetes  Asymptomatic   Check A1c   If greater than 6.5%, start weekly GLP 1 injections   Still needs to obtain A1c     - Mixed hyperlipidemia  Asymptomatic   Check lipid panel   Adjust Lipitor 20 mg daily as needed   Still needs to obtain lipid panel     - Mild intermittent asthma without complication  No frequent rescue inhaler use   No controller medication indicated   Today reports increased shortness for breath and dyspnea on exertion with wheezing   Feels it is related to her significant weight gain  Did not have an active/valid albuterol prescription to see if it would help her symptoms     - Morbid obesity with BMI of 40.0-44.9, adult  - Obstructive sleep apnea syndrome  Unfortunately significant weight gain since last visit   Likely due decreased mobility from pain   If all labs normal and not diabetic will try and obtain coverage for Zepbound with coexisting sleep apnea   Still needs obtain lab work      Review of Systems   Constitutional:  Positive for activity change and unexpected weight change.   HENT:  Negative for hearing loss, rhinorrhea and trouble swallowing.    Eyes:  Positive for visual disturbance. Negative for discharge.  "  Respiratory:  Positive for shortness of breath and wheezing. Negative for chest tightness.    Cardiovascular:  Negative for chest pain and palpitations.   Gastrointestinal:  Negative for blood in stool, constipation, diarrhea and vomiting.   Endocrine: Positive for polyuria. Negative for polydipsia.   Genitourinary:  Negative for difficulty urinating, dysuria, hematuria and menstrual problem.   Musculoskeletal:  Positive for joint swelling. Negative for arthralgias and neck pain.   Neurological:  Positive for weakness. Negative for headaches.   Psychiatric/Behavioral:  Negative for confusion and dysphoric mood.        Objective:   BP (!) 142/90 (BP Location: Left arm, Patient Position: Sitting)   Pulse 92   Ht 5' 5" (1.651 m)   Wt 127.3 kg (280 lb 10.3 oz)   LMP 09/12/2022   SpO2 98%   BMI 46.70 kg/m²     Physical Exam  Vitals and nursing note reviewed.   Constitutional:       Appearance: Normal appearance. She is well-developed. She is morbidly obese.      Comments:   Blood pressure improved but remains elevated   Neck:      Vascular: No JVD.   Cardiovascular:      Rate and Rhythm: Normal rate and regular rhythm.      Heart sounds: Normal heart sounds.   Pulmonary:      Effort: Pulmonary effort is normal.      Breath sounds: Normal breath sounds.   Musculoskeletal:      Right lower leg: No edema.      Left lower leg: No edema.   Skin:     Findings: No rash.   Psychiatric:         Attention and Perception: She is inattentive.         Mood and Affect: Affect normal. Mood is anxious.         Speech: Speech is tangential.         Behavior: Behavior is agitated.         Thought Content: Thought content normal.         Cognition and Memory: Cognition and memory normal.         Judgment: Judgment normal.         Assessment:       ICD-10-CM ICD-9-CM   1. Primary hypertension  I10 401.9   2. Postoperative hypothyroidism  E89.0 244.0   3. Prediabetes  R73.03 790.29   4. Mixed hyperlipidemia  E78.2 272.2   5. Severe " obesity with body mass index (BMI) of 35.0 to 39.9 with serious comorbidity  E66.01 278.01   6. Obstructive sleep apnea syndrome  G47.33 327.23   7. Mild persistent asthma without complication  J45.30 493.90     Plan:   Primary hypertension  -     olmesartan (BENICAR) 40 MG tablet; Take 1 tablet (40 mg total) by mouth once daily.  Dispense: 90 tablet; Refill: 3  -     amLODIPine (NORVASC) 5 MG tablet; Take 1 tablet (5 mg total) by mouth once daily.  Dispense: 30 tablet; Refill: 0  Improved but remains uncontrolled   Continue HCTZ 25 mg daily   Start Benicar 40 mg daily   Start amlodipine 5 mg daily   Recheck blood pressure 4 weeks     Postoperative hypothyroidism  Prediabetes  Mixed hyperlipidemia  Obtain labs as previously ordered   Adjust medications as indicated     Severe obesity with body mass index (BMI) of 35.0 to 39.9 with serious comorbidity  Obstructive sleep apnea syndrome  -     Ambulatory referral/consult to Sleep Disorders; Future; Expected date: 04/10/2025  Obtain labs as previously ordered   Prescription for Zepbound if not diabetic   Now states never obtained CPAP device as previously recommended   Referral back to sleep medicine     Mild persistent asthma without complication  -     albuterol (PROVENTIL/VENTOLIN HFA) 90 mcg/actuation inhaler; Inhale 2 puffs into the lungs every 6 (six) hours as needed for Wheezing or Shortness of Breath. Rescue  Dispense: 18 g; Refill: 3  -     fluticasone-salmeterol 250-50 mcg/dose (WIXELA INHUB) 250-50 mcg/dose diskus inhaler; Inhale 1 puff into the lungs 2 (two) times a day. Controller  Dispense: 30 each; Refill: 0  Unclear if shortness for breath more asthma related or deconditioning.    Start Advair 250/50 inhalation twice a day   Use albuterol as needed  Reassess 4 weeks     40 minutes of total time spent on the encounter, which includes face to face time and non-face to face time preparing to see the patient (eg, review of tests), Obtaining and/or reviewing  separately obtained history, documenting clinical information in the electronic or other health record, independently interpreting results (not separately reported) and communicating results to the patient/family/caregiver, or Care coordination (not separately reported)    Return to clinic 4 weeks    Visit today included increased complexity associated with managing the longitudinal care of the patient due to the serious and/or complex managed problem(s) listed above.

## 2025-04-04 ENCOUNTER — PATIENT MESSAGE (OUTPATIENT)
Dept: PULMONOLOGY | Facility: CLINIC | Age: 50
End: 2025-04-04
Payer: COMMERCIAL

## 2025-04-04 LAB
ALBUMIN SERPL BCP-MCNC: 3.5 G/DL (ref 3.5–5.2)
ALP SERPL-CCNC: 115 UNIT/L (ref 40–150)
ALT SERPL W/O P-5'-P-CCNC: 10 UNIT/L (ref 10–44)
ANION GAP (OHS): 6 MMOL/L (ref 8–16)
AST SERPL-CCNC: 12 UNIT/L (ref 11–45)
BILIRUB SERPL-MCNC: 0.2 MG/DL (ref 0.1–1)
BUN SERPL-MCNC: 10 MG/DL (ref 6–20)
CALCIUM SERPL-MCNC: 9 MG/DL (ref 8.7–10.5)
CHLORIDE SERPL-SCNC: 104 MMOL/L (ref 95–110)
CHOLEST SERPL-MCNC: 190 MG/DL (ref 120–199)
CHOLEST/HDLC SERPL: 3.8 {RATIO} (ref 2–5)
CO2 SERPL-SCNC: 29 MMOL/L (ref 23–29)
CREAT SERPL-MCNC: 0.8 MG/DL (ref 0.5–1.4)
EAG (OHS): 120 MG/DL (ref 68–131)
ERYTHROCYTE [DISTWIDTH] IN BLOOD BY AUTOMATED COUNT: 13.7 % (ref 11.5–14.5)
GFR SERPLBLD CREATININE-BSD FMLA CKD-EPI: >60 ML/MIN/1.73/M2
GLUCOSE SERPL-MCNC: 62 MG/DL (ref 70–110)
HBA1C MFR BLD: 5.8 % (ref 4–5.6)
HCT VFR BLD AUTO: 37.4 % (ref 37–48.5)
HDLC SERPL-MCNC: 50 MG/DL (ref 40–75)
HDLC SERPL: 26.3 % (ref 20–50)
HGB BLD-MCNC: 11.2 GM/DL (ref 12–16)
LDLC SERPL CALC-MCNC: 105.2 MG/DL (ref 63–159)
MCH RBC QN AUTO: 26.5 PG (ref 27–31)
MCHC RBC AUTO-ENTMCNC: 29.9 G/DL (ref 32–36)
MCV RBC AUTO: 89 FL (ref 82–98)
NONHDLC SERPL-MCNC: 140 MG/DL
PLATELET # BLD AUTO: 217 K/UL (ref 150–450)
PMV BLD AUTO: 13.6 FL (ref 9.2–12.9)
POTASSIUM SERPL-SCNC: 3.8 MMOL/L (ref 3.5–5.1)
PROT SERPL-MCNC: 7.6 GM/DL (ref 6–8.4)
RBC # BLD AUTO: 4.22 M/UL (ref 4–5.4)
SODIUM SERPL-SCNC: 139 MMOL/L (ref 136–145)
T4 FREE SERPL-MCNC: 0.96 NG/DL (ref 0.71–1.51)
TRIGL SERPL-MCNC: 174 MG/DL (ref 30–150)
TSH SERPL-ACNC: 5.47 UIU/ML (ref 0.4–4)
WBC # BLD AUTO: 8.32 K/UL (ref 3.9–12.7)

## 2025-04-08 ENCOUNTER — RESULTS FOLLOW-UP (OUTPATIENT)
Dept: INTERNAL MEDICINE | Facility: CLINIC | Age: 50
End: 2025-04-08

## 2025-04-08 DIAGNOSIS — G47.33 OBSTRUCTIVE SLEEP APNEA SYNDROME: Primary | Chronic | ICD-10-CM

## 2025-04-08 DIAGNOSIS — E66.01 MORBID OBESITY WITH BMI OF 45.0-49.9, ADULT: ICD-10-CM

## 2025-04-08 DIAGNOSIS — E89.0 POSTOPERATIVE HYPOTHYROIDISM: Chronic | ICD-10-CM

## 2025-04-08 RX ORDER — LEVOTHYROXINE SODIUM 175 UG/1
175 TABLET ORAL
Qty: 90 TABLET | Refills: 3 | Status: SHIPPED | OUTPATIENT
Start: 2025-04-08 | End: 2026-04-03

## 2025-04-08 RX ORDER — TIRZEPATIDE 2.5 MG/.5ML
2.5 INJECTION, SOLUTION SUBCUTANEOUS
Qty: 4 PEN | Refills: 0 | Status: SHIPPED | OUTPATIENT
Start: 2025-04-08 | End: 2025-05-01

## 2025-04-09 ENCOUNTER — TELEPHONE (OUTPATIENT)
Dept: INTERNAL MEDICINE | Facility: CLINIC | Age: 50
End: 2025-04-09
Payer: COMMERCIAL

## 2025-04-09 DIAGNOSIS — Z12.31 ENCOUNTER FOR SCREENING MAMMOGRAM FOR MALIGNANT NEOPLASM OF BREAST: Primary | ICD-10-CM

## 2025-04-09 NOTE — TELEPHONE ENCOUNTER
----- Message from Shara sent at 4/9/2025 12:45 PM CDT -----  Contact: MARTHA CLEANING [[id number]]  ..Type:  Patient Requesting CallWho Called:MARTHA CLEANING [[id number]]Does the patient know what this is regarding?:requesting someone to give her a call Would the patient rather a call back or a response via [facility]? [facility] Call Back Number:[phone] (home) Additional Information: need order sent for her mammogram  ----- Message -----  From: Shara Davis  Sent: 4/9/2025  12:45 PM CDT  To: Bud Reynoso    ..Type:  Patient Requesting CallWho Called:MARTHA CLEANING [[id number]]Does the patient know what this is regarding?:requesting someone to give her a call Would the patient rather a call back or a response via [facility]? [facility] Call Back Number:[phone] (home) Additional Information:

## 2025-04-10 ENCOUNTER — TELEPHONE (OUTPATIENT)
Dept: SLEEP MEDICINE | Facility: CLINIC | Age: 50
End: 2025-04-10
Payer: COMMERCIAL

## 2025-04-10 ENCOUNTER — OFFICE VISIT (OUTPATIENT)
Dept: PULMONOLOGY | Facility: CLINIC | Age: 50
End: 2025-04-10
Payer: COMMERCIAL

## 2025-04-10 VITALS
SYSTOLIC BLOOD PRESSURE: 118 MMHG | BODY MASS INDEX: 44.75 KG/M2 | WEIGHT: 278.44 LBS | HEIGHT: 66 IN | HEART RATE: 81 BPM | OXYGEN SATURATION: 97 % | RESPIRATION RATE: 16 BRPM | DIASTOLIC BLOOD PRESSURE: 88 MMHG

## 2025-04-10 DIAGNOSIS — G47.00 INSOMNIA, UNSPECIFIED TYPE: ICD-10-CM

## 2025-04-10 DIAGNOSIS — R49.0 VOICE HOARSENESS: ICD-10-CM

## 2025-04-10 DIAGNOSIS — G47.33 OBSTRUCTIVE SLEEP APNEA SYNDROME: ICD-10-CM

## 2025-04-10 DIAGNOSIS — G47.33 OSA (OBSTRUCTIVE SLEEP APNEA): Primary | ICD-10-CM

## 2025-04-10 PROCEDURE — 1159F MED LIST DOCD IN RCRD: CPT | Mod: CPTII,S$GLB,, | Performed by: STUDENT IN AN ORGANIZED HEALTH CARE EDUCATION/TRAINING PROGRAM

## 2025-04-10 PROCEDURE — 3008F BODY MASS INDEX DOCD: CPT | Mod: CPTII,S$GLB,, | Performed by: STUDENT IN AN ORGANIZED HEALTH CARE EDUCATION/TRAINING PROGRAM

## 2025-04-10 PROCEDURE — 99999 PR PBB SHADOW E&M-EST. PATIENT-LVL V: CPT | Mod: PBBFAC,,, | Performed by: STUDENT IN AN ORGANIZED HEALTH CARE EDUCATION/TRAINING PROGRAM

## 2025-04-10 PROCEDURE — 3044F HG A1C LEVEL LT 7.0%: CPT | Mod: CPTII,S$GLB,, | Performed by: STUDENT IN AN ORGANIZED HEALTH CARE EDUCATION/TRAINING PROGRAM

## 2025-04-10 PROCEDURE — 3074F SYST BP LT 130 MM HG: CPT | Mod: CPTII,S$GLB,, | Performed by: STUDENT IN AN ORGANIZED HEALTH CARE EDUCATION/TRAINING PROGRAM

## 2025-04-10 PROCEDURE — 3079F DIAST BP 80-89 MM HG: CPT | Mod: CPTII,S$GLB,, | Performed by: STUDENT IN AN ORGANIZED HEALTH CARE EDUCATION/TRAINING PROGRAM

## 2025-04-10 PROCEDURE — 99202 OFFICE O/P NEW SF 15 MIN: CPT | Mod: S$GLB,,, | Performed by: STUDENT IN AN ORGANIZED HEALTH CARE EDUCATION/TRAINING PROGRAM

## 2025-04-10 PROCEDURE — 4010F ACE/ARB THERAPY RXD/TAKEN: CPT | Mod: CPTII,S$GLB,, | Performed by: STUDENT IN AN ORGANIZED HEALTH CARE EDUCATION/TRAINING PROGRAM

## 2025-04-10 NOTE — TELEPHONE ENCOUNTER
----- Message from Denia Talamantes sent at 4/10/2025  1:56 PM CDT -----  [facility]

## 2025-04-10 NOTE — PROGRESS NOTES
Chief complaint:  Chief Complaint   Patient presents with    Asthma    Sleep Apnea    new patient        History of present illness -  HPI   Mrs. Kam comes to clinic today to revisit a diagnosis of BALTA.    She carries a diagnosis of severe morbid obesity, anxiety, fibromyalgia and has symptoms of excessive daytime sleepiness and can essentially fall asleep everywhere during the day but reportedly has difficulty falling asleep at night  She also mentioned snoring choking gasping and witnessed apneas and a had a positive sleep study a few years ago with an AHI of 7, but never obtained a CPAP machine    Symptoms: EDS, Non restorative sleep, Snoring, Witnessed apneas, and Choking/gasping  Associated conditions: HTN  Occupational history: Works with persons with ID.      Sleeping habits -  Bedtime: 12 am  Latency: 2-3 hrs   Wake up time: 2-3 am  Refreshed: No  Sleep interruptions: At times     STOP-Bang Questionnaire (validated BALTA screening questionnaire)  Negative unless checked off.  [x] Snoring    [x]  Tired/Fatigued/Sleepy  [x] Obstruction (apneas/choking)  [x] Pressure (HTN)  [x] BMI >35  [] Age >50  [] Neck >40 cm  [] Gender male   STOP-Bang = 5 (low risk 0-2,high risk 3-8)    Physical examination -   Physical Exam  Vitals and nursing note reviewed.   Constitutional:       Appearance: Normal appearance.   HENT:      Head: Normocephalic and atraumatic.      Nose: Nose normal.      Mouth/Throat:      Mouth: Mucous membranes are moist.   Eyes:      Pupils: Pupils are equal, round, and reactive to light.   Neck:      Comments: Neck is 14 in  Mallampati score is 2  Cardiovascular:      Rate and Rhythm: Normal rate and regular rhythm.      Pulses: Normal pulses.      Heart sounds: Normal heart sounds.   Pulmonary:      Effort: Pulmonary effort is normal.      Breath sounds: Normal breath sounds.   Abdominal:      General: Abdomen is flat.      Palpations: Abdomen is soft.   Musculoskeletal:         General: No  "swelling.   Skin:     General: Skin is warm.      Capillary Refill: Capillary refill takes less than 2 seconds.   Neurological:      General: No focal deficit present.      Mental Status: She is alert.        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

## 2025-04-17 ENCOUNTER — OFFICE VISIT (OUTPATIENT)
Dept: OTOLARYNGOLOGY | Facility: CLINIC | Age: 50
End: 2025-04-17
Payer: COMMERCIAL

## 2025-04-17 VITALS — HEIGHT: 67 IN | BODY MASS INDEX: 43.91 KG/M2 | WEIGHT: 279.75 LBS

## 2025-04-17 DIAGNOSIS — R49.0 VOICE HOARSENESS: ICD-10-CM

## 2025-04-17 DIAGNOSIS — J38.6 SUBGLOTTIC STENOSIS: ICD-10-CM

## 2025-04-17 DIAGNOSIS — Z98.890 HISTORY OF TRACHEOSTOMY: Primary | ICD-10-CM

## 2025-04-17 DIAGNOSIS — J38.4 VOCAL FOLD EDEMA: ICD-10-CM

## 2025-04-17 PROCEDURE — 99999 PR PBB SHADOW E&M-EST. PATIENT-LVL IV: CPT | Mod: PBBFAC,,, | Performed by: STUDENT IN AN ORGANIZED HEALTH CARE EDUCATION/TRAINING PROGRAM

## 2025-04-17 NOTE — PROGRESS NOTES
"Referring Provider:    Camden Cui Md  [id number] [facility] [place] [id number]  Subjective:   Patient: Shelly Kam [id number], :1975   Visit date:2025 4:58 PM    Chief Complaint: see below  HPI:      Shelly Kam is a 50 y.o. female with dysphonia    History of prior trach due to anaphylaxis in   Hsitory thyroidectomy   Prior SDML with SGS dilation last 20 update  Referred for dysphonia noted at pulm visit    On and off for years since tracheotomy  Raspy, fatigues with use or singing  She is more short of breath, but no stridor    Objective:     Physical Exam:  Vitals:  Ht 5' 7.2" (1.707 m)   Wt 126.9 kg (279 lb 12.2 oz)   LMP 2022   BMI 43.56 kg/m²   General appearance:  Well developed, well nourished    Raspy, gravely moderate to severe dysphonia, not breathy, no stridor    Ears:  Otoscopy of external auditory canals and tympanic membranes was normal, clinical speech reception thresholds grossly intact, no mass/lesion of auricle.    Nose:  No masses/lesions of external nose, nasal mucosa, septum, and turbinates were within normal limits.    Mouth:  No mass/lesion of lips, teeth, gums, hard/soft palate, tongue, tonsils, or oropharynx.    Neck & Lymphatics:  No cervical lymphadenopathy, no neck mass/crepitus/ asymmetry, trachea is midline, no thyroid enlargement/tenderness/mass.      Data review    Review prior ENT notes, surgical notes    SDML   FINDINGS:  1.  The patient is a slightly challenging exposure with the Ossoff-Pilling   laryngoscope due primarily to her dentition pattern.  2.  Mild obstructive subglottic stenosis with granulation was present at the   level of the inner aspect of the cricothyroid membrane.  3.  Mild adenoid hypertrophy was noted.  4.  Chronically inflamed tonsils were noted, estimated at 3+, with significant scarring in the peritonsillar space.      Procedure -Transnasal fiberoptic laryngoscopy     Surgeon: " Scott Vidal M.D. .      Anesthesia: topical 0.05% oxymetazoline with 4% lidocaine      Complications: None.     Description of Procedure: With the patient in the sitting position, topical lidocaine and oxymetazoline was applied to the nose. The scope was passed through the nose. Examination was carried out of the nose, nasopharynx, oropharynx, hypopharynx, and larynx with findings as noted below. Scope was removed. The patient tolerated the procedure well.      Findings: No masses or lesions in the nose, nasopharynx, oropharynx, hypopharynx. Moderate edema of bilateral true cords with a small hemorrhagic lesion of the right anterior cord - non obstructive. Vocal fold abduction and adduction is normal. No pooling of secretions in the piriform sinuses, penetration, or aspiration.       Patent subglottis.                []  Data Reviewed:    Lab Results   Component Value Date    WBC 8.32 04/03/2025    HGB 11.2 (L) 04/03/2025    HCT 37.4 04/03/2025    MCV 89 04/03/2025    EOSINOPHIL 8.5 (H) 10/17/2023         [facility] 2022        Assessment & Plan:   History of tracheostomy    Voice hoarseness  -     Ambulatory referral/consult to ENT    Subglottic stenosis        No evidence of laryngeal or subglottic narrowing  Start voice therapy first  Larynology referral once Dr. Maynard starts August 2025

## 2025-04-22 ENCOUNTER — TELEPHONE (OUTPATIENT)
Dept: PULMONOLOGY | Facility: CLINIC | Age: 50
End: 2025-04-22
Payer: COMMERCIAL

## 2025-04-22 NOTE — TELEPHONE ENCOUNTER
----- Message from Sudhir sent at 4/21/2025 12:42 PM CDT -----  Contact: self  .Type:  Patient Returning CallWho Called:Irma Fraser Left Message for Patient:Does the patient know what this is regarding?:yesWould the patient rather a call back or a response via [facility]? Call [facility] Call Back Number:.[phone] Information: Pt states she missed a call to schedule her home sleep study for this office, unsure who called and would like a call back.

## 2025-05-01 ENCOUNTER — OFFICE VISIT (OUTPATIENT)
Dept: INTERNAL MEDICINE | Facility: CLINIC | Age: 50
End: 2025-05-01
Payer: COMMERCIAL

## 2025-05-01 VITALS
HEIGHT: 67 IN | DIASTOLIC BLOOD PRESSURE: 76 MMHG | SYSTOLIC BLOOD PRESSURE: 126 MMHG | OXYGEN SATURATION: 98 % | BODY MASS INDEX: 43.32 KG/M2 | WEIGHT: 276 LBS | HEART RATE: 85 BPM

## 2025-05-01 DIAGNOSIS — G47.33 OBSTRUCTIVE SLEEP APNEA SYNDROME: ICD-10-CM

## 2025-05-01 DIAGNOSIS — G89.29 CHRONIC BILATERAL LOW BACK PAIN WITH RIGHT-SIDED SCIATICA: ICD-10-CM

## 2025-05-01 DIAGNOSIS — I10 PRIMARY HYPERTENSION: Primary | ICD-10-CM

## 2025-05-01 DIAGNOSIS — R73.03 PREDIABETES: ICD-10-CM

## 2025-05-01 DIAGNOSIS — M51.372 DEGENERATION OF INTERVERTEBRAL DISC OF LUMBOSACRAL REGION WITH DISCOGENIC BACK PAIN AND LOWER EXTREMITY PAIN: ICD-10-CM

## 2025-05-01 DIAGNOSIS — M54.41 CHRONIC BILATERAL LOW BACK PAIN WITH RIGHT-SIDED SCIATICA: ICD-10-CM

## 2025-05-01 DIAGNOSIS — E66.01 MORBID OBESITY WITH BMI OF 40.0-44.9, ADULT: ICD-10-CM

## 2025-05-01 DIAGNOSIS — M47.817 FACET ARTHRITIS OF LUMBOSACRAL REGION: ICD-10-CM

## 2025-05-01 DIAGNOSIS — E89.0 POSTOPERATIVE HYPOTHYROIDISM: ICD-10-CM

## 2025-05-01 DIAGNOSIS — E78.2 MIXED HYPERLIPIDEMIA: ICD-10-CM

## 2025-05-01 DIAGNOSIS — J45.30 MILD PERSISTENT ASTHMA WITHOUT COMPLICATION: ICD-10-CM

## 2025-05-01 PROCEDURE — 99999 PR PBB SHADOW E&M-EST. PATIENT-LVL IV: CPT | Mod: PBBFAC,,, | Performed by: FAMILY MEDICINE

## 2025-05-01 RX ORDER — AMLODIPINE BESYLATE 5 MG/1
5 TABLET ORAL DAILY
Qty: 90 TABLET | Refills: 3 | Status: SHIPPED | OUTPATIENT
Start: 2025-05-01 | End: 2026-05-01

## 2025-05-01 RX ORDER — FLUTICASONE PROPIONATE AND SALMETEROL 250; 50 UG/1; UG/1
1 POWDER RESPIRATORY (INHALATION) 2 TIMES DAILY
Qty: 60 EACH | Refills: 11 | Status: SHIPPED | OUTPATIENT
Start: 2025-05-01 | End: 2026-05-01

## 2025-05-01 RX ORDER — TOPIRAMATE 50 MG/1
50 TABLET, FILM COATED ORAL EVERY MORNING
Qty: 30 TABLET | Refills: 0 | Status: SHIPPED | OUTPATIENT
Start: 2025-05-01 | End: 2025-05-31

## 2025-05-01 NOTE — PROGRESS NOTES
Subjective:   Patient ID: Shelly Kam is a 50 y.o. female.  Chief Complaint:  Lumbar Spine Pain (L-Spine) and Knee Pain    Presents for 4 week follow-up    - Primary hypertension  Improved but remained uncontrolled   Continue HCTZ 25 mg daily.  Started Benicar 40 mg daily.  Started amlodipine 5 mg daily.    Reports compliance.  Denies side effects.    Blood pressure improved/controlled.  Mild improvement in shortness for breath   Significant improvement in swelling    - Postoperative hypothyroidism  - Prediabetes  - Mixed hyperlipidemia  TSH elevated 5.468 .  Free T4 lower limits normal. Increased Synthroid 175 mcg a day  Recheck TSH in 12 weeks.  Reports compliance with medication.  CMP with normal glucose, kidney function, electrolytes, and liver function   Lipid panel close to goal 100. For now continue Lipitor 20 mg daily  CBC with mild stable anemia, otherwise normal. Take daily multivitamin  A1c 5.8% Prediabetes stable. Okay remain off medication. However, will try and obtain coverage for Zepbound with coexisting BALTA to help with weight loss    - Severe obesity with body mass index (BMI) of 35.0 to 39.9 with serious comorbidity  - Obstructive sleep apnea syndrome  Obtain labs as previously ordered   Prescription for Zepbound if not diabetic   Now states never obtained CPAP device as previously recommended   Referral back to sleep medicine   Unfortunately unable to obtain coverage for Zepbound   Questions what her other options are to help with weight loss     - Mild persistent asthma without complication  Unclear if shortness for breath more asthma related or deconditioning.    Start Advair 250/50 inhalation twice a day   Use albuterol as needed  Significant improvement in shortness for breath/dyspnea on exertion with treatment   Still with overall fatigue, weakness, generalized debility and deconditioning    New complaint today of right-sided sciatica type symptoms after prolonged walking or  "standing   Has history of recurrent low back pain without sciatica   Previous imaging showed degenerative disc disease and facet arthritis of lumbosacral spine   Unable to complete full course of physical therapy due to cost  NSAIDs and muscle relaxants no real improvement   Previously referral to but never establish with pain management    Review of Systems   Constitutional:  Positive for activity change and unexpected weight change. Negative for diaphoresis, fatigue and fever.   HENT:  Negative for hearing loss, rhinorrhea and trouble swallowing.    Eyes:  Negative for discharge and visual disturbance.   Respiratory:  Negative for cough, chest tightness, shortness of breath and wheezing.    Cardiovascular:  Negative for chest pain, palpitations and leg swelling.   Gastrointestinal:  Negative for abdominal pain, blood in stool, constipation, diarrhea, nausea and vomiting.   Endocrine: Negative for polydipsia, polyphagia and polyuria.   Genitourinary:  Negative for difficulty urinating, dysuria, flank pain, frequency, hematuria, menstrual problem and urgency.   Musculoskeletal:  Positive for arthralgias, back pain, gait problem, joint swelling and myalgias. Negative for neck pain and neck stiffness.   Skin:  Negative for rash.   Neurological:  Positive for weakness. Negative for dizziness, syncope, light-headedness, numbness and headaches.   Psychiatric/Behavioral:  Negative for confusion, dysphoric mood and sleep disturbance. The patient is not nervous/anxious.      Objective:   /76 (BP Location: Left arm, Patient Position: Sitting)   Pulse 85   Ht 5' 7.2" (1.707 m)   Wt 125.2 kg (276 lb 0.3 oz)   LMP 09/12/2022   SpO2 98%   BMI 42.97 kg/m²     Physical Exam  Vitals and nursing note reviewed.   Constitutional:       Appearance: Normal appearance. She is well-developed. She is morbidly obese.   Neck:      Vascular: No JVD.   Cardiovascular:      Rate and Rhythm: Normal rate and regular rhythm.      Heart " sounds: Normal heart sounds.   Pulmonary:      Effort: Pulmonary effort is normal.      Breath sounds: Normal breath sounds.   Musculoskeletal:      Right lower leg: No edema.      Left lower leg: No edema.   Skin:     Findings: No rash.   Psychiatric:         Attention and Perception: She is inattentive.         Mood and Affect: Mood is depressed. Affect is flat.         Speech: Speech normal.         Behavior: Behavior normal. Behavior is cooperative.         Thought Content: Thought content normal.         Cognition and Memory: Cognition and memory normal.         Judgment: Judgment normal.       Assessment:       ICD-10-CM ICD-9-CM   1. Primary hypertension  I10 401.9   2. Postoperative hypothyroidism  E89.0 244.0   3. Morbid obesity with BMI of 40.0-44.9, adult  E66.01 278.01    Z68.41 V85.41   4. Obstructive sleep apnea syndrome  G47.33 327.23   5. Prediabetes  R73.03 790.29   6. Mixed hyperlipidemia  E78.2 272.2   7. Mild persistent asthma without complication  J45.30 493.90   8. Chronic bilateral low back pain with right-sided sciatica  G89.29 338.29    M54.41 724.2     724.3   9. Facet arthritis of lumbosacral region  M47.817 721.3   10. Degeneration of intervertebral disc of lumbosacral region with discogenic back pain and lower extremity pain  M51.372 722.52     Plan:   Primary hypertension  -     amLODIPine (NORVASC) 5 MG tablet; Take 1 tablet (5 mg total) by mouth once daily.  Dispense: 90 tablet; Refill: 3  Controlled.  Stable.  Asymptomatic.  BP at goal.    Continue amlodipine 5 mg daily   Continue HCTZ 25 mg daily   Continue Benicar 40 mg daily     Postoperative hypothyroidism  Continue Synthroid 175 mcg daily dose   Recheck TSH 3 months     Morbid obesity with BMI of 40.0-44.9, adult  -     phentermine (LOMAIRA) 8 mg Tab; Take 1 tablet by mouth once daily.  Dispense: 30 each; Refill: 0  -     topiramate (TOPAMAX) 50 MG tablet; Take 1 tablet (50 mg total) by mouth every morning.  Dispense: 30 tablet;  Refill: 0  Unfortunately unable to obtain coverage for Zepbound  Discuss treatment options   Would like to try generic Qsymia equivalent   Start meds   Refill at higher dose next month if tolerates without side effects   Reassess total body weight loss at 3 months     Obstructive sleep apnea syndrome  Follow-up/establish with sleep medicine as planned     Prediabetes  Controlled.  Stable.  Asymptomatic.  A1c less than 6.5%.    Okay remain off medication     Mixed hyperlipidemia  Controlled rate stable.  Asymptomatic.  LDL close to goal.    Continue Lipitor 20 mg daily     Mild persistent asthma without complication  -     fluticasone-salmeterol 250-50 mcg/dose (WIXELA INHUB) 250-50 mcg/dose diskus inhaler; Inhale 1 puff into the lungs 2 (two) times a day. Controller  Dispense: 60 each; Refill: 11  Symptoms improved  Continue current controller medication   Albuterol as needed     Chronic bilateral low back pain with right-sided sciatica  Facet arthritis of lumbosacral region  Degeneration of intervertebral disc of lumbosacral region with discogenic back pain and lower extremity pain  -     Ambulatory referral/consult to Pain Clinic; Future; Expected date: 05/08/2025  Recommended agrees to referral to interventional pain management     Return to clinic 3 months or sooner as needed    Visit today included increased complexity associated with managing the longitudinal care of the patient due to the serious and/or complex managed problem(s) listed above.

## 2025-05-02 ENCOUNTER — TELEPHONE (OUTPATIENT)
Dept: INTERNAL MEDICINE | Facility: CLINIC | Age: 50
End: 2025-05-02
Payer: COMMERCIAL

## 2025-05-02 ENCOUNTER — OFFICE VISIT (OUTPATIENT)
Dept: SPORTS MEDICINE | Facility: CLINIC | Age: 50
End: 2025-05-02
Payer: COMMERCIAL

## 2025-05-02 DIAGNOSIS — S83.242D TEAR OF MEDIAL MENISCUS OF LEFT KNEE, CURRENT, UNSPECIFIED TEAR TYPE, SUBSEQUENT ENCOUNTER: Primary | ICD-10-CM

## 2025-05-02 DIAGNOSIS — M17.0 PRIMARY OSTEOARTHRITIS OF BOTH KNEES: ICD-10-CM

## 2025-05-02 PROCEDURE — 3044F HG A1C LEVEL LT 7.0%: CPT | Mod: CPTII,S$GLB,, | Performed by: PHYSICIAN ASSISTANT

## 2025-05-02 PROCEDURE — 1159F MED LIST DOCD IN RCRD: CPT | Mod: CPTII,S$GLB,, | Performed by: PHYSICIAN ASSISTANT

## 2025-05-02 PROCEDURE — 99999 PR PBB SHADOW E&M-EST. PATIENT-LVL III: CPT | Mod: PBBFAC,,, | Performed by: PHYSICIAN ASSISTANT

## 2025-05-02 PROCEDURE — 99214 OFFICE O/P EST MOD 30 MIN: CPT | Mod: 25,S$GLB,, | Performed by: PHYSICIAN ASSISTANT

## 2025-05-02 PROCEDURE — 20610 DRAIN/INJ JOINT/BURSA W/O US: CPT | Mod: LT,S$GLB,, | Performed by: PHYSICIAN ASSISTANT

## 2025-05-02 PROCEDURE — 1160F RVW MEDS BY RX/DR IN RCRD: CPT | Mod: CPTII,S$GLB,, | Performed by: PHYSICIAN ASSISTANT

## 2025-05-02 PROCEDURE — 4010F ACE/ARB THERAPY RXD/TAKEN: CPT | Mod: CPTII,S$GLB,, | Performed by: PHYSICIAN ASSISTANT

## 2025-05-02 RX ORDER — NABUMETONE 750 MG/1
750 TABLET, FILM COATED ORAL 2 TIMES DAILY
Qty: 60 TABLET | Refills: 0 | Status: SHIPPED | OUTPATIENT
Start: 2025-05-02

## 2025-05-02 RX ADMIN — KETOROLAC TROMETHAMINE 30 MG: 30 INJECTION, SOLUTION INTRAMUSCULAR; INTRAVENOUS at 03:05

## 2025-05-02 NOTE — TELEPHONE ENCOUNTER
----- Message from Sudhir sent at 5/2/2025  9:46 AM CDT -----  Contact: self  .Type:  Needs Medical AdviceWho Called: .Shelly Ricoould the patient rather a call back or a response via [facility]? Call [facility] Call Back Number: .[phone] Information: Pt states she is needing her handy cap sticker renewed would like a call back.

## 2025-05-02 NOTE — PROGRESS NOTES
Orthopaedic Follow-Up Visit    Last Appointment:  01/24/2025  Diagnosis:  Primary osteoarthritis of both knees, left medial meniscus tear  Prior Procedure:  Bilateral knee CSI 01/24/2025      Shelly Kam is a 50 y.o. female who is here for f/u evaluation of bilateral knee pain, left knee worse than right. The patient was last seen here by me on 01/24/2025 at which point we elected to move forward with bilateral knee corticosteroid injections, oral anti-inflammatories, and a knee brace prior to considering further treatment options. We also placed a prior authorization for viscosupplementation at the last visit, she was unable to receive this injection due to the gel injections not being a covered benefit for her insurance. The patient returns today reporting that symptoms are worse in the left knee and she is interested in discussing further treatment options    To review her history, Shelly Kam is a 48 y.o. year old female patient who initially presented to clinic on 10/18/2023 for bilateral knee pain, her left knee is worse than her right knee. Her symptoms began in January but has been worsening at that time. She fell off the treadmill back in August and that is when her knee pain began. Her symptoms include bilateral knee pain, anterior knee pain, night pain, morning stiffness, intermittent swelling. Her pain is made worse by prolonged standing, prolonged periods of rest, going from sit to stand position, night pain. Her treatment has included rest, activity modification, oral anti-inflammatories, Tylenol, Voltaren gel, lidocaine patches, ibuprofen, previous corticosteroid injection. She again returned to clinic on 05/28/2024 for worsening left knee pain after she sustained an injury while at work. She was in an emergent situation in which she would performed CPR and had to flip onto her knees quickly and she had extreme left knee pain since then.     Patient's medications, allergies,  past medical, surgical, social and family histories were reviewed and updated as appropriate.    Review of Systems   All systems reviewed were negative.  Specifically, the patient denies fever, chills, weight loss, chest pain, shortness of breath, or dyspnea on exertion.      Past Medical History:   Diagnosis Date    Allergy     Anemia     Asthma     Depression     Diabetes mellitus     Diabetes mellitus, type 2     Encounter for blood transfusion     Fibroid 11/17/2021    Fibromyalgia     GERD (gastroesophageal reflux disease)     Hyperlipidemia     Hypertension     Hyperthyroidism     Hypothyroidism     Panic attack     Prozac in past    Primary osteoarthritis of both knees 10/17/2023    Seasonal allergies        Objective:      Physical Exam  Patient is alert and oriented, no distress. Skin is intact. Neuro is normal with no focal motor or sensory findings.    Standing exam  stance: normal alignment, no significant leg-length discrepancy  gait: antalgic        Knee                                                  RIGHT             LEFT  Skin:                                         Intact               Intact  ROM:                                        0-130              0-130  Effusion:                                   Neg                  +, mild  Medial joint line tenderness:    +                      +  Lateral joint line tenderness:    Neg                  Neg  Pretty:                                Neg                  +  Patella crepitus:                       Neg                  Neg  Patella tenderness:                  Neg                  Neg  Patella grind:                            Neg                  Neg  Lachman:                                 Neg                  Neg  Pivot shift:                                Neg                  Neg  Valgus stress:                          Neg                  Neg, pain no lax  Varus stress:                            Neg                  Neg  Posterior  drawer:                     Neg                  Neg  N-V                                          intact               intact  Hip:                                          nml                    nml              Lower extremity edema:         +                      +    Neurovascular exam  - motor function grossly intact bilaterally to hip flexion, knee extension and flexion, ankle dorsiflexion and plantarflexion  - sensation intact to light touch bilaterally to femoral, tibial, tibial and peroneal distributions  - symmetrical pedal pulses    Imaging:   XR Results:  Results for orders placed during the hospital encounter of 08/08/23    X-Ray Knee 3 View Bilateral    Narrative  EXAMINATION:  XR KNEE 3 VIEW BILATERAL    CLINICAL HISTORY:  Pain in right knee    COMPARISON:  None    Impression  FINDINGS/  No acute fracture or dislocation seen.  Tri compartment osteoarthritis with medial compartment predominance bilateral mild-to-moderate      Electronically signed by: Chelle Fournier  Date:    08/08/2023  Time:    19:02      MRI Results:  Results for orders placed during the hospital encounter of 05/29/24    MRI Knee Without Contrast Left    Narrative  EXAMINATION:  MRI KNEE WITHOUT CONTRAST LEFT    CLINICAL HISTORY:  Knee pain, chronic, negative xray (Age >= 5y);Meniscal tear, untreated, new symptoms;Bilateral primary osteoarthritis of knee    TECHNIQUE:  Multiplanar, multisequence images were preformed of the left knee.    COMPARISON:  None.    FINDINGS:  Medial meniscus: Partially extruded with intrasubstance mucoid degeneration.  Free edge tear lateral aspect posterior horn.    Lateral meniscus: No evidence of meniscal tear.    Ligaments:  ACL, PCL, MCL, and LCL complex are intact.  Mild diffuse edema adjacent to the MCL.    Tendons:  Extensor mechanism is maintained.    Cartilage:    Patellofemoral: Mild surface irregularity of the retropatellar cartilage.  No full-thickness chondral defects.    Medial  tibiofemoral: Mild thinning and surface irregularity of weight-bearing cartilage.  No full-thickness chondral defects.    Lateral tibiofemoral: Articular cartilage is maintained.    Bone: No fracture or marrow replacing process.  Mild marginal spurring of the medial tibiofemoral joint.    Miscellaneous: Mild-to-moderate joint effusion.    Impression  1. Medial meniscus mucoid degeneration and posterior horn tear.  2. Retropatellar and medial tibiofemoral chondromalacia.  3. Intact supporting tendon and ligament structures.  4. Joint effusion.      Electronically signed by: HALLIE Kuo MD  Date:    05/30/2024  Time:    08:08    Physician Read: I agree with the above impression.    Assessment/Plan:   Assessment:  Shelly Kam is a 50 y.o. female with primary osteoarthritis of both knees, left knee medial meniscus tear    Plan:    Discussed diagnosis and treatment options with the patient today. She has known osteoarthritis affecting both knees as well as a left knee medial meniscus tear and posterior horn tear.  She has now tried considerable conservative treatment options for her knees. She has tried rest, activity modification, oral anti-inflammatories, intra-articular corticosteroid injections, formal physical therapy, knee bracing. Despite these interventions she still continues to have persistent left knee pain that has affecting her activities of daily living.  Today we discussed operative and nonoperative treatment options.  Discussed she may be a candidate for a left knee arthroscopy with meniscectomy versus meniscus repair. She is interested in discussing this further as she has failed several conservative treatment options over the last year  We will plan to get her set up with Dr. Scherer for consideration of a left knee arthroscopy.  For her acute pain today we will move forward with a left knee Toradol intra-articular injection.  Discussed we should avoid a steroid if she is considering  operative management.  Continue over-the-counter Tylenol as needed for pain in addition to anti-inflammatories.  She can continue her knee brace as needed.  Follow-up with me on an as-needed basis, follow up with Dr. Scherer for chronic knee pain and consideration of left knee arthroscopy        Amy Lazo PA-C  Sports Medicine Physician Assistant       Disclaimer: This note was prepared using a voice recognition system and is likely to have sound alike errors within the text.

## 2025-05-02 NOTE — PROCEDURES
Large Joint Aspiration/Injection: L knee    Date/Time: 5/2/2025 3:30 PM    Performed by: Amy Lazo PA-C  Authorized by: Amy Lazo PA-C    Consent Done?:  Yes (Verbal)  Indications:  Pain  Site marked: the procedure site was marked    Timeout: prior to procedure the correct patient, procedure, and site was verified    Prep: patient was prepped and draped in usual sterile fashion      Local anesthesia used?: Yes    Anesthesia:  Local infiltration  Local anesthetic:  Lidocaine 1% without epinephrine  Anesthetic total (ml):  4      Details:  Needle Size:  22 G  Ultrasonic Guidance for needle placement?: No    Approach:  Anterolateral  Location:  Knee  Site:  L knee  Medications:  30 mg ketorolac 30 mg/mL (1 mL)  Aspirate amount (mL):  0  Patient tolerance:  Patient tolerated the procedure well with no immediate complications     Procedure Note:  Left knee toradol injection

## 2025-05-06 RX ORDER — KETOROLAC TROMETHAMINE 30 MG/ML
30 INJECTION, SOLUTION INTRAMUSCULAR; INTRAVENOUS
Status: DISCONTINUED | OUTPATIENT
Start: 2025-05-02 | End: 2025-05-06 | Stop reason: HOSPADM

## 2025-05-27 ENCOUNTER — OFFICE VISIT (OUTPATIENT)
Dept: ORTHOPEDICS | Facility: CLINIC | Age: 50
End: 2025-05-27
Payer: COMMERCIAL

## 2025-05-27 ENCOUNTER — HOSPITAL ENCOUNTER (OUTPATIENT)
Dept: CARDIOLOGY | Facility: HOSPITAL | Age: 50
Discharge: HOME | End: 2025-05-27
Attending: ORTHOPAEDIC SURGERY
Payer: COMMERCIAL

## 2025-05-27 VITALS — HEIGHT: 65 IN | WEIGHT: 269.81 LBS | BODY MASS INDEX: 44.95 KG/M2

## 2025-05-27 DIAGNOSIS — S83.242D ACUTE MEDIAL MENISCUS TEAR OF LEFT KNEE, SUBSEQUENT ENCOUNTER: Primary | ICD-10-CM

## 2025-05-27 DIAGNOSIS — Z01.818 PRE-OP EXAM: Primary | ICD-10-CM

## 2025-05-27 DIAGNOSIS — Z01.818 PRE-OP EXAMINATION: ICD-10-CM

## 2025-05-27 DIAGNOSIS — S83.242D ACUTE MEDIAL MENISCUS TEAR OF LEFT KNEE, SUBSEQUENT ENCOUNTER: ICD-10-CM

## 2025-05-27 LAB
OHS QRS DURATION: 72 MS
OHS QTC CALCULATION: 406 MS

## 2025-05-27 PROCEDURE — 93005 ELECTROCARDIOGRAM TRACING: CPT

## 2025-05-27 PROCEDURE — 99999 PR PBB SHADOW E&M-EST. PATIENT-LVL V: CPT | Mod: PBBFAC,,, | Performed by: ORTHOPAEDIC SURGERY

## 2025-05-27 PROCEDURE — 93010 ELECTROCARDIOGRAM REPORT: CPT | Mod: ,,, | Performed by: INTERNAL MEDICINE

## 2025-05-27 RX ORDER — TRAMADOL HYDROCHLORIDE 50 MG/1
50 TABLET, FILM COATED ORAL EVERY 8 HOURS PRN
Qty: 15 TABLET | Refills: 0 | Status: SHIPPED | OUTPATIENT
Start: 2025-05-27 | End: 2025-05-30

## 2025-05-27 RX ORDER — NABUMETONE 500 MG/1
500 TABLET, FILM COATED ORAL 2 TIMES DAILY
Qty: 60 TABLET | Refills: 0 | Status: SHIPPED | OUTPATIENT
Start: 2025-05-27

## 2025-05-27 NOTE — PROGRESS NOTES
Terrell Scherer MD  Orthopedic Surgeon   [address] [place] [address]                 Name: Shelly Kam  MRN: [id number]  Date: 5/27/2025    Patient ID: Shelly Kam is a 50 y.o. female from [place]    Reason for visit:  Left knee pain    Patient Instructions     Encounter Diagnosis   Name Primary?    Acute medial meniscus tear of left knee, subsequent encounter Yes   Hypertension  Prediabetes  Gastric reflux    ASSESSMENT:  Left knee medial meniscus tear on MRI May 2024  Chondral surfaces well-preserved on patellofemoral joint, medial joint and lateral joint, mild thinning on the medial joint no full-thickness defects.  Continued pain despite conservative care    TREATMENT/PLAN:  Discuss the risks benefits and potential complications of knee arthroscopy with the patient.  She would like to proceed in the near future with left knee arthroscopic partial meniscectomy.  Plan for postoperative physical therapy twice a week for 3 weeks  Prescription tramadol 15. Tablets for postoperative pain.  Prescription for nabumetone 500 mg twice daily 60 tablets for postop swelling/inflammation  PT at [facility] twice weekly for 3 weeks after arthroscopic procedure  Obtain any needed preop blood work/EKG.  Informed consent obtained from patient today to proceed to left knee arthroscopic surgery with partial medial meniscectomy    HISTORY OF PRESENT ILLNESS:   Shelly Kam is a 50 y.o. female.Patient presents today for evaluation of her left knee to discuss possible knee arthroscopy.  Referred by Amy Lazo PA-C.  Her primary care physician is Dr. Heraclio Farrell [facility]    MRI performed at [facility] May 29 2024    1. Medial meniscus mucoid degeneration  and posterior horn tear.  2. Retropatellar and medial tibiofemoral chondromalacia.  3. Intact supporting tendon and ligament structures.  4. Joint effusion.    Cartilage:     Patellofemoral: Mild surface irregularity of the retropatellar cartilage.  No full-thickness chondral defects.  Medial tibiofemoral: Mild thinning and surface irregularity of weight-bearing cartilage.  No full-thickness chondral defects.  Lateral tibiofemoral: Articular cartilage is maintained.       Objective     XRAY:  August 2023 [facility] left knee  Non standing views.  Small osteophyte on the medial femur and tibia  Joint space without bone-to-bone changes.  Possible medial joint space narrowing  Kellgren Alexander grade 2    PHYSICAL EXAMINATION: Body mass index is 44.9 kg/m².  Blood pressure 126/76, pulse 85, height 5 ft 5 in, weight 122.4 kg    GENERAL:  Pleasant, cooperative  female well dressed and well groomed    EXTREMITY:  Left knee demonstrates pain along the medial joint line with a positive Pretty.  120° of flexion.  Lacks 10° of full extension due to pain.  Patella tracked centrally.  No significant palpable effusion.  ACL and PCL are stable.  Collateral ligaments are stable.  Calf soft.  No popliteal fullness or cyst.  Neurovascularly intact left lower extremity.  No peripheral edema.         MEDICATIONS: Current Medications[1]    ALLERGIES:  Positive anaphylaxis with penicillin requiring trach  Review of patient's allergies indicates:   Allergen Reactions    Pcn [penicillins] Anaphylaxis     Throat , tongue swelling     Banana      Itchy mouth    Lisinopril Other (See Comments)     Lip swelling    Melon      Itchy mouth    Morphine Other (See Comments)     Unknown    Tree nuts      Oral itching       MEDICAL HISTORY:   Past Medical History:   Diagnosis Date    Allergy     Anemia     Asthma     Depression     Diabetes mellitus     Diabetes mellitus, type 2     Encounter for blood transfusion     Fibroid  2021    Fibromyalgia     GERD (gastroesophageal reflux disease)     Hyperlipidemia     Hypertension     Hyperthyroidism     Hypothyroidism     Panic attack     Prozac in past    Primary osteoarthritis of both knees 10/17/2023    Seasonal allergies        SURGICAL HISTORY:   Past Surgical History:   Procedure Laterality Date    BRONCHOSCOPY      DIAGNOSTIC LAPAROSCOPY N/A 2022    Procedure: LAPAROSCOPY, DIAGNOSTIC;  Surgeon: Talha Santa MD;  Location: [facility];  Service: OB/GYN;  Laterality: N/A;    HERNIA REPAIR      SALPINGECTOMY Bilateral 2022    Procedure: SALPINGECTOMY;  Surgeon: Talha Santa MD;  Location: [facility] OR;  Service: OB/GYN;  Laterality: Bilateral;    THYROIDECTOMY Bilateral 2021    Procedure: THYROIDECTOMY;  Surgeon: Alon Barton MD;  Location: [address];  Service: ENT;  Laterality: Bilateral;  NIMS monitor    TONSILLECTOMY      TOTAL ABDOMINAL HYSTERECTOMY N/A 2022    Procedure: HYSTERECTOMY, TOTAL, ABDOMINAL;  Surgeon: Talha Santa MD;  Location: [facility] OR;  Service: OB/GYN;  Laterality: N/A;    TRACHEOSTOMY      TRACHEOSTOMY TUBE PLACEMENT      XI ROBOTIC HYSTERECTOMY, WITH SALPINGECTOMY N/A 2022    Procedure: XI ROBOTIC HYSTERECTOMY,WITH SALPINGECTOMY;  Surgeon: Talha Santa MD;  Location: [facility] OR;  Service: OB/GYN;  Laterality: N/A;  converted to open without docking       REVIEW OF SYSTEMS:   No fevers, chills, sweats, chest pain or shortness of breath.    SOCIAL HISTORY: Works with special needs patients   Social History     Occupational History    Not on file   Tobacco Use    Smoking status: Every Day     Current packs/day: 0.00     Average packs/day: 0.3 packs/day for 23.3 years (5.8 ttl pk-yrs)     Types: Cigarettes     Start date: 8/10/1999     Last attempt to quit: 2022     Years since quittin.4    Smokeless tobacco: Never    Tobacco comments:     Smokes 8 long cigarettes that she relights   Substance and Sexual Activity     Alcohol use: Not Currently     Comment: once a year; hold 72 hrs prior to sx    Drug use: Yes     Types: Marijuana    Sexual activity: Yes     Partners: Male     Birth control/protection: Condom       Patient Type: New Patient  Visit Type: (CPT 42166 - New 30-44 min)     Forty-five minute patient encounter  This includes face to face time and non-face to face time preparing to see the patient (eg, review of tests),   obtaining and/or reviewing separately obtained history, documenting clinical information in the electronic or other health record, independently interpreting results   and communicating results to the patient/family/caregiver, or care coordinator.   Today's discussion included risks and benefits of arthroscopic procedure with moderate comorbidity and risks involved.     Modifier - No.    25 Modifier - No.     Physician Guided Exercise - No.     Brace - No.         DISCLAIMER: This note was prepared with [employer] voice recognition transcription software. Garbled syntax, mangled pronouns, and other bizarre constructions may be attributed to that software system.      Terrell Scherer M.D.  Orthopedic Surgeon  [facility]           [1]   Current Outpatient Medications:     albuterol (PROVENTIL/VENTOLIN HFA) 90 mcg/actuation inhaler, Inhale 2 puffs into the lungs every 6 (six) hours as needed for Wheezing or Shortness of Breath. Rescue, Disp: 18 g, Rfl: 3    amLODIPine (NORVASC) 5 MG tablet, Take 1 tablet (5 mg total) by mouth once daily., Disp: 90 tablet, Rfl: 3    aspirin (ECOTRIN) 81 MG EC tablet, Take 1 tablet (81 mg total) by mouth once daily., Disp: 90 tablet, Rfl: 3    atorvastatin (LIPITOR) 20 MG tablet, Take 1 tablet (20 mg total) by mouth once daily., Disp: 90 tablet, Rfl: 3    citalopram (CELEXA) 20 MG tablet, Take 1 tablet (20 mg total) by mouth every evening., Disp: 90 tablet, Rfl: 3    diclofenac sodium (VOLTAREN) 1 % Gel, Apply 2 g topically 3 (three) times daily., Disp: 100  g, Rfl: 2    fluticasone-salmeterol 250-50 mcg/dose (WIXELA INHUB) 250-50 mcg/dose diskus inhaler, Inhale 1 puff into the lungs 2 (two) times a day. Controller, Disp: 60 each, Rfl: 11    hydroCHLOROthiazide (HYDRODIURIL) 25 MG tablet, Take 1 tablet (25 mg total) by mouth once daily., Disp: 90 tablet, Rfl: 3    levothyroxine (SYNTHROID, LEVOTHROID) 175 MCG tablet, Take 1 tablet (175 mcg total) by mouth before breakfast., Disp: 90 tablet, Rfl: 3    nabumetone (RELAFEN) 500 MG tablet, Take 1 tablet (500 mg total) by mouth 2 (two) times daily., Disp: 60 tablet, Rfl: 0    nabumetone (RELAFEN) 750 MG tablet, Take 1 tablet (750 mg total) by mouth 2 (two) times daily., Disp: 60 tablet, Rfl: 0    olmesartan (BENICAR) 40 MG tablet, Take 1 tablet (40 mg total) by mouth once daily., Disp: 90 tablet, Rfl: 3    omeprazole (PRILOSEC) 40 MG capsule, Take 1 capsule (40 mg total) by mouth once daily., Disp: 90 capsule, Rfl: 3    phentermine (LOMAIRA) 8 mg Tab, Take 1 tablet by mouth once daily., Disp: 30 each, Rfl: 0    topiramate (TOPAMAX) 50 MG tablet, Take 1 tablet (50 mg total) by mouth every morning., Disp: 30 tablet, Rfl: 0    traMADoL (ULTRAM) 50 mg tablet, Take 1 tablet (50 mg total) by mouth every 8 (eight) hours as needed for Pain., Disp: 15 tablet, Rfl: 0

## 2025-05-27 NOTE — PATIENT INSTRUCTIONS
Encounter Diagnosis   Name Primary?    Acute medial meniscus tear of left knee, subsequent encounter Yes   Hypertension  Prediabetes  Gastric reflux    ASSESSMENT:  Left knee medial meniscus tear on MRI May 2024  Chondral surfaces well-preserved on patellofemoral joint, medial joint and lateral joint, mild thinning on the medial joint no full-thickness defects.  Continued pain despite conservative care    TREATMENT/PLAN:  Discuss the risks benefits and potential complications of knee arthroscopy with the patient.  She would like to proceed in the near future with left knee arthroscopic partial meniscectomy.  Plan for postoperative physical therapy twice a week for 3 weeks  Prescription tramadol 15. Tablets for postoperative pain.  Prescription for nabumetone 500 mg twice daily 60 tablets for postop swelling/inflammation  PT at [facility] twice weekly for 3 weeks after arthroscopic procedure  Obtain any needed preop blood work/EKG.  Informed consent obtained from patient today to proceed to left knee arthroscopic surgery with partial medial meniscectomy

## 2025-05-29 ENCOUNTER — OFFICE VISIT (OUTPATIENT)
Dept: PAIN MEDICINE | Facility: CLINIC | Age: 50
End: 2025-05-29
Payer: COMMERCIAL

## 2025-05-29 ENCOUNTER — ANESTHESIA EVENT (OUTPATIENT)
Dept: SURGERY | Facility: HOSPITAL | Age: 50
End: 2025-05-29
Payer: COMMERCIAL

## 2025-05-29 VITALS
DIASTOLIC BLOOD PRESSURE: 77 MMHG | SYSTOLIC BLOOD PRESSURE: 115 MMHG | HEART RATE: 76 BPM | BODY MASS INDEX: 44.95 KG/M2 | RESPIRATION RATE: 17 BRPM | HEIGHT: 65 IN | WEIGHT: 269.81 LBS

## 2025-05-29 DIAGNOSIS — G89.29 CHRONIC BILATERAL LOW BACK PAIN WITH RIGHT-SIDED SCIATICA: ICD-10-CM

## 2025-05-29 DIAGNOSIS — E66.01 MORBID OBESITY WITH BMI OF 40.0-44.9, ADULT: ICD-10-CM

## 2025-05-29 DIAGNOSIS — M51.372 DEGENERATION OF INTERVERTEBRAL DISC OF LUMBOSACRAL REGION WITH DISCOGENIC BACK PAIN AND LOWER EXTREMITY PAIN: ICD-10-CM

## 2025-05-29 DIAGNOSIS — M54.16 LUMBAR RADICULOPATHY: Primary | ICD-10-CM

## 2025-05-29 DIAGNOSIS — M47.817 FACET ARTHRITIS OF LUMBOSACRAL REGION: ICD-10-CM

## 2025-05-29 DIAGNOSIS — M54.9 DORSALGIA, UNSPECIFIED: ICD-10-CM

## 2025-05-29 DIAGNOSIS — M17.12 PRIMARY OSTEOARTHRITIS OF LEFT KNEE: ICD-10-CM

## 2025-05-29 DIAGNOSIS — M54.41 CHRONIC BILATERAL LOW BACK PAIN WITH RIGHT-SIDED SCIATICA: ICD-10-CM

## 2025-05-29 DIAGNOSIS — M54.16 LUMBAR RADICULOPATHY, CHRONIC: ICD-10-CM

## 2025-05-29 PROBLEM — S83.242A ACUTE MEDIAL MENISCUS TEAR OF LEFT KNEE: Status: ACTIVE | Noted: 2025-05-29

## 2025-05-29 PROCEDURE — 99999 PR PBB SHADOW E&M-EST. PATIENT-LVL V: CPT | Mod: PBBFAC,,, | Performed by: ANESTHESIOLOGY

## 2025-05-29 RX ORDER — ZONISAMIDE 25 MG/1
50 CAPSULE ORAL NIGHTLY
Qty: 60 CAPSULE | Refills: 2 | Status: SHIPPED | OUTPATIENT
Start: 2025-05-29

## 2025-05-29 NOTE — PROGRESS NOTES
New Patient Interventional Pain Note (Initial Visit)    Referring Physician: Heraclio Farrell MD    PCP: Heraclio Farrell MD    Chief Complaint:     Chief Complaint   Patient presents with    Low-back Pain     Patient has pain in lower back right side that radiates down right side.  Pain level 8/10        SUBJECTIVE:    Shelly Kam is a 50 y.o. female who presents to the clinic for the evaluation of lower back pain.   Patient reports 8 month history of lower back pain.  Patient reports that lower back pain started after increased pain in her left knee.  Patient denies any previous surgeries in her lumbar spine or bilateral lower extremities.  Of note, patient is scheduled for left knee arthroscopic repair with Dr. Scherer 06/02/2025.  Lower back pain is described as an aching stabbing pain that starts in the midline lower lumbar spine area.  This pain then radiates down her right lower extremity along the lateral aspect to just below the knee.  Patient denies any significant radiation down her left lower extremity.  Pain is worse with prolonged standing and walking, better with sitting down.  Pain is currently rated a 8/10.  Patient denies any fevers, chills, saddle anesthesia, or bowel and bladder incontinence.      Non-Pharmacologic Treatments:  Physical Therapy/Home Exercise: yes  Ice/Heat:yes  TENS: no  Acupuncture: no  Massage: yes  Chiropractic: no        Previous Pain Medications:  Tylenol, ibuprofen, diclofenac, Celebrex, Ambien, Flexeril, gabapentin, Lyrica, tramadol, Topamax, topicals     report:  Reviewed and consistent with medication use as prescribed.    Pain Procedures:   none    Pain Disability Index Review:         5/29/2025     8:42 AM   Last 3 PDI Scores   Pain Disability Index (PDI) 56       Imaging:     Results for orders placed during the hospital encounter of 05/11/24    X-Ray Lumbar Spine 5 View    Narrative  EXAMINATION:  LUMBAR SPINE    CLINICAL HISTORY:  Chronic low back  pain without sciatica    TECHNIQUE:  AP lateral, oblique, and coned lateral views of the lumbar spine.    COMPARISON:  01/25/2021    FINDINGS:  There is normal alignment of the lumbar spine. There is no acute fracture.  There is grade 1 retrolisthesis of L5 on S1, which may be due to degenerative change and or ligamentum laxity.  Moderate intervertebral disc space narrowing is seen at L5/S1 with endplate sclerosis.  There is a marginal osteophyte at L4.  A similar appearance was seen on the previous exam.  The bones appear to be normally mineralized.    Impression  No acute bony abnormality detected.  Degenerative changes at L5/S1.      Electronically signed by: Mary Farrell  Date:    05/11/2024  Time:    23:53      MRI KNEE WITHOUT CONTRAST LEFT 05/29/2024     CLINICAL HISTORY:  Knee pain, chronic, negative xray (Age >= 5y);Meniscal tear, untreated, new symptoms;Bilateral primary osteoarthritis of knee     TECHNIQUE:  Multiplanar, multisequence images were preformed of the left knee.     COMPARISON:  None.     FINDINGS:  Medial meniscus: Partially extruded with intrasubstance mucoid degeneration.  Free edge tear lateral aspect posterior horn.     Lateral meniscus: No evidence of meniscal tear.     Ligaments:  ACL, PCL, MCL, and LCL complex are intact.  Mild diffuse edema adjacent to the MCL.     Tendons:  Extensor mechanism is maintained.     Cartilage:     Patellofemoral: Mild surface irregularity of the retropatellar cartilage.  No full-thickness chondral defects.     Medial tibiofemoral: Mild thinning and surface irregularity of weight-bearing cartilage.  No full-thickness chondral defects.     Lateral tibiofemoral: Articular cartilage is maintained.     Bone: No fracture or marrow replacing process.  Mild marginal spurring of the medial tibiofemoral joint.     Miscellaneous: Mild-to-moderate joint effusion.     Impression:     1. Medial meniscus mucoid degeneration and posterior horn tear.  2. Retropatellar  and medial tibiofemoral chondromalacia.  3. Intact supporting tendon and ligament structures.  4. Joint effusion.           Past Medical History:   Diagnosis Date    Allergy     Anemia     Asthma     Depression     Diabetes mellitus     Diabetes mellitus, type 2     Encounter for blood transfusion     Fibroid 11/17/2021    Fibromyalgia     GERD (gastroesophageal reflux disease)     Hyperlipidemia     Hypertension     Hyperthyroidism     Hypothyroidism     Panic attack     Prozac in past    Primary osteoarthritis of both knees 10/17/2023    Seasonal allergies      Past Surgical History:   Procedure Laterality Date    BRONCHOSCOPY      DIAGNOSTIC LAPAROSCOPY N/A 09/12/2022    Procedure: LAPAROSCOPY, DIAGNOSTIC;  Surgeon: Talha Santa MD;  Location: [facility];  Service: OB/GYN;  Laterality: N/A;    HERNIA REPAIR      SALPINGECTOMY Bilateral 09/12/2022    Procedure: SALPINGECTOMY;  Surgeon: Talha Santa MD;  Location: [facility];  Service: OB/GYN;  Laterality: Bilateral;    THYROIDECTOMY Bilateral 03/25/2021    Procedure: THYROIDECTOMY;  Surgeon: Alon Barton MD;  Location: [address];  Service: ENT;  Laterality: Bilateral;  NIMS monitor    TONSILLECTOMY      TOTAL ABDOMINAL HYSTERECTOMY N/A 09/12/2022    Procedure: HYSTERECTOMY, TOTAL, ABDOMINAL;  Surgeon: Talha Santa MD;  Location: [facility] OR;  Service: OB/GYN;  Laterality: N/A;    TRACHEOSTOMY      TRACHEOSTOMY TUBE PLACEMENT      XI ROBOTIC HYSTERECTOMY, WITH SALPINGECTOMY N/A 09/12/2022    Procedure: XI ROBOTIC HYSTERECTOMY,WITH SALPINGECTOMY;  Surgeon: Talha Santa MD;  Location: [facility];  Service: OB/GYN;  Laterality: N/A;  converted to open without docking     Social History[1]  Family History   Problem Relation Name Age of Onset    Cancer Paternal Aunt      Asthma Mother Connergilbert Maile     COPD Father Ysabel Ramirez     Vision loss Father Ysabel Ramirez     COPD Maternal Grandmother Jada Mejia     Stroke Maternal Uncle Steve Mejia         Review of patient's allergies indicates:   Allergen Reactions    Pcn [penicillins] Anaphylaxis     Throat , tongue swelling     Banana      Itchy mouth    Lisinopril Other (See Comments)     Lip swelling    Melon      Itchy mouth    Morphine Other (See Comments)     Unknown    Tree nuts      Oral itching       Current Outpatient Medications   Medication Sig    albuterol (PROVENTIL/VENTOLIN HFA) 90 mcg/actuation inhaler Inhale 2 puffs into the lungs every 6 (six) hours as needed for Wheezing or Shortness of Breath. Rescue    amLODIPine (NORVASC) 5 MG tablet Take 1 tablet (5 mg total) by mouth once daily.    aspirin (ECOTRIN) 81 MG EC tablet Take 1 tablet (81 mg total) by mouth once daily.    atorvastatin (LIPITOR) 20 MG tablet Take 1 tablet (20 mg total) by mouth once daily.    citalopram (CELEXA) 20 MG tablet Take 1 tablet (20 mg total) by mouth every evening.    diclofenac sodium (VOLTAREN) 1 % Gel Apply 2 g topically 3 (three) times daily.    fluticasone-salmeterol 250-50 mcg/dose (WIXELA INHUB) 250-50 mcg/dose diskus inhaler Inhale 1 puff into the lungs 2 (two) times a day. Controller    hydroCHLOROthiazide (HYDRODIURIL) 25 MG tablet Take 1 tablet (25 mg total) by mouth once daily.    levothyroxine (SYNTHROID, LEVOTHROID) 175 MCG tablet Take 1 tablet (175 mcg total) by mouth before breakfast.    nabumetone (RELAFEN) 500 MG tablet Take 1 tablet (500 mg total) by mouth 2 (two) times daily.    nabumetone (RELAFEN) 750 MG tablet Take 1 tablet (750 mg total) by mouth 2 (two) times daily.    olmesartan (BENICAR) 40 MG tablet Take 1 tablet (40 mg total) by mouth once daily.    omeprazole (PRILOSEC) 40 MG capsule Take 1 capsule (40 mg total) by mouth once daily.    phentermine (LOMAIRA) 8 mg Tab Take 1 tablet by mouth once daily.    topiramate (TOPAMAX) 50 MG tablet Take 1 tablet (50 mg total) by mouth every morning.    traMADoL (ULTRAM) 50 mg tablet Take 1 tablet (50 mg total) by mouth every 8 (eight) hours as  "needed for Pain.    zonisamide (ZONEGRAN) 25 MG Cap Take 2 capsules (50 mg total) by mouth every evening.     No current facility-administered medications for this visit.         ROS  Review of Systems   Constitutional:  Negative for chills, diaphoresis, fatigue and fever.   HENT:  Negative for ear discharge, ear pain, rhinorrhea, trouble swallowing and voice change.    Respiratory:  Negative for chest tightness, shortness of breath, wheezing and stridor.    Cardiovascular:  Positive for leg swelling. Negative for chest pain.   Gastrointestinal:  Negative for blood in stool, diarrhea, nausea and vomiting.   Endocrine: Negative for cold intolerance and heat intolerance.   Genitourinary:  Negative for dysuria, hematuria and urgency.   Musculoskeletal:  Positive for arthralgias, back pain, gait problem, joint swelling and myalgias. Negative for neck pain and neck stiffness.   Skin:  Negative for rash.   Neurological:  Positive for weakness, numbness and headaches. Negative for tremors, seizures, speech difficulty and light-headedness.   Hematological:  Does not bruise/bleed easily.   Psychiatric/Behavioral:  Negative for agitation, confusion and suicidal ideas.             OBJECTIVE:  /77   Pulse 76   Resp 17   Ht 5' 5" (1.651 m)   Wt 122.4 kg (269 lb 13.5 oz)   LMP 09/12/2022   BMI 44.90 kg/m²         Physical Exam  Constitutional:       Appearance: Normal appearance.   HENT:      Head: Normocephalic and atraumatic.   Eyes:      Extraocular Movements: Extraocular movements intact.      Pupils: Pupils are equal, round, and reactive to light.   Cardiovascular:      Pulses: Normal pulses.   Pulmonary:      Effort: Pulmonary effort is normal.   Musculoskeletal:      Right knee: Normal.      Left knee: Swelling and crepitus present. No bony tenderness. Decreased range of motion. Tenderness present over the medial joint line. Normal pulse.   Skin:     General: Skin is warm.      Capillary Refill: Capillary " refill takes less than 2 seconds.   Neurological:      Mental Status: She is alert and oriented to person, place, and time.      Cranial Nerves: Cranial nerves 2-12 are intact.      Sensory: No sensory deficit.      Motor: Weakness present. No abnormal muscle tone.      Gait: Gait abnormal.      Deep Tendon Reflexes: Babinski sign absent on the right side. Babinski sign absent on the left side.      Reflex Scores:       Patellar reflexes are 2+ on the right side and 2+ on the left side.       Achilles reflexes are 1+ on the right side and 2+ on the left side.     Comments: Antalgic gait  4/5 strength in left knee extension and left knee flexion, 4/5 strength in right dorsiflexion   Psychiatric:         Mood and Affect: Mood normal.         Behavior: Behavior normal.         Thought Content: Thought content normal.           Musculoskeletal:      Lumbar Exam  Incision: no  Pain with Flexion: yes  Pain with Extension: yes  ROM:  Decreased  Paraspinous TTP:  Right-greater-than-left  Facet TTP:  L5-S1  Facet Loading:  Positive bilaterally  SLR:  Positive on the right at 75°  SIJ TTP:  Negative bilaterally  ROBERT:  Negative bilaterally      LABS:  Lab Results   Component Value Date    WBC 8.32 04/03/2025    HGB 11.2 (L) 04/03/2025    HCT 37.4 04/03/2025    MCV 89 04/03/2025     04/03/2025       CMP  Sodium   Date Value Ref Range Status   04/03/2025 139 136 - 145 mmol/L Final   10/17/2023 140 136 - 145 mmol/L Final     Potassium   Date Value Ref Range Status   04/03/2025 3.8 3.5 - 5.1 mmol/L Final   10/17/2023 4.3 3.5 - 5.1 mmol/L Final     Chloride   Date Value Ref Range Status   04/03/2025 104 95 - 110 mmol/L Final   10/17/2023 100 95 - 110 mmol/L Final     CO2   Date Value Ref Range Status   04/03/2025 29 23 - 29 mmol/L Final   10/17/2023 28 23 - 29 mmol/L Final     Glucose   Date Value Ref Range Status   04/03/2025 62 (L) 70 - 110 mg/dL Final   10/17/2023 79 70 - 110 mg/dL Final     BUN   Date Value Ref Range  Status   04/03/2025 10 6 - 20 mg/dL Final     Creatinine   Date Value Ref Range Status   04/03/2025 0.8 0.5 - 1.4 mg/dL Final     Calcium   Date Value Ref Range Status   04/03/2025 9.0 8.7 - 10.5 mg/dL Final   10/17/2023 9.7 8.7 - 10.5 mg/dL Final     Protein Total   Date Value Ref Range Status   04/03/2025 7.6 6.0 - 8.4 gm/dL Final     Total Protein   Date Value Ref Range Status   10/17/2023 7.4 6.0 - 8.4 g/dL Final     Albumin   Date Value Ref Range Status   04/03/2025 3.5 3.5 - 5.2 g/dL Final   10/17/2023 4.1 3.5 - 5.2 g/dL Final     Total Bilirubin   Date Value Ref Range Status   10/17/2023 0.2 0.1 - 1.0 mg/dL Final     Comment:     For infants and newborns, interpretation of results should be based  on gestational age, weight and in agreement with clinical  observations.    Premature Infant recommended reference ranges:  Up to 24 hours.............<8.0 mg/dL  Up to 48 hours............<12.0 mg/dL  3-5 days..................<15.0 mg/dL  6-29 days.................<15.0 mg/dL       Bilirubin Total   Date Value Ref Range Status   04/03/2025 0.2 0.1 - 1.0 mg/dL Final     Comment:     For infants and newborns, interpretation of results should be based   on gestational age, weight and in agreement with clinical   observations.    Premature Infant recommended reference ranges:   0-24 hours:  <8.0 mg/dL   24-48 hours: <12.0 mg/dL   3-5 days:    <15.0 mg/dL   6-29 days:   <15.0 mg/dL     Alkaline Phosphatase   Date Value Ref Range Status   10/17/2023 73 55 - 135 U/L Final     ALP   Date Value Ref Range Status   04/03/2025 115 40 - 150 unit/L Final     AST   Date Value Ref Range Status   04/03/2025 12 11 - 45 unit/L Final   10/17/2023 18 10 - 40 U/L Final     ALT   Date Value Ref Range Status   04/03/2025 10 10 - 44 unit/L Final   10/17/2023 11 10 - 44 U/L Final     Anion Gap   Date Value Ref Range Status   04/03/2025 6 (L) 8 - 16 mmol/L Final     eGFR if    Date Value Ref Range Status   06/25/2022 >60 >60  mL/min/1.73 m^2 Final     eGFR if non    Date Value Ref Range Status   06/25/2022 >60 >60 mL/min/1.73 m^2 Final     Comment:     Calculation used to obtain the estimated glomerular filtration  rate (eGFR) is the CKD-EPI equation.          Lab Results   Component Value Date    HGBA1C 5.8 (H) 04/03/2025             ASSESSMENT:       50 y.o. year old female with lower back pain, consistent with     1. Lumbar radiculopathy  zonisamide (ZONEGRAN) 25 MG Cap    MRI Lumbar Spine Without Contrast      2. Chronic bilateral low back pain with right-sided sciatica  Ambulatory referral/consult to Pain Clinic    zonisamide (ZONEGRAN) 25 MG Cap      3. Facet arthritis of lumbosacral region  Ambulatory referral/consult to Pain Clinic    zonisamide (ZONEGRAN) 25 MG Cap      4. Degeneration of intervertebral disc of lumbosacral region with discogenic back pain and lower extremity pain  Ambulatory referral/consult to Pain Clinic    zonisamide (ZONEGRAN) 25 MG Cap      5. Primary osteoarthritis of left knee  zonisamide (ZONEGRAN) 25 MG Cap      6. Dorsalgia, unspecified        7. Lumbar radiculopathy, chronic          Lumbar radiculopathy  -     zonisamide (ZONEGRAN) 25 MG Cap; Take 2 capsules (50 mg total) by mouth every evening.  Dispense: 60 capsule; Refill: 2  -     MRI Lumbar Spine Without Contrast; Future; Expected date: 05/29/2025    Chronic bilateral low back pain with right-sided sciatica  -     Ambulatory referral/consult to Pain Clinic  -     zonisamide (ZONEGRAN) 25 MG Cap; Take 2 capsules (50 mg total) by mouth every evening.  Dispense: 60 capsule; Refill: 2    Facet arthritis of lumbosacral region  -     Ambulatory referral/consult to Pain Clinic  -     zonisamide (ZONEGRAN) 25 MG Cap; Take 2 capsules (50 mg total) by mouth every evening.  Dispense: 60 capsule; Refill: 2    Degeneration of intervertebral disc of lumbosacral region with discogenic back pain and lower extremity pain  -     Ambulatory  referral/consult to Pain Clinic  -     zonisamide (ZONEGRAN) 25 MG Cap; Take 2 capsules (50 mg total) by mouth every evening.  Dispense: 60 capsule; Refill: 2    Primary osteoarthritis of left knee  -     zonisamide (ZONEGRAN) 25 MG Cap; Take 2 capsules (50 mg total) by mouth every evening.  Dispense: 60 capsule; Refill: 2    Dorsalgia, unspecified    Lumbar radiculopathy, chronic             PLAN:   - Interventions:   - none at this time.  Pain appears to be consistent with right lumbar radiculopathy secondary to compensation from left knee pain.  Patient is scheduled for left knee arthroscopic repair.  We will also obtain updated MRI of the lumbar spine.    - Anticoagulation use:   No no anticoagulation    - Medications:  - start Zonegran 50 mg at night  - continue Relafen 750 mg twice a day p.r.n.  - patient has previously failed gabapentin and Lyrica    - Therapy:   - patient has completed 6 weeks of formal physical therapy in the last 3 months with minimal relief.    - Imaging/Diagnostic:  - x-rays of lumbar spine reviewed and findings discussed with patient.  Significant for grade 1 retrolisthesis of L5 upon S1 with loss of disc height of the level  - we will obtain updated MRI lumbar spine without contrast to further evaluate lower back pain with right lumbar radiculopathy that has continued despite over 8 weeks of conservative therapy    - Consults:   - continue follow up with Orthopedics for left knee pain.  Patient is scheduled for left knee arthroscopic repair on 06/02/2025        - Patient Questions: Answered all of the patient's questions regarding diagnosis, therapy, and treatment     This condition does not require this patient to take time off of work, and the primary goal of our Pain Management services is to improve the patient's functional capacity.     - Follow up visit: return to clinic in 5-6 weeks        The above plan and management options were discussed at length with patient. Patient is in  agreement with the above and verbalized understanding.    I discussed the goals of interventional chronic pain management with the patient on today's visit.  I explained the utility of injections for diagnostic and therapeutic purposes.  We discussed a multimodal approach to pain including treating the patient's given worst pain at any given time.  We will use a systematic approach to addressing pain.  We will also adopt a multimodal approach that includes injections, adjuvant medications, physical therapy, at times psychiatry.  There may be a limited role for opioid use intermittently in the treatment of pain, more particularly for acute pain although no one approach can be used as a sole treatment modality.    I emphasized the importance of regular exercise, core strengthening and stretching, diet and weight loss as a cornerstone of long-term pain management.      Min De Oliveira MD  Interventional Pain Management  [facility]    Future Appointments   Date Time Provider Department Center   5/29/2025  2:45 PM [facility], [facility]   6/9/2025  9:30 AM Nicole Leon, PT, DPT [facility] [place]   6/10/2025  9:00 AM Terrell Scherer MD [facility] ORTHO [facility]   6/12/2025  5:00 PM [facility] MRI [facility] [place]   7/10/2025 10:20 AM Breana Kimbrough NP [facility] INT ANU [facility]   7/18/2025  1:30 PM Camden Cui MD [facility] PULMS[facility]   8/4/2025  2:40 PM Heraclio Farrell MD [facility] IM [facility]           Disclaimer:  This note was prepared using voice recognition system and is likely to have sound alike errors that may have been overlooked even after proof reading.  Please call me with any questions      I spent a total of 30 minutes on the day of the visit.  This includes face to face time and non-face to face time preparing to see the patient (eg, review of tests), obtaining and/or reviewing separately obtained history, documenting clinical information in the electronic or  other health record, independently interpreting results and communicating results to the patient/family/caregiver, or care coordinator.         [1]   Social History  Socioeconomic History    Marital status: Legally    Tobacco Use    Smoking status: Every Day     Current packs/day: 0.00     Average packs/day: 0.3 packs/day for 23.3 years (5.8 ttl pk-yrs)     Types: Cigarettes     Start date: 8/10/1999     Last attempt to quit: 2022     Years since quittin.4    Smokeless tobacco: Never    Tobacco comments:     Smokes 8 long cigarettes that she relights   Substance and Sexual Activity    Alcohol use: Not Currently     Comment: once a year; hold 72 hrs prior to sx    Drug use: Yes     Types: Marijuana    Sexual activity: Yes     Partners: Male     Birth control/protection: Condom   Social History Narrative    Wears a seatbelt.     Social Drivers of Health     Financial Resource Strain: Medium Risk (2025)    Overall Financial Resource Strain (CARDIA)     Difficulty of Paying Living Expenses: Somewhat hard   Food Insecurity: Food Insecurity Present (2025)    Hunger Vital Sign     Worried About Running Out of Food in the Last Year: Patient declined     Ran Out of Food in the Last Year: Sometimes true   Transportation Needs: No Transportation Needs (2022)    PRAPARE - Transportation     Lack of Transportation (Medical): No     Lack of Transportation (Non-Medical): No   Physical Activity: Sufficiently Active (2025)    Exercise Vital Sign     Days of Exercise per Week: 7 days     Minutes of Exercise per Session: 30 min   Stress: Stress Concern Present (2025)    [employer] - Occupational Stress Questionnaire     Feeling of Stress : Very much   Housing Stability: High Risk (2025)    Housing Stability Vital Sign     Unable to Pay for Housing in the Last Year: Yes

## 2025-05-30 ENCOUNTER — HOSPITAL ENCOUNTER (EMERGENCY)
Facility: HOSPITAL | Age: 50
Discharge: HOME | End: 2025-05-30
Attending: EMERGENCY MEDICINE
Payer: COMMERCIAL

## 2025-05-30 ENCOUNTER — PATIENT MESSAGE (OUTPATIENT)
Dept: PREADMISSION TESTING | Facility: HOSPITAL | Age: 50
End: 2025-05-30
Payer: COMMERCIAL

## 2025-05-30 VITALS
BODY MASS INDEX: 44.9 KG/M2 | WEIGHT: 269.81 LBS | DIASTOLIC BLOOD PRESSURE: 85 MMHG | HEART RATE: 68 BPM | TEMPERATURE: 98 F | RESPIRATION RATE: 18 BRPM | SYSTOLIC BLOOD PRESSURE: 143 MMHG | OXYGEN SATURATION: 98 %

## 2025-05-30 DIAGNOSIS — R51.9 LEFT-SIDED HEADACHE: ICD-10-CM

## 2025-05-30 DIAGNOSIS — M43.6 TORTICOLLIS: Primary | ICD-10-CM

## 2025-05-30 PROBLEM — Z01.818 PRE-OP EXAM: Status: ACTIVE | Noted: 2025-05-30

## 2025-05-30 LAB
ABSOLUTE EOSINOPHIL (OHS): 0.56 K/UL
ABSOLUTE MONOCYTE (OHS): 0.42 K/UL (ref 0.3–1)
ABSOLUTE NEUTROPHIL COUNT (OHS): 3.57 K/UL (ref 1.8–7.7)
ALBUMIN SERPL BCP-MCNC: 3.8 G/DL (ref 3.5–5.2)
ALP SERPL-CCNC: 107 UNIT/L (ref 40–150)
ALT SERPL W/O P-5'-P-CCNC: 5 UNIT/L (ref 10–44)
ANION GAP (OHS): 10 MMOL/L (ref 8–16)
AST SERPL-CCNC: 9 UNIT/L (ref 11–45)
BASOPHILS # BLD AUTO: 0.03 K/UL
BASOPHILS NFR BLD AUTO: 0.5 %
BILIRUB SERPL-MCNC: 0.2 MG/DL (ref 0.1–1)
BILIRUB UR QL STRIP.AUTO: NEGATIVE
BUN SERPL-MCNC: 15 MG/DL (ref 6–20)
CALCIUM SERPL-MCNC: 9.3 MG/DL (ref 8.7–10.5)
CHLORIDE SERPL-SCNC: 108 MMOL/L (ref 95–110)
CLARITY UR: CLEAR
CO2 SERPL-SCNC: 23 MMOL/L (ref 23–29)
COLOR UR AUTO: YELLOW
CREAT SERPL-MCNC: 0.8 MG/DL (ref 0.5–1.4)
ERYTHROCYTE [DISTWIDTH] IN BLOOD BY AUTOMATED COUNT: 13.7 % (ref 11.5–14.5)
GFR SERPLBLD CREATININE-BSD FMLA CKD-EPI: >60 ML/MIN/1.73/M2
GLUCOSE SERPL-MCNC: 106 MG/DL (ref 70–110)
GLUCOSE UR QL STRIP: NEGATIVE
HCT VFR BLD AUTO: 38.7 % (ref 37–48.5)
HGB BLD-MCNC: 12.2 GM/DL (ref 12–16)
HGB UR QL STRIP: NEGATIVE
HOLD SPECIMEN: NORMAL
IMM GRANULOCYTES # BLD AUTO: 0.03 K/UL (ref 0–0.04)
IMM GRANULOCYTES NFR BLD AUTO: 0.5 % (ref 0–0.5)
KETONES UR QL STRIP: NEGATIVE
LEUKOCYTE ESTERASE UR QL STRIP: NEGATIVE
LYMPHOCYTES # BLD AUTO: 1.95 K/UL (ref 1–4.8)
MCH RBC QN AUTO: 26.3 PG (ref 27–31)
MCHC RBC AUTO-ENTMCNC: 31.5 G/DL (ref 32–36)
MCV RBC AUTO: 83 FL (ref 82–98)
NITRITE UR QL STRIP: NEGATIVE
NUCLEATED RBC (/100WBC) (OHS): 0 /100 WBC
PH UR STRIP: 6 [PH]
PLATELET # BLD AUTO: 235 K/UL (ref 150–450)
PMV BLD AUTO: 10.1 FL (ref 9.2–12.9)
POTASSIUM SERPL-SCNC: 3.9 MMOL/L (ref 3.5–5.1)
PROT SERPL-MCNC: 8 GM/DL (ref 6–8.4)
PROT UR QL STRIP: ABNORMAL
RBC # BLD AUTO: 4.64 M/UL (ref 4–5.4)
RELATIVE EOSINOPHIL (OHS): 8.5 %
RELATIVE LYMPHOCYTE (OHS): 29.7 % (ref 18–48)
RELATIVE MONOCYTE (OHS): 6.4 % (ref 4–15)
RELATIVE NEUTROPHIL (OHS): 54.4 % (ref 38–73)
SODIUM SERPL-SCNC: 141 MMOL/L (ref 136–145)
SP GR UR STRIP: >=1.03
UROBILINOGEN UR STRIP-ACNC: ABNORMAL EU/DL
WBC # BLD AUTO: 6.56 K/UL (ref 3.9–12.7)

## 2025-05-30 PROCEDURE — 25000003 PHARM REV CODE 250

## 2025-05-30 PROCEDURE — 99285 EMERGENCY DEPT VISIT HI MDM: CPT | Mod: 25

## 2025-05-30 PROCEDURE — 85025 COMPLETE CBC W/AUTO DIFF WBC: CPT

## 2025-05-30 PROCEDURE — 63600175 PHARM REV CODE 636 W HCPCS: Performed by: NURSE PRACTITIONER

## 2025-05-30 PROCEDURE — 80053 COMPREHEN METABOLIC PANEL: CPT

## 2025-05-30 PROCEDURE — 96372 THER/PROPH/DIAG INJ SC/IM: CPT | Performed by: NURSE PRACTITIONER

## 2025-05-30 PROCEDURE — 81003 URINALYSIS AUTO W/O SCOPE: CPT

## 2025-05-30 RX ORDER — ORPHENADRINE CITRATE 30 MG/ML
60 INJECTION INTRAMUSCULAR; INTRAVENOUS
Status: COMPLETED | OUTPATIENT
Start: 2025-05-30 | End: 2025-05-30

## 2025-05-30 RX ORDER — METHOCARBAMOL 500 MG/1
1000 TABLET, FILM COATED ORAL 2 TIMES DAILY PRN
Qty: 28 TABLET | Refills: 0 | Status: SHIPPED | OUTPATIENT
Start: 2025-05-30 | End: 2025-06-06

## 2025-05-30 RX ORDER — BUTALBITAL, ACETAMINOPHEN AND CAFFEINE 50; 325; 40 MG/1; MG/1; MG/1
1 TABLET ORAL
Status: COMPLETED | OUTPATIENT
Start: 2025-05-30 | End: 2025-05-30

## 2025-05-30 RX ORDER — HYDROCODONE BITARTRATE AND ACETAMINOPHEN 5; 325 MG/1; MG/1
1 TABLET ORAL EVERY 6 HOURS PRN
Qty: 12 TABLET | Refills: 0 | Status: SHIPPED | OUTPATIENT
Start: 2025-05-30

## 2025-05-30 RX ORDER — KETOROLAC TROMETHAMINE 30 MG/ML
30 INJECTION, SOLUTION INTRAMUSCULAR; INTRAVENOUS
Status: COMPLETED | OUTPATIENT
Start: 2025-05-30 | End: 2025-05-30

## 2025-05-30 RX ADMIN — BUTALBITAL, ACETAMINOPHEN, AND CAFFEINE 1 TABLET: 325; 50; 40 TABLET ORAL at 04:05

## 2025-05-30 RX ADMIN — KETOROLAC TROMETHAMINE 30 MG: 30 INJECTION, SOLUTION INTRAMUSCULAR; INTRAVENOUS at 05:05

## 2025-05-30 RX ADMIN — ORPHENADRINE CITRATE 60 MG: 60 INJECTION INTRAMUSCULAR; INTRAVENOUS at 05:05

## 2025-05-30 NOTE — ED PROVIDER NOTES
Encounter Date: 5/30/2025       History     Chief Complaint   Patient presents with    Neck Pain     Pt. C/o intermittent upper neck pain that radiates to behind her left ear for the past 2 days       50-year-old female past medical history of anxiety, diabetes, hypertension, arthritis, and fibromyalgia presents to ER complaining of left-sided neck pain that radiates to left side of head for the last 2 days.  Reports pain is worse with turning head from left to right.  Denies vomiting, visual changes, dizziness, weakness, chest pain, or shortness of breath.  Reports Excedrin with no relief.        Review of patient's allergies indicates:   Allergen Reactions    Pcn [penicillins] Anaphylaxis     Throat , tongue swelling     Banana      Itchy mouth    Lisinopril Other (See Comments)     Lip swelling    Melon      Itchy mouth    Morphine Other (See Comments)     Unknown    Tree nuts      Oral itching     Past Medical History:   Diagnosis Date    Allergy     Anemia     Asthma     Depression     Diabetes mellitus     Diabetes mellitus, type 2     Encounter for blood transfusion     Fibroid 11/17/2021    Fibromyalgia     GERD (gastroesophageal reflux disease)     Hyperlipidemia     Hypertension     Hyperthyroidism     Hypothyroidism     Panic attack     Prozac in past    Primary osteoarthritis of both knees 10/17/2023    Seasonal allergies      Past Surgical History:   Procedure Laterality Date    BRONCHOSCOPY      DIAGNOSTIC LAPAROSCOPY N/A 09/12/2022    Procedure: LAPAROSCOPY, DIAGNOSTIC;  Surgeon: Talha Santa MD;  Location: [facility] OR;  Service: OB/GYN;  Laterality: N/A;    HERNIA REPAIR      SALPINGECTOMY Bilateral 09/12/2022    Procedure: SALPINGECTOMY;  Surgeon: Talha Santa MD;  Location: [facility] OR;  Service: OB/GYN;  Laterality: Bilateral;    THYROIDECTOMY Bilateral 03/25/2021    Procedure: THYROIDECTOMY;  Surgeon: Alon Barton MD;  Location: [address];  Service: ENT;  Laterality: Bilateral;  [facility]  monitor    TONSILLECTOMY      TOTAL ABDOMINAL HYSTERECTOMY N/A 09/12/2022    Procedure: HYSTERECTOMY, TOTAL, ABDOMINAL;  Surgeon: Talha Santa MD;  Location: [facility] OR;  Service: OB/GYN;  Laterality: N/A;    TRACHEOSTOMY      TRACHEOSTOMY TUBE PLACEMENT      XI ROBOTIC HYSTERECTOMY, WITH SALPINGECTOMY N/A 09/12/2022    Procedure: XI ROBOTIC HYSTERECTOMY,WITH SALPINGECTOMY;  Surgeon: Talha Santa MD;  Location: [facility] OR;  Service: OB/GYN;  Laterality: N/A;  converted to open without docking     Family History   Problem Relation Name Age of Onset    Cancer Paternal Aunt      Asthma Mother Olu Gr     COPD Father Ysabel Ramirez     Vision loss Father Ysabel Ramirez     COPD Maternal Grandmother Jada Mejia     Stroke Maternal Uncle Steve Mejia      Social History[1]  Review of Systems   Constitutional:  Negative for fever.   HENT:  Negative for sore throat.    Respiratory:  Negative for shortness of breath.    Cardiovascular:  Negative for chest pain.   Gastrointestinal:  Negative for nausea.   Genitourinary:  Negative for dysuria.   Musculoskeletal:  Positive for neck pain. Negative for back pain.   Skin:  Negative for rash.   Neurological:  Positive for headaches. Negative for dizziness, seizures, syncope, facial asymmetry, weakness, light-headedness and numbness.   Hematological:  Does not bruise/bleed easily.       Physical Exam     Initial Vitals [05/30/25 1323]   BP Pulse Resp Temp SpO2   132/64 75 18 98.1 °F (36.7 °C) 100 %      MAP       --         Physical Exam    Nursing note and vitals reviewed.  Constitutional: She appears well-developed and well-nourished.   HENT:   Head: Normocephalic and atraumatic.   Eyes: Conjunctivae and EOM are normal. Pupils are equal, round, and reactive to light.   Neck: Neck supple.   Normal range of motion.  Cardiovascular:  Normal heart sounds.           Pulmonary/Chest: Breath sounds normal. No respiratory distress.   Abdominal: She exhibits no  distension. There is no abdominal tenderness.   Musculoskeletal:         General: Normal range of motion.      Cervical back: Normal range of motion and neck supple. No rigidity.     Neurological: She is alert and oriented to person, place, and time. She has normal strength. No cranial nerve deficit or sensory deficit. GCS score is 15. GCS eye subscore is 4. GCS verbal subscore is 5. GCS motor subscore is 6.   Skin: Skin is warm and dry. Capillary refill takes less than 2 seconds. No rash noted.   Psychiatric: She has a normal mood and affect.         ED Course   Procedures  Labs Reviewed   COMPREHENSIVE METABOLIC PANEL - Abnormal       Result Value    Sodium 141      Potassium 3.9      Chloride 108      CO2 23      Glucose 106      BUN 15      Creatinine 0.8      Calcium 9.3      Protein Total 8.0      Albumin 3.8      Bilirubin Total 0.2            AST 9 (*)     ALT 5 (*)     Anion Gap 10      eGFR >60     CBC WITH DIFFERENTIAL - Abnormal    WBC 6.56      RBC 4.64      HGB 12.2      HCT 38.7      MCV 83      MCH 26.3 (*)     MCHC 31.5 (*)     RDW 13.7      Platelet Count 235      MPV 10.1      Nucleated RBC 0      Neut % 54.4      Lymph % 29.7      Mono % 6.4      Eos % 8.5 (*)     Basophil % 0.5      Imm Grans % 0.5      Neut # 3.57      Lymph # 1.95      Mono # 0.42      Eos # 0.56 (*)     Baso # 0.03      Imm Grans # 0.03     URINALYSIS, REFLEX TO URINE CULTURE - Abnormal    Color, UA Yellow      Appearance, UA Clear      pH, UA 6.0      Spec Grav UA >=1.030 (*)     Protein, UA Trace (*)     Glucose, UA Negative      Ketones, UA Negative      Bilirubin, UA Negative      Blood, UA Negative      Nitrites, UA Negative      Urobilinogen, UA 2.0-3.0 (*)     Leukocyte Esterase, UA Negative     CBC W/ AUTO DIFFERENTIAL    Narrative:     The following orders were created for panel order CBC auto differential.  Procedure                               Abnormality         Status                     ---------                                -----------         ------                     CBC with Differential[[id number]]       Abnormal            Final result                 Please view results for these tests on the individual orders.   GREY TOP URINE HOLD    Extra Tube Hold for add-ons.            Imaging Results              CT Head Without Contrast (Final result)  Result time 05/30/25 14:49:36      Final result by Neftaly Arreola MD (05/30/25 14:49:36)                   Impression:      1.  Negative for acute intracranial process. Negative for hemorrhage, or skull fracture.    2.  Paranasal sinus disease in distribution described above.    All CT scans at this facility are performed  using dose modulation techniques as appropriate to performed exam including the following:  automated exposure control; adjustment of mA and/or kV according to the patients size (this includes techniques or standardized protocols for targeted exams where dose is matched to indication/reason for exam: i.e. extremities or head);  iterative reconstruction technique.      Electronically signed by: Neftaly Arreola MD  Date:    05/30/2025  Time:    14:49               Narrative:    EXAMINATION:  CT HEAD WITHOUT CONTRAST    CLINICAL HISTORY:  Headache, sudden, severe;    TECHNIQUE:  Axial images through the brain and posterior fossa were obtained without the use of IV contrast.  Sagittal and coronal reconstructions are provided for review.    COMPARISON:  January 23, 2023    FINDINGS:  The ventricles are midline and the CSF spaces are normal. The gray-white matter junction is well preserved. Negative for intracranial vascular abnormalities. Negative for mass, mass effect, cerebral edema, hemorrhage or abnormal fluid collections.    The skull and scalp are  intact.  Moderate ethmoid air cell disease.  Bilateral frontal sinus and sphenoid sinus mucoperiosteal thickening.  Bilateral maxillary sinus mucous retention cysts.  The  mastoid air cells, middle ears  and ear canals are clear. The globes are intact.                                       Medications   butalbital-acetaminophen-caffeine -40 mg per tablet 1 tablet (1 tablet Oral Given 5/30/25 1634)   orphenadrine injection 60 mg (60 mg Intramuscular Given 5/30/25 1733)   ketorolac injection 30 mg (30 mg Intramuscular Given 5/30/25 1733)     Medical Decision Making  Risk  Prescription drug management.                  5:59 PM  Patient presents to ER for evaluation of neck pain.  Patient comfortable.  Afebrile.  Nontoxic appearing.  Vital signs stable.  Patient reports pain improved after receiving anti-inflammatory and muscle relaxer.  Imaging is without acute findings.  No leukocytosis noted in labs.  All results discussed with patient.  I suspect torticollis.  We will discharge patient home with anti-inflammatory and muscle relaxer.  Advised to follow up with primary care provider for re-evaluation.  Advised to return to ER for new or worsening symptoms.  Patient verbalized understanding and is in agreement with treatment plan.  Stable for discharge.  Differential diagnosis include migraine, sinusitis, peritonsillar abscess, muscle strain, arthritis, spondylosis, cervical radiculopathy.                     Clinical Impression:  Final diagnoses:  [M43.6] Torticollis (Primary)  [R51.9] Left-sided headache          ED Disposition Condition    Discharge Stable          ED Prescriptions       Medication Sig Dispense Start Date End Date Auth. Provider    HYDROcodone-acetaminophen (NORCO) 5-325 mg per tablet Take 1 tablet by mouth every 6 (six) hours as needed for Pain. 12 tablet 5/30/2025 -- Latrice Ann NP    methocarbamoL (ROBAXIN) 500 MG Tab Take 2 tablets (1,000 mg total) by mouth 2 (two) times daily as needed (Muscle spasm). 28 tablet 5/30/2025 6/6/2025 Latrice Ann NP          Follow-up Information       Follow up With Specialties Details Why Contact Info    Heraclio Farrell MD Family Medicine  Schedule an appointment as soon as possible for a visit   [address] [facility]  [place] [address]  [phone]                     [1]   Social History  Tobacco Use    Smoking status: Every Day     Current packs/day: 0.00     Average packs/day: 0.3 packs/day for 23.3 years (5.8 ttl pk-yrs)     Types: Cigarettes     Start date: 8/10/1999     Last attempt to quit: 2022     Years since quittin.5    Smokeless tobacco: Never    Tobacco comments:     Smokes 8 long cigarettes that she relights   Substance Use Topics    Alcohol use: Not Currently     Comment: once a year; hold 72 hrs prior to sx    Drug use: Yes     Types: Marijuana        Latrice Ann NP  [phone]

## 2025-05-30 NOTE — DISCHARGE INSTRUCTIONS
Take medication as prescribed.  Take medication with food.  Follow up with your primary care provider for further evaluation and management.  Return to ER for new or worsening symptoms.

## 2025-05-30 NOTE — FIRST PROVIDER EVALUATION
Medical screening examination initiated.  I have conducted a focused provider triage encounter, findings are as follows:    Brief history of present illness:  50-year-old female presents to emergency department chief complaint of left-sided headache.  Reports of headache feels like stabbing pain over her left ear.  Reports associated confusion.    Vitals:    05/30/25 1323   BP: 132/64   BP Location: Right arm   Pulse: 75   Resp: 18   Temp: 98.1 °F (36.7 °C)   TempSrc: Oral   SpO2: 100%   Weight: 122.4 kg (269 lb 12.8 oz)       Pertinent physical exam:  Headache    Brief workup plan:  Workup, imaging    Preliminary workup initiated; this workup will be continued and followed by the physician or advanced practice provider that is assigned to the patient when roomed.

## 2025-05-30 NOTE — Clinical Note
"Shelly Santoschaitanya Kam was seen and treated in our emergency department on 5/30/2025.  She may return to work on 06/03/2025.       If you have any questions or concerns, please don't hesitate to call.      Latrice Ann, NP"

## 2025-06-02 ENCOUNTER — HOSPITAL ENCOUNTER (OUTPATIENT)
Facility: HOSPITAL | Age: 50
Discharge: HOME | End: 2025-06-02
Attending: ORTHOPAEDIC SURGERY | Admitting: ORTHOPAEDIC SURGERY
Payer: COMMERCIAL

## 2025-06-02 ENCOUNTER — ANESTHESIA (OUTPATIENT)
Dept: SURGERY | Facility: HOSPITAL | Age: 50
End: 2025-06-02
Payer: COMMERCIAL

## 2025-06-02 VITALS
HEIGHT: 65 IN | TEMPERATURE: 98 F | DIASTOLIC BLOOD PRESSURE: 67 MMHG | HEART RATE: 69 BPM | OXYGEN SATURATION: 97 % | BODY MASS INDEX: 44.22 KG/M2 | WEIGHT: 265.44 LBS | RESPIRATION RATE: 16 BRPM | SYSTOLIC BLOOD PRESSURE: 125 MMHG

## 2025-06-02 DIAGNOSIS — S83.242A ACUTE MEDIAL MENISCUS TEAR OF LEFT KNEE, INITIAL ENCOUNTER: Primary | ICD-10-CM

## 2025-06-02 DIAGNOSIS — S83.242A ACUTE MEDIAL MENISCUS TEAR OF LEFT KNEE: ICD-10-CM

## 2025-06-02 LAB — POCT GLUCOSE: 124 MG/DL (ref 70–110)

## 2025-06-02 PROCEDURE — 37000009 HC ANESTHESIA EA ADD 15 MINS: Performed by: ORTHOPAEDIC SURGERY

## 2025-06-02 PROCEDURE — 63600175 PHARM REV CODE 636 W HCPCS: Performed by: NURSE PRACTITIONER

## 2025-06-02 PROCEDURE — 63600175 PHARM REV CODE 636 W HCPCS: Performed by: NURSE ANESTHETIST, CERTIFIED REGISTERED

## 2025-06-02 PROCEDURE — 27201423 OPTIME MED/SURG SUP & DEVICES STERILE SUPPLY: Performed by: ORTHOPAEDIC SURGERY

## 2025-06-02 PROCEDURE — 63600175 PHARM REV CODE 636 W HCPCS: Performed by: ANESTHESIOLOGY

## 2025-06-02 PROCEDURE — 71000016 HC POSTOP RECOV ADDL HR: Performed by: ORTHOPAEDIC SURGERY

## 2025-06-02 PROCEDURE — 36000710: Performed by: ORTHOPAEDIC SURGERY

## 2025-06-02 PROCEDURE — 29881 ARTHRS KNE SRG MNISECTMY M/L: CPT | Mod: LT,,, | Performed by: ORTHOPAEDIC SURGERY

## 2025-06-02 PROCEDURE — 37000008 HC ANESTHESIA 1ST 15 MINUTES: Performed by: ORTHOPAEDIC SURGERY

## 2025-06-02 PROCEDURE — 25000003 PHARM REV CODE 250: Performed by: NURSE ANESTHETIST, CERTIFIED REGISTERED

## 2025-06-02 PROCEDURE — 71000033 HC RECOVERY, INTIAL HOUR: Performed by: ORTHOPAEDIC SURGERY

## 2025-06-02 PROCEDURE — 63600175 PHARM REV CODE 636 W HCPCS: Performed by: ORTHOPAEDIC SURGERY

## 2025-06-02 PROCEDURE — 71000015 HC POSTOP RECOV 1ST HR: Performed by: ORTHOPAEDIC SURGERY

## 2025-06-02 PROCEDURE — 36000711: Performed by: ORTHOPAEDIC SURGERY

## 2025-06-02 RX ORDER — ONDANSETRON HYDROCHLORIDE 2 MG/ML
INJECTION, SOLUTION INTRAVENOUS
Status: DISCONTINUED | OUTPATIENT
Start: 2025-06-02 | End: 2025-06-02

## 2025-06-02 RX ORDER — ACETAMINOPHEN 10 MG/ML
INJECTION, SOLUTION INTRAVENOUS
Status: DISCONTINUED | OUTPATIENT
Start: 2025-06-02 | End: 2025-06-02

## 2025-06-02 RX ORDER — ALBUTEROL SULFATE 0.83 MG/ML
2.5 SOLUTION RESPIRATORY (INHALATION) EVERY 4 HOURS PRN
Status: DISCONTINUED | OUTPATIENT
Start: 2025-06-02 | End: 2025-06-02 | Stop reason: HOSPADM

## 2025-06-02 RX ORDER — TOPIRAMATE 50 MG/1
50 TABLET, FILM COATED ORAL EVERY MORNING
Qty: 90 TABLET | Refills: 3 | Status: SHIPPED | OUTPATIENT
Start: 2025-06-02 | End: 2026-06-02

## 2025-06-02 RX ORDER — FENTANYL CITRATE 50 UG/ML
INJECTION, SOLUTION INTRAMUSCULAR; INTRAVENOUS
Status: DISCONTINUED | OUTPATIENT
Start: 2025-06-02 | End: 2025-06-02

## 2025-06-02 RX ORDER — SODIUM CHLORIDE, SODIUM LACTATE, POTASSIUM CHLORIDE, CALCIUM CHLORIDE 600; 310; 30; 20 MG/100ML; MG/100ML; MG/100ML; MG/100ML
INJECTION, SOLUTION INTRAVENOUS CONTINUOUS
Status: DISCONTINUED | OUTPATIENT
Start: 2025-06-02 | End: 2025-06-02 | Stop reason: HOSPADM

## 2025-06-02 RX ORDER — FENTANYL CITRATE 50 UG/ML
25 INJECTION, SOLUTION INTRAMUSCULAR; INTRAVENOUS EVERY 5 MIN PRN
Status: DISCONTINUED | OUTPATIENT
Start: 2025-06-02 | End: 2025-06-02 | Stop reason: HOSPADM

## 2025-06-02 RX ORDER — SODIUM CHLORIDE 9 MG/ML
INJECTION, SOLUTION INTRAVENOUS CONTINUOUS
Status: DISCONTINUED | OUTPATIENT
Start: 2025-06-02 | End: 2025-06-02 | Stop reason: HOSPADM

## 2025-06-02 RX ORDER — CLINDAMYCIN PHOSPHATE 900 MG/50ML
INJECTION, SOLUTION INTRAVENOUS
Status: COMPLETED
Start: 2025-06-02 | End: 2025-06-02

## 2025-06-02 RX ORDER — PROPOFOL 10 MG/ML
VIAL (ML) INTRAVENOUS
Status: DISCONTINUED | OUTPATIENT
Start: 2025-06-02 | End: 2025-06-02

## 2025-06-02 RX ORDER — BUPIVACAINE HYDROCHLORIDE 2.5 MG/ML
INJECTION, SOLUTION EPIDURAL; INFILTRATION; INTRACAUDAL; PERINEURAL
Status: DISCONTINUED | OUTPATIENT
Start: 2025-06-02 | End: 2025-06-02 | Stop reason: HOSPADM

## 2025-06-02 RX ORDER — DIPHENHYDRAMINE HYDROCHLORIDE 50 MG/ML
25 INJECTION, SOLUTION INTRAMUSCULAR; INTRAVENOUS EVERY 6 HOURS PRN
Status: DISCONTINUED | OUTPATIENT
Start: 2025-06-02 | End: 2025-06-02 | Stop reason: HOSPADM

## 2025-06-02 RX ORDER — BUPIVACAINE HYDROCHLORIDE 2.5 MG/ML
INJECTION, SOLUTION EPIDURAL; INFILTRATION; INTRACAUDAL; PERINEURAL
Status: DISCONTINUED
Start: 2025-06-02 | End: 2025-06-02 | Stop reason: HOSPADM

## 2025-06-02 RX ORDER — DEXAMETHASONE SODIUM PHOSPHATE 4 MG/ML
INJECTION, SOLUTION INTRA-ARTICULAR; INTRALESIONAL; INTRAMUSCULAR; INTRAVENOUS; SOFT TISSUE
Status: DISCONTINUED | OUTPATIENT
Start: 2025-06-02 | End: 2025-06-02

## 2025-06-02 RX ORDER — LIDOCAINE HYDROCHLORIDE 20 MG/ML
INJECTION INTRAVENOUS
Status: DISCONTINUED | OUTPATIENT
Start: 2025-06-02 | End: 2025-06-02

## 2025-06-02 RX ORDER — ROCURONIUM BROMIDE 10 MG/ML
INJECTION, SOLUTION INTRAVENOUS
Status: DISCONTINUED | OUTPATIENT
Start: 2025-06-02 | End: 2025-06-02

## 2025-06-02 RX ORDER — MIDAZOLAM HYDROCHLORIDE 1 MG/ML
INJECTION INTRAMUSCULAR; INTRAVENOUS
Status: DISCONTINUED | OUTPATIENT
Start: 2025-06-02 | End: 2025-06-02

## 2025-06-02 RX ORDER — SUCCINYLCHOLINE CHLORIDE 20 MG/ML
INJECTION INTRAMUSCULAR; INTRAVENOUS
Status: DISCONTINUED | OUTPATIENT
Start: 2025-06-02 | End: 2025-06-02

## 2025-06-02 RX ORDER — CLINDAMYCIN PHOSPHATE 900 MG/50ML
900 INJECTION, SOLUTION INTRAVENOUS
Status: COMPLETED | OUTPATIENT
Start: 2025-06-02 | End: 2025-06-02

## 2025-06-02 RX ORDER — ONDANSETRON HYDROCHLORIDE 2 MG/ML
4 INJECTION, SOLUTION INTRAVENOUS ONCE AS NEEDED
Status: COMPLETED | OUTPATIENT
Start: 2025-06-02 | End: 2025-06-02

## 2025-06-02 RX ADMIN — SODIUM CHLORIDE, POTASSIUM CHLORIDE, SODIUM LACTATE AND CALCIUM CHLORIDE: 600; 310; 30; 20 INJECTION, SOLUTION INTRAVENOUS at 09:06

## 2025-06-02 RX ADMIN — PROPOFOL 200 MG: 10 INJECTION, EMULSION INTRAVENOUS at 09:06

## 2025-06-02 RX ADMIN — ONDANSETRON 4 MG: 2 INJECTION INTRAMUSCULAR; INTRAVENOUS at 11:06

## 2025-06-02 RX ADMIN — PROPOFOL 100 MG: 10 INJECTION, EMULSION INTRAVENOUS at 09:06

## 2025-06-02 RX ADMIN — FENTANYL CITRATE 50 MCG: 50 INJECTION, SOLUTION INTRAMUSCULAR; INTRAVENOUS at 09:06

## 2025-06-02 RX ADMIN — SUCCINYLCHOLINE CHLORIDE 160 MG: 20 INJECTION, SOLUTION INTRAMUSCULAR; INTRAVENOUS; PARENTERAL at 09:06

## 2025-06-02 RX ADMIN — ONDANSETRON 4 MG: 2 INJECTION INTRAMUSCULAR; INTRAVENOUS at 10:06

## 2025-06-02 RX ADMIN — DEXAMETHASONE SODIUM PHOSPHATE 4 MG: 4 INJECTION, SOLUTION INTRA-ARTICULAR; INTRALESIONAL; INTRAMUSCULAR; INTRAVENOUS; SOFT TISSUE at 09:06

## 2025-06-02 RX ADMIN — ROCURONIUM BROMIDE 20 MG: 10 SOLUTION INTRAVENOUS at 09:06

## 2025-06-02 RX ADMIN — FENTANYL CITRATE 50 MCG: 50 INJECTION, SOLUTION INTRAMUSCULAR; INTRAVENOUS at 10:06

## 2025-06-02 RX ADMIN — MIDAZOLAM 2 MG: 1 INJECTION INTRAMUSCULAR; INTRAVENOUS at 09:06

## 2025-06-02 RX ADMIN — CLINDAMYCIN PHOSPHATE 900 MG: 900 INJECTION, SOLUTION INTRAVENOUS at 09:06

## 2025-06-02 RX ADMIN — SUGAMMADEX 200 MG: 100 INJECTION, SOLUTION INTRAVENOUS at 10:06

## 2025-06-02 RX ADMIN — FENTANYL CITRATE 25 MCG: 50 INJECTION INTRAMUSCULAR; INTRAVENOUS at 10:06

## 2025-06-02 RX ADMIN — ACETAMINOPHEN 800 MG: 10 INJECTION, SOLUTION INTRAVENOUS at 09:06

## 2025-06-02 RX ADMIN — ROCURONIUM BROMIDE 10 MG: 10 SOLUTION INTRAVENOUS at 09:06

## 2025-06-02 RX ADMIN — LIDOCAINE HYDROCHLORIDE 60 MG: 20 INJECTION INTRAVENOUS at 09:06

## 2025-06-02 RX ADMIN — SODIUM CHLORIDE, POTASSIUM CHLORIDE, SODIUM LACTATE AND CALCIUM CHLORIDE: 600; 310; 30; 20 INJECTION, SOLUTION INTRAVENOUS at 10:06

## 2025-06-03 ENCOUNTER — PATIENT MESSAGE (OUTPATIENT)
Dept: ADMINISTRATIVE | Facility: HOSPITAL | Age: 50
End: 2025-06-03
Payer: COMMERCIAL

## 2025-06-04 DIAGNOSIS — Z12.11 SCREENING FOR COLON CANCER: ICD-10-CM

## 2025-06-05 ENCOUNTER — HOSPITAL ENCOUNTER (OUTPATIENT)
Dept: RADIOLOGY | Facility: HOSPITAL | Age: 50
Discharge: HOME | End: 2025-06-05
Attending: FAMILY MEDICINE
Payer: COMMERCIAL

## 2025-06-05 ENCOUNTER — CLINICAL SUPPORT (OUTPATIENT)
Dept: REHABILITATION | Facility: HOSPITAL | Age: 50
End: 2025-06-05
Attending: ORTHOPAEDIC SURGERY
Payer: COMMERCIAL

## 2025-06-05 DIAGNOSIS — R29.898 WEAKNESS OF LEFT LOWER EXTREMITY: ICD-10-CM

## 2025-06-05 DIAGNOSIS — M25.662 DECREASED ROM OF LEFT KNEE: ICD-10-CM

## 2025-06-05 DIAGNOSIS — Z12.31 ENCOUNTER FOR SCREENING MAMMOGRAM FOR MALIGNANT NEOPLASM OF BREAST: ICD-10-CM

## 2025-06-05 DIAGNOSIS — S83.242D ACUTE MEDIAL MENISCUS TEAR OF LEFT KNEE, SUBSEQUENT ENCOUNTER: ICD-10-CM

## 2025-06-05 DIAGNOSIS — Z98.890 S/P ARTHROSCOPIC PARTIAL MEDIAL MENISCECTOMY: Primary | ICD-10-CM

## 2025-06-05 PROCEDURE — 77063 BREAST TOMOSYNTHESIS BI: CPT | Mod: TC

## 2025-06-05 PROCEDURE — 77063 BREAST TOMOSYNTHESIS BI: CPT | Mod: 26,,, | Performed by: RADIOLOGY

## 2025-06-05 PROCEDURE — 97016 VASOPNEUMATIC DEVICE THERAPY: CPT

## 2025-06-05 PROCEDURE — 97110 THERAPEUTIC EXERCISES: CPT

## 2025-06-05 PROCEDURE — 77067 SCR MAMMO BI INCL CAD: CPT | Mod: 26,,, | Performed by: RADIOLOGY

## 2025-06-05 PROCEDURE — 97161 PT EVAL LOW COMPLEX 20 MIN: CPT

## 2025-06-06 ENCOUNTER — RESULTS FOLLOW-UP (OUTPATIENT)
Dept: INTERNAL MEDICINE | Facility: CLINIC | Age: 50
End: 2025-06-06

## 2025-06-06 PROBLEM — M25.662 DECREASED ROM OF LEFT KNEE: Status: ACTIVE | Noted: 2025-06-06

## 2025-06-06 PROBLEM — Z98.890 S/P ARTHROSCOPIC PARTIAL MEDIAL MENISCECTOMY: Status: ACTIVE | Noted: 2025-06-06

## 2025-06-06 PROBLEM — R29.898 WEAKNESS OF LEFT LOWER EXTREMITY: Status: ACTIVE | Noted: 2025-06-06

## 2025-06-10 ENCOUNTER — TELEPHONE (OUTPATIENT)
Dept: ORTHOPEDICS | Facility: CLINIC | Age: 50
End: 2025-06-10

## 2025-06-10 ENCOUNTER — OFFICE VISIT (OUTPATIENT)
Dept: ORTHOPEDICS | Facility: CLINIC | Age: 50
End: 2025-06-10
Payer: COMMERCIAL

## 2025-06-10 VITALS — BODY MASS INDEX: 44.22 KG/M2 | WEIGHT: 265.44 LBS | HEIGHT: 65 IN

## 2025-06-10 DIAGNOSIS — S83.242D ACUTE MEDIAL MENISCUS TEAR OF LEFT KNEE, SUBSEQUENT ENCOUNTER: Primary | ICD-10-CM

## 2025-06-10 PROCEDURE — 99999 PR PBB SHADOW E&M-EST. PATIENT-LVL IV: CPT | Mod: PBBFAC,,, | Performed by: ORTHOPAEDIC SURGERY

## 2025-06-10 PROCEDURE — 4010F ACE/ARB THERAPY RXD/TAKEN: CPT | Mod: CPTII,S$GLB,, | Performed by: ORTHOPAEDIC SURGERY

## 2025-06-10 PROCEDURE — 1159F MED LIST DOCD IN RCRD: CPT | Mod: CPTII,S$GLB,, | Performed by: ORTHOPAEDIC SURGERY

## 2025-06-10 PROCEDURE — 1160F RVW MEDS BY RX/DR IN RCRD: CPT | Mod: CPTII,S$GLB,, | Performed by: ORTHOPAEDIC SURGERY

## 2025-06-10 PROCEDURE — 3044F HG A1C LEVEL LT 7.0%: CPT | Mod: CPTII,S$GLB,, | Performed by: ORTHOPAEDIC SURGERY

## 2025-06-10 PROCEDURE — 99024 POSTOP FOLLOW-UP VISIT: CPT | Mod: S$GLB,,, | Performed by: ORTHOPAEDIC SURGERY

## 2025-06-10 RX ORDER — HYDROCODONE BITARTRATE AND ACETAMINOPHEN 5; 325 MG/1; MG/1
1 TABLET ORAL EVERY 8 HOURS PRN
Qty: 21 TABLET | Refills: 0 | Status: SHIPPED | OUTPATIENT
Start: 2025-06-10

## 2025-06-10 NOTE — PATIENT INSTRUCTIONS
Encounter Diagnosis   Name Primary?    Acute medial meniscus tear of left knee, subsequent encounter Yes   Status post left knee arthroscopy 8 days.    ASSESSMENT:  Left scalp boils/itching break out possibly related to antibiotic or medicine at surgery.  Improving with T tree oil.  Status post partial posterior horn medial meniscectomy and chondroplasty of the medial femur and patella.  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 every 8 hours for pain 21 tablets prescribed  Extend physical therapy twice a week over the next 4 weeks to get her strength and range of motion back and wean off crutches hopefully in the next 1-2 weeks  Recheck office in 4 weeks or sooner if needed.  Stitches removed today Band-Aid applied.  Wounds are healing well

## 2025-06-10 NOTE — PROGRESS NOTES
Dictation #1  MRN:[id number]  CSN:[id number]              Terrell Scherer MD  Orthopedic Surgeon   [facility]  [address], [place] [address]     VISIT DATE: 6/10/2025        Name: Shelly Kam  MRN: [id number]    PCP: Heraclio Farrell MD  Insurance: Payor: [employer] [facility] / Plan: BCBS ALL OUT OF STATE / Product Type: PPO /     Reason for Visit:  Knee arthroscopy postop visit - 1 week     Chief Complaint:  Boils, itching of the left scalp, and left knee pain    Patient Instructions     Encounter Diagnosis   Name Primary?    Acute medial meniscus tear of left knee, subsequent encounter Yes   Status post left knee arthroscopy 8 days.    ASSESSMENT:  Left scalp boils/itching break out possibly related to antibiotic or medicine at surgery.  Improving with T tree oil.  Status post partial posterior horn medial meniscectomy and chondroplasty of the medial femur and patella.  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 every 8 hours for pain 21 tablets prescribed  Extend physical therapy twice a week over the next 4 weeks to get her strength and range of motion back and wean off crutches hopefully in the next 1-2 weeks  Recheck office in 4 weeks or sooner if needed.  Stitches removed today Band-Aid applied.  Wounds are healing well      HPI: Shelly Kam is a 50 y.o. female. who returns to the office today, status post knee arthroscopy.    REVIEW: Post-op Symptoms/Complaints:  Pain 8/10.    PHYSICAL EXAMINATION:  Alert and oriented.  Well dressed and well groomed.  Ambulating with Crutches    Incision site:  Portal incision sites are benign and healing well.  No significant erythema or drainage at the incision.    ROM:   0-110    Mild swelling about the  knee  Neurovascularly intact in the lower extremity.  Normal pulses and capillary refill.    No peripheral edema lower extremity.  Calf is soft without palpable cords with a negative Homans.  Good dorsiflexion/plantar flexion of the feet and toes.    Patient type: Established   Visit type: Postop Global     Twenty minute patient encounter  This includes face to face time and non-face to face time preparing to see the patient (eg, review of tests),   obtaining and/or reviewing separately obtained history, documenting clinical information in the electronic or other health record, independently interpreting results   and communicating results to the patient/family/caregiver, or care coordinator.       DISCLAIMER: This note was prepared with [employer] voice recognition transcription software. Garbled syntax, mangled pronouns, and other bizarre constructions may be attributed to that software system.           Terrell Scherer M.D.  Orthopedic Surgeon  [facility]

## 2025-06-10 NOTE — TELEPHONE ENCOUNTER
Patient call has been returned regarding right knee pain and sciatica pain. Patient stated she would like a prescription called into pharmacy on file. Patient also scheduled a upcoming appointment regarding right knee and side pain on 07/16/2025

## 2025-06-11 NOTE — TELEPHONE ENCOUNTER
Patient has been called regarding FMLA paperwork is completed. Patient did not answer call so a brief voicemail was left. Patient form was also faxed and sent to requesting fax number [phone].

## 2025-06-12 ENCOUNTER — CLINICAL SUPPORT (OUTPATIENT)
Dept: REHABILITATION | Facility: HOSPITAL | Age: 50
End: 2025-06-12
Payer: COMMERCIAL

## 2025-06-12 DIAGNOSIS — M25.662 DECREASED ROM OF LEFT KNEE: ICD-10-CM

## 2025-06-12 DIAGNOSIS — Z98.890 S/P ARTHROSCOPIC PARTIAL MEDIAL MENISCECTOMY: ICD-10-CM

## 2025-06-12 DIAGNOSIS — S83.242A ACUTE MEDIAL MENISCUS TEAR OF LEFT KNEE, INITIAL ENCOUNTER: Primary | ICD-10-CM

## 2025-06-12 DIAGNOSIS — R29.898 WEAKNESS OF LEFT LOWER EXTREMITY: ICD-10-CM

## 2025-06-12 PROCEDURE — 97112 NEUROMUSCULAR REEDUCATION: CPT

## 2025-06-12 PROCEDURE — 97110 THERAPEUTIC EXERCISES: CPT

## 2025-06-12 PROCEDURE — 97016 VASOPNEUMATIC DEVICE THERAPY: CPT

## 2025-06-12 PROCEDURE — 97140 MANUAL THERAPY 1/> REGIONS: CPT

## 2025-06-12 NOTE — PROGRESS NOTES
"  Outpatient Rehab    Physical Therapy Visit    Patient Name: Shelly Kam  MRN: [id number]  YOB: 1975  Encounter Date: 6/12/2025    Therapy Diagnosis:   Encounter Diagnoses   Name Primary?    Acute medial meniscus tear of left knee, initial encounter Yes    S/P arthroscopic partial medial meniscectomy     Weakness of left lower extremity     Decreased ROM of left knee      Physician: Terrell Scherer MD    Physician Orders: Eval and Treat  Medical Diagnosis: Acute medial meniscus tear of left knee, subsequent encounter  Surgical Diagnosis: S/P Left Medial Menisectomy/Chondroplasty   Surgical Date: Not applicable for this Episode    Visit # / Visits Authorized:  1 / 10  Insurance Authorization Period: 6/5/2025 to 12/31/2025  Date of Evaluation: 6/5/2025  Plan of Care Certification: 6/6/2025 to 7/4/2025      PT/PTA:     Number of PTA visits since last PT visit:   Time In: 1520   Time Out: 1620  Total Time (in minutes): 60   Total Billable Time (in minutes): 45    FOTO:  Intake Score:  %  Survey Score 2:  %  Survey Score 3:  %    Precautions:       Subjective   Patient reports she has been walking with one crutch instead of two. She is still having pain along the outside of her knee..  Pain reported as 3/10.      Objective            Treatment:     CPT Intervention Performed   Today Duration / Intensity   MT  Patella MOBS and Gentle Tib Fem MOBS x  10 minutes     TE Heel Prop x 5 minutes     Seated knee flexion off EOM x 5 minutes      Leg Press  x 45# 3x10      Sit to stands x  3x10 from high low mat    NMR  QS  x  3 mins with 5sec holds      SAQ  x  3 mins with 5sec holds      LAQ  x  3 mins with 5sec holds     SLR x  Attempted but stopped due to pain      Standing Hip abduction on 2" box  x RTB 3x10 only on the op leg    Standing Hip Extension on 2" box  x RTB 3x10 only on the op leg   TA                                                            Gameready ice and compression low x 10 minutes "              PLAN  Nu-step, NMES QS, SAQ, LAQ, SLR, SL hip abduction, Standing Hip 3 way, Mini Squats, Leg press             CPT Codes available for Billing:   (10) minutes of Manual therapy (MT) to improve pain and ROM.  (15) minutes of Therapeutic Exercise (TE) to develop strength, endurance, range of motion, and flexibility.  (20) minutes of Neuromuscular Re-Education (NMR)  to improve: Balance, Coordination, Kinesthetic, Sense, Proprioception, and Posture.  (00) minutes of Therapeutic Activities (TA) to improve functional performance.  Vasopneumatic Device Therapy () for management of swelling/edema. (10892)  Unattended Electrical Stimulation (ES) for muscle performance or pain modulation.  BFR: Blood flow restriction applied during exercise       Time Entry(in minutes):       Assessment & Plan   Assessment: Patient tolerated all treatment well and was able to complete program as noted for initial treatment session. Cues for all interventions for correct technique and to avoid substitution patterns. Encouraged HEP       The patient will continue to benefit from skilled outpatient physical therapy in order to address the deficits listed in the problem list on the initial evaluation, provide patient and family education, and maximize the patients level of independence in the home and community environments.     The patient's spiritual, cultural, and educational needs were considered, and the patient is agreeable to the plan of care and goals.           Plan: Continue Plan of Care (POC) and progress per patient tolerance. See treatment section for details on planned progressions next session.    Goals:   Active       LTG       Pt will report worst pain of 1-2/10 in order to progress toward max functional ability and improve quality of life                Start:  06/05/25    Expected End:  08/01/25            Patient will demonstrate improved function as indicated by a score of greater than or equal to 60 out of  100 on FOTO.                Start:  06/05/25    Expected End:  08/01/25            Patient will improve AROM to normal limits in order to return to maximal functional potential and improve quality of life.         Start:  06/05/25    Expected End:  08/01/25            Patient will achieve 90% LSI on quad and hamstring dynamometer testing to improve functional independence.        Start:  06/05/25    Expected End:  08/01/25               STG       Pt will report worst pain of 5/10 in order to progress toward max functional ability and improve quality of life                Start:  06/05/25    Expected End:  07/04/25            Patient will demonstrate improved function as indicated by a score of greater than or equal to 45 out of 100 on FOTO.                Start:  06/05/25    Expected End:  07/04/25            Patient will improve AROM to 50% of stated goals, listed in objective measures above, in order to progress towards independence with functional activities.         Start:  06/05/25    Expected End:  07/04/25            Patient will improve strength by 1/2 a grade, in order to progress towards independence with functional activities.                Start:  06/05/25    Expected End:  07/04/25            Patient will demonstrate independence with HEP in order to progress toward functional independence.        Start:  06/05/25    Expected End:  07/04/25                Renny Edmondson, PT

## 2025-06-13 ENCOUNTER — CLINICAL SUPPORT (OUTPATIENT)
Dept: REHABILITATION | Facility: HOSPITAL | Age: 50
End: 2025-06-13
Payer: COMMERCIAL

## 2025-06-13 DIAGNOSIS — S83.242A ACUTE MEDIAL MENISCUS TEAR OF LEFT KNEE, INITIAL ENCOUNTER: Primary | ICD-10-CM

## 2025-06-13 DIAGNOSIS — M25.662 DECREASED ROM OF LEFT KNEE: ICD-10-CM

## 2025-06-13 DIAGNOSIS — R29.898 WEAKNESS OF LEFT LOWER EXTREMITY: ICD-10-CM

## 2025-06-13 DIAGNOSIS — Z98.890 S/P ARTHROSCOPIC PARTIAL MEDIAL MENISCECTOMY: ICD-10-CM

## 2025-06-13 PROCEDURE — 97016 VASOPNEUMATIC DEVICE THERAPY: CPT

## 2025-06-13 PROCEDURE — 97112 NEUROMUSCULAR REEDUCATION: CPT

## 2025-06-13 PROCEDURE — 97110 THERAPEUTIC EXERCISES: CPT

## 2025-06-13 PROCEDURE — 97140 MANUAL THERAPY 1/> REGIONS: CPT

## 2025-06-13 NOTE — PROGRESS NOTES
"  Outpatient Rehab    Physical Therapy Visit    Patient Name: Shelly Kam  MRN: [id number]  YOB: 1975  Encounter Date: 6/13/2025    Therapy Diagnosis:   Encounter Diagnoses   Name Primary?    Acute medial meniscus tear of left knee, initial encounter Yes    S/P arthroscopic partial medial meniscectomy     Weakness of left lower extremity     Decreased ROM of left knee        Physician: Terrell Scherer MD    Physician Orders: Eval and Treat  Medical Diagnosis: Acute medial meniscus tear of left knee, subsequent encounter  Surgical Diagnosis: S/P Left Medial Menisectomy/Chondroplasty   Surgical Date: Not applicable for this Episode    Visit # / Visits Authorized:  2 / 10  Insurance Authorization Period: 6/5/2025 to 12/31/2025  Date of Evaluation: 6/5/2025  Plan of Care Certification: 6/6/2025 to 7/4/2025      PT/PTA:     Number of PTA visits since last PT visit:   Time In: 1430   Time Out: 1530  Total Time (in minutes): 60   Total Billable Time (in minutes): 50    FOTO:  Intake Score:  %  Survey Score 2:  %  Survey Score 3:  %    Precautions:       Subjective   She is in a little more pain today and has been hurting all day..  Pain reported as 6/10.      Objective            Treatment:     CPT Intervention Performed   Today Duration / Intensity   MT  Patella MOBS and Gentle Tib Fem MOBS x  10 minutes     TE Heel Prop x 5 minutes     Seated knee flexion off EOM x 5 minutes      Leg Press  x 45# 3x10 and Single leg 25# 3x10      Sit to stands x  3x10 from high low mat    NMR  QS  x  3 mins with 5sec holds      SAQ  x  3 mins with 5sec holds      LAQ  x  3 mins with 5sec holds     SLR x  Attempted but stopped due to pain      Standing Hip abduction on 2" box  x RTB 3x10 only on the op leg    Standing Hip Extension on 2" box  x RTB 3x10 only on the op leg   TA                                                            Gameready ice and compression low x 10 minutes              PLAN  Nu-step, NMES " QS, SAQ, LAQ, SLR, SL hip abduction, Standing Hip 3 way, Mini Squats, Leg press             CPT Codes available for Billing:   (10) minutes of Manual therapy (MT) to improve pain and ROM.  (20) minutes of Therapeutic Exercise (TE) to develop strength, endurance, range of motion, and flexibility.  (20) minutes of Neuromuscular Re-Education (NMR)  to improve: Balance, Coordination, Kinesthetic, Sense, Proprioception, and Posture.  (00) minutes of Therapeutic Activities (TA) to improve functional performance.  Vasopneumatic Device Therapy () for management of swelling/edema. (11647)  Unattended Electrical Stimulation (ES) for muscle performance or pain modulation.  BFR: Blood flow restriction applied during exercise       Time Entry(in minutes):       Assessment & Plan   Assessment: Patient tolerated all treatment well and was able to complete program as noted this treatment session. Cues for all interventions for correct technique and to avoid substitution patterns. She was able to progress into some single leg leg press work with good tolerance overall.       The patient will continue to benefit from skilled outpatient physical therapy in order to address the deficits listed in the problem list on the initial evaluation, provide patient and family education, and maximize the patients level of independence in the home and community environments.     The patient's spiritual, cultural, and educational needs were considered, and the patient is agreeable to the plan of care and goals.           Plan: Continue Plan of Care (POC) and progress per patient tolerance. See treatment section for details on planned progressions next session.    Goals:   Active       LTG       Pt will report worst pain of 1-2/10 in order to progress toward max functional ability and improve quality of life                Start:  06/05/25    Expected End:  08/01/25            Patient will demonstrate improved function as indicated by a score of  greater than or equal to 60 out of 100 on FOTO.                Start:  06/05/25    Expected End:  08/01/25            Patient will improve AROM to normal limits in order to return to maximal functional potential and improve quality of life.         Start:  06/05/25    Expected End:  08/01/25            Patient will achieve 90% LSI on quad and hamstring dynamometer testing to improve functional independence.        Start:  06/05/25    Expected End:  08/01/25               STG       Pt will report worst pain of 5/10 in order to progress toward max functional ability and improve quality of life                Start:  06/05/25    Expected End:  07/04/25            Patient will demonstrate improved function as indicated by a score of greater than or equal to 45 out of 100 on FOTO.                Start:  06/05/25    Expected End:  07/04/25            Patient will improve AROM to 50% of stated goals, listed in objective measures above, in order to progress towards independence with functional activities.         Start:  06/05/25    Expected End:  07/04/25            Patient will improve strength by 1/2 a grade, in order to progress towards independence with functional activities.                Start:  06/05/25    Expected End:  07/04/25            Patient will demonstrate independence with HEP in order to progress toward functional independence.        Start:  06/05/25    Expected End:  07/04/25                  Renny Edmondson, PT

## 2025-06-16 ENCOUNTER — CLINICAL SUPPORT (OUTPATIENT)
Dept: REHABILITATION | Facility: HOSPITAL | Age: 50
End: 2025-06-16
Payer: COMMERCIAL

## 2025-06-16 DIAGNOSIS — M25.662 DECREASED ROM OF LEFT KNEE: ICD-10-CM

## 2025-06-16 DIAGNOSIS — S83.242A ACUTE MEDIAL MENISCUS TEAR OF LEFT KNEE, INITIAL ENCOUNTER: Primary | ICD-10-CM

## 2025-06-16 DIAGNOSIS — R29.898 WEAKNESS OF LEFT LOWER EXTREMITY: ICD-10-CM

## 2025-06-16 DIAGNOSIS — Z98.890 S/P ARTHROSCOPIC PARTIAL MEDIAL MENISCECTOMY: ICD-10-CM

## 2025-06-16 PROCEDURE — 97110 THERAPEUTIC EXERCISES: CPT

## 2025-06-16 PROCEDURE — 97140 MANUAL THERAPY 1/> REGIONS: CPT

## 2025-06-16 PROCEDURE — 97112 NEUROMUSCULAR REEDUCATION: CPT

## 2025-06-16 PROCEDURE — 97016 VASOPNEUMATIC DEVICE THERAPY: CPT

## 2025-06-16 NOTE — PROGRESS NOTES
"  Outpatient Rehab    Physical Therapy Visit    Patient Name: Shelly Kam  MRN: [id number]  YOB: 1975  Encounter Date: 6/16/2025    Therapy Diagnosis:   Encounter Diagnoses   Name Primary?    Acute medial meniscus tear of left knee, initial encounter Yes    S/P arthroscopic partial medial meniscectomy     Weakness of left lower extremity     Decreased ROM of left knee          Physician: Terrell Scherer MD    Physician Orders: Eval and Treat  Medical Diagnosis: Acute medial meniscus tear of left knee, subsequent encounter  Surgical Diagnosis: S/P Left Medial Menisectomy/Chondroplasty   Surgical Date: Not applicable for this Episode    Visit # / Visits Authorized:  3 / 10  Insurance Authorization Period: 6/5/2025 to 12/31/2025  Date of Evaluation: 6/5/2025  Plan of Care Certification: 6/6/2025 to 7/4/2025      PT/PTA:     Number of PTA visits since last PT visit:   Time In: 0700   Time Out: 0810  Total Time (in minutes): 70   Total Billable Time (in minutes): 60    FOTO:  Intake Score:  %  Survey Score 2:  %  Survey Score 3:  %    Precautions:       Subjective   Patient reports she still been having a good bit of pain getting around the house and she feels like sometimes her knee cap gets stuck..  Pain reported as 6/10.      Objective            Treatment:     CPT Intervention Performed   Today Duration / Intensity   MT  Patella MOBS and Gentle Tib Fem MOBS x  08 minutes     TE Heel Prop x 5 minutes with Hot pack for weight and pain modulation     Seated knee flexion off EOM x 5 minutes      Leg Press  x 45# 3x10 and Single leg 25# 3x10      Sit to stands x  3x10 from high low mat  with GTB     Knee Flexion Matrix x 3x10 35#   NMR Bridges x 3x10       SAQ  x  3 mins with 5sec holds      LAQ  x  3 mins with 5sec holds     SLR x  2x10 with strap assist       Standing Hip abduction on 2" box  x RTB 3x10 only on the op leg    Standing Hip Extension on 2" box  x RTB 3x10 only on the op leg   TA      "                                                       Gameready ice and compression low x 10 minutes              PLAN  Nu-step, NMES QS, SAQ, LAQ, SLR, SL hip abduction, Standing Hip 3 way, Mini Squats, Leg press             CPT Codes available for Billing:   (08) minutes of Manual therapy (MT) to improve pain and ROM.  (22) minutes of Therapeutic Exercise (TE) to develop strength, endurance, range of motion, and flexibility.  (25) minutes of Neuromuscular Re-Education (NMR)  to improve: Balance, Coordination, Kinesthetic, Sense, Proprioception, and Posture.  (00) minutes of Therapeutic Activities (TA) to improve functional performance.  Vasopneumatic Device Therapy () for management of swelling/edema. (13025)  Unattended Electrical Stimulation (ES) for muscle performance or pain modulation.  BFR: Blood flow restriction applied during exercise       Time Entry(in minutes):       Assessment & Plan   Assessment: Patient tolerated this session well and we were luz to progress her resistance with multiple OKC exercises. She still has pain with transitional movements from flexion to extension around the patella.   Evaluation/Treatment Tolerance: Patient tolerated treatment well    The patient will continue to benefit from skilled outpatient physical therapy in order to address the deficits listed in the problem list on the initial evaluation, provide patient and family education, and maximize the patients level of independence in the home and community environments.     The patient's spiritual, cultural, and educational needs were considered, and the patient is agreeable to the plan of care and goals.           Plan: Continue Plan of Care (POC) and progress per patient tolerance. See treatment section for details on planned progressions next session.    Goals:   Active       LTG       Pt will report worst pain of 1-2/10 in order to progress toward max functional ability and improve quality of life                 Start:  06/05/25    Expected End:  08/01/25            Patient will demonstrate improved function as indicated by a score of greater than or equal to 60 out of 100 on FOTO.                Start:  06/05/25    Expected End:  08/01/25            Patient will improve AROM to normal limits in order to return to maximal functional potential and improve quality of life.         Start:  06/05/25    Expected End:  08/01/25            Patient will achieve 90% LSI on quad and hamstring dynamometer testing to improve functional independence.        Start:  06/05/25    Expected End:  08/01/25               STG       Pt will report worst pain of 5/10 in order to progress toward max functional ability and improve quality of life                Start:  06/05/25    Expected End:  07/04/25            Patient will demonstrate improved function as indicated by a score of greater than or equal to 45 out of 100 on FOTO.                Start:  06/05/25    Expected End:  07/04/25            Patient will improve AROM to 50% of stated goals, listed in objective measures above, in order to progress towards independence with functional activities.         Start:  06/05/25    Expected End:  07/04/25            Patient will improve strength by 1/2 a grade, in order to progress towards independence with functional activities.                Start:  06/05/25    Expected End:  07/04/25            Patient will demonstrate independence with HEP in order to progress toward functional independence.        Start:  06/05/25    Expected End:  07/04/25                    Renny Edmondson, PT

## 2025-06-23 ENCOUNTER — CLINICAL SUPPORT (OUTPATIENT)
Dept: REHABILITATION | Facility: HOSPITAL | Age: 50
End: 2025-06-23
Payer: COMMERCIAL

## 2025-06-23 DIAGNOSIS — Z98.890 S/P ARTHROSCOPIC PARTIAL MEDIAL MENISCECTOMY: ICD-10-CM

## 2025-06-23 DIAGNOSIS — M25.662 DECREASED ROM OF LEFT KNEE: ICD-10-CM

## 2025-06-23 DIAGNOSIS — S83.242A ACUTE MEDIAL MENISCUS TEAR OF LEFT KNEE, INITIAL ENCOUNTER: Primary | ICD-10-CM

## 2025-06-23 DIAGNOSIS — R29.898 WEAKNESS OF LEFT LOWER EXTREMITY: ICD-10-CM

## 2025-06-23 PROCEDURE — 97112 NEUROMUSCULAR REEDUCATION: CPT

## 2025-06-23 PROCEDURE — 97110 THERAPEUTIC EXERCISES: CPT

## 2025-06-23 NOTE — PROGRESS NOTES
Outpatient Rehab    Physical Therapy Visit    Patient Name: Shelly Kam  MRN: [id number]  YOB: 1975  Encounter Date: 6/23/2025    Therapy Diagnosis:   Encounter Diagnoses   Name Primary?    Acute medial meniscus tear of left knee, initial encounter Yes    S/P arthroscopic partial medial meniscectomy     Weakness of left lower extremity     Decreased ROM of left knee            Physician: Terrell Scherer MD    Physician Orders: Eval and Treat  Medical Diagnosis: Acute medial meniscus tear of left knee, subsequent encounter  Surgical Diagnosis: S/P Left Medial Menisectomy/Chondroplasty   Surgical Date: Not applicable for this Episode    Visit # / Visits Authorized:  4 / 10  Insurance Authorization Period: 6/5/2025 to 12/31/2025  Date of Evaluation: 6/5/2025  Plan of Care Certification: 6/6/2025 to 7/4/2025      PT/PTA:     Number of PTA visits since last PT visit:   Time In: 0810   Time Out: 0915  Total Time (in minutes): 65   Total Billable Time (in minutes): 58    FOTO:  Intake Score:  %  Survey Score 2:  %  Survey Score 3:  %    Precautions:       Subjective   Patient reports she still been having a good bit of pain getting around the house and she is very concerned with her knee as the pain has not been improving and she is still having as much pain in her knee now as she was when she first started and she feels like she can't wean off the crutch due to pain and difficulty with walking..         Objective            Treatment:     CPT Intervention Performed   Today Duration / Intensity   MT  Patella MOBS and Gentle Tib Fem MOBS x  08 minutes     TE Heel Prop x 5 minutes with Hot pack for weight and pain modulation     Seated knee flexion off EOM x 5 minutes    Prone Quad Stretch x 10sx 10       Leg Press  x 75# 3x10 and Single leg 35# 3x10      Sit to stands x  3x10 from high low mat  with GTB     Knee Flexion Matrix x 3x10 35#    Knee Extension Matrix ISO x 45 seconds x 4     Knee  "Extension Matrix  x 15# double leg    NMR Bridges x 3x10      Clamshells x GTB 3x10      Sidelying hip abduction x 3x10      SLR x  2x10 with strap assist      Step ups x 4" box 3x10     Maria Victoria walking in // bars x 8 laps alternating lead foot focused on heel strike and quad contraction.      Standing Hip abduction on 2" box  - RTB 3x10 only on the op leg    Standing Hip Extension on 2" box  - RTB 3x10 only on the op leg   TA                                                            Gameready ice and compression low x 10 minutes              PLAN  Nu-step, NMES QS, SAQ, LAQ, SLR, SL hip abduction, Standing Hip 3 way, Mini Squats, Leg press             CPT Codes available for Billing:   (08) minutes of Manual therapy (MT) to improve pain and ROM.  (27) minutes of Therapeutic Exercise (TE) to develop strength, endurance, range of motion, and flexibility.  (23) minutes of Neuromuscular Re-Education (NMR)  to improve: Balance, Coordination, Kinesthetic, Sense, Proprioception, and Posture.  (00) minutes of Therapeutic Activities (TA) to improve functional performance.  Vasopneumatic Device Therapy () for management of swelling/edema. (22764)  Unattended Electrical Stimulation (ES) for muscle performance or pain modulation.  BFR: Blood flow restriction applied during exercise       Time Entry(in minutes):       Assessment & Plan   Assessment: Discussed at length with patient about her procedure and concerns. Talked to her about how recovery for each person is different and focused on motivational interviewing to address her concerns. Patient was able to tolerate progressions to her plan today with more open and closed chain work for her knee, but had slight limitation with openchain due to pain. Worked on ambulating with no crutch in // bars and hurdles to focus on knee flexion and heel strike.   Evaluation/Treatment Tolerance: Patient limited by pain    The patient will continue to benefit from skilled outpatient " physical therapy in order to address the deficits listed in the problem list on the initial evaluation, provide patient and family education, and maximize the patients level of independence in the home and community environments.     The patient's spiritual, cultural, and educational needs were considered, and the patient is agreeable to the plan of care and goals.           Plan: Continue Plan of Care (POC) and progress per patient tolerance. See treatment section for details on planned progressions next session.    Goals:   Active       LTG       Pt will report worst pain of 1-2/10 in order to progress toward max functional ability and improve quality of life                Start:  06/05/25    Expected End:  08/01/25            Patient will demonstrate improved function as indicated by a score of greater than or equal to 60 out of 100 on FOTO.                Start:  06/05/25    Expected End:  08/01/25            Patient will improve AROM to normal limits in order to return to maximal functional potential and improve quality of life.         Start:  06/05/25    Expected End:  08/01/25            Patient will achieve 90% LSI on quad and hamstring dynamometer testing to improve functional independence.        Start:  06/05/25    Expected End:  08/01/25               STG       Pt will report worst pain of 5/10 in order to progress toward max functional ability and improve quality of life                Start:  06/05/25    Expected End:  07/04/25            Patient will demonstrate improved function as indicated by a score of greater than or equal to 45 out of 100 on FOTO.                Start:  06/05/25    Expected End:  07/04/25            Patient will improve AROM to 50% of stated goals, listed in objective measures above, in order to progress towards independence with functional activities.         Start:  06/05/25    Expected End:  07/04/25            Patient will improve strength by 1/2 a grade, in order to  progress towards independence with functional activities.                Start:  06/05/25    Expected End:  07/04/25            Patient will demonstrate independence with HEP in order to progress toward functional independence.        Start:  06/05/25    Expected End:  07/04/25                      Renny Edmondson PT

## 2025-06-24 ENCOUNTER — CLINICAL SUPPORT (OUTPATIENT)
Dept: REHABILITATION | Facility: HOSPITAL | Age: 50
End: 2025-06-24
Payer: COMMERCIAL

## 2025-06-24 DIAGNOSIS — Z98.890 S/P ARTHROSCOPIC PARTIAL MEDIAL MENISCECTOMY: ICD-10-CM

## 2025-06-24 DIAGNOSIS — M25.662 DECREASED ROM OF LEFT KNEE: ICD-10-CM

## 2025-06-24 DIAGNOSIS — R29.898 WEAKNESS OF LEFT LOWER EXTREMITY: ICD-10-CM

## 2025-06-24 DIAGNOSIS — S83.242A ACUTE MEDIAL MENISCUS TEAR OF LEFT KNEE, INITIAL ENCOUNTER: Primary | ICD-10-CM

## 2025-06-24 PROCEDURE — 97110 THERAPEUTIC EXERCISES: CPT

## 2025-06-24 PROCEDURE — 97112 NEUROMUSCULAR REEDUCATION: CPT

## 2025-06-24 NOTE — PROGRESS NOTES
"  Outpatient Rehab    Physical Therapy Visit    Patient Name: Shelly Kam  MRN: [id number]  YOB: 1975  Encounter Date: 6/24/2025    Therapy Diagnosis:   Encounter Diagnoses   Name Primary?    Acute medial meniscus tear of left knee, initial encounter Yes    S/P arthroscopic partial medial meniscectomy     Weakness of left lower extremity     Decreased ROM of left knee            Physician: Terrell Scherer MD    Physician Orders: Eval and Treat  Medical Diagnosis: Acute medial meniscus tear of left knee, subsequent encounter  Surgical Diagnosis: S/P Left Medial Menisectomy/Chondroplasty   Surgical Date: Not applicable for this Episode    Visit # / Visits Authorized:  5 / 10  Insurance Authorization Period: 6/5/2025 to 12/31/2025  Date of Evaluation: 6/5/2025  Plan of Care Certification: 6/6/2025 to 7/4/2025      PT/PTA:     Number of PTA visits since last PT visit:   Time In: 1522   Time Out: 1624  Total Time (in minutes): 62   Total Billable Time (in minutes): 58    FOTO:  Intake Score:  %  Survey Score 2:  %  Survey Score 3:  %    Precautions:       Subjective   Patient reports she is feeling a little bit better today compared to her last visit. She put a pain patch on her knee today and feels like it may be helping..  Pain reported as 5/10.      Objective            Treatment:     CPT Intervention Performed   Today Duration / Intensity   MT  Patella MOBS and Gentle Tib Fem MOBS x  08 minutes     TE Heel Prop x 5 minutes with Hot pack for weight and pain modulation     Supine Heel slides x 3 minutes    Prone Quad Stretch x 10sx 10       Leg Press  x 75# 3x10 and Single leg 35# 3x10      Sit to stands x  3x10 from high low mat     Knee Flexion Matrix x 3x10 35#    Knee Extension Matrix ISO x 45 seconds x 4     Knee Extension Matrix  x 15# double leg    NMR Bridges x 3x10      Clamshells x GTB 3x10      Sidelying hip abduction x 3x10      SLR x  2x10 with strap assist      Step ups x 4" box " "3x10     Maria Victoria walking in // bars x 8 laps alternating lead foot focused on heel strike and quad contraction.      Standing Hip abduction on 2" box  - RTB 3x10 only on the op leg    Standing Hip Extension on 2" box  - RTB 3x10 only on the op leg   TA                                                            Gameready ice and compression low - 10 minutes              PLAN  Nu-step, NMES QS, SAQ, LAQ, SLR, SL hip abduction, Standing Hip 3 way, Mini Squats, Leg press             CPT Codes available for Billing:   (08) minutes of Manual therapy (MT) to improve pain and ROM.  (27) minutes of Therapeutic Exercise (TE) to develop strength, endurance, range of motion, and flexibility.  (23) minutes of Neuromuscular Re-Education (NMR)  to improve: Balance, Coordination, Kinesthetic, Sense, Proprioception, and Posture.  (00) minutes of Therapeutic Activities (TA) to improve functional performance.  Vasopneumatic Device Therapy () for management of swelling/edema. (81933)  Unattended Electrical Stimulation (ES) for muscle performance or pain modulation.  BFR: Blood flow restriction applied during exercise       Time Entry(in minutes):       Assessment & Plan   Assessment: Patient tolerated this session significantly better than last visit. Patient had improved tolerance, to all OKC and CKC work today with good knee stability with all exercises.        The patient will continue to benefit from skilled outpatient physical therapy in order to address the deficits listed in the problem list on the initial evaluation, provide patient and family education, and maximize the patients level of independence in the home and community environments.     The patient's spiritual, cultural, and educational needs were considered, and the patient is agreeable to the plan of care and goals.           Plan: Continue Plan of Care (POC) and progress per patient tolerance. See treatment section for details on planned progressions next " session.    Goals:   Active       LTG       Pt will report worst pain of 1-2/10 in order to progress toward max functional ability and improve quality of life                Start:  06/05/25    Expected End:  08/01/25            Patient will demonstrate improved function as indicated by a score of greater than or equal to 60 out of 100 on FOTO.                Start:  06/05/25    Expected End:  08/01/25            Patient will improve AROM to normal limits in order to return to maximal functional potential and improve quality of life.         Start:  06/05/25    Expected End:  08/01/25            Patient will achieve 90% LSI on quad and hamstring dynamometer testing to improve functional independence.        Start:  06/05/25    Expected End:  08/01/25               STG       Pt will report worst pain of 5/10 in order to progress toward max functional ability and improve quality of life                Start:  06/05/25    Expected End:  07/04/25            Patient will demonstrate improved function as indicated by a score of greater than or equal to 45 out of 100 on FOTO.                Start:  06/05/25    Expected End:  07/04/25            Patient will improve AROM to 50% of stated goals, listed in objective measures above, in order to progress towards independence with functional activities.         Start:  06/05/25    Expected End:  07/04/25            Patient will improve strength by 1/2 a grade, in order to progress towards independence with functional activities.                Start:  06/05/25    Expected End:  07/04/25            Patient will demonstrate independence with HEP in order to progress toward functional independence.        Start:  06/05/25    Expected End:  07/04/25              Renny Edmondson, PT

## 2025-06-26 ENCOUNTER — TELEPHONE (OUTPATIENT)
Dept: ORTHOPEDICS | Facility: CLINIC | Age: 50
End: 2025-06-26
Payer: COMMERCIAL

## 2025-06-26 NOTE — TELEPHONE ENCOUNTER
Copied from CRM #[id number]. Topic: General Inquiry - Patient Advice  >> Jun 26, 2025 12:38 PM Tamika wrote:  Type:  Needs Medical Advice    Who Called: Shelly   Symptoms (please be specific): n/a   How long has patient had these symptoms:  n/a  Pharmacy name and phone #:  n/a  Would the patient rather a call back or a response via [facility]? Call back   Best Call Back Number: [phone] (M)    Additional Information: pt is calling in regards to her fmla paperwork. Pt stated that her employer only received the first page and they are needing  all the forms. They should have re faxed new forms to be filled out around 6/6 or 6/7. Pt would like to know if paperwork was received and sent back out.       Thanks KB

## 2025-06-30 NOTE — PROGRESS NOTES
Interventional Pain Note     Referring Physician: No ref. provider found    PCP: Heraclio Farrell MD    Chief Complaint:     Chief Complaint   Patient presents with    Follow-up        SUBJECTIVE:  Interval History (7/10/2025):  Patient Shelly Kam presents today for follow-up visit.  Patient is being seen for follow up of low back pain. Pain is across the lower back and radiates down the back of the left leg, with prolonged walking she will also feel it down the right leg. Pain is 5/10. She has not yet completed lumbar MRI.   She recently had L knee surgery with Dr. Scherer, currently in PT.  Patient denies night fever/night sweats, urinary incontinence, bowel incontinence, significant weight loss and significant motor weakness.   Patient denies any other complaints or concerns at this time.      5/29/2025  Shelly Kam is a 50 y.o. female who presents to the clinic for the evaluation of lower back pain.   Patient reports 8 month history of lower back pain.  Patient reports that lower back pain started after increased pain in her left knee.  Patient denies any previous surgeries in her lumbar spine or bilateral lower extremities.  Of note, patient is scheduled for left knee arthroscopic repair with Dr. Scherer 06/02/2025.  Lower back pain is described as an aching stabbing pain that starts in the midline lower lumbar spine area.  This pain then radiates down her right lower extremity along the lateral aspect to just below the knee.  Patient denies any significant radiation down her left lower extremity.  Pain is worse with prolonged standing and walking, better with sitting down.  Pain is currently rated a 8/10.  Patient denies any fevers, chills, saddle anesthesia, or bowel and bladder incontinence.      Non-Pharmacologic Treatments:  Physical Therapy/Home Exercise: yes  Ice/Heat:yes  TENS: no  Acupuncture: no  Massage: yes  Chiropractic: no        Previous Pain Medications:  Tylenol,  ibuprofen, diclofenac, Celebrex, Ambien, Flexeril, gabapentin, Lyrica, tramadol, Topamax, topicals     report:  Reviewed and consistent with medication use as prescribed.    Pain Procedures:   none    Pain Disability Index Review:         5/29/2025     8:42 AM   Last 3 PDI Scores   Pain Disability Index (PDI) 56       Imaging:     Results for orders placed during the hospital encounter of 05/11/24    X-Ray Lumbar Spine 5 View    Narrative  EXAMINATION:  LUMBAR SPINE    CLINICAL HISTORY:  Chronic low back pain without sciatica    TECHNIQUE:  AP lateral, oblique, and coned lateral views of the lumbar spine.    COMPARISON:  01/25/2021    FINDINGS:  There is normal alignment of the lumbar spine. There is no acute fracture.  There is grade 1 retrolisthesis of L5 on S1, which may be due to degenerative change and or ligamentum laxity.  Moderate intervertebral disc space narrowing is seen at L5/S1 with endplate sclerosis.  There is a marginal osteophyte at L4.  A similar appearance was seen on the previous exam.  The bones appear to be normally mineralized.    Impression  No acute bony abnormality detected.  Degenerative changes at L5/S1.      Electronically signed by: Mary Farrell  Date:    05/11/2024  Time:    23:53      MRI KNEE WITHOUT CONTRAST LEFT 05/29/2024     CLINICAL HISTORY:  Knee pain, chronic, negative xray (Age >= 5y);Meniscal tear, untreated, new symptoms;Bilateral primary osteoarthritis of knee     TECHNIQUE:  Multiplanar, multisequence images were preformed of the left knee.     COMPARISON:  None.     FINDINGS:  Medial meniscus: Partially extruded with intrasubstance mucoid degeneration.  Free edge tear lateral aspect posterior horn.     Lateral meniscus: No evidence of meniscal tear.     Ligaments:  ACL, PCL, MCL, and LCL complex are intact.  Mild diffuse edema adjacent to the MCL.     Tendons:  Extensor mechanism is maintained.     Cartilage:     Patellofemoral: Mild surface irregularity of the  retropatellar cartilage.  No full-thickness chondral defects.     Medial tibiofemoral: Mild thinning and surface irregularity of weight-bearing cartilage.  No full-thickness chondral defects.     Lateral tibiofemoral: Articular cartilage is maintained.     Bone: No fracture or marrow replacing process.  Mild marginal spurring of the medial tibiofemoral joint.     Miscellaneous: Mild-to-moderate joint effusion.     Impression:     1. Medial meniscus mucoid degeneration and posterior horn tear.  2. Retropatellar and medial tibiofemoral chondromalacia.  3. Intact supporting tendon and ligament structures.  4. Joint effusion.           Past Medical History:   Diagnosis Date    Allergy     Anemia     Asthma     Depression     Diabetes mellitus     Diabetes mellitus, type 2     Encounter for blood transfusion     Fibroid 11/17/2021    Fibromyalgia     GERD (gastroesophageal reflux disease)     Hyperlipidemia     Hypertension     Hyperthyroidism     Hypothyroidism     Panic attack     Prozac in past    Primary osteoarthritis of both knees 10/17/2023    Seasonal allergies      Past Surgical History:   Procedure Laterality Date    ARTHROSCOPIC CHONDROPLASTY OF KNEE JOINT Left 6/2/2025    Procedure: ARTHROSCOPY, KNEE, W/ CHONDROPLASTY;  Surgeon: Terrell Scherer MD;  Location: [facility] OR;  Service: Orthopedics;  Laterality: Left;  Medial femoral chondroplasty  Patellar chondroplasty    ARTHROSCOPY, KNEE, WITH MEDIAL OR LATERAL MENISCECTOMY Left 6/2/2025    Procedure: ARTHROSCOPY, KNEE, W/ MEDIAL OR LATERAL MENISCECTOMY;  Surgeon: Terrell Scherer MD;  Location: [facility] OR;  Service: Orthopedics;  Laterality: Left;  LEFT KNEE ARTHROSCOPY with:   Partial medial meniscectomy    BRONCHOSCOPY      DIAGNOSTIC LAPAROSCOPY N/A 09/12/2022    Procedure: LAPAROSCOPY, DIAGNOSTIC;  Surgeon: Talha Santa MD;  Location: [facility] OR;  Service: OB/GYN;  Laterality: N/A;    HERNIA REPAIR      SALPINGECTOMY Bilateral 09/12/2022    Procedure:  SALPINGECTOMY;  Surgeon: Talha Santa MD;  Location: [facility] OR;  Service: OB/GYN;  Laterality: Bilateral;    THYROIDECTOMY Bilateral 03/25/2021    Procedure: THYROIDECTOMY;  Surgeon: Alon Barton MD;  Location: [address];  Service: ENT;  Laterality: Bilateral;  NIMS monitor    TONSILLECTOMY      TOTAL ABDOMINAL HYSTERECTOMY N/A 09/12/2022    Procedure: HYSTERECTOMY, TOTAL, ABDOMINAL;  Surgeon: Talha Santa MD;  Location: [facility] OR;  Service: OB/GYN;  Laterality: N/A;    TRACHEOSTOMY      TRACHEOSTOMY TUBE PLACEMENT      XI ROBOTIC HYSTERECTOMY, WITH SALPINGECTOMY N/A 09/12/2022    Procedure: XI ROBOTIC HYSTERECTOMY,WITH SALPINGECTOMY;  Surgeon: Talha Santa MD;  Location: [facility] OR;  Service: OB/GYN;  Laterality: N/A;  converted to open without docking     Social History[1]  Family History   Problem Relation Name Age of Onset    Cancer Paternal Aunt      Asthma Mother Olu Gr     COPD Father Ysabel Ramirez     Vision loss Father Ysabel Ramirez     COPD Maternal Grandmother Jada Mejia     Stroke Maternal Uncle Steve Mejia        Review of patient's allergies indicates:   Allergen Reactions    Pcn [penicillins] Anaphylaxis     Throat , tongue swelling     Banana      Itchy mouth    Lisinopril Other (See Comments)     Lip swelling    Melon      Itchy mouth    Morphine Other (See Comments)     Unknown    Tree nuts      Oral itching       Current Outpatient Medications   Medication Sig    albuterol (PROVENTIL/VENTOLIN HFA) 90 mcg/actuation inhaler Inhale 2 puffs into the lungs every 6 (six) hours as needed for Wheezing or Shortness of Breath. Rescue    amLODIPine (NORVASC) 5 MG tablet Take 1 tablet (5 mg total) by mouth once daily.    aspirin (ECOTRIN) 81 MG EC tablet Take 1 tablet (81 mg total) by mouth once daily.    atorvastatin (LIPITOR) 20 MG tablet Take 1 tablet (20 mg total) by mouth once daily.    citalopram (CELEXA) 20 MG tablet Take 1 tablet (20 mg total) by mouth every evening.     diclofenac sodium (VOLTAREN) 1 % Gel Apply 2 g topically 3 (three) times daily.    fluticasone-salmeterol 250-50 mcg/dose (WIXELA INHUB) 250-50 mcg/dose diskus inhaler Inhale 1 puff into the lungs 2 (two) times a day. Controller    hydroCHLOROthiazide (HYDRODIURIL) 25 MG tablet Take 1 tablet (25 mg total) by mouth once daily.    HYDROcodone-acetaminophen (NORCO) 5-325 mg per tablet Take 1 tablet by mouth every 6 (six) hours as needed for Pain.    HYDROcodone-acetaminophen (NORCO) 5-325 mg per tablet Take 1 tablet by mouth every 8 (eight) hours as needed for Pain.    levothyroxine (SYNTHROID, LEVOTHROID) 175 MCG tablet Take 1 tablet (175 mcg total) by mouth before breakfast.    nabumetone (RELAFEN) 500 MG tablet Take 1 tablet (500 mg total) by mouth 2 (two) times daily.    nabumetone (RELAFEN) 750 MG tablet Take 1 tablet (750 mg total) by mouth 2 (two) times daily.    olmesartan (BENICAR) 40 MG tablet Take 1 tablet (40 mg total) by mouth once daily.    omeprazole (PRILOSEC) 40 MG capsule Take 1 capsule (40 mg total) by mouth once daily.    phentermine (LOMAIRA) 8 mg Tab Take 1 tablet by mouth once daily.    topiramate (TOPAMAX) 50 MG tablet Take 1 tablet (50 mg total) by mouth every morning.    zonisamide (ZONEGRAN) 25 MG Cap Take 2 capsules (50 mg total) by mouth every evening.     No current facility-administered medications for this visit.         ROS  Review of Systems   Constitutional:  Negative for chills, diaphoresis, fatigue and fever.   HENT:  Negative for ear discharge, ear pain, rhinorrhea, trouble swallowing and voice change.    Respiratory:  Negative for chest tightness, shortness of breath, wheezing and stridor.    Cardiovascular:  Positive for leg swelling. Negative for chest pain.   Gastrointestinal:  Negative for blood in stool, diarrhea, nausea and vomiting.   Endocrine: Negative for cold intolerance and heat intolerance.   Genitourinary:  Negative for dysuria, hematuria and urgency.  "  Musculoskeletal:  Positive for arthralgias, back pain, gait problem, joint swelling and myalgias. Negative for neck pain and neck stiffness.   Skin:  Negative for rash.   Neurological:  Positive for weakness, numbness and headaches. Negative for tremors, seizures, speech difficulty and light-headedness.   Hematological:  Does not bruise/bleed easily.   Psychiatric/Behavioral:  Negative for agitation, confusion and suicidal ideas.             OBJECTIVE:  /74   Pulse 76   Ht 5' 5" (1.651 m)   Wt 115.7 kg (255 lb 1.2 oz)   LMP 09/12/2022   BMI 42.45 kg/m²         Physical Exam  Constitutional:       Appearance: Normal appearance.   HENT:      Head: Normocephalic and atraumatic.   Eyes:      Extraocular Movements: Extraocular movements intact.      Pupils: Pupils are equal, round, and reactive to light.   Cardiovascular:      Pulses: Normal pulses.   Pulmonary:      Effort: Pulmonary effort is normal.   Musculoskeletal:      Right knee: Normal.      Left knee: Swelling and crepitus present. No bony tenderness. Decreased range of motion. Tenderness present over the medial joint line. Normal pulse.   Skin:     General: Skin is warm.      Capillary Refill: Capillary refill takes less than 2 seconds.   Neurological:      Mental Status: She is alert and oriented to person, place, and time.      Cranial Nerves: Cranial nerves 2-12 are intact.      Sensory: No sensory deficit.      Motor: Weakness present. No abnormal muscle tone.      Gait: Gait abnormal.      Deep Tendon Reflexes: Babinski sign absent on the right side. Babinski sign absent on the left side.      Reflex Scores:       Patellar reflexes are 2+ on the right side and 2+ on the left side.       Achilles reflexes are 1+ on the right side and 2+ on the left side.     Comments: Antalgic gait  4/5 strength in left knee extension and left knee flexion, 4/5 strength in right dorsiflexion   Psychiatric:         Mood and Affect: Mood normal.         " Behavior: Behavior normal.         Thought Content: Thought content normal.           Musculoskeletal:      Lumbar Exam  Incision: no  Pain with Flexion: yes  Pain with Extension: yes  ROM:  Decreased  Paraspinous TTP:  Right-greater-than-left  Facet TTP:  L5-S1  Facet Loading:  Positive bilaterally  SLR:  Positive on the right at 75°  SIJ TTP:  Negative bilaterally  ROBERT:  Negative bilaterally      LABS:  Lab Results   Component Value Date    WBC 6.56 05/30/2025    HGB 12.2 05/30/2025    HCT 38.7 05/30/2025    MCV 83 05/30/2025     05/30/2025       CMP  Sodium   Date Value Ref Range Status   05/30/2025 141 136 - 145 mmol/L Final   10/17/2023 140 136 - 145 mmol/L Final     Potassium   Date Value Ref Range Status   05/30/2025 3.9 3.5 - 5.1 mmol/L Final   10/17/2023 4.3 3.5 - 5.1 mmol/L Final     Chloride   Date Value Ref Range Status   05/30/2025 108 95 - 110 mmol/L Final   10/17/2023 100 95 - 110 mmol/L Final     CO2   Date Value Ref Range Status   05/30/2025 23 23 - 29 mmol/L Final   10/17/2023 28 23 - 29 mmol/L Final     Glucose   Date Value Ref Range Status   05/30/2025 106 70 - 110 mg/dL Final   10/17/2023 79 70 - 110 mg/dL Final     BUN   Date Value Ref Range Status   05/30/2025 15 6 - 20 mg/dL Final     Creatinine   Date Value Ref Range Status   05/30/2025 0.8 0.5 - 1.4 mg/dL Final     Calcium   Date Value Ref Range Status   05/30/2025 9.3 8.7 - 10.5 mg/dL Final   10/17/2023 9.7 8.7 - 10.5 mg/dL Final     Protein Total   Date Value Ref Range Status   05/30/2025 8.0 6.0 - 8.4 gm/dL Final     Total Protein   Date Value Ref Range Status   10/17/2023 7.4 6.0 - 8.4 g/dL Final     Albumin   Date Value Ref Range Status   05/30/2025 3.8 3.5 - 5.2 g/dL Final   10/17/2023 4.1 3.5 - 5.2 g/dL Final     Total Bilirubin   Date Value Ref Range Status   10/17/2023 0.2 0.1 - 1.0 mg/dL Final     Comment:     For infants and newborns, interpretation of results should be based  on gestational age, weight and in agreement  with clinical  observations.    Premature Infant recommended reference ranges:  Up to 24 hours.............<8.0 mg/dL  Up to 48 hours............<12.0 mg/dL  3-5 days..................<15.0 mg/dL  6-29 days.................<15.0 mg/dL       Bilirubin Total   Date Value Ref Range Status   05/30/2025 0.2 0.1 - 1.0 mg/dL Final     Comment:     For infants and newborns, interpretation of results should be based   on gestational age, weight and in agreement with clinical   observations.    Premature Infant recommended reference ranges:   0-24 hours:  <8.0 mg/dL   24-48 hours: <12.0 mg/dL   3-5 days:    <15.0 mg/dL   6-29 days:   <15.0 mg/dL     Alkaline Phosphatase   Date Value Ref Range Status   10/17/2023 73 55 - 135 U/L Final     ALP   Date Value Ref Range Status   05/30/2025 107 40 - 150 unit/L Final     AST   Date Value Ref Range Status   05/30/2025 9 (L) 11 - 45 unit/L Final   10/17/2023 18 10 - 40 U/L Final     ALT   Date Value Ref Range Status   05/30/2025 5 (L) 10 - 44 unit/L Final   10/17/2023 11 10 - 44 U/L Final     Anion Gap   Date Value Ref Range Status   05/30/2025 10 8 - 16 mmol/L Final     eGFR if    Date Value Ref Range Status   06/25/2022 >60 >60 mL/min/1.73 m^2 Final     eGFR if non    Date Value Ref Range Status   06/25/2022 >60 >60 mL/min/1.73 m^2 Final     Comment:     Calculation used to obtain the estimated glomerular filtration  rate (eGFR) is the CKD-EPI equation.          Lab Results   Component Value Date    HGBA1C 5.8 (H) 04/03/2025             ASSESSMENT:       50 y.o. year old female with lower back pain, consistent with     1. Facet arthritis of lumbosacral region        2. Lumbar radiculopathy            Facet arthritis of lumbosacral region    Lumbar radiculopathy               PLAN:   - Interventions:   - none at this time.  Pain appears to be consistent with right lumbar radiculopathy secondary to compensation from left knee pain. We will also obtain  updated MRI of the lumbar spine.   -discussed GERMANIA as possibility based on MRI    - Anticoagulation use:   No no anticoagulation    - Medications:  - continue Zonegran 50 mg at night  - continue Relafen 750 mg twice a day p.r.n. Do not combine with other NSAIDs such as ibuprofen, aleve, advil. Take with food. If any GI upset, stop medication and contact office.   We have previously discussed potential deleterious side effects of NSAIDs on the cardiovascular, gastrointestinal and renal systems. We have discussed judicious use of this medication.     -may take up to 2g of tylenol a day as needed    - patient has previously failed gabapentin and Lyrica    - Therapy:   - patient has completed 6 weeks of formal physical therapy in the last 3 months with minimal relief.    - Imaging/Diagnostic:  - x-rays of lumbar spine reviewed and findings discussed with patient.  Significant for grade 1 retrolisthesis of L5 upon S1 with loss of disc height of the level  - we will obtain updated MRI lumbar spine without contrast to further evaluate lower back pain with right lumbar radiculopathy that has continued despite over 8 weeks of conservative therapy    - Consults:   - continue follow up with Orthopedics for left knee pain.  Patient is scheduled for left knee arthroscopic repair on 06/02/2025        - Patient Questions: Answered all of the patient's questions regarding diagnosis, therapy, and treatment     This condition does not require this patient to take time off of work, and the primary goal of our Pain Management services is to improve the patient's functional capacity.     - Follow up visit: will contact via [employer] with mri results        The above plan and management options were discussed at length with patient. Patient is in agreement with the above and verbalized understanding.    I discussed the goals of interventional chronic pain management with the patient on today's visit.  I explained the utility of injections  for diagnostic and therapeutic purposes.  We discussed a multimodal approach to pain including treating the patient's given worst pain at any given time.  We will use a systematic approach to addressing pain.  We will also adopt a multimodal approach that includes injections, adjuvant medications, physical therapy, at times psychiatry.  There may be a limited role for opioid use intermittently in the treatment of pain, more particularly for acute pain although no one approach can be used as a sole treatment modality.    I emphasized the importance of regular exercise, core strengthening and stretching, diet and weight loss as a cornerstone of long-term pain management.      Breana Kimbrough, RYAN  Interventional Pain Management  [facility]    Future Appointments   Date Time Provider Department Center   7/11/2025 10:45 AM Terrell Scherer MD [facility] ORTHO [facility]   7/11/2025 11:00 AM Renny Edmondson, PT [facility]   7/18/2025  1:30 PM Camden Cui MD [facility]UL[facility]   8/4/2025  2:40 PM Heraclio Farrell MD [facility] IM [facility]   9/23/2025 11:00 AM Talha Santa MD [facility] OBGYN [facility]           Disclaimer:  This note was prepared using voice recognition system and is likely to have sound alike errors that may have been overlooked even after proof reading.  Please call me with any questions      No LOS data to display  This includes face to face time and non-face to face time preparing to see the patient (eg, review of tests), obtaining and/or reviewing separately obtained history, documenting clinical information in the electronic or other health record, independently interpreting results and communicating results to the patient/family/caregiver, or care coordinator.           [1]   Social History  Socioeconomic History    Marital status: Legally    Tobacco Use    Smoking status: Every Day     Current packs/day: 0.00     Average packs/day: 0.3 packs/day for 23.3 years  (5.8 ttl pk-yrs)     Types: Cigarettes     Start date: 8/10/1999     Last attempt to quit: 2022     Years since quittin.6    Smokeless tobacco: Never    Tobacco comments:     Smokes 8 long cigarettes that she relights   Substance and Sexual Activity    Alcohol use: Not Currently     Comment: once a year; hold 72 hrs prior to sx    Drug use: Yes     Types: Marijuana    Sexual activity: Yes     Partners: Male     Birth control/protection: Condom   Social History Narrative    Wears a seatbelt.     Social Drivers of Health     Financial Resource Strain: Medium Risk (2025)    Overall Financial Resource Strain (CARDIA)     Difficulty of Paying Living Expenses: Somewhat hard   Food Insecurity: Food Insecurity Present (2025)    Hunger Vital Sign     Worried About Running Out of Food in the Last Year: Patient declined     Ran Out of Food in the Last Year: Sometimes true   Transportation Needs: No Transportation Needs (2022)    PRAPARE - Transportation     Lack of Transportation (Medical): No     Lack of Transportation (Non-Medical): No   Physical Activity: Sufficiently Active (2025)    Exercise Vital Sign     Days of Exercise per Week: 7 days     Minutes of Exercise per Session: 30 min   Stress: Stress Concern Present (2025)    [employer] [place] [employer] - Occupational Stress Questionnaire     Feeling of Stress : Very much   Housing Stability: High Risk (2025)    Housing Stability Vital Sign     Unable to Pay for Housing in the Last Year: Yes

## 2025-07-03 ENCOUNTER — CLINICAL SUPPORT (OUTPATIENT)
Dept: REHABILITATION | Facility: HOSPITAL | Age: 50
End: 2025-07-03
Payer: COMMERCIAL

## 2025-07-03 DIAGNOSIS — M25.662 DECREASED ROM OF LEFT KNEE: ICD-10-CM

## 2025-07-03 DIAGNOSIS — Z98.890 S/P ARTHROSCOPIC PARTIAL MEDIAL MENISCECTOMY: ICD-10-CM

## 2025-07-03 DIAGNOSIS — S83.242A ACUTE MEDIAL MENISCUS TEAR OF LEFT KNEE, INITIAL ENCOUNTER: Primary | ICD-10-CM

## 2025-07-03 DIAGNOSIS — R29.898 WEAKNESS OF LEFT LOWER EXTREMITY: ICD-10-CM

## 2025-07-03 PROCEDURE — 97110 THERAPEUTIC EXERCISES: CPT

## 2025-07-03 PROCEDURE — 97140 MANUAL THERAPY 1/> REGIONS: CPT

## 2025-07-03 NOTE — PROGRESS NOTES
Outpatient Rehab    Physical Therapy Progress Note : Updated Plan of Care    Patient Name: Shelly Kam  MRN: [id number]  YOB: 1975  Encounter Date: 7/3/2025    Therapy Diagnosis:   Encounter Diagnoses   Name Primary?    Acute medial meniscus tear of left knee, initial encounter Yes    S/P arthroscopic partial medial meniscectomy     Weakness of left lower extremity     Decreased ROM of left knee      Physician: Terrell Scherer MD    Physician Orders: Eval and Treat  Medical Diagnosis: Acute medial meniscus tear of left knee, subsequent encounter  Surgical Diagnosis: S/P Left Medial Menisectomy/Chondroplasty   Surgical Date: Not applicable for this Episode  Days Since Last Surgery: Not applicable for this Episode    Visit # / Visits Authorized:  6 / 10  Insurance Authorization Period: 6/5/2025 to 12/31/2025  Date of Evaluation: 6/5/2025   Plan of Care Certification: 6/6/2025 to 7/4/2025      PT/PTA:     Number of PTA visits since last PT visit:   Time In: 0758   Time Out: 0900  Total Time (in minutes): 62   Total Billable Time (in minutes): 50    FOTO:  Intake Score:  %  Survey Score 2:  %  Survey Score 3:  %    Precautions:       Subjective   Patient reports she is still in a lot of pain in her knee and feels she is unable to walk without her crutches. She reports any time she straightens her knee she gets immense pain in her knee cap under the knee cap as it feels like it is tilting and pushing down and getting stuck..  Pain reported as 6/10.      Objective                 RANGE OF MOTION Right Left   Knee AROM  7/3/2025 PROM   7/3/2025 AROM  7/3/2025 PROM   7/3/2025   Hyper-Neutral- Flexion 2-0-110  2-0-112 0-110 0-110    *Pain at end range extension in her superior and retropatella knee cap.      STRENGTH:   L/E MMT Right  (spine) Left Pain/Dysfunction with Movement Goal   Hip Flexion  4/5 4/5   4+/5 B   Hip Extension  4/5 4/5   4+/5 B   Hip Abduction  4/5 4/5   4+/5 B   Knee Extension  "5/5 4/5 Pain in her knee cap   5/5 B   Knee Flexion 5/5 4-/5  Pain in her knee cap  5/5 B   Hip IR 4/5 4/5   4+/5 B   Hip ER 4+/5 4-/5   4+/5 B   Ankle DF 5/5 5/5   5/5 B   Ankle PF 5/5 5/5   5/5 B   Ankle Inversion 5/5 5/5   5/5 B   Ankle Eversion 5/5 5/5   5/5 B            Lower extremity srength with MicroFET handheld dynamometer Right Left LSI   Hip flexion 9.9 kg  8.5 kg  86%   Quadriceps 28.6 kg  19.5 kg  68%   Hamstrings 23.3 kg  13 kg  56%    *PAIN REPORTED IN THE KNEE CAP WITH BOTH HAMSTRINGS AND QUADRICEPS.         Functional Tests  Outcome Norms   Timed Up and Go 12.8 seconds  <13.5 sec   30 second Sit to Stand 16 Age Male Female   60-64 <14 <12   65-69 <12 <11   70-74 <12 <10   75-79 <11 <10   80-84 <10 <9   85-89 <8 <8   90-93 <7 <4            Gait Analysis: Gait Analysis: The patient ambulated with the following assistive device: Single axillary crutches; the patient presents with the following gait abnormalities: decreased step length, decreased and decreased weight shift         Limitation/Restriction for FOTO knee Survey     Therapist reviewed FOTO scores for Shelly Kam on 7/3/2025.   FOTO documents entered into [employer] - see Media section.     Limitation Score:  38%            Treatment:     CPT Intervention Performed   Today Duration / Intensity   MT  Patella MOBS and Gentle Tib Fem MOBS x  08 minutes     TE Prone knee hang x 5 minutes with Hot pack on back of knee to help with relaxation     Supine Heel slides x 3 minutes    Prone Quad Stretch x 10sx 10       Leg Press  - 75# 3x10 and Single leg 35# 3x10      Sit to stands -  3x10 from high low mat     Knee Flexion Matrix - 3x10 35#    Knee Extension Matrix ISO - 45 seconds x 4     Knee Extension Matrix  - 15# double leg     Objective re-assessment  x 20 minutes    NMR Bridges - 3x10      Clamshells - GTB 3x10      Sidelying hip abduction - 3x10      SLR -  2x10 with strap assist      Step ups - 4" box 3x10     Maria Victoria walking in // " "bars - 8 laps alternating lead foot focused on heel strike and quad contraction.      Standing Hip abduction on 2" box  - RTB 3x10 only on the op leg    Standing Hip Extension on 2" box  - RTB 3x10 only on the op leg   TA                                                            Gameready ice and compression low - 10 minutes              PLAN  Nu-step, NMES QS, SAQ, LAQ, SLR, SL hip abduction, Standing Hip 3 way, Mini Squats, Leg press             CPT Codes available for Billing:   (10) minutes of Manual therapy (MT) to improve pain and ROM.  (40) minutes of Therapeutic Exercise (TE) to develop strength, endurance, range of motion, and flexibility.  (00) minutes of Neuromuscular Re-Education (NMR)  to improve: Balance, Coordination, Kinesthetic, Sense, Proprioception, and Posture.  (00) minutes of Therapeutic Activities (TA) to improve functional performance.  Vasopneumatic Device Therapy () for management of swelling/edema. (83813)  Unattended Electrical Stimulation (ES) for muscle performance or pain modulation.  BFR: Blood flow restriction applied during exercise       Time Entry(in minutes):       Assessment & Plan   Assessment  Patient with poor tolerance to this session due to pain, continued to try to wean the patient from her crutch but she still is having a lot of pain in her knee that based on descriptions is retropatella and is easily replicated with forced Terminal knee extension or superior patella glides. She reports it feels grinding and sharp.   Evaluation/Treatment Tolerance: Patient limited by pain  Plan  From a physical therapy perspective, the patient would benefit from: Skilled Rehab Services    Planned therapy interventions include: Therapeutic exercise, Therapeutic activities, Neuromuscular re-education, Manual therapy, ADLs/IADLs, Aquatic therapy, Gait training, Wound care, and Other (Comment). virtual visits, dry needling, modalities, electrical stimulation (IFC, Pre-Mod, Attended with " Functional Dry Needling),Cervical/Lumbar Traction, Electrical Stimulation IFC/NMES, Moist Heat/ Ice, Patient Education, and Self Care          Visit Frequency: 2 times Per Week for 4 Weeks.       This plan was discussed with Patient.   Discussion participants: Agreed Upon Plan of Care             The patient will continue to benefit from skilled outpatient physical therapy in order to address the deficits listed in the problem list on the initial evaluation, provide patient and family education, and maximize the patients level of independence in the home and community environments.     The patient's spiritual, cultural, and educational needs were considered, and the patient is agreeable to the plan of care and goals.           Goals:   Active       LTG       Pt will report worst pain of 1-2/10 in order to progress toward max functional ability and improve quality of life                Start:  06/05/25    Expected End:  08/01/25            Patient will demonstrate improved function as indicated by a score of greater than or equal to 60 out of 100 on FOTO.                Start:  06/05/25    Expected End:  08/01/25            Patient will improve AROM to normal limits in order to return to maximal functional potential and improve quality of life.         Start:  06/05/25    Expected End:  08/01/25            Patient will achieve 90% LSI on quad and hamstring dynamometer testing to improve functional independence.        Start:  06/05/25    Expected End:  08/01/25               STG       Pt will report worst pain of 5/10 in order to progress toward max functional ability and improve quality of life                Start:  06/05/25    Expected End:  07/04/25            Patient will demonstrate improved function as indicated by a score of greater than or equal to 45 out of 100 on FOTO.                Start:  06/05/25    Expected End:  07/04/25            Patient will improve AROM to 50% of stated goals, listed in  objective measures above, in order to progress towards independence with functional activities.         Start:  06/05/25    Expected End:  07/04/25            Patient will improve strength by 1/2 a grade, in order to progress towards independence with functional activities.                Start:  06/05/25    Expected End:  07/04/25            Patient will demonstrate independence with HEP in order to progress toward functional independence.        Start:  06/05/25    Expected End:  07/04/25                Renny Edmondson PT

## 2025-07-08 ENCOUNTER — CLINICAL SUPPORT (OUTPATIENT)
Dept: REHABILITATION | Facility: HOSPITAL | Age: 50
End: 2025-07-08
Payer: COMMERCIAL

## 2025-07-08 DIAGNOSIS — Z98.890 S/P ARTHROSCOPIC PARTIAL MEDIAL MENISCECTOMY: ICD-10-CM

## 2025-07-08 DIAGNOSIS — S83.242A ACUTE MEDIAL MENISCUS TEAR OF LEFT KNEE, INITIAL ENCOUNTER: Primary | ICD-10-CM

## 2025-07-08 DIAGNOSIS — M25.662 DECREASED ROM OF LEFT KNEE: ICD-10-CM

## 2025-07-08 DIAGNOSIS — R29.898 WEAKNESS OF LEFT LOWER EXTREMITY: ICD-10-CM

## 2025-07-08 PROCEDURE — 97112 NEUROMUSCULAR REEDUCATION: CPT

## 2025-07-08 PROCEDURE — 97110 THERAPEUTIC EXERCISES: CPT

## 2025-07-08 PROCEDURE — 97140 MANUAL THERAPY 1/> REGIONS: CPT

## 2025-07-08 NOTE — PROGRESS NOTES
Outpatient Rehab    Physical Therapy Visit    Patient Name: Shelly Kam  MRN: [id number]  YOB: 1975  Encounter Date: 7/8/2025    Therapy Diagnosis:   Encounter Diagnoses   Name Primary?    Acute medial meniscus tear of left knee, initial encounter Yes    S/P arthroscopic partial medial meniscectomy     Weakness of left lower extremity     Decreased ROM of left knee      Physician: Terrell Scherer MD    Physician Orders: Eval and Treat  Medical Diagnosis: Acute medial meniscus tear of left knee, subsequent encounter  Surgical Diagnosis: S/P Left Medial Menisectomy/Chondroplasty   Surgical Date: Not applicable for this Episode  Days Since Last Surgery: Not applicable for this Episode    Visit # / Visits Authorized:  7 / 10  Insurance Authorization Period: 6/5/2025 to 12/31/2025  Date of Evaluation: 6/5/2025  Plan of Care Certification: 7/3/2025 to 7/31/2025      PT/PTA:     Number of PTA visits since last PT visit:   Time In: 1325   Time Out: 1425  Total Time (in minutes): 60   Total Billable Time (in minutes): 40    FOTO:  Intake Score:  %  Survey Score 2:  %  Survey Score 3:  %    Precautions:       Subjective   Patient reports she is feeling a little better today, compared to last time, she has been walking without her crutches, but still notices her limp and it bothers her..  Pain reported as 5/10.      Objective            Treatment:   Treatment:  CPT Intervention Performed   Today Duration / Intensity   MT  Patella MOBS and Gentle Tib Fem MOBS x  08 minutes     TE Prone knee hang x 5 minutes with Hot pack on back of knee to help with relaxation     Supine Heel slides x 3 minutes     Prone Quad Stretch x 10sx 10       Leg Press  - 75# 3x10 and Single leg 35# 3x10       Sit to stands -  3x10 from high low mat      Knee Flexion Matrix x 3x10 35#     Knee Extension Matrix  x 15# double leg           NMR Bridges x 3x10      Clamshells x GTB 3x10      Sidelying hip abduction x 3x10       SLR x  " 2x10 with strap assist       Step ups x 4" box 3x10      Maria Victoria walking in // bars - 8 laps alternating lead foot focused on heel strike and quad contraction.       Standing Hip abduction on 2" box  - RTB 3x10 only on the op leg     Standing Hip Extension on 2" box  - RTB 3x10 only on the op leg   TA                                                            Gameready ice and compression low - 10 minutes              PLAN  Nu-step, NMES QS, SAQ, LAQ, SLR, SL hip abduction, Standing Hip 3 way, Mini Squats, Leg press             CPT Codes available for Billing:   (10) minutes of Manual therapy (MT) to improve pain and ROM.  (15) minutes of Therapeutic Exercise (TE) to develop strength, endurance, range of motion, and flexibility.  (15) minutes of Neuromuscular Re-Education (NMR)  to improve: Balance, Coordination, Kinesthetic, Sense, Proprioception, and Posture.  (00) minutes of Therapeutic Activities (TA) to improve functional performance.  Vasopneumatic Device Therapy () for management of swelling/edema. (90207)  Unattended Electrical Stimulation (ES) for muscle performance or pain modulation.  BFR: Blood flow restriction applied during exercise       Time Entry(in minutes):       Assessment & Plan   Assessment: Patient with improved tolerance to physical therapy today compared to last session. Session was cut short slightly due to her having a dentist appointment across [facility]. Improved tolerance to extension based activities today but still ambulates with notable flex in her knee with Weight bearing activities.   Evaluation/Treatment Tolerance: Patient tolerated treatment well    The patient will continue to benefit from skilled outpatient physical therapy in order to address the deficits listed in the problem list on the initial evaluation, provide patient and family education, and maximize the patients level of independence in the home and community environments.     The patient's spiritual, cultural, and " educational needs were considered, and the patient is agreeable to the plan of care and goals.           Plan: Continue Plan of Care (POC) and progress per patient tolerance. See treatment section for details on planned progressions next session.    Goals:   Active       LTG       Pt will report worst pain of 1-2/10 in order to progress toward max functional ability and improve quality of life                Start:  06/05/25    Expected End:  08/01/25            Patient will demonstrate improved function as indicated by a score of greater than or equal to 60 out of 100 on FOTO.                Start:  06/05/25    Expected End:  08/01/25            Patient will improve AROM to normal limits in order to return to maximal functional potential and improve quality of life.         Start:  06/05/25    Expected End:  08/01/25            Patient will achieve 90% LSI on quad and hamstring dynamometer testing to improve functional independence.        Start:  06/05/25    Expected End:  08/01/25               STG       Pt will report worst pain of 5/10 in order to progress toward max functional ability and improve quality of life                Start:  06/05/25    Expected End:  07/04/25            Patient will demonstrate improved function as indicated by a score of greater than or equal to 45 out of 100 on FOTO.                Start:  06/05/25    Expected End:  07/04/25            Patient will improve AROM to 50% of stated goals, listed in objective measures above, in order to progress towards independence with functional activities.         Start:  06/05/25    Expected End:  07/04/25            Patient will improve strength by 1/2 a grade, in order to progress towards independence with functional activities.                Start:  06/05/25    Expected End:  07/04/25            Patient will demonstrate independence with HEP in order to progress toward functional independence.        Start:  06/05/25    Expected End:  07/04/25                 Renny Edmondson, PT

## 2025-07-10 ENCOUNTER — OFFICE VISIT (OUTPATIENT)
Dept: PAIN MEDICINE | Facility: CLINIC | Age: 50
End: 2025-07-10
Payer: COMMERCIAL

## 2025-07-10 VITALS
HEART RATE: 76 BPM | HEIGHT: 65 IN | WEIGHT: 255.06 LBS | SYSTOLIC BLOOD PRESSURE: 109 MMHG | BODY MASS INDEX: 42.49 KG/M2 | DIASTOLIC BLOOD PRESSURE: 74 MMHG

## 2025-07-10 DIAGNOSIS — M47.817 FACET ARTHRITIS OF LUMBOSACRAL REGION: Primary | ICD-10-CM

## 2025-07-10 DIAGNOSIS — M54.16 LUMBAR RADICULOPATHY: ICD-10-CM

## 2025-07-10 PROCEDURE — 3074F SYST BP LT 130 MM HG: CPT | Mod: CPTII,S$GLB,, | Performed by: NURSE PRACTITIONER

## 2025-07-10 PROCEDURE — 99999 PR PBB SHADOW E&M-EST. PATIENT-LVL III: CPT | Mod: PBBFAC,,, | Performed by: NURSE PRACTITIONER

## 2025-07-10 PROCEDURE — 3078F DIAST BP <80 MM HG: CPT | Mod: CPTII,S$GLB,, | Performed by: NURSE PRACTITIONER

## 2025-07-10 PROCEDURE — 3044F HG A1C LEVEL LT 7.0%: CPT | Mod: CPTII,S$GLB,, | Performed by: NURSE PRACTITIONER

## 2025-07-10 PROCEDURE — 3008F BODY MASS INDEX DOCD: CPT | Mod: CPTII,S$GLB,, | Performed by: NURSE PRACTITIONER

## 2025-07-10 PROCEDURE — 99213 OFFICE O/P EST LOW 20 MIN: CPT | Mod: S$GLB,,, | Performed by: NURSE PRACTITIONER

## 2025-07-10 PROCEDURE — 1159F MED LIST DOCD IN RCRD: CPT | Mod: CPTII,S$GLB,, | Performed by: NURSE PRACTITIONER

## 2025-07-10 PROCEDURE — 4010F ACE/ARB THERAPY RXD/TAKEN: CPT | Mod: CPTII,S$GLB,, | Performed by: NURSE PRACTITIONER

## 2025-07-11 ENCOUNTER — TELEPHONE (OUTPATIENT)
Dept: ORTHOPEDICS | Facility: CLINIC | Age: 50
End: 2025-07-11
Payer: COMMERCIAL

## 2025-07-11 NOTE — TELEPHONE ENCOUNTER
Patient call has been returned regarding missed post-op appointment. Patient voicemail was full and couldn't receive any messages. Will continue to get in contact with patient to reschedule appointment\.

## 2025-07-11 NOTE — TELEPHONE ENCOUNTER
Copied from CRM #[id number]. Topic: General Inquiry - Patient Advice  >> Jul 11, 2025 10:59 AM Alirio wrote:  Type:  Needs Medical Advice    Who Called: nadia  Would the patient rather a call back or a response via [facility]? Call back  Best Call Back Number: [phone]  Additional Information: regarding missed post op appt today

## 2025-07-11 NOTE — TELEPHONE ENCOUNTER
Copied from CRM #[id number]. Topic: General Inquiry - Return Call  >> Jul 11, 2025 11:46 AM Eloisa wrote:  Type:  Patient Returning Call    Who Called:Shelly Kam   Who Left Message for Patient: Sharon   Does the patient know what this is regarding?: her post op appt   Would the patient rather a call back or a response via [facility]?  Call   Best Call Back Number: [phone]   Additional Information: pt states if your not able to reach her she is ok with a message on [facility].

## 2025-07-11 NOTE — TELEPHONE ENCOUNTER
Patient call was returned regarding missed left knee TKA post-op appointment.Patient was reschedule for 7/18/2025 8:30am

## 2025-07-17 NOTE — PROGRESS NOTES
Terrell Scherer MD  Orthopedic Surgeon   [address] [place] [address]                 Name: Shelly Kam  MRN: [id number]  Date: 7/18/2025    Patient ID: Shelly Kam is a 50 y.o. female from [place]    Reason for visit:  Follow up post arthroscopy 6 weeks, date of surgery 2nd and June 2025    Patient Instructions     Encounter Diagnosis   Name Primary?    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  Current pain level 6/10    TREATMENT/PLAN:  40 mg Depo-Medrol +1 cc Marcaine intra-articular injection medial joint line of the left knee  Order single injection Visco supplement for injection in 3-4 weeks left knee  Return to office 3-4 weeks  Finish last visit with therapy and continue home exercise protocol.  Conservative care for arthritis at this point.  The patient has progressive pain despite conservative treatment may become a future candidate for knee replacement     HISTORY OF PRESENT ILLNESS:   Shelly Kam is a 50 y.o. female.Patient presents today 1-1/2 months follow up for knee arthroscopy  She reports patellofemoral and medial joint pain 6/10.  Worse with standing getting out of a chair and walking.  1 session of physical therapy remaining.    Post op diagnosis: LEFT KNEE   Medial meniscal tear, posterior body and horn, complex  Medial femoral chondromalacia grade 3-4 diffuse  Patellar chondromalacia grade 2  Medial tibial chondromalacia grade 2                         Procedure: LEFT KNEE ARTHROSCOPY with:                          1. Partial medial  meniscectomy                          2. Medial femoral chondroplasty                          3. Patellar chondroplasty      Objective     PHYSICAL EXAMINATION: Body mass index is 43.07 kg/m².    GENERAL:  Cooperative pleasant  female, well dressed well groomed    EXTREMITY:   Left knee with mild synovial fullness medial and lateral.  Arthroscopic portals are well healed.  Calf is soft.  Neurovascularly intact left leg.  Pain around the patellofemoral joint and medial joint line to palpation.  Negative Pretty.  Positive antalgic gait.         MEDICATIONS: Current Medications[1]    ALLERGIES:   Review of patient's allergies indicates:   Allergen Reactions    Pcn [penicillins] Anaphylaxis     Throat , tongue swelling     Banana      Itchy mouth    Lisinopril Other (See Comments)     Lip swelling    Melon      Itchy mouth    Morphine Other (See Comments)     Unknown    Tree nuts      Oral itching       MEDICAL HISTORY:   Past Medical History:   Diagnosis Date    Allergy     Anemia     Asthma     Depression     Diabetes mellitus     Diabetes mellitus, type 2     Encounter for blood transfusion     Fibroid 11/17/2021    Fibromyalgia     GERD (gastroesophageal reflux disease)     Hyperlipidemia     Hypertension     Hyperthyroidism     Hypothyroidism     Panic attack     Prozac in past    Primary osteoarthritis of both knees 10/17/2023    Seasonal allergies        SURGICAL HISTORY:   Past Surgical History:   Procedure Laterality Date    ARTHROSCOPIC CHONDROPLASTY OF KNEE JOINT Left 6/2/2025    Procedure: ARTHROSCOPY, KNEE, W/ CHONDROPLASTY;  Surgeon: Terrell Scherer MD;  Location: [facility] OR;  Service: Orthopedics;  Laterality: Left;  Medial femoral chondroplasty  Patellar chondroplasty    ARTHROSCOPY, KNEE, WITH MEDIAL OR LATERAL MENISCECTOMY Left 6/2/2025    Procedure: ARTHROSCOPY, KNEE, W/ MEDIAL OR LATERAL MENISCECTOMY;  Surgeon: Terrell Scherer MD;  Location: [facility] OR;  Service: Orthopedics;  Laterality:  Left;  LEFT KNEE ARTHROSCOPY with:   Partial medial meniscectomy    BRONCHOSCOPY      DIAGNOSTIC LAPAROSCOPY N/A 2022    Procedure: LAPAROSCOPY, DIAGNOSTIC;  Surgeon: Talha Santa MD;  Location: [facility];  Service: OB/GYN;  Laterality: N/A;    HERNIA REPAIR      SALPINGECTOMY Bilateral 2022    Procedure: SALPINGECTOMY;  Surgeon: Talha Santa MD;  Location: [facility] OR;  Service: OB/GYN;  Laterality: Bilateral;    THYROIDECTOMY Bilateral 2021    Procedure: THYROIDECTOMY;  Surgeon: Alon Barton MD;  Location: [address];  Service: ENT;  Laterality: Bilateral;  NIMS monitor    TONSILLECTOMY      TOTAL ABDOMINAL HYSTERECTOMY N/A 2022    Procedure: HYSTERECTOMY, TOTAL, ABDOMINAL;  Surgeon: Talha Santa MD;  Location: [facility];  Service: OB/GYN;  Laterality: N/A;    TRACHEOSTOMY      TRACHEOSTOMY TUBE PLACEMENT      XI ROBOTIC HYSTERECTOMY, WITH SALPINGECTOMY N/A 2022    Procedure: XI ROBOTIC HYSTERECTOMY,WITH SALPINGECTOMY;  Surgeon: Talha Santa MD;  Location: [facility];  Service: OB/GYN;  Laterality: N/A;  converted to open without docking       REVIEW OF SYSTEMS:   No fevers, chills, sweats, chest pain or shortness of breath.    SOCIAL HISTORY:   Social History     Occupational History    Not on file   Tobacco Use    Smoking status: Every Day     Current packs/day: 0.00     Average packs/day: 0.3 packs/day for 23.3 years (5.8 ttl pk-yrs)     Types: Cigarettes     Start date: 8/10/1999     Last attempt to quit: 2022     Years since quittin.6    Smokeless tobacco: Never    Tobacco comments:     Smokes 8 long cigarettes that she relights   Substance and Sexual Activity    Alcohol use: Not Currently     Comment: once a year; hold 72 hrs prior to sx    Drug use: Yes     Types: Marijuana    Sexual activity: Yes     Partners: Male     Birth control/protection: Condom       Patient Type: Established Patient  Visit Type: Injection Only - NOLOS     Fifteen minute patient  encounter  This includes face to face time and non-face to face time preparing to see the patient (eg, review of tests),   obtaining and/or reviewing separately obtained history, documenting clinical information in the electronic or other health record, independently interpreting results   and communicating results to the patient/family/caregiver, or care coordinator.    DISCLAIMER: This note was prepared with [employer] voice recognition transcription software. Garbled syntax, mangled pronouns, and other bizarre constructions may be attributed to that software system.      Terrell Scherer M.D.  Orthopedic Surgeon  [facility]             [1]   Current Outpatient Medications:     albuterol (PROVENTIL/VENTOLIN HFA) 90 mcg/actuation inhaler, Inhale 2 puffs into the lungs every 6 (six) hours as needed for Wheezing or Shortness of Breath. Rescue, Disp: 18 g, Rfl: 3    amLODIPine (NORVASC) 5 MG tablet, Take 1 tablet (5 mg total) by mouth once daily., Disp: 90 tablet, Rfl: 3    aspirin (ECOTRIN) 81 MG EC tablet, Take 1 tablet (81 mg total) by mouth once daily., Disp: 90 tablet, Rfl: 3    atorvastatin (LIPITOR) 20 MG tablet, Take 1 tablet (20 mg total) by mouth once daily., Disp: 90 tablet, Rfl: 3    citalopram (CELEXA) 20 MG tablet, Take 1 tablet (20 mg total) by mouth every evening., Disp: 90 tablet, Rfl: 3    diclofenac sodium (VOLTAREN) 1 % Gel, Apply 2 g topically 3 (three) times daily., Disp: 100 g, Rfl: 2    fluticasone-salmeterol 250-50 mcg/dose (WIXELA INHUB) 250-50 mcg/dose diskus inhaler, Inhale 1 puff into the lungs 2 (two) times a day. Controller, Disp: 60 each, Rfl: 11    hydroCHLOROthiazide (HYDRODIURIL) 25 MG tablet, Take 1 tablet (25 mg total) by mouth once daily., Disp: 90 tablet, Rfl: 3    levothyroxine (SYNTHROID, LEVOTHROID) 175 MCG tablet, Take 1 tablet (175 mcg total) by mouth before breakfast., Disp: 90 tablet, Rfl: 3    nabumetone (RELAFEN) 500 MG tablet, Take 1 tablet (500 mg total) by  mouth 2 (two) times daily., Disp: 60 tablet, Rfl: 0    nabumetone (RELAFEN) 750 MG tablet, Take 1 tablet (750 mg total) by mouth 2 (two) times daily., Disp: 60 tablet, Rfl: 0    olmesartan (BENICAR) 40 MG tablet, Take 1 tablet (40 mg total) by mouth once daily., Disp: 90 tablet, Rfl: 3    omeprazole (PRILOSEC) 40 MG capsule, Take 1 capsule (40 mg total) by mouth once daily., Disp: 90 capsule, Rfl: 3    phentermine (LOMAIRA) 8 mg Tab, Take 1 tablet by mouth once daily., Disp: 30 each, Rfl: 0    topiramate (TOPAMAX) 50 MG tablet, Take 1 tablet (50 mg total) by mouth every morning., Disp: 90 tablet, Rfl: 3    zonisamide (ZONEGRAN) 25 MG Cap, Take 2 capsules (50 mg total) by mouth every evening., Disp: 60 capsule, Rfl: 2

## 2025-07-18 ENCOUNTER — CLINICAL SUPPORT (OUTPATIENT)
Dept: REHABILITATION | Facility: HOSPITAL | Age: 50
End: 2025-07-18
Payer: COMMERCIAL

## 2025-07-18 ENCOUNTER — OFFICE VISIT (OUTPATIENT)
Dept: ORTHOPEDICS | Facility: CLINIC | Age: 50
End: 2025-07-18
Payer: COMMERCIAL

## 2025-07-18 VITALS — WEIGHT: 258.81 LBS | BODY MASS INDEX: 43.12 KG/M2 | HEIGHT: 65 IN

## 2025-07-18 DIAGNOSIS — Z98.890 STATUS POST ARTHROSCOPY OF LEFT KNEE: Primary | ICD-10-CM

## 2025-07-18 DIAGNOSIS — M25.662 DECREASED ROM OF LEFT KNEE: ICD-10-CM

## 2025-07-18 DIAGNOSIS — S83.242A ACUTE MEDIAL MENISCUS TEAR OF LEFT KNEE, INITIAL ENCOUNTER: Primary | ICD-10-CM

## 2025-07-18 DIAGNOSIS — R29.898 WEAKNESS OF LEFT LOWER EXTREMITY: ICD-10-CM

## 2025-07-18 DIAGNOSIS — Z98.890 S/P ARTHROSCOPIC PARTIAL MEDIAL MENISCECTOMY: ICD-10-CM

## 2025-07-18 DIAGNOSIS — Z98.890 STATUS POST ARTHROSCOPY OF LEFT KNEE: ICD-10-CM

## 2025-07-18 DIAGNOSIS — M17.0 PRIMARY OSTEOARTHRITIS OF BOTH KNEES: Primary | ICD-10-CM

## 2025-07-18 PROCEDURE — 97110 THERAPEUTIC EXERCISES: CPT

## 2025-07-18 PROCEDURE — 97112 NEUROMUSCULAR REEDUCATION: CPT

## 2025-07-18 PROCEDURE — 99999 PR PBB SHADOW E&M-EST. PATIENT-LVL IV: CPT | Mod: PBBFAC,,, | Performed by: ORTHOPAEDIC SURGERY

## 2025-07-18 PROCEDURE — 97140 MANUAL THERAPY 1/> REGIONS: CPT

## 2025-07-18 PROCEDURE — 97016 VASOPNEUMATIC DEVICE THERAPY: CPT

## 2025-07-18 RX ORDER — METHYLPREDNISOLONE ACETATE 40 MG/ML
40 INJECTION, SUSPENSION INTRA-ARTICULAR; INTRALESIONAL; INTRAMUSCULAR; SOFT TISSUE
Status: DISCONTINUED | OUTPATIENT
Start: 2025-07-18 | End: 2025-07-18 | Stop reason: HOSPADM

## 2025-07-18 RX ADMIN — METHYLPREDNISOLONE ACETATE 40 MG: 40 INJECTION, SUSPENSION INTRA-ARTICULAR; INTRALESIONAL; INTRAMUSCULAR; SOFT TISSUE at 08:07

## 2025-07-18 NOTE — PROGRESS NOTES
Outpatient Rehab    Physical Therapy Visit    Patient Name: Shelly Kam  MRN: [id number]  YOB: 1975  Encounter Date: 7/18/2025    Therapy Diagnosis:   Encounter Diagnoses   Name Primary?    Acute medial meniscus tear of left knee, initial encounter Yes    S/P arthroscopic partial medial meniscectomy     Weakness of left lower extremity     Decreased ROM of left knee        Physician: Terrell Scherer MD    Physician Orders: Eval and Treat  Medical Diagnosis: Acute medial meniscus tear of left knee, subsequent encounter  Surgical Diagnosis: S/P Left Medial Menisectomy/Chondroplasty   Surgical Date: Not applicable for this Episode  Days Since Last Surgery: Not applicable for this Episode    Visit # / Visits Authorized:  8 / 10  Insurance Authorization Period: 6/5/2025 to 12/31/2025  Date of Evaluation: 6/5/2025  Plan of Care Certification: 7/3/2025 to 7/31/2025      PT/PTA:     Number of PTA visits since last PT visit:   Time In: 1000   Time Out: 1050  Total Time (in minutes): 50   Total Billable Time (in minutes): 40    FOTO:  Intake Score:  %  Survey Score 2:  %  Survey Score 3:  %    Precautions:       Subjective   She saw Dr. Scherer today and got a cortisone injection shot. He told her the increased amount of pain in her knee may be from the arthritis..  Pain reported as 5/10.      Objective            Treatment:   Treatment:  CPT Intervention Performed   Today Duration / Intensity   MT  Patella MOBS and Gentle Tib Fem MOBS x  08 minutes     TE Prone knee hang x 5 minutes with Hot pack on back of knee to help with relaxation     Supine Heel slides x 3 minutes     Prone Quad Stretch x 10sx 10     Tailgaiters x 3 mins 19#      Leg Press  - 75# 3x10 and Single leg 35# 3x10       Sit to stands -  3x10 from high low mat      Knee Flexion Matrix - 3x10 35#     Knee Extension Matrix  - 15# double leg           NMR Bridges x 3x10      Clamshells x GTB 3x10      Sidelying hip abduction x 3x10        "SLR x  2x10 with strap assist       Step ups x 4" box 3x10      Maria Victoria walking in // bars - 8 laps alternating lead foot focused on heel strike and quad contraction.       Standing Hip abduction on 2" box  - RTB 3x10 only on the op leg     Standing Hip Extension on 2" box  - RTB 3x10 only on the op leg   TA                                                            Gameready ice and compression low x 10 minutes              PLAN  Nu-step, NMES QS, SAQ, LAQ, SLR, SL hip abduction, Standing Hip 3 way, Mini Squats, Leg press             CPT Codes available for Billing:   (10) minutes of Manual therapy (MT) to improve pain and ROM.  (15) minutes of Therapeutic Exercise (TE) to develop strength, endurance, range of motion, and flexibility.  (15) minutes of Neuromuscular Re-Education (NMR)  to improve: Balance, Coordination, Kinesthetic, Sense, Proprioception, and Posture.  (00) minutes of Therapeutic Activities (TA) to improve functional performance.  Vasopneumatic Device Therapy () for management of swelling/edema. (24398)  Unattended Electrical Stimulation (ES) for muscle performance or pain modulation.  BFR: Blood flow restriction applied during exercise       Time Entry(in minutes):       Assessment & Plan   Assessment: Patient with limited tolerance to this session today due to just getting an injection. Patient was able to participate in her hip exercises and to work on pain modulation work.   Evaluation/Treatment Tolerance: Patient tolerated treatment well    The patient will continue to benefit from skilled outpatient physical therapy in order to address the deficits listed in the problem list on the initial evaluation, provide patient and family education, and maximize the patients level of independence in the home and community environments.     The patient's spiritual, cultural, and educational needs were considered, and the patient is agreeable to the plan of care and goals.           Plan: Continue Plan " of Care (POC) and progress per patient tolerance. See treatment section for details on planned progressions next session.    Goals:   Active       LTG       Pt will report worst pain of 1-2/10 in order to progress toward max functional ability and improve quality of life                Start:  06/05/25    Expected End:  08/01/25            Patient will demonstrate improved function as indicated by a score of greater than or equal to 60 out of 100 on FOTO.                Start:  06/05/25    Expected End:  08/01/25            Patient will improve AROM to normal limits in order to return to maximal functional potential and improve quality of life.         Start:  06/05/25    Expected End:  08/01/25            Patient will achieve 90% LSI on quad and hamstring dynamometer testing to improve functional independence.        Start:  06/05/25    Expected End:  08/01/25               STG       Pt will report worst pain of 5/10 in order to progress toward max functional ability and improve quality of life                Start:  06/05/25    Expected End:  07/04/25            Patient will demonstrate improved function as indicated by a score of greater than or equal to 45 out of 100 on FOTO.                Start:  06/05/25    Expected End:  07/04/25            Patient will improve AROM to 50% of stated goals, listed in objective measures above, in order to progress towards independence with functional activities.         Start:  06/05/25    Expected End:  07/04/25            Patient will improve strength by 1/2 a grade, in order to progress towards independence with functional activities.                Start:  06/05/25    Expected End:  07/04/25            Patient will demonstrate independence with HEP in order to progress toward functional independence.        Start:  06/05/25    Expected End:  07/04/25                  Renny Edmondson PT

## 2025-07-18 NOTE — PROCEDURES
Large Joint Aspiration/Injection: L knee    Date/Time: 7/18/2025 8:30 AM    Performed by: Terrell Scherer MD  Authorized by: Terrell Scherer MD    Consent Done?:  Yes (Verbal)  Indications:  Pain  Timeout: prior to procedure the correct patient, procedure, and site was verified    Prep: patient was prepped and draped in usual sterile fashion      Local anesthesia used?: Yes    Anesthesia:  Local infiltration  Local anesthetic:  Topical anesthetic and bupivacaine 0.5% without epinephrine  Anesthetic total (ml):  1      Details:  Needle Size:  22 G  Ultrasonic Guidance for needle placement?: No    Approach:  Medial  Location:  Knee  Site:  L knee  Medications:  40 mg methylPREDNISolone acetate 40 mg/mL  Patient tolerance:  Patient tolerated the procedure well with no immediate complications

## 2025-07-18 NOTE — PATIENT INSTRUCTIONS
Encounter Diagnosis   Name Primary?    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  Current pain level 6/10    TREATMENT/PLAN:  40 mg Depo-Medrol +1 cc Marcaine intra-articular injection medial joint line of the left knee  Order single injection Visco supplement for injection in 3-4 weeks left knee  Return to office 3-4 weeks  Finish last visit with therapy and continue home exercise protocol.  Conservative care for arthritis at this point.  The patient has progressive pain despite conservative treatment may become a future candidate for knee replacement

## 2025-07-20 ENCOUNTER — HOSPITAL ENCOUNTER (OUTPATIENT)
Dept: RADIOLOGY | Facility: HOSPITAL | Age: 50
Discharge: HOME | End: 2025-07-20
Attending: ANESTHESIOLOGY
Payer: COMMERCIAL

## 2025-07-20 DIAGNOSIS — M54.16 LUMBAR RADICULOPATHY: ICD-10-CM

## 2025-07-20 PROCEDURE — 72148 MRI LUMBAR SPINE W/O DYE: CPT | Mod: TC

## 2025-07-20 PROCEDURE — 72148 MRI LUMBAR SPINE W/O DYE: CPT | Mod: 26,,, | Performed by: RADIOLOGY

## 2025-08-05 ENCOUNTER — TELEPHONE (OUTPATIENT)
Dept: ORTHOPEDICS | Facility: CLINIC | Age: 50
End: 2025-08-05
Payer: COMMERCIAL

## 2025-08-05 DIAGNOSIS — M17.12 OSTEOARTHRITIS OF LEFT KNEE, UNSPECIFIED OSTEOARTHRITIS TYPE: Primary | ICD-10-CM

## 2025-08-05 NOTE — TELEPHONE ENCOUNTER
Called patient to speak with her about the visco denial for her left knee. Per precert patients insurance does not cover any visco product. Patient stated she is in a lot of pain and wanted to know what can be done for relief. I spoke with Dr. Scherer and offered a few options to the patient. 1. Pay out of pocket for the visco, or PRP injections , 2. Try steroid injections to continue conservative treatment 3. When he went in to clean out the meniscus during her scope surgery he did clean the meniscus but he also saw patient was bone to bone on the medial side of her knee. With the grade of arthritis on the medial side of the knee the patient is a candidate for partial knee replacement. The patient said she will think about and call us back to let us know if she would like to try CSI or a partial knee replacement. She also asked for something for pain I advised patient Dr. Scherer doesn't send out pain medication for arthritis pain.  I spoke with Dr. Scherer since tramadol keeps patient up he will send in Norco 5 mg 21 tabs. Patient understood this is a one time fill and she will need to make a decision on conservative treatment or partial knee replacement.   
Walk in

## 2025-08-06 ENCOUNTER — TELEPHONE (OUTPATIENT)
Dept: ORTHOPEDICS | Facility: CLINIC | Age: 50
End: 2025-08-06
Payer: COMMERCIAL

## 2025-08-06 RX ORDER — HYDROCODONE BITARTRATE AND ACETAMINOPHEN 5; 325 MG/1; MG/1
1 TABLET ORAL EVERY 6 HOURS PRN
Qty: 21 TABLET | Refills: 0 | Status: SHIPPED | OUTPATIENT
Start: 2025-08-06

## 2025-08-06 NOTE — TELEPHONE ENCOUNTER
Patient has been called regarding left knee injection denial.Patient ststed she was approved for [facility] Financial Assistance program anf they may be able to help with injection.Patient explained she been out of work for 3 month and still have no relief with left knee. She also was scheduled f or a evaluation with  on 08/20/2025 at 7:30am to discuss further treatment and plan.Also to determine if she can return back to work.

## 2025-08-08 ENCOUNTER — TELEPHONE (OUTPATIENT)
Dept: ORTHOPEDICS | Facility: CLINIC | Age: 50
End: 2025-08-08
Payer: COMMERCIAL

## 2025-08-08 NOTE — TELEPHONE ENCOUNTER
Called patient and left a voicemail       Copied from CRM #[id number]. Topic: General Inquiry - Patient Advice  >> Aug 8, 2025 12:07 PM Alirio wrote:  Type:  Needs Medical Advice    Who Called: salena  Symptoms (please be specific): regarding injections    How long has patient had these symptoms:    Pharmacy name and phone #:    Would the patient rather a call back or a response via [facility]? Call back  Best Call Back Number: [phone] ([place])  Additional Information: regarding programs to get financial assistance for injections

## 2025-08-16 DIAGNOSIS — J45.30 MILD PERSISTENT ASTHMA WITHOUT COMPLICATION: ICD-10-CM

## 2025-08-25 RX ORDER — ALBUTEROL SULFATE 90 UG/1
INHALANT RESPIRATORY (INHALATION)
Qty: 18 G | Refills: 0 | Status: SHIPPED | OUTPATIENT
Start: 2025-08-25

## 2025-09-02 ENCOUNTER — OFFICE VISIT (OUTPATIENT)
Dept: ORTHOPEDICS | Facility: CLINIC | Age: 50
End: 2025-09-02
Payer: COMMERCIAL

## 2025-09-02 VITALS — BODY MASS INDEX: 43.12 KG/M2 | HEIGHT: 65 IN | WEIGHT: 258.81 LBS

## 2025-09-02 DIAGNOSIS — M17.12 OSTEOARTHRITIS OF LEFT KNEE, UNSPECIFIED OSTEOARTHRITIS TYPE: Primary | ICD-10-CM

## 2025-09-02 DIAGNOSIS — Z98.890 STATUS POST ARTHROSCOPY OF LEFT KNEE: ICD-10-CM

## 2025-09-02 DIAGNOSIS — M17.12 PRIMARY OSTEOARTHRITIS OF LEFT KNEE: ICD-10-CM

## 2025-09-02 PROCEDURE — G2211 COMPLEX E/M VISIT ADD ON: HCPCS | Mod: S$GLB,,, | Performed by: ORTHOPAEDIC SURGERY

## 2025-09-02 PROCEDURE — 1159F MED LIST DOCD IN RCRD: CPT | Mod: CPTII,S$GLB,, | Performed by: ORTHOPAEDIC SURGERY

## 2025-09-02 PROCEDURE — 1160F RVW MEDS BY RX/DR IN RCRD: CPT | Mod: CPTII,S$GLB,, | Performed by: ORTHOPAEDIC SURGERY

## 2025-09-02 PROCEDURE — 99213 OFFICE O/P EST LOW 20 MIN: CPT | Mod: S$GLB,,, | Performed by: ORTHOPAEDIC SURGERY

## 2025-09-02 PROCEDURE — 4010F ACE/ARB THERAPY RXD/TAKEN: CPT | Mod: CPTII,S$GLB,, | Performed by: ORTHOPAEDIC SURGERY

## 2025-09-02 PROCEDURE — 3008F BODY MASS INDEX DOCD: CPT | Mod: CPTII,S$GLB,, | Performed by: ORTHOPAEDIC SURGERY

## 2025-09-02 PROCEDURE — 99999 PR PBB SHADOW E&M-EST. PATIENT-LVL IV: CPT | Mod: PBBFAC,,, | Performed by: ORTHOPAEDIC SURGERY

## 2025-09-02 PROCEDURE — 3044F HG A1C LEVEL LT 7.0%: CPT | Mod: CPTII,S$GLB,, | Performed by: ORTHOPAEDIC SURGERY

## 2025-09-02 RX ORDER — CELECOXIB 200 MG/1
200 CAPSULE ORAL 2 TIMES DAILY
Qty: 60 CAPSULE | Refills: 0 | Status: SHIPPED | OUTPATIENT
Start: 2025-09-02

## (undated) DEVICE — HOOK LONE STAR BLUNT 12MM

## (undated) DEVICE — GOWN SURGICAL X-LARGE

## (undated) DEVICE — KIT TURNOVER

## (undated) DEVICE — NDL PNEUMO INSUFFLATI 120MM

## (undated) DEVICE — BANDAGE MATRIX HK LOOP 6IN 5YD

## (undated) DEVICE — SYR 30CC LUER LOCK

## (undated) DEVICE — Device

## (undated) DEVICE — KIT ANTIFOG W/SPONG & FLUID

## (undated) DEVICE — SOCKINETTE IMPERVIOUS 12X48IN

## (undated) DEVICE — DRAPE T EXTRM SURG 121X128X90

## (undated) DEVICE — SOL IRR NACL .9% 3000ML

## (undated) DEVICE — COVER PROXIMA MAYO STAND

## (undated) DEVICE — SUT ABS CLIP LAPRA-TY CTD

## (undated) DEVICE — DRESSING TRANS 4X4 TEGADERM

## (undated) DEVICE — DRAPE ARM DAVINCI XI

## (undated) DEVICE — SEE MEDLINE ITEM 157128

## (undated) DEVICE — GLOVE BIOGEL PI MICRO SZ 6.5

## (undated) DEVICE — DRAPE UINDERBUT GRAD PCH

## (undated) DEVICE — SUT CTD VICRYL 3-0 CR/SH

## (undated) DEVICE — OCCLUDER COLPO-PNEUMO STERILE

## (undated) DEVICE — GOWN POLY REINF BRTH SLV XL

## (undated) DEVICE — DRAPE U SPLIT SHEET 54X76IN

## (undated) DEVICE — APPLICATOR CHLORAPREP ORN 26ML

## (undated) DEVICE — SOL ELECTROLUBE ANTI-STIC

## (undated) DEVICE — DRESSING XEROFORM FOIL PK 1X8

## (undated) DEVICE — ADHESIVE DERMABOND ADVANCED

## (undated) DEVICE — SUT ETHILON 3-0 PS2 18 BLK

## (undated) DEVICE — SEE MEDLINE ITEM 157194

## (undated) DEVICE — SYR 50CC LL

## (undated) DEVICE — STAPLER SKIN PROXIMATE WIDE

## (undated) DEVICE — BANDAGE ACE DOUBLE STER 6IN

## (undated) DEVICE — DRAPE THREE-QTR REINF 53X77IN

## (undated) DEVICE — PACK BASIC SETUP SC BR

## (undated) DEVICE — SUT SILK 3-0 RB-1 30IN BLK

## (undated) DEVICE — SEAL UNIVERSAL 5MM-8MM XI

## (undated) DEVICE — PAD ABDOMINAL STERILE 8X10IN

## (undated) DEVICE — SUPPORT ULNA NERVE PROTECTOR

## (undated) DEVICE — SUT PROLENE 4-0 MONO 18IN

## (undated) DEVICE — SYR 3CC LUER LOC

## (undated) DEVICE — OBTURATOR BLADELESS 8MM XI CLR

## (undated) DEVICE — DRAPE STERI LONG

## (undated) DEVICE — UNDERGLOVE BIOGEL PI SZ 6.5 LF

## (undated) DEVICE — EVACUATOR WOUND BULB 100CC

## (undated) DEVICE — NDL HYPO REG 25G X 1 1/2

## (undated) DEVICE — SHEET EENT SPLIT

## (undated) DEVICE — TRAY SKIN SCRUB WET PREMIUM

## (undated) DEVICE — GOWN SURG 2XL DISP TIE BACK

## (undated) DEVICE — GOWN NONREINF SET-IN SLV 2XL

## (undated) DEVICE — GLOVE SURGICAL LATEX SZ 7

## (undated) DEVICE — FIBRILLAR ABS HEMOSTAT 4X4

## (undated) DEVICE — GOWN XX-LARGE

## (undated) DEVICE — TRAY CATH FOL SIL URIMTR 16FR

## (undated) DEVICE — GLOVE SURG BIOGEL LATEX SZ 7.5

## (undated) DEVICE — SEALER LIGASURE IMPACT 18CM

## (undated) DEVICE — PAD CAST SPECIALIST STRL 6

## (undated) DEVICE — UNDERGLOVES BIOGEL PI SIZE 8

## (undated) DEVICE — SUT MCRYL PLUS 4-0 PS2 27IN

## (undated) DEVICE — DRAPE LAP TIBURON 77X122IN

## (undated) DEVICE — SOL NS 1000CC

## (undated) DEVICE — NDL SAFETY 22G X 1.5 ECLIPSE

## (undated) DEVICE — SUT MONOCRYL 4-0 PS-1 UND

## (undated) DEVICE — BNDG COFLEX FOAM LF2 ST 4X5YD

## (undated) DEVICE — POSITIONER HEAD DONUT 9IN FOAM

## (undated) DEVICE — TOURNIQUET SB QC DP 44X4IN

## (undated) DEVICE — SHEARS HARMONIC CRVD 9 CM

## (undated) DEVICE — COVER TIP CURVED SCISSORS XI

## (undated) DEVICE — DRAPE LAVH SURG 109X109X100IN

## (undated) DEVICE — ELECTRODE REM PLYHSV RETURN 9

## (undated) DEVICE — SUT LIGACLIP SMALL XTRA

## (undated) DEVICE — SKINMARKER & RULER REGULAR X-F

## (undated) DEVICE — SEE MEDLINE ITEM 152622

## (undated) DEVICE — GLOVE SURG ULTRA TOUCH 8

## (undated) DEVICE — TOWEL OR DISP STRL BLUE 4/PK

## (undated) DEVICE — GAUZE SPONGE PEANUT STRL

## (undated) DEVICE — MANIFOLD 4 PORT

## (undated) DEVICE — CORD BIPOLAR 12 FOOT

## (undated) DEVICE — MANIPULATOR TIP RUMI ORANGE

## (undated) DEVICE — DRESSING AQUACEL AG 3.5X10IN

## (undated) DEVICE — SET PNEUMOCLEAR HEAT HUM SE HF

## (undated) DEVICE — SPONGE LAP 18X18 PREWASHED

## (undated) DEVICE — TUBING PUMP ARTHROSCOPY STRL

## (undated) DEVICE — CLIP MED TICALL

## (undated) DEVICE — PROBE SIMULATOR KRAFF

## (undated) DEVICE — UNDERGLOVES BIOGEL PI SIZE 7.5

## (undated) DEVICE — SYR 10CC LUER LOCK

## (undated) DEVICE — HEADREST ROUND DISP FOAM 9IN

## (undated) DEVICE — TRAY MINOR GEN SURG

## (undated) DEVICE — BLADE SURG #15 CARBON STEEL

## (undated) DEVICE — DRAPE COLUMN DAVINCI XI

## (undated) DEVICE — SPONGE COTTON TRAY 4X4IN

## (undated) DEVICE — ELECTRODE BLADE INSULATED 1 IN

## (undated) DEVICE — TUBE EMG NIM 7.0MM TRIVANTAGE

## (undated) DEVICE — COVER LIGHT HANDLE 80/CA

## (undated) DEVICE — SEE MEDLINE ITEM 154981

## (undated) DEVICE — GOWN SMARTGOWN LVL4 X-LONG XL

## (undated) DEVICE — SOL 9P NACL IRR PIC IL

## (undated) DEVICE — TUBING SUCTION STRAIGHT .25X20